# Patient Record
Sex: MALE | Race: WHITE | Employment: FULL TIME | URBAN - METROPOLITAN AREA
[De-identification: names, ages, dates, MRNs, and addresses within clinical notes are randomized per-mention and may not be internally consistent; named-entity substitution may affect disease eponyms.]

---

## 2017-01-09 ENCOUNTER — OFFICE VISIT (OUTPATIENT)
Dept: INTERNAL MEDICINE CLINIC | Age: 37
End: 2017-01-09

## 2017-01-09 VITALS
SYSTOLIC BLOOD PRESSURE: 146 MMHG | HEART RATE: 84 BPM | HEIGHT: 71 IN | WEIGHT: 287 LBS | BODY MASS INDEX: 40.18 KG/M2 | DIASTOLIC BLOOD PRESSURE: 108 MMHG

## 2017-01-09 DIAGNOSIS — I10 ESSENTIAL HYPERTENSION: Primary | ICD-10-CM

## 2017-01-09 DIAGNOSIS — E66.9 OBESITY, UNSPECIFIED OBESITY SEVERITY, UNSPECIFIED OBESITY TYPE: ICD-10-CM

## 2017-01-09 DIAGNOSIS — G47.33 OSA (OBSTRUCTIVE SLEEP APNEA): ICD-10-CM

## 2017-01-09 DIAGNOSIS — F32.A DEPRESSION, UNSPECIFIED DEPRESSION TYPE: ICD-10-CM

## 2017-01-09 PROCEDURE — 99204 OFFICE O/P NEW MOD 45 MIN: CPT | Performed by: INTERNAL MEDICINE

## 2017-01-09 RX ORDER — HYDROCHLOROTHIAZIDE 12.5 MG/1
12.5 CAPSULE, GELATIN COATED ORAL DAILY
Qty: 30 CAPSULE | Refills: 0 | Status: SHIPPED | OUTPATIENT
Start: 2017-01-09 | End: 2017-01-09 | Stop reason: SDUPTHER

## 2017-01-09 RX ORDER — HYDROCHLOROTHIAZIDE 12.5 MG/1
12.5 CAPSULE, GELATIN COATED ORAL DAILY
Qty: 30 CAPSULE | Refills: 3 | Status: SHIPPED | OUTPATIENT
Start: 2017-01-09 | End: 2017-01-09 | Stop reason: SDUPTHER

## 2017-01-09 RX ORDER — HYDROCHLOROTHIAZIDE 12.5 MG/1
12.5 CAPSULE, GELATIN COATED ORAL DAILY
Qty: 30 CAPSULE | Refills: 3 | Status: SHIPPED | OUTPATIENT
Start: 2017-01-09 | End: 2017-01-11 | Stop reason: SDUPTHER

## 2017-01-09 RX ORDER — SERTRALINE HYDROCHLORIDE 100 MG/1
100 TABLET, FILM COATED ORAL DAILY
COMMUNITY
End: 2017-05-01 | Stop reason: SDUPTHER

## 2017-01-09 ASSESSMENT — ENCOUNTER SYMPTOMS
EYE DISCHARGE: 0
EYE PAIN: 0
WHEEZING: 0
ABDOMINAL PAIN: 0
VOICE CHANGE: 0
BACK PAIN: 0
COLOR CHANGE: 0
VOMITING: 0
SHORTNESS OF BREATH: 0
COUGH: 0
NAUSEA: 0
RHINORRHEA: 0

## 2017-01-11 RX ORDER — HYDROCHLOROTHIAZIDE 12.5 MG/1
12.5 CAPSULE, GELATIN COATED ORAL DAILY
Qty: 90 CAPSULE | Refills: 3 | Status: SHIPPED | OUTPATIENT
Start: 2017-01-11 | End: 2017-05-01 | Stop reason: SDUPTHER

## 2017-01-28 ENCOUNTER — HOSPITAL ENCOUNTER (OUTPATIENT)
Dept: OTHER | Age: 37
Discharge: HOME | End: 2017-01-28
Attending: INTERNAL MEDICINE | Admitting: INTERNAL MEDICINE

## 2017-01-28 LAB
A/G RATIO: 1.7 (ref 1.1–2.2)
ALBUMIN SERPL-MCNC: 4.5 G/DL (ref 3.4–5)
ALP BLD-CCNC: 72 U/L (ref 40–129)
ALT SERPL-CCNC: 22 U/L (ref 10–40)
ANION GAP SERPL CALCULATED.3IONS-SCNC: 14 MMOL/L (ref 3–16)
AST SERPL-CCNC: 23 U/L (ref 15–37)
BILIRUB SERPL-MCNC: 0.5 MG/DL (ref 0–1)
BILIRUBIN URINE: NEGATIVE
BLOOD, URINE: NEGATIVE
BUN BLDV-MCNC: 17 MG/DL (ref 7–20)
CALCIUM SERPL-MCNC: 9.2 MG/DL (ref 8.3–10.6)
CHLORIDE BLD-SCNC: 100 MMOL/L (ref 99–110)
CHOLESTEROL, TOTAL: 197 MG/DL (ref 0–199)
CLARITY: CLEAR
CO2: 23 MMOL/L (ref 21–32)
COLOR: YELLOW
COMMENT UA: NORMAL
CREAT SERPL-MCNC: 0.8 MG/DL (ref 0.9–1.3)
EPITHELIAL CELLS, UA: 0 /HPF (ref 0–5)
GFR AFRICAN AMERICAN: >60
GFR NON-AFRICAN AMERICAN: >60
GLOBULIN: 2.7 G/DL
GLUCOSE BLD-MCNC: 81 MG/DL (ref 70–99)
GLUCOSE URINE: NEGATIVE MG/DL
HDLC SERPL-MCNC: 38 MG/DL (ref 40–60)
HYALINE CASTS: 0 /HPF (ref 0–8)
KETONES, URINE: NEGATIVE MG/DL
LDL CHOLESTEROL CALCULATED: 132 MG/DL
LEUKOCYTE ESTERASE, URINE: NEGATIVE
MICROSCOPIC EXAMINATION: YES
NITRITE, URINE: NEGATIVE
PH UA: 6
POTASSIUM SERPL-SCNC: 4.3 MMOL/L (ref 3.5–5.1)
PROTEIN UA: ABNORMAL MG/DL
RBC UA: 2 /HPF (ref 0–4)
SODIUM BLD-SCNC: 137 MMOL/L (ref 136–145)
SPECIFIC GRAVITY UA: 1.02
TOTAL PROTEIN: 7.2 G/DL (ref 6.4–8.2)
TRIGL SERPL-MCNC: 134 MG/DL (ref 0–150)
TSH REFLEX: 1.32 UIU/ML (ref 0.27–4.2)
URINE TYPE: ABNORMAL
UROBILINOGEN, URINE: 1 E.U./DL
VLDLC SERPL CALC-MCNC: 27 MG/DL
WBC UA: 1 /HPF (ref 0–5)

## 2017-02-22 ENCOUNTER — OFFICE VISIT (OUTPATIENT)
Dept: INTERNAL MEDICINE CLINIC | Age: 37
End: 2017-02-22

## 2017-02-22 VITALS
WEIGHT: 286 LBS | HEIGHT: 71 IN | HEART RATE: 80 BPM | DIASTOLIC BLOOD PRESSURE: 100 MMHG | SYSTOLIC BLOOD PRESSURE: 140 MMHG | BODY MASS INDEX: 40.04 KG/M2

## 2017-02-22 DIAGNOSIS — K21.9 GASTROESOPHAGEAL REFLUX DISEASE WITHOUT ESOPHAGITIS: ICD-10-CM

## 2017-02-22 DIAGNOSIS — E66.09 NON MORBID OBESITY DUE TO EXCESS CALORIES: ICD-10-CM

## 2017-02-22 DIAGNOSIS — E78.5 DYSLIPIDEMIA: ICD-10-CM

## 2017-02-22 DIAGNOSIS — F32.A DEPRESSION, UNSPECIFIED DEPRESSION TYPE: ICD-10-CM

## 2017-02-22 DIAGNOSIS — I10 ESSENTIAL HYPERTENSION: Primary | ICD-10-CM

## 2017-02-22 PROCEDURE — 99214 OFFICE O/P EST MOD 30 MIN: CPT | Performed by: INTERNAL MEDICINE

## 2017-02-22 RX ORDER — OMEPRAZOLE 20 MG/1
20 CAPSULE, DELAYED RELEASE ORAL DAILY
COMMUNITY
End: 2017-05-01 | Stop reason: SDUPTHER

## 2017-02-22 RX ORDER — LISINOPRIL 40 MG/1
40 TABLET ORAL DAILY
Qty: 30 TABLET | Refills: 3 | Status: SHIPPED | OUTPATIENT
Start: 2017-02-22 | End: 2017-05-01 | Stop reason: SDUPTHER

## 2017-02-22 ASSESSMENT — PATIENT HEALTH QUESTIONNAIRE - PHQ9
2. FEELING DOWN, DEPRESSED OR HOPELESS: 0
SUM OF ALL RESPONSES TO PHQ QUESTIONS 1-9: 0
1. LITTLE INTEREST OR PLEASURE IN DOING THINGS: 0
SUM OF ALL RESPONSES TO PHQ9 QUESTIONS 1 & 2: 0

## 2017-02-22 ASSESSMENT — ENCOUNTER SYMPTOMS
ABDOMINAL PAIN: 0
COUGH: 0
NAUSEA: 0
VOMITING: 0
SHORTNESS OF BREATH: 0
WHEEZING: 0

## 2017-03-13 ENCOUNTER — TELEPHONE (OUTPATIENT)
Dept: INTERNAL MEDICINE CLINIC | Age: 37
End: 2017-03-13

## 2017-04-27 ENCOUNTER — TELEPHONE (OUTPATIENT)
Dept: INTERNAL MEDICINE CLINIC | Age: 37
End: 2017-04-27

## 2017-05-01 ENCOUNTER — OFFICE VISIT (OUTPATIENT)
Dept: INTERNAL MEDICINE CLINIC | Age: 37
End: 2017-05-01

## 2017-05-01 VITALS
HEART RATE: 80 BPM | BODY MASS INDEX: 40.32 KG/M2 | WEIGHT: 288 LBS | SYSTOLIC BLOOD PRESSURE: 136 MMHG | HEIGHT: 71 IN | DIASTOLIC BLOOD PRESSURE: 86 MMHG

## 2017-05-01 DIAGNOSIS — E78.5 DYSLIPIDEMIA: ICD-10-CM

## 2017-05-01 DIAGNOSIS — K21.9 GASTROESOPHAGEAL REFLUX DISEASE WITHOUT ESOPHAGITIS: ICD-10-CM

## 2017-05-01 DIAGNOSIS — F32.A DEPRESSION, UNSPECIFIED DEPRESSION TYPE: ICD-10-CM

## 2017-05-01 DIAGNOSIS — I10 ESSENTIAL HYPERTENSION: Primary | ICD-10-CM

## 2017-05-01 PROCEDURE — 99214 OFFICE O/P EST MOD 30 MIN: CPT | Performed by: INTERNAL MEDICINE

## 2017-05-01 RX ORDER — LISINOPRIL 40 MG/1
40 TABLET ORAL DAILY
Qty: 90 TABLET | Refills: 1 | Status: SHIPPED | OUTPATIENT
Start: 2017-05-01 | End: 2018-01-24 | Stop reason: SDUPTHER

## 2017-05-01 RX ORDER — OMEPRAZOLE 20 MG/1
20 CAPSULE, DELAYED RELEASE ORAL DAILY
Qty: 90 CAPSULE | Refills: 1 | Status: SHIPPED | OUTPATIENT
Start: 2017-05-01 | End: 2018-01-23 | Stop reason: SDUPTHER

## 2017-05-01 RX ORDER — VERAPAMIL HYDROCHLORIDE 240 MG/1
240 TABLET, FILM COATED, EXTENDED RELEASE ORAL NIGHTLY
Qty: 90 TABLET | Refills: 1 | Status: SHIPPED | OUTPATIENT
Start: 2017-05-01 | End: 2018-01-23 | Stop reason: SDUPTHER

## 2017-05-01 RX ORDER — SERTRALINE HYDROCHLORIDE 100 MG/1
100 TABLET, FILM COATED ORAL DAILY
Qty: 90 TABLET | Refills: 1 | Status: SHIPPED | OUTPATIENT
Start: 2017-05-01 | End: 2018-01-23 | Stop reason: SDUPTHER

## 2017-05-01 RX ORDER — HYDROCHLOROTHIAZIDE 12.5 MG/1
12.5 CAPSULE, GELATIN COATED ORAL DAILY
Qty: 90 CAPSULE | Refills: 3 | Status: SHIPPED | OUTPATIENT
Start: 2017-05-01 | End: 2018-02-12 | Stop reason: DRUGHIGH

## 2017-05-01 ASSESSMENT — ENCOUNTER SYMPTOMS
COUGH: 0
SHORTNESS OF BREATH: 0
WHEEZING: 0

## 2017-09-18 ENCOUNTER — OFFICE VISIT (OUTPATIENT)
Dept: INTERNAL MEDICINE CLINIC | Age: 37
End: 2017-09-18

## 2017-09-18 VITALS
DIASTOLIC BLOOD PRESSURE: 88 MMHG | SYSTOLIC BLOOD PRESSURE: 138 MMHG | WEIGHT: 289 LBS | BODY MASS INDEX: 40.46 KG/M2 | HEIGHT: 71 IN | HEART RATE: 80 BPM

## 2017-09-18 DIAGNOSIS — I10 ESSENTIAL HYPERTENSION: Primary | ICD-10-CM

## 2017-09-18 DIAGNOSIS — F32.A DEPRESSION, UNSPECIFIED DEPRESSION TYPE: ICD-10-CM

## 2017-09-18 DIAGNOSIS — K21.9 GASTROESOPHAGEAL REFLUX DISEASE WITHOUT ESOPHAGITIS: ICD-10-CM

## 2017-09-18 PROCEDURE — 99214 OFFICE O/P EST MOD 30 MIN: CPT | Performed by: INTERNAL MEDICINE

## 2017-09-18 ASSESSMENT — ENCOUNTER SYMPTOMS
WHEEZING: 0
COUGH: 0

## 2018-01-23 DIAGNOSIS — K21.9 GASTROESOPHAGEAL REFLUX DISEASE WITHOUT ESOPHAGITIS: ICD-10-CM

## 2018-01-23 DIAGNOSIS — F32.A DEPRESSION, UNSPECIFIED DEPRESSION TYPE: ICD-10-CM

## 2018-01-23 DIAGNOSIS — I10 ESSENTIAL HYPERTENSION: ICD-10-CM

## 2018-01-24 DIAGNOSIS — I10 ESSENTIAL HYPERTENSION: ICD-10-CM

## 2018-01-24 RX ORDER — VERAPAMIL HYDROCHLORIDE 240 MG/1
TABLET, FILM COATED, EXTENDED RELEASE ORAL
Qty: 90 TABLET | Refills: 1 | Status: SHIPPED | OUTPATIENT
Start: 2018-01-24 | End: 2018-05-14 | Stop reason: SDUPTHER

## 2018-01-24 RX ORDER — SERTRALINE HYDROCHLORIDE 100 MG/1
TABLET, FILM COATED ORAL
Qty: 90 TABLET | Refills: 1 | Status: SHIPPED | OUTPATIENT
Start: 2018-01-24 | End: 2018-05-14 | Stop reason: SDUPTHER

## 2018-01-24 RX ORDER — LISINOPRIL 40 MG/1
TABLET ORAL
Qty: 90 TABLET | Refills: 1 | Status: SHIPPED | OUTPATIENT
Start: 2018-01-24 | End: 2018-05-14 | Stop reason: SDUPTHER

## 2018-01-24 RX ORDER — OMEPRAZOLE 20 MG/1
CAPSULE, DELAYED RELEASE ORAL
Qty: 90 CAPSULE | Refills: 1 | Status: SHIPPED | OUTPATIENT
Start: 2018-01-24 | End: 2018-10-17 | Stop reason: SDUPTHER

## 2018-01-29 ENCOUNTER — OFFICE VISIT (OUTPATIENT)
Dept: INTERNAL MEDICINE CLINIC | Age: 38
End: 2018-01-29

## 2018-01-29 ENCOUNTER — HOSPITAL ENCOUNTER (OUTPATIENT)
Dept: OTHER | Age: 38
Discharge: HOME | End: 2018-01-29
Attending: INTERNAL MEDICINE | Admitting: INTERNAL MEDICINE

## 2018-01-29 VITALS
SYSTOLIC BLOOD PRESSURE: 118 MMHG | BODY MASS INDEX: 39.9 KG/M2 | HEART RATE: 72 BPM | DIASTOLIC BLOOD PRESSURE: 86 MMHG | HEIGHT: 71 IN | WEIGHT: 285 LBS

## 2018-01-29 DIAGNOSIS — T14.8XXA PUNCTURE WOUND: ICD-10-CM

## 2018-01-29 DIAGNOSIS — F32.A DEPRESSION, UNSPECIFIED DEPRESSION TYPE: ICD-10-CM

## 2018-01-29 DIAGNOSIS — I10 ESSENTIAL HYPERTENSION: Primary | ICD-10-CM

## 2018-01-29 DIAGNOSIS — E78.5 DYSLIPIDEMIA: ICD-10-CM

## 2018-01-29 DIAGNOSIS — K21.9 GASTROESOPHAGEAL REFLUX DISEASE WITHOUT ESOPHAGITIS: ICD-10-CM

## 2018-01-29 PROCEDURE — 99214 OFFICE O/P EST MOD 30 MIN: CPT | Performed by: INTERNAL MEDICINE

## 2018-01-29 RX ORDER — CEPHALEXIN 500 MG/1
500 CAPSULE ORAL 3 TIMES DAILY
Qty: 21 CAPSULE | Refills: 0 | Status: SHIPPED | OUTPATIENT
Start: 2018-01-29 | End: 2018-02-05

## 2018-01-29 ASSESSMENT — ENCOUNTER SYMPTOMS
SHORTNESS OF BREATH: 0
CHEST TIGHTNESS: 0
EYE REDNESS: 0
WHEEZING: 0
ABDOMINAL PAIN: 0
NAUSEA: 0

## 2018-01-29 NOTE — PROGRESS NOTES
Readings from Last 3 Encounters:   01/29/18 285 lb (129.3 kg)   09/18/17 289 lb (131.1 kg)   05/01/17 288 lb (130.6 kg)       Objective:   Physical Exam   Constitutional: He is oriented to person, place, and time. He appears well-developed and well-nourished. No distress. Eyes: Conjunctivae are normal. No scleral icterus. Cardiovascular: Normal rate, regular rhythm and normal heart sounds. Pulmonary/Chest: Effort normal and breath sounds normal.   Abdominal: Soft. Bowel sounds are normal.   Musculoskeletal:   +ve puncture wound at the right 3rd finger. No drainage or redness. NO grossly palpable FB  Last Tdap 6 years ago   Neurological: He is alert and oriented to person, place, and time. Nursing note and vitals reviewed.     CBC:   Lab Results   Component Value Date    WBC 8.9 11/01/2012    HGB 14.7 11/01/2012    HCT 44.0 11/01/2012     11/01/2012     CMP:   Lab Results   Component Value Date     01/28/2017    K 4.3 01/28/2017     01/28/2017    CO2 23 01/28/2017    ANIONGAP 14 01/28/2017    GLUCOSE 81 01/28/2017    BUN 17 01/28/2017    CREATININE 0.8 01/28/2017    GFRAA >60 01/28/2017    GFRAA >60 11/01/2012    CALCIUM 9.2 01/28/2017    PROT 7.2 01/28/2017    LABALBU 4.5 01/28/2017    AGRATIO 1.7 01/28/2017    BILITOT 0.5 01/28/2017    ALKPHOS 72 01/28/2017    ALT 22 01/28/2017    AST 23 01/28/2017    GLOB 2.7 01/28/2017     URINALYSIS:   Lab Results   Component Value Date    GLUCOSEU Negative 01/28/2017    KETUA Negative 01/28/2017    SPECGRAV 1.025 01/28/2017    BLOODU Negative 01/28/2017    PHUR 6.0 01/28/2017    PROTEINU TRACE 01/28/2017    NITRU Negative 01/28/2017    LEUKOCYTESUR Negative 01/28/2017    LABMICR YES 01/28/2017    URINETYPE Not Specified 01/28/2017   MICRO/ALB:   Lab Results   Component Value Date    LABMICR YES 01/28/2017     LIPID:   Lab Results   Component Value Date    CHOL 197 01/28/2017    TRIG 134 01/28/2017    HDL 38 01/28/2017    LDLCALC 132 01/28/2017

## 2018-02-12 ENCOUNTER — OFFICE VISIT (OUTPATIENT)
Dept: INTERNAL MEDICINE CLINIC | Age: 38
End: 2018-02-12

## 2018-02-12 VITALS
BODY MASS INDEX: 38.5 KG/M2 | DIASTOLIC BLOOD PRESSURE: 96 MMHG | HEIGHT: 71 IN | WEIGHT: 275 LBS | HEART RATE: 72 BPM | SYSTOLIC BLOOD PRESSURE: 130 MMHG

## 2018-02-12 DIAGNOSIS — S69.91XD INJURY OF FINGER OF RIGHT HAND, SUBSEQUENT ENCOUNTER: Primary | ICD-10-CM

## 2018-02-12 DIAGNOSIS — I10 ESSENTIAL HYPERTENSION: ICD-10-CM

## 2018-02-12 PROCEDURE — 99213 OFFICE O/P EST LOW 20 MIN: CPT | Performed by: INTERNAL MEDICINE

## 2018-02-12 RX ORDER — HYDROCHLOROTHIAZIDE 25 MG/1
25 TABLET ORAL DAILY
Qty: 90 TABLET | Refills: 1 | Status: SHIPPED | OUTPATIENT
Start: 2018-02-12 | End: 2018-05-14 | Stop reason: SDUPTHER

## 2018-02-12 ASSESSMENT — ENCOUNTER SYMPTOMS
WHEEZING: 0
COUGH: 0
SHORTNESS OF BREATH: 0

## 2018-02-12 NOTE — PROGRESS NOTES
Subjective:      Chief Complaint   Patient presents with    2 Week Follow-Up    Hypertension    Weight Loss     10 more #--deliberate. portion, healthier choices   Patient is here for follow-up visit to check his right third finger puncture wound. Patient ID: Priya Talamantes is a 44 y.o. male. HPI  He denies any more pain, swelling and redness to the right third finger. He completed course of antibiotic. He does not monitor his blood pressure. He has been doing diet exercise weight loss. He denies chest pain shortness of breath leg swelling palpitation. Review of Systems   Constitutional: Negative for diaphoresis and unexpected weight change. Respiratory: Negative for cough, shortness of breath and wheezing. Cardiovascular: Negative for chest pain and palpitations. Neurological: Negative for dizziness and light-headedness. Allergies   Allergen Reactions    Morphine Hives   '    Vitals:    02/12/18 1645   BP: (!) 130/96   Pulse:        Objective:   Physical Exam   Constitutional: He appears well-developed and well-nourished. Cardiovascular: Normal rate, regular rhythm and normal heart sounds. Pulmonary/Chest: Effort normal and breath sounds normal. No respiratory distress. He has no wheezes. Musculoskeletal:   Examination of the right third finger  No redness, swelling, tenderness, deformity. Nursing note and vitals reviewed. Assessment/Plan:  Libertad Escobar was seen today for 2 week follow-up, hypertension and weight loss. Diagnoses and all orders for this visit:    Injury of finger of right hand, subsequent encounter  Feeling better. Essential hypertension  Increase-     hydrochlorothiazide (HYDRODIURIL) 25 MG tablet; Take 1 tablet by mouth daily  Continue verapamil, lisinopril  F/u in 3 months  An After Visit Summary was printed and given to the patient. Documentation was done using voice recognition dragon software.   Every effort was made to ensure accuracy; however, inadvertent

## 2018-02-24 ENCOUNTER — HOSPITAL ENCOUNTER (OUTPATIENT)
Dept: OTHER | Age: 38
Discharge: HOME | End: 2018-02-24
Attending: INTERNAL MEDICINE | Admitting: INTERNAL MEDICINE

## 2018-02-24 DIAGNOSIS — E78.5 DYSLIPIDEMIA: ICD-10-CM

## 2018-02-24 DIAGNOSIS — I10 ESSENTIAL HYPERTENSION: ICD-10-CM

## 2018-02-24 LAB
ANION GAP SERPL CALCULATED.3IONS-SCNC: 14 MMOL/L (ref 3–16)
BUN BLDV-MCNC: 17 MG/DL (ref 7–20)
CALCIUM SERPL-MCNC: 8.8 MG/DL (ref 8.3–10.6)
CHLORIDE BLD-SCNC: 103 MMOL/L (ref 99–110)
CHOLESTEROL, TOTAL: 146 MG/DL (ref 0–199)
CO2: 25 MMOL/L (ref 21–32)
CREAT SERPL-MCNC: 0.7 MG/DL (ref 0.9–1.3)
GFR AFRICAN AMERICAN: >60
GFR NON-AFRICAN AMERICAN: >60
GLUCOSE BLD-MCNC: 90 MG/DL (ref 70–99)
HDLC SERPL-MCNC: 33 MG/DL (ref 40–60)
LDL CHOLESTEROL CALCULATED: 95 MG/DL
POTASSIUM SERPL-SCNC: 3.9 MMOL/L (ref 3.5–5.1)
SODIUM BLD-SCNC: 142 MMOL/L (ref 136–145)
TRIGL SERPL-MCNC: 92 MG/DL (ref 0–150)
TSH REFLEX: 1.26 UIU/ML (ref 0.27–4.2)
VLDLC SERPL CALC-MCNC: 18 MG/DL

## 2018-05-14 ENCOUNTER — OFFICE VISIT (OUTPATIENT)
Dept: INTERNAL MEDICINE CLINIC | Age: 38
End: 2018-05-14

## 2018-05-14 VITALS
WEIGHT: 270 LBS | SYSTOLIC BLOOD PRESSURE: 122 MMHG | DIASTOLIC BLOOD PRESSURE: 82 MMHG | HEIGHT: 71 IN | BODY MASS INDEX: 37.8 KG/M2 | HEART RATE: 72 BPM

## 2018-05-14 DIAGNOSIS — E78.5 DYSLIPIDEMIA: ICD-10-CM

## 2018-05-14 DIAGNOSIS — F32.A DEPRESSION, UNSPECIFIED DEPRESSION TYPE: ICD-10-CM

## 2018-05-14 DIAGNOSIS — K21.9 GASTROESOPHAGEAL REFLUX DISEASE WITHOUT ESOPHAGITIS: ICD-10-CM

## 2018-05-14 DIAGNOSIS — I10 ESSENTIAL HYPERTENSION: Primary | ICD-10-CM

## 2018-05-14 PROCEDURE — 99214 OFFICE O/P EST MOD 30 MIN: CPT | Performed by: INTERNAL MEDICINE

## 2018-05-14 RX ORDER — VERAPAMIL HYDROCHLORIDE 240 MG/1
TABLET, FILM COATED, EXTENDED RELEASE ORAL
Qty: 90 TABLET | Refills: 1 | Status: SHIPPED | OUTPATIENT
Start: 2018-05-14 | End: 2018-10-17 | Stop reason: SDUPTHER

## 2018-05-14 RX ORDER — CHLORAL HYDRATE 500 MG
1000 CAPSULE ORAL 2 TIMES DAILY
Qty: 180 CAPSULE | Refills: 1 | Status: SHIPPED | OUTPATIENT
Start: 2018-05-14 | End: 2018-10-17

## 2018-05-14 RX ORDER — HYDROCHLOROTHIAZIDE 25 MG/1
25 TABLET ORAL DAILY
Qty: 90 TABLET | Refills: 1 | Status: SHIPPED | OUTPATIENT
Start: 2018-05-14 | End: 2018-10-17 | Stop reason: SDUPTHER

## 2018-05-14 RX ORDER — SERTRALINE HYDROCHLORIDE 100 MG/1
TABLET, FILM COATED ORAL
Qty: 90 TABLET | Refills: 1 | Status: SHIPPED | OUTPATIENT
Start: 2018-05-14 | End: 2018-10-17 | Stop reason: SDUPTHER

## 2018-05-14 RX ORDER — LISINOPRIL 40 MG/1
TABLET ORAL
Qty: 90 TABLET | Refills: 1 | Status: SHIPPED | OUTPATIENT
Start: 2018-05-14 | End: 2018-10-17 | Stop reason: SDUPTHER

## 2018-05-14 ASSESSMENT — PATIENT HEALTH QUESTIONNAIRE - PHQ9
2. FEELING DOWN, DEPRESSED OR HOPELESS: 0
SUM OF ALL RESPONSES TO PHQ QUESTIONS 1-9: 0
SUM OF ALL RESPONSES TO PHQ9 QUESTIONS 1 & 2: 0
1. LITTLE INTEREST OR PLEASURE IN DOING THINGS: 0

## 2018-05-14 ASSESSMENT — ENCOUNTER SYMPTOMS
WHEEZING: 0
ABDOMINAL PAIN: 0
NAUSEA: 0
SHORTNESS OF BREATH: 0
VOMITING: 0

## 2018-09-26 ENCOUNTER — TELEPHONE (OUTPATIENT)
Dept: INTERNAL MEDICINE CLINIC | Age: 38
End: 2018-09-26

## 2018-10-17 ENCOUNTER — OFFICE VISIT (OUTPATIENT)
Dept: INTERNAL MEDICINE CLINIC | Age: 38
End: 2018-10-17
Payer: COMMERCIAL

## 2018-10-17 VITALS
HEART RATE: 78 BPM | SYSTOLIC BLOOD PRESSURE: 108 MMHG | WEIGHT: 279 LBS | HEIGHT: 71 IN | DIASTOLIC BLOOD PRESSURE: 80 MMHG | BODY MASS INDEX: 39.06 KG/M2

## 2018-10-17 DIAGNOSIS — F32.A DEPRESSION, UNSPECIFIED DEPRESSION TYPE: ICD-10-CM

## 2018-10-17 DIAGNOSIS — K21.9 GASTROESOPHAGEAL REFLUX DISEASE WITHOUT ESOPHAGITIS: ICD-10-CM

## 2018-10-17 DIAGNOSIS — I10 ESSENTIAL HYPERTENSION: Primary | ICD-10-CM

## 2018-10-17 DIAGNOSIS — I10 ESSENTIAL HYPERTENSION: ICD-10-CM

## 2018-10-17 DIAGNOSIS — G47.00 INSOMNIA, UNSPECIFIED TYPE: ICD-10-CM

## 2018-10-17 LAB
ANION GAP SERPL CALCULATED.3IONS-SCNC: 15 MMOL/L (ref 3–16)
BUN BLDV-MCNC: 18 MG/DL (ref 7–20)
CALCIUM SERPL-MCNC: 9.7 MG/DL (ref 8.3–10.6)
CHLORIDE BLD-SCNC: 101 MMOL/L (ref 99–110)
CO2: 22 MMOL/L (ref 21–32)
CREAT SERPL-MCNC: 0.9 MG/DL (ref 0.9–1.3)
GFR AFRICAN AMERICAN: >60
GFR NON-AFRICAN AMERICAN: >60
GLUCOSE BLD-MCNC: 83 MG/DL (ref 70–99)
POTASSIUM SERPL-SCNC: 4.2 MMOL/L (ref 3.5–5.1)
SODIUM BLD-SCNC: 138 MMOL/L (ref 136–145)

## 2018-10-17 PROCEDURE — 99214 OFFICE O/P EST MOD 30 MIN: CPT | Performed by: INTERNAL MEDICINE

## 2018-10-17 PROCEDURE — G0444 DEPRESSION SCREEN ANNUAL: HCPCS | Performed by: INTERNAL MEDICINE

## 2018-10-17 RX ORDER — VERAPAMIL HYDROCHLORIDE 240 MG/1
TABLET, FILM COATED, EXTENDED RELEASE ORAL
Qty: 90 TABLET | Refills: 1 | Status: SHIPPED | OUTPATIENT
Start: 2018-10-17 | End: 2019-01-21 | Stop reason: SDUPTHER

## 2018-10-17 RX ORDER — OMEPRAZOLE 20 MG/1
CAPSULE, DELAYED RELEASE ORAL
Qty: 90 CAPSULE | Refills: 1 | Status: SHIPPED | OUTPATIENT
Start: 2018-10-17 | End: 2019-01-21 | Stop reason: SDUPTHER

## 2018-10-17 RX ORDER — HYDROCHLOROTHIAZIDE 25 MG/1
25 TABLET ORAL DAILY
Qty: 90 TABLET | Refills: 1 | Status: SHIPPED | OUTPATIENT
Start: 2018-10-17 | End: 2019-01-21 | Stop reason: SDUPTHER

## 2018-10-17 RX ORDER — CALCIUM CARBONATE 200(500)MG
6 TABLET,CHEWABLE ORAL DAILY PRN
COMMUNITY

## 2018-10-17 RX ORDER — LISINOPRIL 40 MG/1
TABLET ORAL
Qty: 90 TABLET | Refills: 1 | Status: SHIPPED | OUTPATIENT
Start: 2018-10-17 | End: 2019-01-21 | Stop reason: SDUPTHER

## 2018-10-17 ASSESSMENT — PATIENT HEALTH QUESTIONNAIRE - PHQ9
SUM OF ALL RESPONSES TO PHQ QUESTIONS 1-9: 0
4. FEELING TIRED OR HAVING LITTLE ENERGY: 0
7. TROUBLE CONCENTRATING ON THINGS, SUCH AS READING THE NEWSPAPER OR WATCHING TELEVISION: 0
8. MOVING OR SPEAKING SO SLOWLY THAT OTHER PEOPLE COULD HAVE NOTICED. OR THE OPPOSITE, BEING SO FIGETY OR RESTLESS THAT YOU HAVE BEEN MOVING AROUND A LOT MORE THAN USUAL: 0
1. LITTLE INTEREST OR PLEASURE IN DOING THINGS: 0
SUM OF ALL RESPONSES TO PHQ9 QUESTIONS 1 & 2: 0
10. IF YOU CHECKED OFF ANY PROBLEMS, HOW DIFFICULT HAVE THESE PROBLEMS MADE IT FOR YOU TO DO YOUR WORK, TAKE CARE OF THINGS AT HOME, OR GET ALONG WITH OTHER PEOPLE: 0
3. TROUBLE FALLING OR STAYING ASLEEP: 0
5. POOR APPETITE OR OVEREATING: 0
9. THOUGHTS THAT YOU WOULD BE BETTER OFF DEAD, OR OF HURTING YOURSELF: 0
SUM OF ALL RESPONSES TO PHQ QUESTIONS 1-9: 0
2. FEELING DOWN, DEPRESSED OR HOPELESS: 0
6. FEELING BAD ABOUT YOURSELF - OR THAT YOU ARE A FAILURE OR HAVE LET YOURSELF OR YOUR FAMILY DOWN: 0

## 2018-10-17 ASSESSMENT — ENCOUNTER SYMPTOMS
NAUSEA: 0
WHEEZING: 0
VOMITING: 0
SHORTNESS OF BREATH: 0

## 2018-11-07 ENCOUNTER — TELEPHONE (OUTPATIENT)
Dept: INTERNAL MEDICINE CLINIC | Age: 38
End: 2018-11-07

## 2019-01-21 ENCOUNTER — OFFICE VISIT (OUTPATIENT)
Dept: INTERNAL MEDICINE CLINIC | Age: 39
End: 2019-01-21
Payer: COMMERCIAL

## 2019-01-21 VITALS
DIASTOLIC BLOOD PRESSURE: 80 MMHG | HEIGHT: 71 IN | WEIGHT: 280 LBS | HEART RATE: 76 BPM | BODY MASS INDEX: 39.2 KG/M2 | SYSTOLIC BLOOD PRESSURE: 124 MMHG

## 2019-01-21 DIAGNOSIS — G47.00 INSOMNIA, UNSPECIFIED TYPE: ICD-10-CM

## 2019-01-21 DIAGNOSIS — I10 ESSENTIAL HYPERTENSION: ICD-10-CM

## 2019-01-21 DIAGNOSIS — F32.A DEPRESSION, UNSPECIFIED DEPRESSION TYPE: Primary | ICD-10-CM

## 2019-01-21 DIAGNOSIS — K21.9 GASTROESOPHAGEAL REFLUX DISEASE WITHOUT ESOPHAGITIS: ICD-10-CM

## 2019-01-21 PROCEDURE — 99214 OFFICE O/P EST MOD 30 MIN: CPT | Performed by: INTERNAL MEDICINE

## 2019-01-21 RX ORDER — SERTRALINE HYDROCHLORIDE 25 MG/1
25 TABLET, FILM COATED ORAL DAILY
Qty: 90 TABLET | Refills: 0 | Status: SHIPPED | OUTPATIENT
Start: 2019-01-21 | End: 2019-03-11 | Stop reason: SDUPTHER

## 2019-01-21 RX ORDER — OMEPRAZOLE 20 MG/1
CAPSULE, DELAYED RELEASE ORAL
Qty: 90 CAPSULE | Refills: 1 | Status: SHIPPED | OUTPATIENT
Start: 2019-01-21 | End: 2019-08-24 | Stop reason: SDUPTHER

## 2019-01-21 RX ORDER — VERAPAMIL HYDROCHLORIDE 240 MG/1
TABLET, FILM COATED, EXTENDED RELEASE ORAL
Qty: 90 TABLET | Refills: 1 | Status: SHIPPED | OUTPATIENT
Start: 2019-01-21 | End: 2019-08-24 | Stop reason: SDUPTHER

## 2019-01-21 RX ORDER — LISINOPRIL 40 MG/1
TABLET ORAL
Qty: 90 TABLET | Refills: 1 | Status: SHIPPED | OUTPATIENT
Start: 2019-01-21 | End: 2019-08-24 | Stop reason: SDUPTHER

## 2019-01-21 RX ORDER — HYDROCHLOROTHIAZIDE 25 MG/1
25 TABLET ORAL DAILY
Qty: 90 TABLET | Refills: 1 | Status: SHIPPED | OUTPATIENT
Start: 2019-01-21 | End: 2019-08-24 | Stop reason: SDUPTHER

## 2019-01-21 ASSESSMENT — ENCOUNTER SYMPTOMS
CHEST TIGHTNESS: 0
SHORTNESS OF BREATH: 0
NAUSEA: 0
VOMITING: 0
ABDOMINAL PAIN: 0

## 2019-04-29 PROBLEM — M75.51 BURSITIS OF RIGHT SHOULDER: Status: ACTIVE | Noted: 2019-04-29

## 2019-05-30 ENCOUNTER — TELEPHONE (OUTPATIENT)
Dept: INTERNAL MEDICINE CLINIC | Age: 39
End: 2019-05-30

## 2019-07-16 ENCOUNTER — OFFICE VISIT (OUTPATIENT)
Dept: INTERNAL MEDICINE CLINIC | Age: 39
End: 2019-07-16
Payer: COMMERCIAL

## 2019-07-16 ENCOUNTER — HOSPITAL ENCOUNTER (OUTPATIENT)
Dept: CT IMAGING | Age: 39
Discharge: HOME | End: 2019-07-16
Payer: COMMERCIAL

## 2019-07-16 VITALS
HEART RATE: 84 BPM | HEIGHT: 71 IN | BODY MASS INDEX: 40.6 KG/M2 | SYSTOLIC BLOOD PRESSURE: 124 MMHG | TEMPERATURE: 98.5 F | WEIGHT: 290 LBS | OXYGEN SATURATION: 98 % | DIASTOLIC BLOOD PRESSURE: 82 MMHG

## 2019-07-16 DIAGNOSIS — N50.819 TESTICULAR PAIN: ICD-10-CM

## 2019-07-16 DIAGNOSIS — R10.9 LEFT FLANK PAIN: ICD-10-CM

## 2019-07-16 DIAGNOSIS — R10.9 LEFT FLANK PAIN: Primary | ICD-10-CM

## 2019-07-16 LAB
BILIRUBIN, POC: NORMAL
BLOOD URINE, POC: NORMAL
CLARITY, POC: CLEAR
COLOR, POC: YELLOW
GLUCOSE URINE, POC: NORMAL
KETONES, POC: NORMAL
LEUKOCYTE EST, POC: NORMAL
NITRITE, POC: NORMAL
PH, POC: 6
PROTEIN, POC: NORMAL
SPECIFIC GRAVITY, POC: 1.02
UROBILINOGEN, POC: 2

## 2019-07-16 PROCEDURE — 99213 OFFICE O/P EST LOW 20 MIN: CPT | Performed by: NURSE PRACTITIONER

## 2019-07-16 PROCEDURE — 74176 CT ABD & PELVIS W/O CONTRAST: CPT

## 2019-07-16 PROCEDURE — 81002 URINALYSIS NONAUTO W/O SCOPE: CPT | Performed by: NURSE PRACTITIONER

## 2019-07-16 ASSESSMENT — ENCOUNTER SYMPTOMS
NAUSEA: 0
CONSTIPATION: 0
ABDOMINAL PAIN: 0
BLOOD IN STOOL: 0
DIARRHEA: 0
SHORTNESS OF BREATH: 0
VOMITING: 0
COUGH: 0

## 2019-07-16 NOTE — PATIENT INSTRUCTIONS
medicines. Read and follow all instructions on the label. · If the doctor gave you a prescription medicine for pain, take it as prescribed. When should you call for help? Call your doctor now or seek immediate medical care if:    · You have severe or increasing pain.     · You notice a change in how your testicles look or are positioned in your scrotum.     · You notice new or worse swelling in your scrotum.     · You have symptoms of a urinary problem, such as a urinary tract infection. These may include:  ? Pain or burning when you urinate. ? A frequent need to urinate without being able to pass much urine. ? Pain in the flank, which is just below the rib cage and above the waist on either side of the back. ? Blood in your urine. ? A fever.    Watch closely for changes in your health, and be sure to contact your doctor if:    · You do not get better as expected. Where can you learn more? Go to [email or web address] org and sign in to your [employer] account. Enter T821 in the [employer] box to learn more about \"Testicular Pain: Care Instructions. \"     If you do not have an account, please click on the \"Sign Up Now\" link. Current as of: December 19, 2018  Content Version: 12.0  © 8877-4404 Healthwise, Incorporated. Care instructions adapted under license by [facility] ([facility]). If you have questions about a medical condition or this instruction, always ask your healthcare professional. [place] [address] any warranty or liability for your use of this information.

## 2019-07-16 NOTE — PROGRESS NOTES
Acute Office Visit  7/16/2019    SUBJECTIVE:    Patient ID: Gabrielavicki Webb is a 43 y.o. male. Chief Complaint   Patient presents with    Back Pain     and testicle, left back started yesterday      HPI: The patient presents to the office for an acute visit. Pt reports left flank pain that started yesterday. Last night, pain started radiating to his testicles bilaterally. Pt reports hesitancy today with urination. Last BM was last night and normal.   Pt reports a history of epididymitis. No abdominal pain. No new sexual partners. No dysuria, hematuria, nocturia, urinary frequency, urgency, urinary cloudiness or odor. No fever/chills. No recent UTI. No penile swelling, pain or discharge. He states he is staying hydrated. Allergies   Allergen Reactions    Morphine Hives     Current Outpatient Medications   Medication Sig Dispense Refill    sertraline (ZOLOFT) 25 MG tablet TAKE 1 TABLET BY MOUTH  DAILY 90 tablet 1    hydrochlorothiazide (HYDRODIURIL) 25 MG tablet Take 1 tablet by mouth daily 90 tablet 1    lisinopril (PRINIVIL;ZESTRIL) 40 MG tablet TAKE 1 TABLET DAILY 90 tablet 1    verapamil (CALAN SR) 240 MG extended release tablet TAKE 1 TABLET daily 90 tablet 1    omeprazole (PRILOSEC) 20 MG delayed release capsule TAKE 1 CAPSULE DAILY 90 capsule 1    calcium carbonate (TUMS) 500 MG chewable tablet Take 6 tablets by mouth daily as needed for Heartburn       No current facility-administered medications for this visit. Review of Systems   Constitutional: Negative for appetite change, chills, fatigue and fever. Respiratory: Negative for cough and shortness of breath. Cardiovascular: Negative for chest pain. Gastrointestinal: Negative for abdominal pain, blood in stool, constipation, diarrhea, nausea and vomiting. Endocrine: Negative for polydipsia, polyphagia and polyuria. Genitourinary: Positive for flank pain (left sided), scrotal swelling and testicular pain.  Negative for decreased urine volume, difficulty urinating, discharge, dysuria, enuresis, frequency, genital sores, hematuria, penile pain, penile swelling and urgency. Musculoskeletal: Negative for gait problem. Skin: Negative for pallor. OBJECTIVE:  /82   Pulse 84   Temp 98.5 °F (36.9 °C)   Ht 5' 10.5\" (1.791 m)   Wt 290 lb (131.5 kg)   SpO2 98%   BMI 41.02 kg/m²    Physical Exam   Constitutional: He is oriented to person, place, and time. He appears well-developed and well-nourished. No distress. HENT:   Head: Normocephalic. Cardiovascular: Normal rate, regular rhythm and normal heart sounds. No murmur heard. Pulmonary/Chest: Effort normal and breath sounds normal. No respiratory distress. He has no wheezes. Abdominal: Soft. Bowel sounds are normal. He exhibits no distension and no mass. There is no tenderness. There is no rebound and no guarding. Genitourinary: Penis normal. No penile tenderness. Genitourinary Comments: CVA tenderness to the left side  Cremasteric reflex present bilaterally   Musculoskeletal: Normal range of motion. He exhibits no tenderness. Neurological: He is alert and oriented to person, place, and time. Skin: Skin is warm and dry. No rash noted. He is not diaphoretic. No erythema. No pallor. Psychiatric: He has a normal mood and affect. Vitals reviewed. A chaperone, Drake Mahoney MA present during penile/scrotal exam.    ASSESSMENT/PLAN:  Jovana Orantes was seen today for back pain. Diagnoses and all orders for this visit:    Left flank pain/Testicular pain  -     Not sudden onset pain. Left flank pain started yesterday and testicular pain started throughout the day. - CVA tenderness to the left side  - Cremasteric reflex present bilaterally. Low suspicion for torsion. No pain with palpation  - Trace lysed hematuria noted on UA with left flank pain. - Stat CT abdomen pelvis today.   - Will send urine for culture and screen for STDs  - POCT Urinalysis no Micro- trace blood  -     C.trachomatis N.gonorrhoeae DNA, Urine; Future  -     URINE CULTURE  -     CT ABDOMEN PELVIS WO CONTRAST Additional Contrast? Radiologist Recommendation; Future- STAT  - Recommended an increase in fluid water intake and tylenol PRN  - Testicular pain patient education handout provided and reviewed with the patient. If not improving, US reviewed with the pt. Symptomatic management reviewed. - Red flag warning signs reviewed with the pt. Pt will call if symptoms worsen or fail to improve. Return for as previously scheduled or sooner if needed. Pt informed to call if symptoms worsen or fail to improve. All questions answered. Patient states no further questions or concerns at this time.     Electronically signed by LORENZA Mcintosh CNP 07/16/19

## 2019-07-17 ENCOUNTER — TELEPHONE (OUTPATIENT)
Dept: INTERNAL MEDICINE CLINIC | Age: 39
End: 2019-07-17

## 2019-07-17 ENCOUNTER — HOSPITAL ENCOUNTER (OUTPATIENT)
Dept: ULTRASOUND IMAGING | Age: 39
Discharge: HOME | End: 2019-07-17
Payer: COMMERCIAL

## 2019-07-17 DIAGNOSIS — N50.819 TESTICULAR PAIN: ICD-10-CM

## 2019-07-17 DIAGNOSIS — R10.9 LEFT FLANK PAIN: ICD-10-CM

## 2019-07-17 DIAGNOSIS — R10.9 LEFT FLANK PAIN: Primary | ICD-10-CM

## 2019-07-17 LAB
C. TRACHOMATIS DNA ,URINE: NEGATIVE
N. GONORRHOEAE DNA, URINE: NEGATIVE

## 2019-07-17 PROCEDURE — 76870 US EXAM SCROTUM: CPT

## 2019-07-17 RX ORDER — LEVOFLOXACIN 500 MG/1
500 TABLET, FILM COATED ORAL DAILY
Qty: 10 TABLET | Refills: 0 | Status: SHIPPED | OUTPATIENT
Start: 2019-07-17 | End: 2019-07-27

## 2019-07-17 RX ORDER — NAPROXEN 500 MG/1
500 TABLET ORAL 2 TIMES DAILY WITH MEALS
Qty: 60 TABLET | Refills: 0 | Status: SHIPPED | OUTPATIENT
Start: 2019-07-17 | End: 2020-02-24 | Stop reason: ALTCHOICE

## 2019-07-17 NOTE — TELEPHONE ENCOUNTER
Please call and inform the patient that his testicular ultrasound showed epididymitis. Gonorrhea and chlamydia was negative as well as UA. Urine culture still pending. Will prescribe Levaquin 500 mg daily for 10 days- sent to pharmacy. Encourage lifestyle modifications such as wearing supportive underwear and ice to the area.     Call if symptoms worsen or fail to improve    Electronically signed by: LORENZA Ruiz CNP 07/17/19

## 2019-07-18 LAB — URINE CULTURE, ROUTINE: NORMAL

## 2019-08-26 ENCOUNTER — OFFICE VISIT (OUTPATIENT)
Dept: INTERNAL MEDICINE CLINIC | Age: 39
End: 2019-08-26
Payer: COMMERCIAL

## 2019-08-26 VITALS
SYSTOLIC BLOOD PRESSURE: 118 MMHG | WEIGHT: 291 LBS | BODY MASS INDEX: 40.74 KG/M2 | HEIGHT: 71 IN | HEART RATE: 90 BPM | DIASTOLIC BLOOD PRESSURE: 84 MMHG

## 2019-08-26 DIAGNOSIS — Z83.3 FAMILY HISTORY OF DIABETES MELLITUS (DM): ICD-10-CM

## 2019-08-26 DIAGNOSIS — I10 ESSENTIAL HYPERTENSION: ICD-10-CM

## 2019-08-26 DIAGNOSIS — Z80.42 FAMILY HISTORY OF PROSTATE CANCER: ICD-10-CM

## 2019-08-26 DIAGNOSIS — Z00.00 ROUTINE PHYSICAL EXAMINATION: Primary | ICD-10-CM

## 2019-08-26 DIAGNOSIS — K21.9 GASTROESOPHAGEAL REFLUX DISEASE WITHOUT ESOPHAGITIS: ICD-10-CM

## 2019-08-26 PROCEDURE — 99396 PREV VISIT EST AGE 40-64: CPT | Performed by: INTERNAL MEDICINE

## 2019-08-26 ASSESSMENT — ENCOUNTER SYMPTOMS
SHORTNESS OF BREATH: 0
VOICE CHANGE: 0
BACK PAIN: 0
RHINORRHEA: 0
EYE DISCHARGE: 0
EYE PAIN: 0
NAUSEA: 0
ABDOMINAL PAIN: 0
COLOR CHANGE: 0
VOMITING: 0
WHEEZING: 0
TROUBLE SWALLOWING: 0
COUGH: 0

## 2019-08-26 NOTE — PROGRESS NOTES
Days per week: None     Minutes per session: None    Stress: None   Relationships    Social connections:     Talks on phone: None     Gets together: None     Attends [employer] service: None     Active member of club or organization: None     Attends meetings of clubs or organizations: None     Relationship status: None    Intimate partner violence:     Fear of current or ex partner: None     Emotionally abused: None     Physically abused: None     Forced sexual activity: None   Other Topics Concern    None   Social History Narrative    None     Family History   Problem Relation Age of Onset    Brain Cancer Mother 58    High Blood Pressure Father     Diabetes Father     Prostate Cancer Father     Heart Disease Father         poss MI       Review of Systems   Constitutional: Negative for appetite change, chills, fever and unexpected weight change. HENT: Negative for congestion, ear discharge, ear pain, rhinorrhea, trouble swallowing and voice change. Eyes: Negative for pain and discharge. Respiratory: Negative for cough, shortness of breath and wheezing. Cardiovascular: Negative for chest pain, palpitations and leg swelling. Gastrointestinal: Negative for abdominal pain, nausea and vomiting. Endocrine: Negative for cold intolerance and heat intolerance. Genitourinary: Negative for dysuria, flank pain and hematuria. Musculoskeletal: Negative for back pain, joint swelling, neck pain and neck stiffness. Skin: Negative for color change and rash. Neurological: Negative for syncope, light-headedness and headaches. Hematological: Negative for adenopathy. Does not bruise/bleed easily. Psychiatric/Behavioral: Negative for decreased concentration. The patient is not nervous/anxious.         OBJECTIVE:  Pulse Readings from Last 4 Encounters:   08/26/19 90   07/16/19 84   01/21/19 76   10/17/18 78     Wt Readings from Last 4 Encounters:   08/26/19 291 lb (132 kg)   07/16/19 290 lb (131.5 kg)

## 2019-09-07 ENCOUNTER — HOSPITAL ENCOUNTER (OUTPATIENT)
Age: 39
Discharge: HOME | End: 2019-09-07
Payer: COMMERCIAL

## 2019-09-07 DIAGNOSIS — Z00.00 ROUTINE PHYSICAL EXAMINATION: ICD-10-CM

## 2019-09-07 DIAGNOSIS — Z80.42 FAMILY HISTORY OF PROSTATE CANCER: ICD-10-CM

## 2019-09-07 DIAGNOSIS — Z83.3 FAMILY HISTORY OF DIABETES MELLITUS (DM): ICD-10-CM

## 2019-09-07 LAB
A/G RATIO: 1.7 (ref 1.1–2.2)
ALBUMIN SERPL-MCNC: 4.6 G/DL (ref 3.4–5)
ALP BLD-CCNC: 74 U/L (ref 40–129)
ALT SERPL-CCNC: 20 U/L (ref 10–40)
ANION GAP SERPL CALCULATED.3IONS-SCNC: 12 MMOL/L (ref 3–16)
AST SERPL-CCNC: 25 U/L (ref 15–37)
BILIRUB SERPL-MCNC: 0.3 MG/DL (ref 0–1)
BILIRUBIN URINE: NEGATIVE
BLOOD, URINE: ABNORMAL
BUN BLDV-MCNC: 17 MG/DL (ref 7–20)
CALCIUM SERPL-MCNC: 9.3 MG/DL (ref 8.3–10.6)
CHLORIDE BLD-SCNC: 100 MMOL/L (ref 99–110)
CHOLESTEROL, TOTAL: 171 MG/DL (ref 0–199)
CLARITY: CLEAR
CO2: 23 MMOL/L (ref 21–32)
COLOR: YELLOW
CREAT SERPL-MCNC: 0.9 MG/DL (ref 0.9–1.3)
EPITHELIAL CELLS, UA: 0 /HPF (ref 0–5)
GFR AFRICAN AMERICAN: >60
GFR NON-AFRICAN AMERICAN: >60
GLOBULIN: 2.7 G/DL
GLUCOSE BLD-MCNC: 85 MG/DL (ref 70–99)
GLUCOSE URINE: NEGATIVE MG/DL
HCT VFR BLD CALC: 45.6 % (ref 40.5–52.5)
HDLC SERPL-MCNC: 40 MG/DL (ref 40–60)
HEMOGLOBIN: 15 G/DL (ref 13.5–17.5)
HYALINE CASTS: 0 /LPF (ref 0–8)
KETONES, URINE: NEGATIVE MG/DL
LDL CHOLESTEROL CALCULATED: 116 MG/DL
LEUKOCYTE ESTERASE, URINE: NEGATIVE
MCH RBC QN AUTO: 31 PG (ref 26–34)
MCHC RBC AUTO-ENTMCNC: 33 G/DL (ref 31–36)
MCV RBC AUTO: 93.9 FL (ref 80–100)
MICROSCOPIC EXAMINATION: YES
NITRITE, URINE: NEGATIVE
PDW BLD-RTO: 13.6 % (ref 12.4–15.4)
PH UA: 6 (ref 5–8)
PLATELET # BLD: 295 K/UL (ref 135–450)
PMV BLD AUTO: 8.1 FL (ref 5–10.5)
POTASSIUM SERPL-SCNC: 4.5 MMOL/L (ref 3.5–5.1)
PROSTATE SPECIFIC ANTIGEN: 0.33 NG/ML (ref 0–4)
PROTEIN UA: NEGATIVE MG/DL
RBC # BLD: 4.85 M/UL (ref 4.2–5.9)
RBC UA: 4 /HPF (ref 0–4)
SODIUM BLD-SCNC: 135 MMOL/L (ref 136–145)
SPECIFIC GRAVITY UA: 1.02 (ref 1–1.03)
TOTAL PROTEIN: 7.3 G/DL (ref 6.4–8.2)
TRIGL SERPL-MCNC: 75 MG/DL (ref 0–150)
TSH REFLEX: 1.44 UIU/ML (ref 0.27–4.2)
URINE TYPE: ABNORMAL
UROBILINOGEN, URINE: 0.2 E.U./DL
VLDLC SERPL CALC-MCNC: 15 MG/DL
WBC # BLD: 5.1 K/UL (ref 4–11)
WBC UA: 1 /HPF (ref 0–5)

## 2019-09-07 PROCEDURE — 80053 COMPREHEN METABOLIC PANEL: CPT

## 2019-09-07 PROCEDURE — 83036 HEMOGLOBIN GLYCOSYLATED A1C: CPT

## 2019-09-07 PROCEDURE — 81001 URINALYSIS AUTO W/SCOPE: CPT

## 2019-09-07 PROCEDURE — 80061 LIPID PANEL: CPT

## 2019-09-07 PROCEDURE — 85027 COMPLETE CBC AUTOMATED: CPT

## 2019-09-07 PROCEDURE — 36415 COLL VENOUS BLD VENIPUNCTURE: CPT

## 2019-09-07 PROCEDURE — 84443 ASSAY THYROID STIM HORMONE: CPT

## 2019-09-07 PROCEDURE — 84153 ASSAY OF PSA TOTAL: CPT

## 2019-09-08 LAB
ESTIMATED AVERAGE GLUCOSE: 119.8 MG/DL
HBA1C MFR BLD: 5.8 %

## 2020-02-05 RX ORDER — LISINOPRIL 40 MG/1
TABLET ORAL
Qty: 90 TABLET | Refills: 1 | Status: SHIPPED | OUTPATIENT
Start: 2020-02-05 | End: 2020-07-27

## 2020-02-05 RX ORDER — OMEPRAZOLE 20 MG/1
CAPSULE, DELAYED RELEASE ORAL
Qty: 90 CAPSULE | Refills: 1 | Status: SHIPPED | OUTPATIENT
Start: 2020-02-05 | End: 2020-07-27

## 2020-02-05 RX ORDER — VERAPAMIL HYDROCHLORIDE 240 MG/1
TABLET, FILM COATED, EXTENDED RELEASE ORAL
Qty: 90 TABLET | Refills: 1 | Status: SHIPPED | OUTPATIENT
Start: 2020-02-05 | End: 2020-07-27

## 2020-02-21 RX ORDER — HYDROCHLOROTHIAZIDE 25 MG/1
25 TABLET ORAL DAILY
Qty: 90 TABLET | Refills: 1 | Status: SHIPPED | OUTPATIENT
Start: 2020-02-21 | End: 2020-07-21 | Stop reason: SDUPTHER

## 2020-02-24 ENCOUNTER — OFFICE VISIT (OUTPATIENT)
Dept: INTERNAL MEDICINE CLINIC | Age: 40
End: 2020-02-24
Payer: COMMERCIAL

## 2020-02-24 VITALS
WEIGHT: 293 LBS | HEIGHT: 71 IN | BODY MASS INDEX: 41.02 KG/M2 | HEART RATE: 90 BPM | DIASTOLIC BLOOD PRESSURE: 88 MMHG | SYSTOLIC BLOOD PRESSURE: 136 MMHG

## 2020-02-24 DIAGNOSIS — R73.03 PREDIABETES: ICD-10-CM

## 2020-02-24 PROCEDURE — 99214 OFFICE O/P EST MOD 30 MIN: CPT | Performed by: INTERNAL MEDICINE

## 2020-02-24 RX ORDER — SILDENAFIL 50 MG/1
50 TABLET, FILM COATED ORAL PRN
Qty: 30 TABLET | Refills: 2 | Status: SHIPPED | OUTPATIENT
Start: 2020-02-24 | End: 2020-08-26 | Stop reason: SDUPTHER

## 2020-02-24 SDOH — ECONOMIC STABILITY: FOOD INSECURITY: WITHIN THE PAST 12 MONTHS, YOU WORRIED THAT YOUR FOOD WOULD RUN OUT BEFORE YOU GOT MONEY TO BUY MORE.: NEVER TRUE

## 2020-02-24 SDOH — ECONOMIC STABILITY: FOOD INSECURITY: WITHIN THE PAST 12 MONTHS, THE FOOD YOU BOUGHT JUST DIDN'T LAST AND YOU DIDN'T HAVE MONEY TO GET MORE.: NEVER TRUE

## 2020-02-24 SDOH — ECONOMIC STABILITY: TRANSPORTATION INSECURITY
IN THE PAST 12 MONTHS, HAS THE LACK OF TRANSPORTATION KEPT YOU FROM MEDICAL APPOINTMENTS OR FROM GETTING MEDICATIONS?: NO

## 2020-02-24 SDOH — ECONOMIC STABILITY: TRANSPORTATION INSECURITY
IN THE PAST 12 MONTHS, HAS LACK OF TRANSPORTATION KEPT YOU FROM MEETINGS, WORK, OR FROM GETTING THINGS NEEDED FOR DAILY LIVING?: NO

## 2020-02-24 SDOH — ECONOMIC STABILITY: INCOME INSECURITY: HOW HARD IS IT FOR YOU TO PAY FOR THE VERY BASICS LIKE FOOD, HOUSING, MEDICAL CARE, AND HEATING?: NOT HARD AT ALL

## 2020-02-24 ASSESSMENT — ENCOUNTER SYMPTOMS
TROUBLE SWALLOWING: 0
VOICE CHANGE: 0
SHORTNESS OF BREATH: 0
PHOTOPHOBIA: 0
ABDOMINAL PAIN: 0
VOMITING: 0
NAUSEA: 0
WHEEZING: 0

## 2020-02-24 NOTE — PROGRESS NOTES
Margi Alonso  1980  male  35 y.o. SUBJECTIVE:       Chief Complaint   Patient presents with    6 Month Follow-Up     [employer]    Hypertension       HPI:  Hypertension:    Margi Alonso returns for follow up of hypertension. Tolerating medications well and taking them as directed. . No symptoms (denies chest pain,dyspnea,edema or TIA's or blurred vision) concerning for end organ damage are present. GERD:    Denies abdominal pain, abdominal pain radiating to back, abdominal pain radiating to shoulder, anorexia, hoarseness, odynophagia, regurgitation, water brash and weight loss  Takes Prilosec daily. Patient has been complaining of erectile dysfunction over the last couple of months. He have a strong urge. Denies fatigue tiredness headache or any other systemic symptoms. Patient denies any exertional chest pain, dyspnea, palpitations, syncope, orthopnea, edema or paroxysmal nocturnal dyspnea.       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   Social Needs    Financial resource strain: Not hard at all   [place] insecurity:     Worry: Never true     Inability: Never true    Transportation needs:     Medical: No     Non-medical: No   Tobacco Use    Smoking status: Never Smoker    Smokeless tobacco: Former User     Types: Chew   Substance and Sexual Activity    Alcohol use: Yes     Comment: rare    Drug use: No    Sexual activity: Yes     Partners: Female     Birth control/protection: Surgical   Lifestyle    Physical activity:     Days per week: None Minutes per session: None    Stress: None   Relationships    Social connections:     Talks on phone: None     Gets together: None     Attends [employer] service: None     Active member of club or organization: None     Attends meetings of clubs or organizations: None     Relationship status: None    Intimate partner violence:     Fear of current or ex partner: None     Emotionally abused: None     Physically abused: None     Forced sexual activity: None   Other Topics Concern    None   Social History Narrative    None     Family History   Problem Relation Age of Onset    Brain Cancer Mother 58    High Blood Pressure Father     Diabetes Father     Prostate Cancer Father     Heart Disease Father         poss MI       Review of Systems   Constitutional: Negative for diaphoresis and unexpected weight change. HENT: Negative for trouble swallowing and voice change. Eyes: Negative for photophobia and visual disturbance. Respiratory: Negative for shortness of breath and wheezing. Cardiovascular: Negative for chest pain and palpitations. Gastrointestinal: Negative for abdominal pain, nausea and vomiting. Endocrine: Negative for polyphagia and polyuria. Genitourinary: Negative for flank pain and frequency. Neurological: Negative for dizziness and light-headedness. OBJECTIVE:  Pulse Readings from Last 4 Encounters:   02/24/20 90   08/26/19 90   07/16/19 84   01/21/19 76     Wt Readings from Last 4 Encounters:   02/24/20 293 lb (132.9 kg)   08/26/19 291 lb (132 kg)   07/16/19 290 lb (131.5 kg)   01/21/19 280 lb (127 kg)     BP Readings from Last 4 Encounters:   02/24/20 136/88   08/26/19 118/84   07/16/19 124/82   01/21/19 124/80     Physical Exam  Vitals signs and nursing note reviewed. Constitutional:       Appearance: He is normal weight. He is not diaphoretic. Eyes:      General: No scleral icterus.      Conjunctiva/sclera: Conjunctivae normal.   Cardiovascular:      Rate and Rhythm:

## 2020-02-25 LAB
ESTIMATED AVERAGE GLUCOSE: 111.2 MG/DL
HBA1C MFR BLD: 5.5 %

## 2020-06-10 ENCOUNTER — TELEPHONE (OUTPATIENT)
Dept: INTERNAL MEDICINE CLINIC | Age: 40
End: 2020-06-10

## 2020-07-21 RX ORDER — HYDROCHLOROTHIAZIDE 25 MG/1
25 TABLET ORAL DAILY
Qty: 90 TABLET | Refills: 1 | Status: SHIPPED | OUTPATIENT
Start: 2020-07-21 | End: 2020-12-07

## 2020-07-27 RX ORDER — VERAPAMIL HYDROCHLORIDE 240 MG/1
TABLET, FILM COATED, EXTENDED RELEASE ORAL
Qty: 90 TABLET | Refills: 3 | Status: SHIPPED | OUTPATIENT
Start: 2020-07-27 | End: 2021-07-15

## 2020-07-27 RX ORDER — LISINOPRIL 40 MG/1
TABLET ORAL
Qty: 90 TABLET | Refills: 3 | Status: SHIPPED | OUTPATIENT
Start: 2020-07-27 | End: 2021-07-15

## 2020-07-27 RX ORDER — OMEPRAZOLE 20 MG/1
CAPSULE, DELAYED RELEASE ORAL
Qty: 90 CAPSULE | Refills: 3 | Status: SHIPPED | OUTPATIENT
Start: 2020-07-27 | End: 2021-07-15

## 2020-08-26 ENCOUNTER — OFFICE VISIT (OUTPATIENT)
Dept: INTERNAL MEDICINE CLINIC | Age: 40
End: 2020-08-26
Payer: COMMERCIAL

## 2020-08-26 VITALS
TEMPERATURE: 99.5 F | WEIGHT: 296 LBS | HEART RATE: 90 BPM | BODY MASS INDEX: 41.44 KG/M2 | DIASTOLIC BLOOD PRESSURE: 86 MMHG | SYSTOLIC BLOOD PRESSURE: 120 MMHG | HEIGHT: 71 IN

## 2020-08-26 DIAGNOSIS — I10 ESSENTIAL HYPERTENSION: ICD-10-CM

## 2020-08-26 PROBLEM — G47.33 OSA (OBSTRUCTIVE SLEEP APNEA): Status: ACTIVE | Noted: 2020-08-26

## 2020-08-26 PROBLEM — N52.9 ERECTILE DYSFUNCTION: Status: ACTIVE | Noted: 2020-08-26

## 2020-08-26 LAB
ANION GAP SERPL CALCULATED.3IONS-SCNC: 10 MMOL/L (ref 3–16)
BUN BLDV-MCNC: 18 MG/DL (ref 7–20)
CALCIUM SERPL-MCNC: 9.7 MG/DL (ref 8.3–10.6)
CHLORIDE BLD-SCNC: 101 MMOL/L (ref 99–110)
CO2: 25 MMOL/L (ref 21–32)
CREAT SERPL-MCNC: 1 MG/DL (ref 0.9–1.3)
GFR AFRICAN AMERICAN: >60
GFR NON-AFRICAN AMERICAN: >60
GLUCOSE BLD-MCNC: 90 MG/DL (ref 70–99)
POTASSIUM SERPL-SCNC: 4.4 MMOL/L (ref 3.5–5.1)
SODIUM BLD-SCNC: 136 MMOL/L (ref 136–145)

## 2020-08-26 PROCEDURE — 99214 OFFICE O/P EST MOD 30 MIN: CPT | Performed by: INTERNAL MEDICINE

## 2020-08-26 RX ORDER — SILDENAFIL 50 MG/1
50 TABLET, FILM COATED ORAL PRN
Qty: 9 TABLET | Refills: 5 | Status: SHIPPED | OUTPATIENT
Start: 2020-08-26 | End: 2021-02-03 | Stop reason: SDUPTHER

## 2020-08-26 ASSESSMENT — PATIENT HEALTH QUESTIONNAIRE - PHQ9
2. FEELING DOWN, DEPRESSED OR HOPELESS: 0
SUM OF ALL RESPONSES TO PHQ QUESTIONS 1-9: 0
1. LITTLE INTEREST OR PLEASURE IN DOING THINGS: 0
SUM OF ALL RESPONSES TO PHQ QUESTIONS 1-9: 0
SUM OF ALL RESPONSES TO PHQ9 QUESTIONS 1 & 2: 0

## 2020-08-26 ASSESSMENT — ENCOUNTER SYMPTOMS
WHEEZING: 0
TROUBLE SWALLOWING: 0
ABDOMINAL PAIN: 0
VOMITING: 0
VOICE CHANGE: 0
NAUSEA: 0
SHORTNESS OF BREATH: 0

## 2020-08-26 NOTE — PROGRESS NOTES
Felicita Calderón  1980  male  38 y.o. SUBJECTIVE:       Chief Complaint   Patient presents with    6 Month Follow-Up       HPI:  Hypertension:    Felicita Calderón returns for follow up of hypertension. Tolerating medications well and taking them as directed. No symptoms (denies chest pain,dyspnea,edema or TIA's or blurred vision) concerning for end organ damage are present. Patient has been complaining of morning fatigue, tiredness, loud snoring. His wife noticed stop breathing at night. He was suggested to have sleep study several years ago however declined that time. Current medication sildenafil has been helpful for his erectile dysfunction and decreased libido. Patient denies any visual symptoms, chest pain palpitation dizziness while using this medication. His reflux symptom has been well controlled with current omeprazole. He denies weight loss, epigastric pain, black his stool, dysphagia.   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   Social Needs    Financial resource strain: Not hard at all   [place]-Damaris insecurity     Worry: Never true     Inability: Never true    Transportation needs     Medical: No     Non-medical: No   Tobacco Use    Smoking status: Never Smoker    Smokeless tobacco: Former User     Types: Chew   Substance and Sexual Activity    Alcohol use: Yes     Comment: rare    Drug use: No    Sexual activity: Yes     Partners: Female     Birth control/protection: Surgical   Lifestyle    Physical activity     Days per week: None Minutes per session: None    Stress: None   Relationships    Social connections     Talks on phone: None     Gets together: None     Attends Restoration service: None     Active member of club or organization: None     Attends meetings of clubs or organizations: None     Relationship status: None    Intimate partner violence     Fear of current or ex partner: None     Emotionally abused: None     Physically abused: None     Forced sexual activity: None   Other Topics Concern    None   Social History Narrative    None     Family History   Problem Relation Age of Onset    Brain Cancer Mother 58    High Blood Pressure Father     Diabetes Father     Prostate Cancer Father     Heart Disease Father         poss MI       Review of Systems   Constitutional: Negative for diaphoresis and unexpected weight change. HENT: Negative for trouble swallowing and voice change. Respiratory: Negative for shortness of breath and wheezing. Cardiovascular: Negative for chest pain and palpitations. Gastrointestinal: Negative for abdominal pain, nausea and vomiting. Neurological: Negative for dizziness and light-headedness. OBJECTIVE:  Pulse Readings from Last 4 Encounters:   08/26/20 90   02/24/20 90   08/26/19 90   07/16/19 84     Wt Readings from Last 4 Encounters:   08/26/20 296 lb (134.3 kg)   02/24/20 293 lb (132.9 kg)   08/26/19 291 lb (132 kg)   07/16/19 290 lb (131.5 kg)     BP Readings from Last 4 Encounters:   08/26/20 120/86   02/24/20 136/88   08/26/19 118/84   07/16/19 124/82     Physical Exam  Vitals signs and nursing note reviewed. Constitutional:       Appearance: He is obese. Eyes:      General: No scleral icterus. Conjunctiva/sclera: Conjunctivae normal.   Cardiovascular:      Rate and Rhythm: Normal rate and regular rhythm. Pulses: Normal pulses. Heart sounds: Normal heart sounds. Pulmonary:      Effort: Pulmonary effort is normal.      Breath sounds: Normal breath sounds. Abdominal:      General: Bowel sounds are normal.   Musculoskeletal:      Right lower leg: No edema. Left lower leg: No edema. Skin:     General: Skin is warm and dry. Capillary Refill: Capillary refill takes less than 2 seconds. Neurological:      General: No focal deficit present. Mental Status: He is oriented to person, place, and time.    Psychiatric:         Mood and Affect: Mood normal.         CBC:   Lab Results   Component Value Date    WBC 5.1 09/07/2019    HGB 15.0 09/07/2019    HCT 45.6 09/07/2019     09/07/2019     CMP:  Lab Results   Component Value Date     09/07/2019    K 4.5 09/07/2019     09/07/2019    CO2 23 09/07/2019    ANIONGAP 12 09/07/2019    GLUCOSE 85 09/07/2019    BUN 17 09/07/2019    CREATININE 0.9 09/07/2019    GFRAA >60 09/07/2019    GFRAA >60 11/01/2012    CALCIUM 9.3 09/07/2019    PROT 7.3 09/07/2019    LABALBU 4.6 09/07/2019    AGRATIO 1.7 09/07/2019    BILITOT 0.3 09/07/2019    ALKPHOS 74 09/07/2019    ALT 20 09/07/2019    AST 25 09/07/2019    GLOB 2.7 09/07/2019     URINALYSIS:  Lab Results   Component Value Date    GLUCOSEU Negative 09/07/2019    KETUA Negative 09/07/2019    SPECGRAV 1.020 09/07/2019    BLOODU TRACE 09/07/2019    PHUR 6.0 09/07/2019    PROTEINU Negative 09/07/2019    NITRU Negative 09/07/2019    LEUKOCYTESUR Negative 09/07/2019    LABMICR YES 09/07/2019    URINETYPE Not Specified 09/07/2019     HBA1C:   Lab Results   Component Value Date    LABA1C 5.5 02/24/2020    .2 02/24/2020     MICRO/ALB:   Lab Results   Component Value Date    LABMICR YES 09/07/2019     LIPID:  Lab Results   Component Value Date    CHOL 171 09/07/2019    TRIG 75 09/07/2019    HDL 40 09/07/2019    LDLCALC 116 09/07/2019    LABVLDL 15 09/07/2019     TSH:   Lab Results   Component Value Date    TSHREFLEX 1.44 09/07/2019     PSA:   Lab Results   Component Value Date    PSA 0.33 09/07/2019        ASSESSMENT/PLAN:    Tereza Farias was seen today for 6 month follow-up. Diagnoses and all orders for this visit:    Essential hypertension  -     BASIC METABOLIC PANEL; Future  Continue lisinopril and hydrochlorothiazide and Calan SR  Erectile dysfunction, unspecified erectile dysfunction type  -     sildenafil (VIAGRA) 50 MG tablet; Take 1 tablet by mouth as needed for Erectile Dysfunction    YOHANNES (obstructive sleep apnea)  -     Ambulatory referral to Sleep Medicine  Patient is advised to increase physical activities, weight loss. Gastroesophageal reflux disease without esophagitis  He will continue Prilosec. No medication side effect. Use as needed only            Orders Placed This Encounter   Procedures    BASIC METABOLIC PANEL     Standing Status:   Future     Number of Occurrences:   1     Standing Expiration Date:   8/26/2021    Ambulatory referral to Sleep Medicine     Referral Priority:   Routine     Referral Type:   Consult for Advice and Opinion     Referral Reason:   Specialty Services Required     Referred to Provider:   Kaykay Whelan MD     Number of Visits Requested:   1     Current Outpatient Medications   Medication Sig Dispense Refill    sildenafil (VIAGRA) 50 MG tablet Take 1 tablet by mouth as needed for Erectile Dysfunction 9 tablet 5    verapamil (CALAN SR) 240 MG extended release tablet TAKE 1 TABLET BY MOUTH  DAILY 90 tablet 3    lisinopril (PRINIVIL;ZESTRIL) 40 MG tablet TAKE 1 TABLET BY MOUTH  DAILY 90 tablet 3    omeprazole (PRILOSEC) 20 MG delayed release capsule TAKE 1 CAPSULE BY MOUTH  DAILY 90 capsule 3    hydroCHLOROthiazide (HYDRODIURIL) 25 MG tablet Take 1 tablet by mouth daily 90 tablet 1    calcium carbonate (TUMS) 500 MG chewable tablet Take 6 tablets by mouth daily as needed for Heartburn       No current facility-administered medications for this visit. Return in about 6 months (around 2/26/2021) for physical.  An After Visit Summary was printed and given to the patient.   Documentation was done using voice recognition dragon software. Every effort was made to ensure accuracy; however, inadvertent  Unintentional computerized transcription errors may be present.

## 2020-10-08 ENCOUNTER — OFFICE VISIT (OUTPATIENT)
Dept: INTERNAL MEDICINE CLINIC | Age: 40
End: 2020-10-08
Payer: COMMERCIAL

## 2020-10-08 VITALS
DIASTOLIC BLOOD PRESSURE: 80 MMHG | TEMPERATURE: 98.3 F | HEART RATE: 96 BPM | SYSTOLIC BLOOD PRESSURE: 126 MMHG | BODY MASS INDEX: 41.72 KG/M2 | WEIGHT: 298 LBS | HEIGHT: 71 IN

## 2020-10-08 DIAGNOSIS — R31.9 HEMATURIA, UNSPECIFIED TYPE: ICD-10-CM

## 2020-10-08 DIAGNOSIS — N39.43 DRIBBLING: ICD-10-CM

## 2020-10-08 DIAGNOSIS — R39.15 URGENCY OF URINATION: ICD-10-CM

## 2020-10-08 DIAGNOSIS — R35.0 FREQUENCY OF URINATION: ICD-10-CM

## 2020-10-08 DIAGNOSIS — Z80.42 FAMILY HISTORY OF PROSTATE CANCER: ICD-10-CM

## 2020-10-08 DIAGNOSIS — Z12.5 SCREENING FOR PROSTATE CANCER: ICD-10-CM

## 2020-10-08 LAB
ANION GAP SERPL CALCULATED.3IONS-SCNC: 12 MMOL/L (ref 3–16)
BILIRUBIN, POC: ABNORMAL
BLOOD URINE, POC: ABNORMAL
BUN BLDV-MCNC: 14 MG/DL (ref 7–20)
CALCIUM SERPL-MCNC: 9.8 MG/DL (ref 8.3–10.6)
CHLORIDE BLD-SCNC: 100 MMOL/L (ref 99–110)
CLARITY, POC: CLEAR
CO2: 27 MMOL/L (ref 21–32)
COLOR, POC: ABNORMAL
CREAT SERPL-MCNC: 0.8 MG/DL (ref 0.9–1.3)
GFR AFRICAN AMERICAN: >60
GFR NON-AFRICAN AMERICAN: >60
GLUCOSE BLD-MCNC: 68 MG/DL (ref 70–99)
GLUCOSE URINE, POC: ABNORMAL
HCT VFR BLD CALC: 43.8 % (ref 40.5–52.5)
HEMOGLOBIN: 14.8 G/DL (ref 13.5–17.5)
KETONES, POC: ABNORMAL
LEUKOCYTE EST, POC: ABNORMAL
MCH RBC QN AUTO: 31.9 PG (ref 26–34)
MCHC RBC AUTO-ENTMCNC: 33.9 G/DL (ref 31–36)
MCV RBC AUTO: 94.2 FL (ref 80–100)
NITRITE, POC: ABNORMAL
PDW BLD-RTO: 13.3 % (ref 12.4–15.4)
PH, POC: 6
PLATELET # BLD: 309 K/UL (ref 135–450)
PMV BLD AUTO: 8 FL (ref 5–10.5)
POTASSIUM SERPL-SCNC: 3.9 MMOL/L (ref 3.5–5.1)
PROSTATE SPECIFIC ANTIGEN: 0.34 NG/ML (ref 0–4)
PROTEIN, POC: ABNORMAL
RBC # BLD: 4.65 M/UL (ref 4.2–5.9)
SODIUM BLD-SCNC: 139 MMOL/L (ref 136–145)
SPECIFIC GRAVITY, POC: 1.01
UROBILINOGEN, POC: 0.2
WBC # BLD: 10.6 K/UL (ref 4–11)

## 2020-10-08 PROCEDURE — 81002 URINALYSIS NONAUTO W/O SCOPE: CPT | Performed by: INTERNAL MEDICINE

## 2020-10-08 PROCEDURE — 99213 OFFICE O/P EST LOW 20 MIN: CPT | Performed by: INTERNAL MEDICINE

## 2020-10-08 RX ORDER — CIPROFLOXACIN 500 MG/1
500 TABLET, FILM COATED ORAL 2 TIMES DAILY
Qty: 14 TABLET | Refills: 0 | Status: SHIPPED | OUTPATIENT
Start: 2020-10-08 | End: 2020-10-15

## 2020-10-08 ASSESSMENT — ENCOUNTER SYMPTOMS
SHORTNESS OF BREATH: 0
TROUBLE SWALLOWING: 0
VOICE CHANGE: 0
WHEEZING: 0

## 2020-10-08 NOTE — PROGRESS NOTES
Jesika Thorpe  1980  male  38 y.o. SUBJECTIVE:       Chief Complaint   Patient presents with    Urinary Frequency     feels like he is emptying his bladder but then needs to go again 10 minutes later. up at night 3-4 x. father with hx of prostate cancer.  Other     10 minutes later pt states he feels the need to go again--doesn 't void in small amounts. no weight loss, no increase in thirst.Dad dx with diabetes at age 79. HPI:  Chief Complaint   Patient presents with    Urinary Frequency     feels like he is emptying his bladder but then needs to go again 10 minutes later. up at night 3-4 x. father with hx of prostate cancer.  Other     10 minutes later pt states he feels the need to go again--doesn 't void in small amounts. no weight loss, no increase in thirst.Dad dx with diabetes at age 79. Urinary Frequency    This is a new problem. The current episode started more than 1 month ago. The problem occurs every urination. The problem has been gradually worsening. The pain is at a severity of 2/10. There has been no fever. He is sexually active. There is no history of pyelonephritis. Associated symptoms include frequency, hesitancy and urgency. Pertinent negatives include no chills, discharge or flank pain. Associated symptoms comments:  Dribbling. He has tried nothing for the symptoms. worried about prostate cancer   Other   Pertinent negatives include no chest pain, chills or diaphoresis.          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 Constitutional:       Appearance: He is obese. Eyes:      Extraocular Movements: Extraocular movements intact. Conjunctiva/sclera: Conjunctivae normal.   Cardiovascular:      Rate and Rhythm: Normal rate and regular rhythm. Pulses: Normal pulses. Heart sounds: Normal heart sounds. Abdominal:      Tenderness: There is no abdominal tenderness. There is no right CVA tenderness, left CVA tenderness, guarding or rebound. Genitourinary:     Penis: Normal.       Scrotum/Testes: Normal.      Prostate: Normal. Not enlarged, not tender and no nodules present. Rectum: Normal.   Neurological:      General: No focal deficit present. Mental Status: He is alert and oriented to person, place, and time.          CBC:   Lab Results   Component Value Date    WBC 5.1 09/07/2019    HGB 15.0 09/07/2019    HCT 45.6 09/07/2019     09/07/2019     CMP:  Lab Results   Component Value Date     08/26/2020    K 4.4 08/26/2020     08/26/2020    CO2 25 08/26/2020    ANIONGAP 10 08/26/2020    GLUCOSE 90 08/26/2020    BUN 18 08/26/2020    CREATININE 1.0 08/26/2020    GFRAA >60 08/26/2020    GFRAA >60 11/01/2012    CALCIUM 9.7 08/26/2020    PROT 7.3 09/07/2019    LABALBU 4.6 09/07/2019    AGRATIO 1.7 09/07/2019    BILITOT 0.3 09/07/2019    ALKPHOS 74 09/07/2019    ALT 20 09/07/2019    AST 25 09/07/2019    GLOB 2.7 09/07/2019     URINALYSIS:  Lab Results   Component Value Date    GLUCOSEU neg 10/08/2020    GLUCOSEU Negative 09/07/2019    KETUA neg 10/08/2020    KETUA Negative 09/07/2019    SPECGRAV 1.010 10/08/2020    SPECGRAV 1.020 09/07/2019    BLOODU trace 10/08/2020    BLOODU TRACE 09/07/2019    PHUR 6.0 10/08/2020    PHUR 6.0 09/07/2019    PROTEINU neg 10/08/2020    PROTEINU Negative 09/07/2019    NITRU Negative 09/07/2019    LEUKOCYTESUR neg 10/08/2020    LEUKOCYTESUR Negative 09/07/2019    LABMICR YES 09/07/2019    URINETYPE Not Specified 09/07/2019     HBA1C:   Lab Results   Component Value Date    LABA1C 5.5 02/24/2020    .2 02/24/2020     MICRO/ALB:   Lab Results   Component Value Date    LABMICR YES 09/07/2019     LIPID:  Lab Results   Component Value Date    CHOL 171 09/07/2019    TRIG 75 09/07/2019    HDL 40 09/07/2019    LDLCALC 116 09/07/2019    LABVLDL 15 09/07/2019     TSH:   Lab Results   Component Value Date    TSHREFLEX 1.44 09/07/2019     PSA:   Lab Results   Component Value Date    PSA 0.33 09/07/2019      POCT UA- small blood. Negative nitrite and leukocyte  ASSESSMENT/PLAN:    1. Frequency of urination    2. Urgency of urination    3. Dribbling    4. Family history of prostate cancer    5. Screening for prostate cancer    6. Hematuria, unspecified type      Once UTI ruled out. May try Flomax. Patient will call in 1 to 2 weeks about the response to current treatment. Orders Placed This Encounter   Procedures    Culture, Urine     Order Specific Question:   Specify (ex-cath, midstream, cysto, etc)?      Answer:   mid stream clean catch    US RENAL COMPLETE     Standing Status:   Future     Standing Expiration Date:   10/8/2021    CBC     Standing Status:   Future     Number of Occurrences:   1     Standing Expiration Date:   10/8/2021    BASIC METABOLIC PANEL     Standing Status:   Future     Number of Occurrences:   1     Standing Expiration Date:   10/8/2021    Psa screening     Standing Status:   Future     Number of Occurrences:   1     Standing Expiration Date:   10/8/2021    POCT Urinalysis no Micro     Current Outpatient Medications   Medication Sig Dispense Refill    ciprofloxacin (CIPRO) 500 MG tablet Take 1 tablet by mouth 2 times daily for 7 days 14 tablet 0    sildenafil (VIAGRA) 50 MG tablet Take 1 tablet by mouth as needed for Erectile Dysfunction 9 tablet 5    verapamil (CALAN SR) 240 MG extended release tablet TAKE 1 TABLET BY MOUTH  DAILY 90 tablet 3    lisinopril (PRINIVIL;ZESTRIL) 40 MG tablet TAKE 1 TABLET BY MOUTH  DAILY 90 tablet 3    omeprazole (PRILOSEC) 20 MG delayed release capsule TAKE 1 CAPSULE BY MOUTH  DAILY 90 capsule 3    hydroCHLOROthiazide (HYDRODIURIL) 25 MG tablet Take 1 tablet by mouth daily 90 tablet 1    calcium carbonate (TUMS) 500 MG chewable tablet Take 6 tablets by mouth daily as needed for Heartburn       No current facility-administered medications for this visit. May need sooner visit. An After Visit Summary was printed and given to the patient. Documentation was done using voice recognition dragon software. Every effort was made to ensure accuracy; however, inadvertent  Unintentional computerized transcription errors may be present.

## 2020-10-09 LAB — URINE CULTURE, ROUTINE: NORMAL

## 2020-10-13 ENCOUNTER — TELEPHONE (OUTPATIENT)
Dept: INTERNAL MEDICINE CLINIC | Age: 40
End: 2020-10-13

## 2020-10-13 NOTE — TELEPHONE ENCOUNTER
Patient wanted to speak with you regarding the flu shot he received this morning and questions about the lab results you left on his voice mail.

## 2020-10-13 NOTE — TELEPHONE ENCOUNTER
He received your message. He made an appt with Urology. He goes 10/28 and will see Medhat Henderson. He received his flu vaccine this AM at work. Please update his chart. Updated in epic. Should he finish the ATB prescribed?

## 2020-11-03 ENCOUNTER — VIRTUAL VISIT (OUTPATIENT)
Dept: PULMONOLOGY | Age: 40
End: 2020-11-03
Payer: COMMERCIAL

## 2020-11-03 PROBLEM — K21.9 GASTROESOPHAGEAL REFLUX DISEASE WITHOUT ESOPHAGITIS: Chronic | Status: ACTIVE | Noted: 2017-02-22

## 2020-11-03 PROBLEM — I10 ESSENTIAL HYPERTENSION: Chronic | Status: ACTIVE | Noted: 2017-02-22

## 2020-11-03 PROBLEM — E66.01 MORBID OBESITY, UNSPECIFIED OBESITY TYPE (HCC): Status: ACTIVE | Noted: 2020-11-03

## 2020-11-03 PROBLEM — E66.01 MORBID OBESITY, UNSPECIFIED OBESITY TYPE (HCC): Chronic | Status: ACTIVE | Noted: 2020-11-03

## 2020-11-03 PROCEDURE — 99204 OFFICE O/P NEW MOD 45 MIN: CPT | Performed by: INTERNAL MEDICINE

## 2020-11-03 RX ORDER — FESOTERODINE FUMARATE 4 MG/1
8 TABLET, FILM COATED, EXTENDED RELEASE ORAL DAILY
COMMUNITY
End: 2021-09-01

## 2020-11-03 ASSESSMENT — SLEEP AND FATIGUE QUESTIONNAIRES
HOW LIKELY ARE YOU TO NOD OFF OR FALL ASLEEP WHILE SITTING AND TALKING TO SOMEONE: 0
HOW LIKELY ARE YOU TO NOD OFF OR FALL ASLEEP WHILE SITTING AND READING: 2
HOW LIKELY ARE YOU TO NOD OFF OR FALL ASLEEP WHILE LYING DOWN TO REST IN THE AFTERNOON WHEN CIRCUMSTANCES PERMIT: 0
HOW LIKELY ARE YOU TO NOD OFF OR FALL ASLEEP WHILE SITTING QUIETLY AFTER LUNCH WITHOUT ALCOHOL: 1
ESS TOTAL SCORE: 6
HOW LIKELY ARE YOU TO NOD OFF OR FALL ASLEEP WHILE WATCHING TV: 2
HOW LIKELY ARE YOU TO NOD OFF OR FALL ASLEEP WHEN YOU ARE A PASSENGER IN A CAR FOR AN HOUR WITHOUT A BREAK: 1
HOW LIKELY ARE YOU TO NOD OFF OR FALL ASLEEP WHILE SITTING INACTIVE IN A PUBLIC PLACE: 0
HOW LIKELY ARE YOU TO NOD OFF OR FALL ASLEEP IN A CAR, WHILE STOPPED FOR A FEW MINUTES IN TRAFFIC: 0

## 2020-11-03 NOTE — PROGRESS NOTES
Fredrick Bhaagt MD, [facility], [facility]  Tiffanie Kehrt [address]  3[address],  [address] [facility], [address] [phone]   [facility] [address] Bartolo Desir. [address] [place], [place] [address]Yodit Benavides 37 [phone]     Video Visit- Consult    Pursuant to the emergency declaration under the [address] [place] [address], [facility] authority and the [employer] Act, this Virtual  Visit was conducted, with patient's consent, to reduce the patient's risk of exposure to COVID-19. Services were provided through a video synchronous discussion virtually to substitute for in-person clinic visit. Patient was located in their home. Assessment:      Visit Diagnoses and Associated Orders     Hypersomnia   (New Problem)  -  Primary    needs work-up    Home Sleep Study (HST) [[id number] Custom]   - Future Order         Snoring   (New Problem)      needs work-up    Home Sleep Study (HST) [[id number] Custom]   - Future Order         Essential hypertension   (Stable)           Gastroesophageal reflux disease without esophagitis   (Stable)           Morbid obesity, unspecified obesity type (HCC)   (Stable)           ORDERS WITHOUT AN ASSOCIATED DIAGNOSIS    fesoterodine (TOVIAZ) 4 MG TB24 ER tablet [[id number]]             Plan:      Differential diagnosis includes but not limited to: YOHANNES, PLMD's, narcolepsy, parasomnias. Reviewed YOHANNES (highest likelihood Dx): pathophysiology, diagnosis, complications and treatment. Instructed him not to drive if drowsy. Continue medications per her PCP and other physicians. Limit caffeine use after 3pm. Standard of care is to do in-lab PSG but insurance is mandating an inferior HST. 1 wk follow up after study to review his results. The chronic medical conditions listed are directly related to the primary diagnosis listed above. The management of the primary diagnosis affects the secondary diagnosis and vice versa.     Continue meds for: HTN and GERD. Pt would medically benefit from wt loss for YOHANNES (diet, exercise, surgical). Orders Placed This Encounter   Procedures    Home Sleep Study (HST)          Subjective:     Patient ID: Sabina Garcia is a 38 y.o. male. Chief Complaint   Patient presents with    New Patient     DR Abel Contreras Fatigue    Snoring       HPI:      Sabina Garcia is a 38 y.o. male referred by Owen Schuler MD for a sleep evaluation. He complains of: snoring, witnessed apneas, excessive daytime sleepiness , non-restorative sleep, nocturia and tossing and turning at night. He denies: cataplexy and hypnagogic hallucinations. Hypertension, gastroesophageal reflux disease, and obesity: stable on meds and followed by pt's PCP and other physicians. Previous evaluation and treatment has included- none    DOT/CDL - No  FAA/'s license -No    Previous Report(s) Reviewed: historical medical records, office notes, andreferral letter(s). Pertinent data has been documented.     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   Social Needs    Financial resource strain: Not hard at all   [place]-Damaris insecurity     Worry: Never true     Inability: Never true    Transportation needs     Medical: No     Non-medical: No   Tobacco Use    Smoking status: Never Smoker    Smokeless tobacco: Former User     Types: Chew   Substance and Sexual Activity    Alcohol use: Yes     Comment: rare    Drug use: No    Sexual activity: Yes     Partners: Female     Birth control/protection: Surgical   Lifestyle    Physical activity     Days per week: Not on file     Minutes per session: Not on file    Stress: Not on file   Relationships    Social connections     Talks on phone: Not on file     Gets together: Not on file     Attends Oriental orthodox service: Not on file     Active member of club or organization: Not on file     Attends meetings of clubs or organizations: Not on file     Relationship status: Not on file    Intimate partner violence     Fear of current or ex partner: Not on file     Emotionally abused: Not on file     Physically abused: Not on file     Forced sexual activity: Not on file   Other Topics Concern    Not on file   Social History Narrative    Not on file        Current Outpatient Medications   Medication Sig Dispense Refill    fesoterodine (TOVIAZ) 4 MG TB24 ER tablet Take 4 mg by mouth daily      sildenafil (VIAGRA) 50 MG tablet Take 1 tablet by mouth as needed for Erectile Dysfunction 9 tablet 5    verapamil (CALAN SR) 240 MG extended release tablet TAKE 1 TABLET BY MOUTH  DAILY 90 tablet 3    lisinopril (PRINIVIL;ZESTRIL) 40 MG tablet TAKE 1 TABLET BY MOUTH  DAILY 90 tablet 3    omeprazole (PRILOSEC) 20 MG delayed release capsule TAKE 1 CAPSULE BY MOUTH  DAILY 90 capsule 3    hydroCHLOROthiazide (HYDRODIURIL) 25 MG tablet Take 1 tablet by mouth daily 90 tablet 1    calcium carbonate (TUMS) 500 MG chewable tablet Take 6 tablets by mouth daily as needed for Heartburn       No current facility-administered medications for this visit.         Allergies as of 11/03/2020 - Review Complete 11/03/2020   Allergen Reaction Noted    Morphine Hives 02/21/2011       Patient Active Problem List   Diagnosis    Depression    Dyslipidemia    Essential hypertension    Gastroesophageal reflux disease without esophagitis    Bursitis of right shoulder    Erectile dysfunction    YOHANNES (obstructive sleep apnea)    Morbid obesity, unspecified obesity type [facility])       Past Medical History:   Diagnosis Date    Hypertension        Past Surgical History:   Procedure Laterality Date    APPENDECTOMY      CHOLECYSTECTOMY      MOUTH SURGERY      VASECTOMY      WISDOM TOOTH EXTRACTION         Family History   Problem Relation Age of Onset    Brain Cancer Mother 58    High Blood Pressure Father     Diabetes Father     Prostate Cancer Father         48 years    Heart Disease Father         poss MI       Objective:     Vitals:  Patient reported Height and Weight Calculated BMI   Patient-Reported Vitals 11/3/2020   Patient-Reported Weight 298LB   Patient-Reported Height 5FT10. 5IN       42.1     Due to COVID-19 this was a virtual visit and physical exam was deferred.     Electronically signed by Adelso Howard MD on11/3/2020 at 4:05 PM

## 2020-11-03 NOTE — LETTER
[facility] Sleep Medicine  [address] [facility] [address] [address]  Phone: [phone]  Fax: [phone]      November 3, 2020       Patient: Viv Sun   MR Number: [id number]   YOB: 1980   Date of Visit: 11/3/2020     Thank you for allowing me to participate in the care of Nancy Edouard. Here is my assessment and plan. Also attached is a copy of his consult note:    ASSESSMENT:  Visit Diagnoses and Associated Orders     Hypersomnia   (New Problem)  -  Primary    needs work-up    Home Sleep Study (HST) [[id number] Custom]   - Future Order         Snoring   (New Problem)      needs work-up    Home Sleep Study (HST) [[id number] Custom]   - Future Order         Essential hypertension   (Stable)           Gastroesophageal reflux disease without esophagitis   (Stable)           Morbid obesity, unspecified obesity type (HCC)   (Stable)           ORDERS WITHOUT AN ASSOCIATED DIAGNOSIS    fesoterodine (TOVIAZ) 4 MG TB24 ER tablet [[id number]]            Plan:  Differential diagnosis includes but not limited to: YOHANENS, PLMD's, narcolepsy, parasomnias. Reviewed YOHANNES (highest likelihood Dx): pathophysiology, diagnosis, complications and treatment. Instructed him not to drive if drowsy. Continue medications per her PCP and other physicians. Limit caffeine use after 3pm. Standard of care is to do in-lab PSG but insurance is mandating an inferior HST. 1 wk follow up after study to review his results. The chronic medical conditions listed are directly related to the primary diagnosis listed above. The management of the primary diagnosis affects the secondary diagnosis and vice versa. Continue meds for: HTN and GERD. Pt would medically benefit from wt loss for YOHANNES (diet, exercise, surgical). Orders Placed This Encounter   Procedures    Home Sleep Study (HST)         If you have questions or concerns, please do not hesitate to call me. I look forward to following [place] along with you. Sincerely,      Glenn Whyte MD    CC providers:  Jessie Fountain MD  [address] [address]  VIA In Basket

## 2020-11-19 ENCOUNTER — HOSPITAL ENCOUNTER (OUTPATIENT)
Dept: SLEEP CENTER | Age: 40
Discharge: HOME | End: 2020-11-19
Payer: COMMERCIAL

## 2020-11-19 PROCEDURE — 95806 SLEEP STUDY UNATT&RESP EFFT: CPT

## 2020-11-25 ENCOUNTER — TELEPHONE (OUTPATIENT)
Dept: PULMONOLOGY | Age: 40
End: 2020-11-25

## 2020-11-25 PROCEDURE — 95806 SLEEP STUDY UNATT&RESP EFFT: CPT | Performed by: INTERNAL MEDICINE

## 2020-11-25 NOTE — PROGRESS NOTES
Marianne Olivo         : 1980 A-1     Diagnosis: [x] YOHANNES (G47.33) [] CSA (G47.31) [] Apnea (G47.30)   Length of Need: [x] 12 Months [] 99 Months [] Other:    Machine (THU!): [x] Respironics Dream Station      Auto [] ResMed AirSense     Auto [] Other:     [x]  CPAP () [] Bilevel ()   Mode: [x] Auto [] Spontaneous    Mode: [] Auto [] Spontaneous          Pmin 7 cmh2O  Pmax 20 cmH2O                 Comfort Settings:   - Ramp Pressure: 4 cmH2O                                        - Ramp time: 15 min                                     -  Flex/EPR - 3 full time                                    - For ResMed Bilevel (TiMax-4 sec   TiMin- 0.2 sec)     Humidifier: [x] Heated ()        [x] Water chamber replacement ()/ 1 per 6 months        Mask:   [x] Nasal () /1 per 3 months [] Full Face () /1 per 3 months   [x] Patient choice -Size and fit mask [] Patient Choice - Size and fit mask   [] Dispense:  [] Dispense:    [x] Headgear () / 1 per 3 months [] Headgear () / 1 per 3 months   [x] Replacement Nasal Cushion ()/2 per month [] Interface Replacement ()/1 per month   [] Replacement Nasal Pillows ()/2 per month         Tubing: [x] Heated ()/1 per 3 months    [] Standard ()/1 per 3 months [] Other:           Filters: [x] Non-disposable ()/1 per 6 months     [x] Ultra-Fine, Disposable ()/2 per month        Miscellaneous: [] Chin Strap ()/ 1 per 6 months [] O2 bleed-in:       LPM   [] Oximetry on CPAP/Bilevel []  Other:    [x] Modem: ()         Start Order Date: 20    MEDICAL JUSTIFICATION:  I, the undersigned, certify that the above prescribed supplies are medically necessary for this patients wellbeing. In my opinion, the supplies are both reasonable and necessary in reference to accepted standards of medicalpractice in treatment of this patients condition.     Valencia Woods MD      NPI: [id number]       Order Signed Date: 11/25/20    Electronically signed by Marychuy Willis MD on 11/25/2020 at 2:16 PM

## 2021-01-25 ENCOUNTER — NURSE TRIAGE (OUTPATIENT)
Dept: OTHER | Facility: CLINIC | Age: 41
End: 2021-01-25

## 2021-01-26 ENCOUNTER — OFFICE VISIT (OUTPATIENT)
Dept: INTERNAL MEDICINE CLINIC | Age: 41
End: 2021-01-26
Payer: COMMERCIAL

## 2021-01-26 ENCOUNTER — HOSPITAL ENCOUNTER (OUTPATIENT)
Dept: VASCULAR LAB | Age: 41
Discharge: HOME | End: 2021-01-26
Payer: COMMERCIAL

## 2021-01-26 ENCOUNTER — VIRTUAL VISIT (OUTPATIENT)
Dept: PULMONOLOGY | Age: 41
End: 2021-01-26
Payer: COMMERCIAL

## 2021-01-26 VITALS
TEMPERATURE: 97.8 F | WEIGHT: 279 LBS | DIASTOLIC BLOOD PRESSURE: 88 MMHG | SYSTOLIC BLOOD PRESSURE: 136 MMHG | BODY MASS INDEX: 39.06 KG/M2 | HEIGHT: 71 IN

## 2021-01-26 DIAGNOSIS — I10 ESSENTIAL HYPERTENSION: Chronic | ICD-10-CM

## 2021-01-26 DIAGNOSIS — G47.33 OSA (OBSTRUCTIVE SLEEP APNEA): ICD-10-CM

## 2021-01-26 DIAGNOSIS — M79.604 PAIN OF RIGHT LOWER EXTREMITY: Primary | ICD-10-CM

## 2021-01-26 DIAGNOSIS — E66.01 MORBID OBESITY, UNSPECIFIED OBESITY TYPE (HCC): Chronic | ICD-10-CM

## 2021-01-26 DIAGNOSIS — M79.604 PAIN OF RIGHT LOWER EXTREMITY: ICD-10-CM

## 2021-01-26 DIAGNOSIS — M79.89 RIGHT LEG SWELLING: ICD-10-CM

## 2021-01-26 DIAGNOSIS — K21.9 GASTROESOPHAGEAL REFLUX DISEASE WITHOUT ESOPHAGITIS: Chronic | ICD-10-CM

## 2021-01-26 PROBLEM — M71.21 SYNOVIAL CYST OF RIGHT POPLITEAL SPACE: Status: ACTIVE | Noted: 2021-01-26

## 2021-01-26 LAB
D DIMER: <200 NG/ML DDU (ref 0–229)
HCT VFR BLD CALC: 45.4 % (ref 40.5–52.5)
HEMOGLOBIN: 15.4 G/DL (ref 13.5–17.5)
MCH RBC QN AUTO: 32 PG (ref 26–34)
MCHC RBC AUTO-ENTMCNC: 33.9 G/DL (ref 31–36)
MCV RBC AUTO: 94.2 FL (ref 80–100)
PDW BLD-RTO: 13.1 % (ref 12.4–15.4)
PLATELET # BLD: 310 K/UL (ref 135–450)
PMV BLD AUTO: 7.7 FL (ref 5–10.5)
RBC # BLD: 4.82 M/UL (ref 4.2–5.9)
WBC # BLD: 5.7 K/UL (ref 4–11)

## 2021-01-26 PROCEDURE — 93971 EXTREMITY STUDY: CPT

## 2021-01-26 PROCEDURE — 99214 OFFICE O/P EST MOD 30 MIN: CPT | Performed by: INTERNAL MEDICINE

## 2021-01-26 ASSESSMENT — SLEEP AND FATIGUE QUESTIONNAIRES
HOW LIKELY ARE YOU TO NOD OFF OR FALL ASLEEP WHILE SITTING INACTIVE IN A PUBLIC PLACE: 2
ESS TOTAL SCORE: 7
HOW LIKELY ARE YOU TO NOD OFF OR FALL ASLEEP WHILE LYING DOWN TO REST IN THE AFTERNOON WHEN CIRCUMSTANCES PERMIT: 3
HOW LIKELY ARE YOU TO NOD OFF OR FALL ASLEEP WHILE WATCHING TV: 2

## 2021-01-26 ASSESSMENT — ENCOUNTER SYMPTOMS
WHEEZING: 0
NAUSEA: 0
CHEST TIGHTNESS: 0
SHORTNESS OF BREATH: 0
VOMITING: 0
COUGH: 0

## 2021-01-26 NOTE — PROGRESS NOTES
Bryson Champ         : 1980    Diagnosis: [x] Hypoxia (R09.02) [] CSA (G47.31) [] Apnea (G47.30)   Length of Need: [] 13 Months [] 99 Months [] Other:    Machine (THU!): [] Respironics Dream Station      Auto [] ResMed AirSense     Auto [] Other:     []  CPAP () [] Bilevel ()   Mode: [] Auto [] Spontaneous    Mode: [] Auto [] Spontaneous              Comfort Settings:        Humidifier: [] Heated ()        [] Water chamber replacement ()/ 1 per 6 months        Mask:   [] Nasal () /1 per 3 months [] Full Face () /1 per 3 months   [] Patient choice -Size and fit mask [] Patient Choice - Size and fit mask   [] Dispense:  [] Dispense:    [] Headgear () / 1 per 3 months [] Headgear () / 1 per 3 months   [] Replacement Nasal Cushion ()/2 per month [] Interface Replacement ()/1 per month   [] Replacement Nasal Pillows ()/2 per month         Tubing: [] Heated ()/1 per 3 months    [] Standard ()/1 per 3 months [] Other:           Filters: [] Non-disposable ()/1 per 6 months     [] Ultra-Fine, Disposable ()/2 per month        Miscellaneous: [] Chin Strap ()/ 1 per 6 months [] O2 bleed-in:       LPM   [x] Overnight Oximetry on CPAP/Bilevel []  Other:    [] Modem: ()         Start Order Date: 21    MEDICAL JUSTIFICATION:  I, the undersigned, certify that the above prescribed supplies are medically necessary for this patients wellbeing. In my opinion, the supplies are both reasonable and necessary in reference to accepted standards of medicalpractice in treatment of this patients condition.     Reji Pickett MD      NPI: [id number]       Order Signed Date: 21    Electronically signed by Reji Pickett MD on 2021 at 4:45 PM    [facility]  1980  [address]  [phone] (home)   [phone] (mobile)      Insurance Info (confirm with patient if correct):  Payor/Plan Subscr  Sex Relation Sub.  Ins. ID Effective Group Num

## 2021-01-26 NOTE — LETTER
[facility] Sleep Medicine  [address] [facility] [place] [address] [address]  Phone: [phone]  Fax: [phone]    January 26, 2021       Patient: Harinder Whitfield   MR Number: [id number]   YOB: 1980   Date of Visit: 1/26/2021       Julio Parisi was seen for a follow up visit today. Here is my assessment and plan as well as an attached copy of his visit today:    YOHANNES (obstructive sleep apnea)  New Problem - On Tx. Reviewed sleep study and download compliance data with patient. Supplies and parts as needed for his machine. These are medically necessary. Limit caffeine use after 3pm.  Needs overnight oximetry on APAP (insurance mandated) even though standard of care is to do in-lab titration. Essential hypertension  Chronic- Stable. Cont meds per PCP and other physicians. Gastroesophageal reflux disease without esophagitis  Chronic- Stable. Cont meds per PCP and other physicians. Morbid obesity, unspecified obesity type ([address]  Chronic-Stable. Encouraged him to work on weight loss through diet and exercise. If you have questions or concerns, please do not hesitate to call me. I look forward to following Donel Jenni along with you.     Sincerely,    Rhea Brown MD    CC providers:  Ashley Mercado MD  [address] [place] [id number]  [address] [place]

## 2021-01-26 NOTE — PROGRESS NOTES
Jensen Terry  1980  male  38 y.o. SUBJECTIVE:       Chief Complaint   Patient presents with    Joint Swelling     RT-tender calf, pain behind knee--pain started after working out.  Weight Loss     310# home scale 11-10--loss of 31#, using protein shake/bar for 2 meals, small portions for dinner. HPI:  Patient has been complaining of pain and swelling affecting the right posterior knee and upper calf for the last 3 days. He recall walking about 3.5 miles on treadmill since then he started to have the symptoms. Patient is trying to lose weight. No previous history of DVT. No history of underlying cancer. Denies any cardiorespiratory symptoms. No family history of DVT or pulmonary embolism. He have not taken any medication to relieve the symptoms. He reported history of Baker's cyst behind the right knee.     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   Social Needs    Financial resource strain: Not hard at all   Mirtha-Damaris insecurity     Worry: Never true     Inability: Never true    Transportation needs     Medical: No     Non-medical: No   Tobacco Use    Smoking status: Never Smoker    Smokeless tobacco: Former User     Types: Chew   Substance and Sexual Activity    Alcohol use: Yes     Comment: rare    Drug use: No    Sexual activity: Yes     Partners: Female     Birth control/protection: Surgical   Lifestyle    Physical activity     Days per week: Not on file     Minutes per session: Not on file  Stress: Not on file   Relationships    Social connections     Talks on phone: Not on file     Gets together: Not on file     Attends Tenriism service: Not on file     Active member of club or organization: Not on file     Attends meetings of clubs or organizations: Not on file     Relationship status: Not on file    Intimate partner violence     Fear of current or ex partner: Not on file     Emotionally abused: Not on file     Physically abused: Not on file     Forced sexual activity: Not on file   Other Topics Concern    Not on file   Social History Narrative    Not on file     Family History   Problem Relation Age of Onset    Brain Cancer Mother 58    High Blood Pressure Father     Diabetes Father     Prostate Cancer Father         48 years    Heart Disease Father         poss MI       Review of Systems   Constitutional: Negative for diaphoresis and unexpected weight change. Respiratory: Negative for cough, chest tightness, shortness of breath and wheezing. Cardiovascular: Positive for leg swelling. Negative for chest pain and palpitations. Gastrointestinal: Negative for nausea and vomiting. Endocrine: Negative for polyphagia and polyuria. Genitourinary: Negative for difficulty urinating and frequency. Musculoskeletal: Negative for arthralgias. Neurological: Negative for dizziness and light-headedness. OBJECTIVE:  Pulse Readings from Last 4 Encounters:   10/08/20 96   08/26/20 90   02/24/20 90   08/26/19 90     Wt Readings from Last 4 Encounters:   01/26/21 279 lb (126.6 kg)   10/08/20 298 lb (135.2 kg)   08/26/20 296 lb (134.3 kg)   02/24/20 293 lb (132.9 kg)     BP Readings from Last 4 Encounters:   01/26/21 136/88   10/08/20 126/80   08/26/20 120/86   02/24/20 136/88     Physical Exam  Vitals signs and nursing note reviewed. Constitutional:       Appearance: He is obese. Eyes:      General: No scleral icterus.      Conjunctiva/sclera: Conjunctivae normal.   Cardiovascular: Rate and Rhythm: Normal rate and regular rhythm. Pulses: Normal pulses. Heart sounds: Normal heart sounds. Pulmonary:      Effort: Pulmonary effort is normal.      Breath sounds: Normal breath sounds. Musculoskeletal:      Comments: Local examination of the right leg    Tenderness over the upper part of the calf. Mild local diffuse puffiness of the lower leg diffusely. There is couple of prominent varicose veins at the lower leg. No definite pitting edema  Right calf diameter about half inch more than the left calf. No tenderness over the course of deep veins other than over the upper tibial vein area. Full weightbearing   Neurological:      General: No focal deficit present. Mental Status: He is alert and oriented to person, place, and time.          CBC:   Lab Results   Component Value Date    WBC 10.6 10/08/2020    HGB 14.8 10/08/2020    HCT 43.8 10/08/2020     10/08/2020     CMP:  Lab Results   Component Value Date     10/08/2020    K 3.9 10/08/2020     10/08/2020    CO2 27 10/08/2020    ANIONGAP 12 10/08/2020    GLUCOSE 68 10/08/2020    BUN 14 10/08/2020    CREATININE 0.8 10/08/2020    GFRAA >60 10/08/2020    GFRAA >60 11/01/2012    CALCIUM 9.8 10/08/2020    PROT 7.3 09/07/2019    LABALBU 4.6 09/07/2019    AGRATIO 1.7 09/07/2019    BILITOT 0.3 09/07/2019    ALKPHOS 74 09/07/2019    ALT 20 09/07/2019    AST 25 09/07/2019    GLOB 2.7 09/07/2019     URINALYSIS:  Lab Results   Component Value Date    GLUCOSEU neg 10/08/2020    GLUCOSEU Negative 09/07/2019    KETUA neg 10/08/2020    KETUA Negative 09/07/2019    SPECGRAV 1.010 10/08/2020    SPECGRAV 1.020 09/07/2019    BLOODU trace 10/08/2020    BLOODU TRACE 09/07/2019    PHUR 6.0 10/08/2020    PHUR 6.0 09/07/2019    PROTEINU neg 10/08/2020    PROTEINU Negative 09/07/2019    NITRU Negative 09/07/2019    LEUKOCYTESUR neg 10/08/2020    LEUKOCYTESUR Negative 09/07/2019    LABMICR YES 09/07/2019 URINETYPE Not Specified 09/07/2019     HBA1C:   Lab Results   Component Value Date    LABA1C 5.5 02/24/2020    .2 02/24/2020     MICRO/ALB:   Lab Results   Component Value Date    LABMICR YES 09/07/2019     LIPID:  Lab Results   Component Value Date    CHOL 171 09/07/2019    TRIG 75 09/07/2019    HDL 40 09/07/2019    LDLCALC 116 09/07/2019    LABVLDL 15 09/07/2019     TSH:   Lab Results   Component Value Date    TSHREFLEX 1.44 09/07/2019     PSA:   Lab Results   Component Value Date    PSA 0.34 10/08/2020        ASSESSMENT/PLAN:  Assessment/Plan:  Brunilda Acosta was seen today for joint swelling and weight loss. Diagnoses and all orders for this visit:    Pain of right lower extremity  -     Cancel: VL Extremity Venous Right; Future  -     CBC; Future  -     D-DIMER, QUANTITATIVE; Future  -     VL Extremity Venous Right; Future    Right leg swelling  -     Cancel: VL Extremity Venous Right; Future  -     CBC; Future  -     D-DIMER, QUANTITATIVE; Future  -     VL Extremity Venous Right; Future    He will be off work today and tomorrow. Pending blood work and Doppler study  Over-the-counter Tylenol ibuprofen for now        Orders Placed This Encounter   Procedures    VL Extremity Venous Right     Standing Status:   Future     Number of Occurrences:   1     Standing Expiration Date:   1/26/2022     Order Specific Question:   Upper or Lower Extremity?      Answer:   Lower Extremity     Order Specific Question:   Reason for exam:     Answer:   exclude dvt    CBC     Standing Status:   Future     Number of Occurrences:   1     Standing Expiration Date:   1/26/2022    D-DIMER, QUANTITATIVE     Standing Status:   Future     Number of Occurrences:   1     Standing Expiration Date:   1/26/2022     Current Outpatient Medications   Medication Sig Dispense Refill    hydroCHLOROthiazide (HYDRODIURIL) 25 MG tablet TAKE 1 TABLET BY MOUTH  DAILY 90 tablet 3

## 2021-01-26 NOTE — LETTER
[facility]  [address] [address]  Phone: [phone]  Fax: [phone]    Errol Bains MD        January 26, 2021     Patient: Kalyn Holloway   YOB: 1980   Date of Visit: 1/26/2021       To Whom it May Concern:    Oniel Carrera was seen in my clinic on 1/26/2021. He  Should be off work today and tomorrow pending further work up. If you have any questions or concerns, please don't hesitate to call.     Sincerely,         Errol Bains MD

## 2021-01-26 NOTE — PROGRESS NOTES
Alxe Mccormick MD, [facility], [facility]  Tiffanie Kehrt CNP  Francie Hamptones CNP [address]  [address] Desir [address] [facility], [address] [facility] [phone]   [facility] [address]  Bartolo Desir. [address] [place] [address]. [address] Benav[address] [phone]     Video Visit- Follow up    Pursuant to the emergency declaration under the [address] [place] [address] authority and the [employer] Act, this Virtual  Visit was conducted, with patient's consent, to reduce the patient's risk of exposure to COVID-19 and provide continuity of care for an established patient. Services were provided through a video synchronous discussion virtually to substitute for in-person clinic visit. Patient was located in their home. Assessment/Plan:     1. YOHANNES (obstructive sleep apnea)  Assessment & Plan:  New Problem - On Tx. Reviewed sleep study and download compliance data with patient. Supplies and parts as needed for his machine. These are medically necessary. Limit caffeine use after 3pm.  Needs overnight oximetry on APAP (insurance mandated) even though standard of care is to do in-lab titration. 2. Essential hypertension  Assessment & Plan:  Chronic- Stable. Cont meds per PCP and other physicians. 3. Gastroesophageal reflux disease without esophagitis  Assessment & Plan:  Chronic- Stable. Cont meds per PCP and other physicians. 4. Morbid obesity, unspecified obesity type ([address]  Assessment & Plan:  Chronic-Stable. Encouraged him to work on weight loss through diet and exercise. Diagnoses of YOHANNES (obstructive sleep apnea), Essential hypertension, Gastroesophageal reflux disease without esophagitis, and Morbid obesity, unspecified obesity type ([address] were pertinent to this visit. The chronic medical conditions listed are directly related to the primary diagnosis listed above. The management of the primary diagnosis affects the secondary diagnosis and vice versa. Subjective:     Patient ID: Maddi Rea is a 38 y.o. male. Chief Complaint   Patient presents with    Sleep Apnea     Subjective   HPI:    Machine Modem/Download Info:  Compliance (hours/night): 6.75 hrs/night  Download AHI (/hour): 5.9 /HR  Average CPAP Pressure : 8.9 cmH2O   APAP - Settings  Pressure Min: 7 cmH2O  Pressure Max: 20 cmH2O     Comfort Settings  Humidity Level (0-8): 3  Flex/EPR (0-3): 3       YOHANNES:   Insurance mandated HST on 11/20/20 showed RDI-27.4/hr and low sat 75% with time below 88% of 146.8 min. Feels much better on his machine. Nocturia is much better on  APAP. No HA, dryness, or congestion. Pressure feels good, not having SOB nor aerophagia. [facility]    [place] - Total score: 7    Current Outpatient Medications   Medication Sig Dispense Refill    hydroCHLOROthiazide (HYDRODIURIL) 25 MG tablet TAKE 1 TABLET BY MOUTH  DAILY 90 tablet 3    fesoterodine (TOVIAZ) 4 MG TB24 ER tablet Take 8 mg by mouth daily       sildenafil (VIAGRA) 50 MG tablet Take 1 tablet by mouth as needed for Erectile Dysfunction 9 tablet 5    verapamil (CALAN SR) 240 MG extended release tablet TAKE 1 TABLET BY MOUTH  DAILY 90 tablet 3    lisinopril (PRINIVIL;ZESTRIL) 40 MG tablet TAKE 1 TABLET BY MOUTH  DAILY 90 tablet 3    omeprazole (PRILOSEC) 20 MG delayed release capsule TAKE 1 CAPSULE BY MOUTH  DAILY 90 capsule 3    calcium carbonate (TUMS) 500 MG chewable tablet Take 6 tablets by mouth daily as needed for Heartburn       No current facility-administered medications for this visit.         Allergies as of 01/26/2021 - Review Complete 01/26/2021   Allergen Reaction Noted    Morphine Hives 02/21/2011         Electronically signed by Saranya Bajwa MD on 1/26/2021 at 4:47 PM

## 2021-01-26 NOTE — ASSESSMENT & PLAN NOTE
New Problem - On Tx. Reviewed sleep study and download compliance data with patient. Supplies and parts as needed for his machine. These are medically necessary. Limit caffeine use after 3pm.  Needs overnight oximetry on APAP (insurance mandated) even though standard of care is to do in-lab titration.

## 2021-01-27 ENCOUNTER — TELEPHONE (OUTPATIENT)
Dept: INTERNAL MEDICINE CLINIC | Age: 41
End: 2021-01-27

## 2021-02-03 ENCOUNTER — OFFICE VISIT (OUTPATIENT)
Dept: INTERNAL MEDICINE CLINIC | Age: 41
End: 2021-02-03
Payer: COMMERCIAL

## 2021-02-03 VITALS
BODY MASS INDEX: 39.2 KG/M2 | HEART RATE: 84 BPM | WEIGHT: 280 LBS | TEMPERATURE: 97.5 F | SYSTOLIC BLOOD PRESSURE: 116 MMHG | DIASTOLIC BLOOD PRESSURE: 86 MMHG | HEIGHT: 71 IN

## 2021-02-03 DIAGNOSIS — N52.9 ERECTILE DYSFUNCTION, UNSPECIFIED ERECTILE DYSFUNCTION TYPE: ICD-10-CM

## 2021-02-03 DIAGNOSIS — F32.A DEPRESSION, UNSPECIFIED DEPRESSION TYPE: ICD-10-CM

## 2021-02-03 DIAGNOSIS — F52.4 PREMATURE EJACULATION: Primary | ICD-10-CM

## 2021-02-03 DIAGNOSIS — M71.21 BAKER CYST, RIGHT: ICD-10-CM

## 2021-02-03 DIAGNOSIS — S86.811D STRAIN OF CALF MUSCLE, RIGHT, SUBSEQUENT ENCOUNTER: ICD-10-CM

## 2021-02-03 PROCEDURE — 99213 OFFICE O/P EST LOW 20 MIN: CPT | Performed by: INTERNAL MEDICINE

## 2021-02-03 RX ORDER — SERTRALINE HYDROCHLORIDE 100 MG/1
TABLET, FILM COATED ORAL
Qty: 90 TABLET | Refills: 1 | Status: SHIPPED | OUTPATIENT
Start: 2021-02-03 | End: 2021-04-22

## 2021-02-03 RX ORDER — SILDENAFIL 50 MG/1
50 TABLET, FILM COATED ORAL PRN
Qty: 27 TABLET | Refills: 1 | Status: SHIPPED | OUTPATIENT
Start: 2021-02-03 | End: 2021-04-22

## 2021-02-03 ASSESSMENT — ENCOUNTER SYMPTOMS
NAUSEA: 0
VOICE CHANGE: 0
WHEEZING: 0
VOMITING: 0
TROUBLE SWALLOWING: 0
SHORTNESS OF BREATH: 0

## 2021-02-03 NOTE — PROGRESS NOTES
Beckyhiwot Drew  1980  male  38 y.o. SUBJECTIVE:       Chief Complaint   Patient presents with    Leg Pain     1 week f/u improving. right--resting leg--not going to gym       HPI:  Follow-up visit. Patient right calf pain resolved completely. He was found to have right popliteal fossa Baker's cyst.  As per patient he reported history of Baker's cyst since the age of 20. Patient is complaining of premature ejaculation. He has been taking Viagra. He used to take Zoloft in the past for depression. He recall he did not have ejaculation issue ssue while taking that medication.     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   Social Needs    Financial resource strain: Not hard at all   [place]-Damaris insecurity     Worry: Never true     Inability: Never true    Transportation needs     Medical: No     Non-medical: No   Tobacco Use    Smoking status: Never Smoker    Smokeless tobacco: Former User     Types: Chew   Substance and Sexual Activity    Alcohol use: Yes     Comment: rare    Drug use: No    Sexual activity: Yes     Partners: Female     Birth control/protection: Surgical   Lifestyle    Physical activity     Days per week: None     Minutes per session: None    Stress: None   Relationships    Social connections     Talks on phone: None     Gets together: None     Attends Scientologist service: None     Active member of club or organization: None     Attends meetings of clubs or organizations: None Relationship status: None    Intimate partner violence     Fear of current or ex partner: None     Emotionally abused: None     Physically abused: None     Forced sexual activity: None   Other Topics Concern    None   Social History Narrative    None     Family History   Problem Relation Age of Onset    Brain Cancer Mother 58    High Blood Pressure Father     Diabetes Father     Prostate Cancer Father         48 years    Heart Disease Father         poss MI       Review of Systems   Constitutional: Negative for appetite change and unexpected weight change. HENT: Negative for trouble swallowing and voice change. Respiratory: Negative for shortness of breath and wheezing. Cardiovascular: Negative for chest pain and palpitations. Gastrointestinal: Negative for nausea and vomiting. Neurological: Negative for dizziness and light-headedness. OBJECTIVE:  Pulse Readings from Last 4 Encounters:   02/03/21 84   10/08/20 96   08/26/20 90   02/24/20 90     Wt Readings from Last 4 Encounters:   02/03/21 280 lb (127 kg)   01/26/21 279 lb (126.6 kg)   10/08/20 298 lb (135.2 kg)   08/26/20 296 lb (134.3 kg)     BP Readings from Last 4 Encounters:   02/03/21 116/86   01/26/21 136/88   10/08/20 126/80   08/26/20 120/86     Physical Exam  Vitals signs and nursing note reviewed. Constitutional:       Appearance: He is obese. Cardiovascular:      Rate and Rhythm: Normal rate and regular rhythm. Pulses: Normal pulses. Heart sounds: Normal heart sounds. Pulmonary:      Effort: Pulmonary effort is normal.      Breath sounds: Normal breath sounds. Abdominal:      General: Bowel sounds are normal.   Musculoskeletal:      Comments: Examination of the right leg  Pre-existing varicose vein. No calf tenderness. No leg swelling   Neurological:      General: No focal deficit present. Mental Status: He is oriented to person, place, and time.          CBC:   Lab Results   Component Value Date WBC 5.7 01/26/2021    HGB 15.4 01/26/2021    HCT 45.4 01/26/2021     01/26/2021     CMP:  Lab Results   Component Value Date     10/08/2020    K 3.9 10/08/2020     10/08/2020    CO2 27 10/08/2020    ANIONGAP 12 10/08/2020    GLUCOSE 68 10/08/2020    BUN 14 10/08/2020    CREATININE 0.8 10/08/2020    GFRAA >60 10/08/2020    GFRAA >60 11/01/2012    CALCIUM 9.8 10/08/2020    PROT 7.3 09/07/2019    LABALBU 4.6 09/07/2019    AGRATIO 1.7 09/07/2019    BILITOT 0.3 09/07/2019    ALKPHOS 74 09/07/2019    ALT 20 09/07/2019    AST 25 09/07/2019    GLOB 2.7 09/07/2019     URINALYSIS:  Lab Results   Component Value Date    GLUCOSEU neg 10/08/2020    GLUCOSEU Negative 09/07/2019    KETUA neg 10/08/2020    KETUA Negative 09/07/2019    SPECGRAV 1.010 10/08/2020    SPECGRAV 1.020 09/07/2019    BLOODU trace 10/08/2020    BLOODU TRACE 09/07/2019    PHUR 6.0 10/08/2020    PHUR 6.0 09/07/2019    PROTEINU neg 10/08/2020    PROTEINU Negative 09/07/2019    NITRU Negative 09/07/2019    LEUKOCYTESUR neg 10/08/2020    LEUKOCYTESUR Negative 09/07/2019    LABMICR YES 09/07/2019    URINETYPE Not Specified 09/07/2019     HBA1C:   Lab Results   Component Value Date    LABA1C 5.5 02/24/2020    .2 02/24/2020     MICRO/ALB:   Lab Results   Component Value Date    LABMICR YES 09/07/2019     LIPID:  Lab Results   Component Value Date    CHOL 171 09/07/2019    TRIG 75 09/07/2019    HDL 40 09/07/2019    LDLCALC 116 09/07/2019    LABVLDL 15 09/07/2019     TSH:   Lab Results   Component Value Date    TSHREFLEX 1.44 09/07/2019     PSA:   Lab Results   Component Value Date    PSA 0.34 10/08/2020        ASSESSMENT/PLAN:    1. Premature ejaculation    2. Erectile dysfunction, unspecified erectile dysfunction type    3. Depression, unspecified depression type    4. Baker cyst, right    5. Strain of calf muscle, right, subsequent encounter    He will continue compression stockings. He may resume his exercise gradually. He will restart sertraline. No orders of the defined types were placed in this encounter. Current Outpatient Medications   Medication Sig Dispense Refill    sildenafil (VIAGRA) 50 MG tablet Take 1 tablet by mouth as needed for Erectile Dysfunction 27 tablet 1    sertraline (ZOLOFT) 100 MG tablet 1/2 tab po daily for 1 week then 1 tab po daily 90 tablet 1    hydroCHLOROthiazide (HYDRODIURIL) 25 MG tablet TAKE 1 TABLET BY MOUTH  DAILY 90 tablet 3    fesoterodine (TOVIAZ) 4 MG TB24 ER tablet Take 8 mg by mouth daily       verapamil (CALAN SR) 240 MG extended release tablet TAKE 1 TABLET BY MOUTH  DAILY 90 tablet 3    lisinopril (PRINIVIL;ZESTRIL) 40 MG tablet TAKE 1 TABLET BY MOUTH  DAILY 90 tablet 3    omeprazole (PRILOSEC) 20 MG delayed release capsule TAKE 1 CAPSULE BY MOUTH  DAILY 90 capsule 3    calcium carbonate (TUMS) 500 MG chewable tablet Take 6 tablets by mouth daily as needed for Heartburn       No current facility-administered medications for this visit. Keep regular appointment as a schedule in next month. An After Visit Summary was printed and given to the patient. Documentation was done using voice recognition dragon software. Every effort was made to ensure accuracy; however, inadvertent  Unintentional computerized transcription errors may be present.

## 2021-02-11 ENCOUNTER — TELEPHONE (OUTPATIENT)
Dept: PULMONOLOGY | Age: 41
End: 2021-02-11

## 2021-02-11 ENCOUNTER — CLINICAL DOCUMENTATION (OUTPATIENT)
Dept: PULMONOLOGY | Age: 41
End: 2021-02-11

## 2021-02-11 DIAGNOSIS — G47.33 OBSTRUCTIVE SLEEP APNEA SYNDROME: Primary | ICD-10-CM

## 2021-02-12 ENCOUNTER — PATIENT MESSAGE (OUTPATIENT)
Dept: PULMONOLOGY | Age: 41
End: 2021-02-12

## 2021-02-12 ENCOUNTER — TELEPHONE (OUTPATIENT)
Dept: SLEEP CENTER | Age: 41
End: 2021-02-12

## 2021-02-12 NOTE — TELEPHONE ENCOUNTER
Called to schedule cpap study per Allison Greenfield.      Left  for the pt to return my call.     [facility]

## 2021-03-01 ENCOUNTER — OFFICE VISIT (OUTPATIENT)
Dept: INTERNAL MEDICINE CLINIC | Age: 41
End: 2021-03-01
Payer: COMMERCIAL

## 2021-03-01 VITALS
BODY MASS INDEX: 37.38 KG/M2 | WEIGHT: 267 LBS | SYSTOLIC BLOOD PRESSURE: 120 MMHG | HEIGHT: 71 IN | TEMPERATURE: 97.2 F | HEART RATE: 78 BPM | DIASTOLIC BLOOD PRESSURE: 82 MMHG

## 2021-03-01 DIAGNOSIS — Z00.00 ANNUAL PHYSICAL EXAM: Primary | ICD-10-CM

## 2021-03-01 DIAGNOSIS — Z00.00 ANNUAL PHYSICAL EXAM: ICD-10-CM

## 2021-03-01 DIAGNOSIS — I10 ESSENTIAL HYPERTENSION: Chronic | ICD-10-CM

## 2021-03-01 DIAGNOSIS — E78.5 DYSLIPIDEMIA: ICD-10-CM

## 2021-03-01 DIAGNOSIS — F32.A DEPRESSION, UNSPECIFIED DEPRESSION TYPE: ICD-10-CM

## 2021-03-01 LAB
A/G RATIO: 1.7 (ref 1.1–2.2)
ALBUMIN SERPL-MCNC: 4.7 G/DL (ref 3.4–5)
ALP BLD-CCNC: 75 U/L (ref 40–129)
ALT SERPL-CCNC: 24 U/L (ref 10–40)
ANION GAP SERPL CALCULATED.3IONS-SCNC: 13 MMOL/L (ref 3–16)
AST SERPL-CCNC: 29 U/L (ref 15–37)
BILIRUB SERPL-MCNC: 0.3 MG/DL (ref 0–1)
BILIRUBIN URINE: NEGATIVE
BLOOD, URINE: ABNORMAL
BUN BLDV-MCNC: 10 MG/DL (ref 7–20)
CALCIUM SERPL-MCNC: 9.6 MG/DL (ref 8.3–10.6)
CHLORIDE BLD-SCNC: 100 MMOL/L (ref 99–110)
CHOLESTEROL, TOTAL: 180 MG/DL (ref 0–199)
CLARITY: CLEAR
CO2: 24 MMOL/L (ref 21–32)
COLOR: YELLOW
CREAT SERPL-MCNC: 0.8 MG/DL (ref 0.9–1.3)
EPITHELIAL CELLS, UA: 0 /HPF (ref 0–5)
GFR AFRICAN AMERICAN: >60
GFR NON-AFRICAN AMERICAN: >60
GLOBULIN: 2.7 G/DL
GLUCOSE BLD-MCNC: 72 MG/DL (ref 70–99)
GLUCOSE URINE: NEGATIVE MG/DL
HCT VFR BLD CALC: 44 % (ref 40.5–52.5)
HDLC SERPL-MCNC: 39 MG/DL (ref 40–60)
HEMOGLOBIN: 15.3 G/DL (ref 13.5–17.5)
HYALINE CASTS: 0 /LPF (ref 0–8)
KETONES, URINE: NEGATIVE MG/DL
LDL CHOLESTEROL CALCULATED: 119 MG/DL
LEUKOCYTE ESTERASE, URINE: NEGATIVE
MCH RBC QN AUTO: 32.6 PG (ref 26–34)
MCHC RBC AUTO-ENTMCNC: 34.8 G/DL (ref 31–36)
MCV RBC AUTO: 93.8 FL (ref 80–100)
MICROSCOPIC EXAMINATION: YES
NITRITE, URINE: NEGATIVE
PDW BLD-RTO: 12.5 % (ref 12.4–15.4)
PH UA: 6.5 (ref 5–8)
PLATELET # BLD: 304 K/UL (ref 135–450)
PMV BLD AUTO: 7.9 FL (ref 5–10.5)
POTASSIUM SERPL-SCNC: 3.6 MMOL/L (ref 3.5–5.1)
PROTEIN UA: NEGATIVE MG/DL
RBC # BLD: 4.69 M/UL (ref 4.2–5.9)
RBC UA: 2 /HPF (ref 0–4)
SODIUM BLD-SCNC: 137 MMOL/L (ref 136–145)
SPECIFIC GRAVITY UA: 1.01 (ref 1–1.03)
TOTAL PROTEIN: 7.4 G/DL (ref 6.4–8.2)
TRIGL SERPL-MCNC: 110 MG/DL (ref 0–150)
TSH REFLEX: 1.49 UIU/ML (ref 0.27–4.2)
URINE TYPE: ABNORMAL
UROBILINOGEN, URINE: 0.2 E.U./DL
VLDLC SERPL CALC-MCNC: 22 MG/DL
WBC # BLD: 6.4 K/UL (ref 4–11)
WBC UA: 0 /HPF (ref 0–5)

## 2021-03-01 PROCEDURE — 99396 PREV VISIT EST AGE 40-64: CPT | Performed by: INTERNAL MEDICINE

## 2021-03-01 ASSESSMENT — ENCOUNTER SYMPTOMS
BACK PAIN: 0
WHEEZING: 0
VOMITING: 0
EYE DISCHARGE: 0
NAUSEA: 0
SHORTNESS OF BREATH: 0
TROUBLE SWALLOWING: 0
VOICE CHANGE: 0
EYE PAIN: 0
COLOR CHANGE: 0
ABDOMINAL PAIN: 0
COUGH: 0

## 2021-03-01 NOTE — PATIENT INSTRUCTIONS
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
## 2021-03-05 ENCOUNTER — HOSPITAL ENCOUNTER (OUTPATIENT)
Dept: SLEEP CENTER | Age: 41
Discharge: HOME | End: 2021-03-05
Payer: COMMERCIAL

## 2021-03-05 DIAGNOSIS — G47.33 OBSTRUCTIVE SLEEP APNEA SYNDROME: ICD-10-CM

## 2021-03-05 PROCEDURE — 95811 POLYSOM 6/>YRS CPAP 4/> PARM: CPT | Performed by: INTERNAL MEDICINE

## 2021-03-05 PROCEDURE — 95811 POLYSOM 6/>YRS CPAP 4/> PARM: CPT

## 2021-03-11 ENCOUNTER — TELEPHONE (OUTPATIENT)
Dept: PULMONOLOGY | Age: 41
End: 2021-03-11

## 2021-03-11 NOTE — PROGRESS NOTES
Franco Hyde         : 1980 [facility]    Diagnosis: [x] YOHANNES (G47.33) [] CSA (G47.31) [] Apnea (G47.30)   Length of Need: [x] 12 Months [] 99 Months [] Other:    Machine (THU!): [x] Respironics Dream Station      Auto [] ResMed AirSense     Auto [] Other:     []  CPAP () [x] Bilevel ()   Mode: [] Auto [] Spontaneous    Mode: [x] Auto [] Spontaneous               IPAP max 25 cmH2O  EPAP  Min 10 cmH2O  PS min 3 cmH2O  PS max 7 cmH2O             Comfort Settings:   - Ramp Pressure: 5 cmH2O                                        - Ramp time: 15 min                                     -  Flex/EPR - 3 full time                                    - For ResMed Bilevel (TiMax-4 sec   TiMin- 0.2 sec)     Humidifier: [x] Heated ()        [x] Water chamber replacement ()/ 1 per 6 months        Mask:   [] Nasal () /1 per 3 months [x] Full Face () /1 per 3 months   [] Patient choice -Size and fit mask [x] Patient Choice - Size and fit mask   [] Dispense:  [] Dispense:    [] Headgear () / 1 per 3 months [x] Headgear () / 1 per 3 months   [] Replacement Nasal Cushion ()/2 per month [x] Interface Replacement ()/1 per month   [] Replacement Nasal Pillows ()/2 per month         Tubing: [x] Heated ()/1 per 3 months    [] Standard ()/1 per 3 months [] Other:           Filters: [x] Non-disposable ()/1 per 6 months     [x] Ultra-Fine, Disposable ()/2 per month        Miscellaneous: [] Chin Strap ()/ 1 per 6 months [] O2 bleed-in:       LPM   [] Oximetry on CPAP/Bilevel []  Other:    [x] Modem: ()         Start Order Date: 21    MEDICAL JUSTIFICATION:  I, the undersigned, certify that the above prescribed supplies are medically necessary for this patients wellbeing. In my opinion, the supplies are both reasonable and necessary in reference to accepted standards of medicalpractice in treatment of this patients condition.     Pop Wang, MD      NPI: [id number]       Order Signed Date: 21    Electronically signed by Luis Gaxiola MD on 3/11/2021 at 9:23 AM    Faye Remy  1980  [address] [address] [phone] (home)   [phone] (mobile)      Insurance Info (confirm with patient if correct):  Payor/Plan Subscr  Sex Relation Sub.  Ins. ID Effective Group Num

## 2021-03-11 NOTE — TELEPHONE ENCOUNTER
Spoke with pt to review titration study. Order to be sent to [facility].  F/U scheduled

## 2021-05-11 ENCOUNTER — VIRTUAL VISIT (OUTPATIENT)
Dept: PULMONOLOGY | Age: 41
End: 2021-05-11
Payer: COMMERCIAL

## 2021-05-11 DIAGNOSIS — G47.33 OSA (OBSTRUCTIVE SLEEP APNEA): Primary | ICD-10-CM

## 2021-05-11 DIAGNOSIS — I10 ESSENTIAL HYPERTENSION: Chronic | ICD-10-CM

## 2021-05-11 DIAGNOSIS — E66.01 MORBID OBESITY, UNSPECIFIED OBESITY TYPE (HCC): Chronic | ICD-10-CM

## 2021-05-11 DIAGNOSIS — K21.9 GASTROESOPHAGEAL REFLUX DISEASE WITHOUT ESOPHAGITIS: Chronic | ICD-10-CM

## 2021-05-11 PROCEDURE — 99214 OFFICE O/P EST MOD 30 MIN: CPT | Performed by: NURSE PRACTITIONER

## 2021-05-11 ASSESSMENT — SLEEP AND FATIGUE QUESTIONNAIRES
HOW LIKELY ARE YOU TO NOD OFF OR FALL ASLEEP WHILE SITTING AND READING: 3
HOW LIKELY ARE YOU TO NOD OFF OR FALL ASLEEP IN A CAR, WHILE STOPPED FOR A FEW MINUTES IN TRAFFIC: 0
HOW LIKELY ARE YOU TO NOD OFF OR FALL ASLEEP WHEN YOU ARE A PASSENGER IN A CAR FOR AN HOUR WITHOUT A BREAK: 0
HOW LIKELY ARE YOU TO NOD OFF OR FALL ASLEEP WHILE SITTING AND TALKING TO SOMEONE: 0
ESS TOTAL SCORE: 5
HOW LIKELY ARE YOU TO NOD OFF OR FALL ASLEEP WHILE LYING DOWN TO REST IN THE AFTERNOON WHEN CIRCUMSTANCES PERMIT: 2

## 2021-05-11 NOTE — PROGRESS NOTES
Steve Nevarez         : 1980    Diagnosis: [x] YOHANNES (G47.33) [] CSA (G47.31) [x] Apnea (G47.30)   Length of Need: [x] 6 Months [] 99 Months [x] Other: Hypoxia Unspecified (R09.02)       Miscellaneous: [] Chin Strap ()/ 1 per 6 months [] O2 bleed-in:       LPM   [x] Oximetry on CPAP/Bilevel []  Other:          Start Order Date: 21    MEDICAL JUSTIFICATION:  I, the undersigned, certify that the above prescribed supplies are medically necessary for this patients wellbeing. In my opinion, the supplies are both reasonable and necessary in reference to accepted standards of medicalpractice in treatment of this patients condition. LORENZA Mancilla CNP      NPI: [id number]       Order Signed Date: 21    Electronically signed by LORENZA Mancilla CNP on 2021 at 4:29 PM    Steve Nevarez  1980  [address] [facility] [address]  [phone] (home)   [phone] (mobile)      Insurance Info (confirm with patient if correct):  Payor/Plan Subscr  Sex Relation Sub.  Ins. ID Effective Group Num
21    Electronically signed by LORENZA Pennington CNP on 2021 at 4:29 PM    Efrain Rule  1980  [address] [address]  [phone] (home)   [phone] (mobile)      Insurance Info (confirm with patient if correct):  Payor/Plan Subscr  Sex Relation Sub.  Ins. ID Effective Group Num

## 2021-05-11 NOTE — PROGRESS NOTES
enough air    [] To much pressure)     Noticed changes in pressure  [x] NA  [] Yes    [] No     Mask is fitting well  [x] Yes  [] No   Noting Mask Air Leak  [x] Yes  [] No   Having painful Aerophagia  [] Yes  [x] No   Nocturia   1  per night. Having  HA upon waking  [] Yes  [x] No   Dry mouth upon waking   Dry Nose  Dry Eyes  [x] Yes  [] No   Congestion upon waking   [] Yes  [x] No    Nose Bleeds  [] Yes  [x] No   Using Sleep Aides  [x] NA  [] OTC  [] Per our office   [] Per another provider   Understands how to change humidification and/or tubing temperature for comfort while at home  [x] Yes  [] No     Difficulties falling asleep  [] Yes  [x] No   Difficulties staying asleep  [] Yes  [x] No   Approximate time to bed  9pm   Approximate wake time  5:40m   Taking Naps  no   If taking naps usual length      [x] NA   If taking naps using the machine [x] NA  [] Yes    [] No    [] With and With out    Drowsy when driving  [] Yes  [x] No     Does patient carry a DOT/CDL  [] Yes  [x] No     Does patient carry FAA/Pilots License   [] Yes  [x] No      Any concerns noted with the machine at this time  [] Yes  [x] No       Assessment/Plan:   1. YOHANNES (obstructive sleep apnea)  Assessment & Plan:  After downloading data and reviewing  Reviewed compliance download with pt. Supplies and parts as needed for his machine. These are medically necessary. Continue medications per his PCP and other physicians. Limit caffeine use after 3pm.    The chronic medical conditions listed are directly related to the primary diagnosis listed above. The management of the primary diagnosis affects the secondary diagnosis and vice versa    Patient is complaint encouraged to maintain compliance to aide on controlling other stated healthcare concerns. 2. Morbid obesity, unspecified obesity type [facility])  Assessment & Plan:  Patient encouraged to work on maintaining a healthy weight per height.   Achievable with diet

## 2021-07-15 DIAGNOSIS — I10 ESSENTIAL HYPERTENSION: ICD-10-CM

## 2021-07-15 DIAGNOSIS — K21.9 GASTROESOPHAGEAL REFLUX DISEASE WITHOUT ESOPHAGITIS: ICD-10-CM

## 2021-07-15 RX ORDER — LISINOPRIL 40 MG/1
TABLET ORAL
Qty: 90 TABLET | Refills: 3 | Status: SHIPPED | OUTPATIENT
Start: 2021-07-15 | End: 2022-08-07 | Stop reason: SDUPTHER

## 2021-07-15 RX ORDER — OMEPRAZOLE 20 MG/1
CAPSULE, DELAYED RELEASE ORAL
Qty: 90 CAPSULE | Refills: 3 | Status: SHIPPED | OUTPATIENT
Start: 2021-07-15 | End: 2022-07-18 | Stop reason: SDUPTHER

## 2021-07-15 RX ORDER — VERAPAMIL HYDROCHLORIDE 240 MG/1
TABLET, FILM COATED, EXTENDED RELEASE ORAL
Qty: 90 TABLET | Refills: 3 | Status: SHIPPED | OUTPATIENT
Start: 2021-07-15 | End: 2022-08-07 | Stop reason: SDUPTHER

## 2021-09-01 ENCOUNTER — OFFICE VISIT (OUTPATIENT)
Dept: INTERNAL MEDICINE CLINIC | Age: 41
End: 2021-09-01
Payer: COMMERCIAL

## 2021-09-01 VITALS
HEART RATE: 76 BPM | DIASTOLIC BLOOD PRESSURE: 82 MMHG | BODY MASS INDEX: 36.01 KG/M2 | HEIGHT: 71 IN | SYSTOLIC BLOOD PRESSURE: 118 MMHG | WEIGHT: 257.2 LBS

## 2021-09-01 DIAGNOSIS — K21.9 GASTROESOPHAGEAL REFLUX DISEASE WITHOUT ESOPHAGITIS: ICD-10-CM

## 2021-09-01 DIAGNOSIS — F32.A DEPRESSION, UNSPECIFIED DEPRESSION TYPE: ICD-10-CM

## 2021-09-01 DIAGNOSIS — I10 ESSENTIAL HYPERTENSION: Primary | ICD-10-CM

## 2021-09-01 PROCEDURE — 99214 OFFICE O/P EST MOD 30 MIN: CPT | Performed by: INTERNAL MEDICINE

## 2021-09-01 RX ORDER — OXYBUTYNIN CHLORIDE 10 MG/1
10 TABLET, EXTENDED RELEASE ORAL 2 TIMES DAILY
COMMUNITY
End: 2022-09-07 | Stop reason: ALTCHOICE

## 2021-09-01 SDOH — ECONOMIC STABILITY: FOOD INSECURITY: WITHIN THE PAST 12 MONTHS, YOU WORRIED THAT YOUR FOOD WOULD RUN OUT BEFORE YOU GOT MONEY TO BUY MORE.: NEVER TRUE

## 2021-09-01 SDOH — ECONOMIC STABILITY: FOOD INSECURITY: WITHIN THE PAST 12 MONTHS, THE FOOD YOU BOUGHT JUST DIDN'T LAST AND YOU DIDN'T HAVE MONEY TO GET MORE.: NEVER TRUE

## 2021-09-01 ASSESSMENT — ENCOUNTER SYMPTOMS
ABDOMINAL PAIN: 0
SHORTNESS OF BREATH: 0
VOICE CHANGE: 0
NAUSEA: 0
WHEEZING: 0
TROUBLE SWALLOWING: 0

## 2021-09-01 ASSESSMENT — SOCIAL DETERMINANTS OF HEALTH (SDOH): HOW HARD IS IT FOR YOU TO PAY FOR THE VERY BASICS LIKE FOOD, HOUSING, MEDICAL CARE, AND HEATING?: NOT HARD AT ALL

## 2021-09-01 NOTE — PROGRESS NOTES
Omar Ruggiero  1980  male  42 y.o. SUBJECTIVE:       Chief Complaint   Patient presents with    6 Month Follow-Up       HPI:  Follow-up visit for chronic problems. Patient denies any exertional chest pain, dyspnea, palpitations, syncope, orthopnea, edema or paroxysmal nocturnal dyspnea. History of depression. Patient denies any manic symptoms. Denies any suicidal homicidal ideation. History of gastroesophageal reflux disorder. He denies any dyspeptic symptoms. He take Prilosec every day    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   Tobacco Use    Smoking status: Never Smoker    Smokeless tobacco: Former User     Types: Chew   Vaping Use    Vaping Use: Never used   Substance and Sexual Activity    Alcohol use: Yes     Comment: rare    Drug use: No    Sexual activity: Yes     Partners: Female     Birth control/protection: Surgical   Other Topics Concern    None   Social History Narrative    None     Social Determinants of Health     Financial Resource Strain: Low Risk     Difficulty of Paying Living Expenses: Not hard at all   Food Insecurity: No Food Insecurity    Worried About [address] [employer] in the Last Year: Never true    [facility] in the Last Year: Never true   Transportation Needs:     Lack of Transportation (Medical):      Lack of Transportation (Non-Medical):    Physical Activity:     Days of Exercise per Week:     Minutes of Exercise per Session:    Stress:  Feeling of Stress :    Social Connections:     Frequency of Communication with Friends and Family:     Frequency of Social Gatherings with Friends and Family:     Attends Yazidi Services:     Active Member of Clubs or Organizations:     Attends Club or Organization Meetings:     Marital Status:    Intimate Partner Violence:     Fear of Current or Ex-Partner:     Emotionally Abused:     Physically Abused:     Sexually Abused:      Family History   Problem Relation Age of Onset    Brain Cancer Mother 58    High Blood Pressure Father     Diabetes Father     Prostate Cancer Father         48 years    Heart Disease Father         poss MI       Review of Systems   Constitutional: Negative for diaphoresis and unexpected weight change. HENT: Negative for trouble swallowing and voice change. Respiratory: Negative for shortness of breath and wheezing. Cardiovascular: Negative for chest pain and palpitations. Gastrointestinal: Negative for abdominal pain and nausea. Neurological: Negative for dizziness and light-headedness. OBJECTIVE:  Pulse Readings from Last 4 Encounters:   09/01/21 76   03/01/21 78   02/03/21 84   10/08/20 96     Wt Readings from Last 4 Encounters:   09/01/21 257 lb 3.2 oz (116.7 kg)   03/01/21 267 lb (121.1 kg)   02/03/21 280 lb (127 kg)   01/26/21 279 lb (126.6 kg)     BP Readings from Last 4 Encounters:   09/01/21 118/82   03/01/21 120/82   02/03/21 116/86   01/26/21 136/88     Physical Exam  Vitals and nursing note reviewed. Constitutional:       Appearance: He is not ill-appearing. Eyes:      General: No scleral icterus. Conjunctiva/sclera: Conjunctivae normal.   Cardiovascular:      Rate and Rhythm: Normal rate and regular rhythm. Pulses: Normal pulses. Heart sounds: Normal heart sounds. Pulmonary:      Effort: Pulmonary effort is normal.      Breath sounds: Normal breath sounds.    Abdominal:      General: Bowel sounds are normal. Musculoskeletal:      Right lower leg: No edema. Left lower leg: No edema. Neurological:      General: No focal deficit present. Mental Status: He is alert and oriented to person, place, and time. CBC:   Lab Results   Component Value Date    WBC 6.4 03/01/2021    HGB 15.3 03/01/2021    HCT 44.0 03/01/2021     03/01/2021     CMP:  Lab Results   Component Value Date     03/01/2021    K 3.6 03/01/2021     03/01/2021    CO2 24 03/01/2021    ANIONGAP 13 03/01/2021    GLUCOSE 72 03/01/2021    BUN 10 03/01/2021    CREATININE 0.8 03/01/2021    GFRAA >60 03/01/2021    GFRAA >60 11/01/2012    CALCIUM 9.6 03/01/2021    PROT 7.4 03/01/2021    LABALBU 4.7 03/01/2021    AGRATIO 1.7 03/01/2021    BILITOT 0.3 03/01/2021    ALKPHOS 75 03/01/2021    ALT 24 03/01/2021    AST 29 03/01/2021    GLOB 2.7 03/01/2021     URINALYSIS:  Lab Results   Component Value Date    GLUCOSEU Negative 03/01/2021    KETUA Negative 03/01/2021    SPECGRAV 1.007 03/01/2021    BLOODU TRACE 03/01/2021    PHUR 6.5 03/01/2021    PROTEINU Negative 03/01/2021    NITRU Negative 03/01/2021    LEUKOCYTESUR Negative 03/01/2021    LABMICR YES 03/01/2021    URINETYPE NotGiven 03/01/2021     HBA1C:   Lab Results   Component Value Date    LABA1C 5.5 02/24/2020    .2 02/24/2020     MICRO/ALB:   Lab Results   Component Value Date    LABMICR YES 03/01/2021     LIPID:  Lab Results   Component Value Date    CHOL 180 03/01/2021    TRIG 110 03/01/2021    HDL 39 03/01/2021    LDLCALC 119 03/01/2021    LABVLDL 22 03/01/2021     TSH:   Lab Results   Component Value Date    TSHREFLEX 1.49 03/01/2021     PSA:   Lab Results   Component Value Date    PSA 0.34 10/08/2020        ASSESSMENT/PLAN:  Assessment/Plan:  Kathia Yadav was seen today for 6 month follow-up. Diagnoses and all orders for this visit:    Essential hypertension  The current medical regimen is effective;  continue present plan and medications.   The patient is asked to make an attempt to improve diet and exercise patterns to aid in medical management of this problem. Depression, unspecified depression type  In full remission with current sertraline. Gastroesophageal reflux disease without esophagitis  Patient is advised she may take Prilosec only as needed. Patient is counseled to get COVID-19 vaccination. No orders of the defined types were placed in this encounter. Current Outpatient Medications   Medication Sig Dispense Refill    oxybutynin (DITROPAN XL) 10 MG extended release tablet Take 10 mg by mouth 2 times daily      lisinopril (PRINIVIL;ZESTRIL) 40 MG tablet TAKE 1 TABLET BY MOUTH  DAILY 90 tablet 3    verapamil (CALAN SR) 240 MG extended release tablet TAKE 1 TABLET BY MOUTH  DAILY 90 tablet 3    omeprazole (PRILOSEC) 20 MG delayed release capsule TAKE 1 CAPSULE BY MOUTH  DAILY 90 capsule 3    sertraline (ZOLOFT) 100 MG tablet 1 TABLET BY MOUTH DAILY 90 tablet 3    sildenafil (VIAGRA) 50 MG tablet TAKE 1 TABLET BY MOUTH AS  NEEDED FOR ERECTILE  DYSFUNCTION 27 tablet 3    hydroCHLOROthiazide (HYDRODIURIL) 25 MG tablet TAKE 1 TABLET BY MOUTH  DAILY 90 tablet 3    calcium carbonate (TUMS) 500 MG chewable tablet Take 6 tablets by mouth daily as needed for Heartburn       No current facility-administered medications for this visit. Return in about 6 months (around 3/1/2022) for physical, will need blood work. An After Visit Summary was printed and given to the patient. Documentation was done using voice recognition dragon software. Every effort was made to ensure accuracy; however, inadvertent  Unintentional computerized transcription errors may be present.

## 2021-09-24 DIAGNOSIS — I10 ESSENTIAL HYPERTENSION: ICD-10-CM

## 2021-09-24 RX ORDER — HYDROCHLOROTHIAZIDE 25 MG/1
25 TABLET ORAL DAILY
Qty: 90 TABLET | Refills: 3 | Status: SHIPPED | OUTPATIENT
Start: 2021-09-24 | End: 2022-08-07 | Stop reason: SDUPTHER

## 2022-03-02 ENCOUNTER — OFFICE VISIT (OUTPATIENT)
Dept: INTERNAL MEDICINE CLINIC | Age: 42
End: 2022-03-02
Payer: COMMERCIAL

## 2022-03-02 VITALS
OXYGEN SATURATION: 97 % | WEIGHT: 281 LBS | DIASTOLIC BLOOD PRESSURE: 60 MMHG | HEIGHT: 71 IN | BODY MASS INDEX: 39.34 KG/M2 | SYSTOLIC BLOOD PRESSURE: 130 MMHG | HEART RATE: 74 BPM

## 2022-03-02 DIAGNOSIS — Z00.00 ENCOUNTER FOR WELL ADULT EXAM WITHOUT ABNORMAL FINDINGS: Primary | ICD-10-CM

## 2022-03-02 DIAGNOSIS — E78.5 DYSLIPIDEMIA: ICD-10-CM

## 2022-03-02 DIAGNOSIS — I10 ESSENTIAL HYPERTENSION: ICD-10-CM

## 2022-03-02 DIAGNOSIS — F32.A DEPRESSION, UNSPECIFIED DEPRESSION TYPE: ICD-10-CM

## 2022-03-02 DIAGNOSIS — E66.01 CLASS 2 SEVERE OBESITY DUE TO EXCESS CALORIES WITH SERIOUS COMORBIDITY IN ADULT, UNSPECIFIED BMI (HCC): ICD-10-CM

## 2022-03-02 DIAGNOSIS — Z13.1 SCREENING FOR DIABETES MELLITUS: ICD-10-CM

## 2022-03-02 DIAGNOSIS — E01.0 THYROMEGALY: ICD-10-CM

## 2022-03-02 DIAGNOSIS — Z83.3 FAMILY HISTORY OF DIABETES MELLITUS: ICD-10-CM

## 2022-03-02 DIAGNOSIS — Z12.5 SCREENING FOR PROSTATE CANCER: ICD-10-CM

## 2022-03-02 DIAGNOSIS — Z80.42 FAMILY HISTORY OF PROSTATE CANCER: ICD-10-CM

## 2022-03-02 PROBLEM — E66.812 CLASS 2 SEVERE OBESITY DUE TO EXCESS CALORIES WITH SERIOUS COMORBIDITY IN ADULT: Status: ACTIVE | Noted: 2020-11-03

## 2022-03-02 PROCEDURE — 99396 PREV VISIT EST AGE 40-64: CPT | Performed by: INTERNAL MEDICINE

## 2022-03-02 ASSESSMENT — PATIENT HEALTH QUESTIONNAIRE - PHQ9
7. TROUBLE CONCENTRATING ON THINGS, SUCH AS READING THE NEWSPAPER OR WATCHING TELEVISION: 0
SUM OF ALL RESPONSES TO PHQ QUESTIONS 1-9: 0
6. FEELING BAD ABOUT YOURSELF - OR THAT YOU ARE A FAILURE OR HAVE LET YOURSELF OR YOUR FAMILY DOWN: 0
9. THOUGHTS THAT YOU WOULD BE BETTER OFF DEAD, OR OF HURTING YOURSELF: 0
8. MOVING OR SPEAKING SO SLOWLY THAT OTHER PEOPLE COULD HAVE NOTICED. OR THE OPPOSITE, BEING SO FIGETY OR RESTLESS THAT YOU HAVE BEEN MOVING AROUND A LOT MORE THAN USUAL: 0
SUM OF ALL RESPONSES TO PHQ QUESTIONS 1-9: 0
1. LITTLE INTEREST OR PLEASURE IN DOING THINGS: 0
SUM OF ALL RESPONSES TO PHQ QUESTIONS 1-9: 0
3. TROUBLE FALLING OR STAYING ASLEEP: 0
SUM OF ALL RESPONSES TO PHQ QUESTIONS 1-9: 0
5. POOR APPETITE OR OVEREATING: 0
10. IF YOU CHECKED OFF ANY PROBLEMS, HOW DIFFICULT HAVE THESE PROBLEMS MADE IT FOR YOU TO DO YOUR WORK, TAKE CARE OF THINGS AT HOME, OR GET ALONG WITH OTHER PEOPLE: 0
SUM OF ALL RESPONSES TO PHQ9 QUESTIONS 1 & 2: 0
2. FEELING DOWN, DEPRESSED OR HOPELESS: 0
4. FEELING TIRED OR HAVING LITTLE ENERGY: 0

## 2022-03-02 ASSESSMENT — ENCOUNTER SYMPTOMS
NAUSEA: 0
ABDOMINAL PAIN: 0
VOMITING: 0
SHORTNESS OF BREATH: 0
WHEEZING: 0

## 2022-03-02 NOTE — PROGRESS NOTES
Well Adult Note  Name: Masha Manual Date: 3/2/2022   MRN: [id number] Sex: Male   Age: 42 y.o. Ethnicity: Non- / Non    : 1980 Race: White (non-)      Jigna Santana is here for well adult exam.  History:  Patient also have yearly physical.  He is also here for follow-up visit of chronic problems. History of hypertension. Denies chest pain palpitation dizziness. He feels his depression has been in full remission. Despite doing moderate physical activities and following dietary restriction he continues to gain weight. Review of Systems   Constitutional: Negative for diaphoresis. Respiratory: Negative for shortness of breath and wheezing. Cardiovascular: Negative for chest pain and palpitations. Gastrointestinal: Negative for abdominal pain, nausea and vomiting. Genitourinary: Negative for difficulty urinating and flank pain. Neurological: Negative for dizziness and light-headedness. Allergies   Allergen Reactions    Morphine Hives         Prior to Visit Medications    Medication Sig Taking?  Authorizing Provider   hydroCHLOROthiazide (HYDRODIURIL) 25 MG tablet TAKE 1 TABLET BY MOUTH  DAILY Yes Shante Robles MD   oxybutynin (DITROPAN XL) 10 MG extended release tablet Take 10 mg by mouth 2 times daily Yes Historical Provider, MD   lisinopril (PRINIVIL;ZESTRIL) 40 MG tablet TAKE 1 TABLET BY MOUTH  DAILY Yes Shante Robles MD   verapamil (CALAN SR) 240 MG extended release tablet TAKE 1 TABLET BY MOUTH  DAILY Yes Shante Robles MD   omeprazole (PRILOSEC) 20 MG delayed release capsule TAKE 1 CAPSULE BY MOUTH  DAILY Yes Shante Robles MD   sertraline (ZOLOFT) 100 MG tablet 1 TABLET BY MOUTH DAILY Yes Shante Robles MD   sildenafil (VIAGRA) 50 MG tablet TAKE 1 TABLET BY MOUTH AS  NEEDED FOR ERECTILE  DYSFUNCTION Yes Mychal Templeton MD   calcium carbonate (TUMS) 500 MG chewable tablet Take 6 tablets by mouth daily as needed for Heartburn Yes Historical Provider, MD         Past Medical History:   Diagnosis Date    Hypertension     YOHANNES (obstructive sleep apnea) 8/26/2020       Past Surgical History:   Procedure Laterality Date    APPENDECTOMY      CHOLECYSTECTOMY      MOUTH SURGERY      TONSILLECTOMY      VASECTOMY      WISDOM TOOTH EXTRACTION           Family History   Problem Relation Age of Onset    Brain Cancer Mother 58    High Blood Pressure Father     Diabetes Father     Prostate Cancer Father         48 years   [facility] Heart Disease Father         poss MI       Social History     Tobacco Use    Smoking status: Never Smoker    Smokeless tobacco: Former User     Types: Chew   Vaping Use    Vaping Use: Never used   Substance Use Topics    Alcohol use: Yes     Comment: rare    Drug use: No       Objective   /60 (Site: Right Upper Arm)   Pulse 74   Ht 5' 10.5\" (1.791 m)   Wt 281 lb (127.5 kg)   SpO2 97%   BMI 39.75 kg/m²   Wt Readings from Last 3 Encounters:   03/02/22 281 lb (127.5 kg)   09/01/21 257 lb 3.2 oz (116.7 kg)   03/01/21 267 lb (121.1 kg)     There were no vitals filed for this visit. Physical Exam  Vitals reviewed. Constitutional:       Appearance: He is obese. He is not ill-appearing. Eyes:      Conjunctiva/sclera: Conjunctivae normal.   Neck:      Comments: Palpable both thyroid lobe  Cardiovascular:      Rate and Rhythm: Normal rate and regular rhythm. Pulses: Normal pulses. Heart sounds: Normal heart sounds. Pulmonary:      Effort: Pulmonary effort is normal.      Breath sounds: Normal breath sounds. Abdominal:      General: Bowel sounds are normal.   Musculoskeletal:      Right lower leg: No edema. Left lower leg: No edema. Neurological:      Mental Status: He is alert. Mental status is at baseline. Psychiatric:         Mood and Affect: Mood normal.         Behavior: Behavior normal.           Assessment   Plan   1.  Encounter for well adult exam without abnormal findings  - CBC; Future  -     Comprehensive Metabolic Panel; Future  -     TSH with Reflex; Future  -     Lipid Panel; Future  -     Urinalysis; Future  -     PSA Screening; Future  2. Depression, unspecified depression type  3. Dyslipidemia  -     Lipid Panel; Future  4. Family history of prostate cancer  5. Screening for diabetes mellitus  -     Hemoglobin A1C; Future  6. Family history of diabetes mellitus  -     Hemoglobin A1C; Future  7. Screening for prostate cancer  -     PSA Screening; Future  8. Essential hypertension  Excellent control. We will continue current lisinopril verapamil and hydrochlorothiazide  The patient is asked to make an attempt to improve diet and exercise patterns to aid in medical management of this problem. -     CBC; Future  -     Comprehensive Metabolic Panel; Future  -     TSH with Reflex; Future  -     Lipid Panel; Future  -     Urinalysis; Future  9. Thyromegaly  -     US HEAD NECK SOFT TISSUE THYROID; Future  10. Class 2 severe obesity due to excess calories with serious comorbidity in adult, unspecified BMI ([facility] [address]  -     US HEAD NECK SOFT TISSUE THYROID; Future     Advised patient he should call me back despite doing aggressive therapeutic lifestyle changes if he is unable to lose weight. Will consider referral to weight loss clinic.     Personalized Preventive Plan   Current Health Maintenance Status  Immunization History   Administered Date(s) Administered    Influenza Virus Vaccine 10/01/2014, 11/07/2018, 10/13/2020    Influenza, Quadv, 6-35 months, IM, PF (Fluzone, Afluria) 10/10/2017    Influenza, Quadv, IM, (6 mo and older Fluzone, Flulaval, Fluarix and 3 yrs and older Afluria) 10/10/2016    Td vaccine (adult) 03/01/2006    Tdap (Boostrix, Adacel) 12/14/2011, 12/30/2011        Health Maintenance   Topic Date Due    Hepatitis C screen  Never done    COVID-19 Vaccine (1) Never done    Depression Monitoring  Never done    HIV screen  Never done    Flu vaccine (1) 09/01/2021  DTaP/Tdap/Td vaccine (3 - Td or Tdap) 12/30/2021    Potassium monitoring  03/01/2022    Creatinine monitoring  03/01/2022    Lipid screen  03/01/2026    Hepatitis A vaccine  Aged Out    Hepatitis B vaccine  Aged Out    Hib vaccine  Aged Out    Meningococcal (ACWY) vaccine  Aged Out    Pneumococcal 0-64 years Vaccine  Aged Out     Recommendations for [employer] Due: see orders and patient instructions/AVS.    Return in about 6 months (around 9/2/2022). An After Visit Summary was printed and given to the patient. Documentation was done using voice recognition dragon software. Every effort was made to ensure accuracy; however, inadvertent  Unintentional computerized transcription errors may be present.

## 2022-03-02 NOTE — PATIENT INSTRUCTIONS
Well Visit, Ages 25 to 48: Care Instructions  Overview     Well visits can help you stay healthy. Your doctor has checked your overall health and may have suggested ways to take good care of yourself. Your doctor also may have recommended tests. At home, you can help prevent illness with healthy eating, regular exercise, and other steps. Follow-up care is a key part of your treatment and safety. Be sure to make and go to all appointments, and call your doctor if you are having problems. It's also a good idea to know your test results and keep a list of the medicines you take. How can you care for yourself at home? · Get screening tests that you and your doctor decide on. Screening helps find diseases before any symptoms appear. · Eat healthy foods. Choose fruits, vegetables, whole grains, protein, and low-fat dairy foods. Limit fat, especially saturated fat. Reduce salt in your diet. · Limit alcohol. If you are a man, have no more than 2 drinks a day or 14 drinks a week. If you are a woman, have no more than 1 drink a day or 7 drinks a week. · Get at least 30 minutes of physical activity on most days of the week. Walking is a good choice. You also may want to do other activities, such as running, swimming, cycling, or playing tennis or team sports. Discuss any changes in your exercise program with your doctor. · Reach and stay at a healthy weight. This will lower your risk for many problems, such as obesity, diabetes, heart disease, and high blood pressure. · Do not smoke or allow others to smoke around you. If you need help quitting, talk to your doctor about stop-smoking programs and medicines. These can increase your chances of quitting for good. · Care for your mental health. It is easy to get weighed down by worry and stress. Learn strategies to manage stress, like deep breathing and mindfulness, and stay connected with your family and community.  If you find you often feel sad or hopeless, talk with your doctor. Treatment can help. · Talk to your doctor about whether you have any risk factors for sexually transmitted infections (STIs). You can help prevent STIs if you wait to have sex with a new partner (or partners) until you've each been tested for STIs. It also helps if you use condoms (male or female condoms) and if you limit your sex partners to one person who only has sex with you. Vaccines are available for some STIs, such as HPV. · Use birth control if it's important to you to prevent pregnancy. Talk with your doctor about the choices available and what might be best for you. · If you think you may have a problem with alcohol or drug use, talk to your doctor. This includes prescription medicines (such as amphetamines and opioids) and illegal drugs (such as cocaine and methamphetamine). Your doctor can help you figure out what type of treatment is best for you. · Protect your skin from too much sun. When you're outdoors from 10 a.m. to 4 p.m., stay in the shade or cover up with clothing and a hat with a wide brim. Wear sunglasses that block UV rays. Even when it's cloudy, put broad-spectrum sunscreen (SPF 30 or higher) on any exposed skin. · See a dentist one or two times a year for checkups and to have your teeth cleaned. · Wear a seat belt in the car. When should you call for help? Watch closely for changes in your health, and be sure to contact your doctor if you have any problems or symptoms that concern you. Where can you learn more? Go to [email or web address].[employer]back [email or web address] org and sign in to your [employer] account. Enter P072 in the [facility] box to learn more about \"Well Visit, Ages 25 to 48: Care Instructions. \"     If you do not have an account, please click on the \"Sign Up Now\" link. Current as of: October 6, 2021               Content Version: 13.1  © 0775-1604 Healthwise, Incorporated. Care instructions adapted under license by [facility] ([facility]).  If you have questions about a medical condition or this instruction, always ask your healthcare professional. [address] any warranty or liability for your use of this information. DASH Diet: After Your Visit  Your Care Instructions  The DASH diet is an eating plan that can help lower your blood pressure. DASH stands for Dietary Approaches to Stop Hypertension. Hypertension is high blood pressure. The DASH diet focuses on eating foods that are high in calcium, potassium, and magnesium. These nutrients can lower blood pressure. The foods that are highest in these nutrients are fruits, vegetables, low-fat dairy products, nuts, seeds, and legumes. But taking calcium, potassium, and magnesium supplements instead of eating foods that are high in those nutrients does not have the same effect. The DASH diet also includes whole grains, fish, and poultry. The DASH diet is one of several lifestyle changes your doctor may recommend to lower your high blood pressure. Your doctor may also want you to decrease the amount of sodium in your diet. Lowering sodium while following the DASH diet can lower blood pressure even further than just the DASH diet alone. Follow-up care is a key part of your treatment and safety. Be sure to make and go to all appointments, and call your doctor if you are having problems. Its also a good idea to know your test results and keep a list of the medicines you take. How can you care for yourself at home? Following the DASH diet  · Eat 4 to 5 servings of fruit each day. A serving is 1 medium-sized piece of fruit, ½ cup chopped or canned fruit, 1/4 cup dried fruit, or 4 ounces (½ cup) of fruit juice. Choose fruit more often than fruit juice. · Eat 4 to 5 servings of vegetables each day. A serving is 1 cup of lettuce or raw leafy vegetables, ½ cup of chopped or cooked vegetables, or 4 ounces (½ cup) of vegetable juice. Choose vegetables more often than vegetable juice.   · Get 2 to 3 servings of low-fat and fat-free dairy each day. A serving is 8 ounces of milk, 1 cup of yogurt, or 1 ½ ounces of cheese. · Eat 7 to 8 servings of grains each day. A serving is 1 slice of bread, 1 ounce of dry cereal, or ½ cup of cooked rice, pasta, or cooked cereal. Try to choose whole-grain products as much as possible. · Limit lean meat, poultry, and fish to 6 ounces each day. Six ounces is about the size of two decks of cards. · Eat 4 to 5 servings of nuts, seeds, and legumes (cooked dried beans, lentils, and split peas) each week. A serving is 1/3 cup of nuts, 2 tablespoons of seeds, or ½ cup cooked dried beans or peas. · Limit sweets and added sugars to 5 servings or less a week. A serving is 1 tablespoon jelly or jam, ½ cup sorbet, or 1 cup of lemonade. Tips for success  · Start small. Do not try to make dramatic changes to your diet all at once. You might feel that you are missing out on your favorite foods and then be more likely to not follow the plan. Make small changes, and stick with them. Once those changes become habit, add a few more changes. · Try some of the following:  ¨ Make it a goal to eat a fruit or vegetable at every meal and at snacks. This will make it easy to get the recommended amount of fruits and vegetables each day. ¨ Try yogurt topped with fruit and nuts for a snack or healthy dessert. ¨ Add lettuce, tomato, cucumber, and onion to sandwiches. ¨ Combine a ready-made pizza crust with low-fat mozzarella cheese and lots of vegetable toppings. Try using tomatoes, squash, spinach, broccoli, carrots, cauliflower, and onions. ¨ Have a variety of cut-up vegetables with a low-fat dip as an appetizer instead of chips and dip. ¨ Sprinkle sunflower seeds or chopped almonds over salads. Or try adding chopped walnuts or almonds to cooked vegetables. Try some vegetarian meals using beans and peas. Add garbanzo or kidney beans to salads.  Make burritos and tacos with mashed pendleton beans or black beans    © 8904-4904 Healthwise, Incorporated. Care instructions adapted under license by [facility]. This care instruction is for use with your licensed healthcare professional. If you have questions about a medical condition or this instruction, always ask your healthcare professional. [address] any warranty or liability for your use of this information. Content Version: 9.4.62099;  Last Revised: March 15, 2012      [facility] Scheduling  [phone], option 2, then option 1

## 2022-03-22 ENCOUNTER — OFFICE VISIT (OUTPATIENT)
Dept: INTERNAL MEDICINE CLINIC | Age: 42
End: 2022-03-22
Payer: COMMERCIAL

## 2022-03-22 VITALS
SYSTOLIC BLOOD PRESSURE: 114 MMHG | DIASTOLIC BLOOD PRESSURE: 70 MMHG | HEART RATE: 72 BPM | WEIGHT: 277.2 LBS | BODY MASS INDEX: 39.21 KG/M2 | OXYGEN SATURATION: 98 % | TEMPERATURE: 97.9 F

## 2022-03-22 DIAGNOSIS — Z09 HOSPITAL DISCHARGE FOLLOW-UP: Primary | ICD-10-CM

## 2022-03-22 DIAGNOSIS — L03.113 CELLULITIS OF RIGHT ELBOW: ICD-10-CM

## 2022-03-22 PROCEDURE — 1111F DSCHRG MED/CURRENT MED MERGE: CPT | Performed by: NURSE PRACTITIONER

## 2022-03-22 PROCEDURE — 99214 OFFICE O/P EST MOD 30 MIN: CPT | Performed by: NURSE PRACTITIONER

## 2022-03-22 RX ORDER — AMOXICILLIN AND CLAVULANATE POTASSIUM 875; 125 MG/1; MG/1
TABLET, FILM COATED ORAL
COMMUNITY
Start: 2022-03-19 | End: 2022-09-07

## 2022-03-22 ASSESSMENT — ENCOUNTER SYMPTOMS
ABDOMINAL PAIN: 0
SHORTNESS OF BREATH: 0
WHEEZING: 0
COUGH: 0
CONSTIPATION: 0
DIARRHEA: 0
NAUSEA: 0
VOMITING: 0

## 2022-03-22 NOTE — PROGRESS NOTES
Post-Discharge Transitional Care Management Services  Efrain Paez   YOB: 1980    Date of Visit:  3/22/2022  Chief Complaint   Patient presents with    Follow-Up from Hospital     Allergies   Allergen Reactions    Morphine Hives     Outpatient Medications Marked as Taking for the 3/22/22 encounter (Office Visit) with LORENZA Monte CNP   Medication Sig Dispense Refill    amoxicillin-clavulanate (AUGMENTIN) 875-125 MG per tablet TAKE 1 TABLET BY MOUTH EVERY 12 HOURS      hydroCHLOROthiazide (HYDRODIURIL) 25 MG tablet TAKE 1 TABLET BY MOUTH  DAILY 90 tablet 3    oxybutynin (DITROPAN XL) 10 MG extended release tablet Take 10 mg by mouth 2 times daily      lisinopril (PRINIVIL;ZESTRIL) 40 MG tablet TAKE 1 TABLET BY MOUTH  DAILY 90 tablet 3    verapamil (CALAN SR) 240 MG extended release tablet TAKE 1 TABLET BY MOUTH  DAILY 90 tablet 3    omeprazole (PRILOSEC) 20 MG delayed release capsule TAKE 1 CAPSULE BY MOUTH  DAILY 90 capsule 3    sertraline (ZOLOFT) 100 MG tablet 1 TABLET BY MOUTH DAILY 90 tablet 3    sildenafil (VIAGRA) 50 MG tablet TAKE 1 TABLET BY MOUTH AS  NEEDED FOR ERECTILE  DYSFUNCTION 27 tablet 3    calcium carbonate (TUMS) 500 MG chewable tablet Take 6 tablets by mouth daily as needed for Heartburn       Vitals:    03/22/22 0735   BP: 114/70   Pulse: 72   Temp: 97.9 °F (36.6 °C)   TempSrc: Temporal   SpO2: 98%   Weight: 277 lb 3.2 oz (125.7 kg)     Body mass index is 39.21 kg/m². Wt Readings from Last 3 Encounters:   03/22/22 277 lb 3.2 oz (125.7 kg)   03/02/22 281 lb (127.5 kg)   09/01/21 257 lb 3.2 oz (116.7 kg)     BP Readings from Last 3 Encounters:   03/22/22 114/70   03/02/22 130/60   09/01/21 118/82      Patient was admitted to [facility] from 3/15/22 for ER visit. States he was put on IV ATB and discharged from the ER.  Then symptoms worsened at home and he was admitted to [facility] from 3/17/22 to 3/19/22 for cellitus of the right elbow. Patient was given IV antibiotics inpatient and then discharged with Augmentin. Inpatient course: Discharge summary reviewed- see chart. Current status:   Today, the patient reports that his symptoms are improving. Still has some swelling over the right elbow. However, improved significantly from inpatient. Pt reports that he is on the ATB at this time and plans to continue this medication- has a script for 10 days and is on day 3. Denies side effects on the medication. No open wounds. No drainage/discharge. Denies chest pain, palpitations, shortness of breath, trouble breathing, lightheadedness, dizziness or blurred vision. Review of Systems:  Review of Systems   Constitutional: Negative for chills, fatigue and fever. Respiratory: Negative for cough, shortness of breath and wheezing. Cardiovascular: Negative for chest pain, palpitations and leg swelling. Gastrointestinal: Negative for abdominal pain, constipation, diarrhea, nausea and vomiting. Musculoskeletal:        Right elbow swelling   Skin: Negative for pallor and rash. Neurological: Negative for dizziness, weakness, light-headedness, numbness and headaches. Physical Exam:  Physical Exam  Vitals reviewed. Constitutional:       General: He is not in acute distress. Appearance: He is well-developed. He is not diaphoretic. HENT:      Head: Normocephalic and atraumatic. Cardiovascular:      Rate and Rhythm: Normal rate and regular rhythm. Pulmonary:      Effort: Pulmonary effort is normal. No respiratory distress. Breath sounds: Normal breath sounds. No wheezing or rales. Chest:      Chest wall: No tenderness. Musculoskeletal:      Comments: Scant swelling to the right elbow   Skin:     General: Skin is warm and dry. Neurological:      Mental Status: He is alert and oriented to person, place, and time.       Coordination: Coordination normal.   Psychiatric:         Mood and Affect: Mood normal. Assessment/Plan:  Peri Tejada was seen today for follow-up from hospital.  Diagnoses and all orders for this visit:    Hospital discharge follow-up/cellulitis of right elbow. -     Pt reports this is improving overall. There is still scant swelling to the right elbow. No fevers/chills. Still has 7 days of ATB left. - Recommend patient continue all antibiotics as prescribed. Symptomatic management reviewed. - Recommend patient monitor symptoms and call if they worsen/fail to improve. Strict protocol reviewed. - RI DISCHARGE MEDS RECONCILED W/ CURRENT OUTPATIENT MED LIST  - Red flag warning signs reviewed with the pt and he will go to the ER if these occur. Diagnostic test results reviewed: labs; Xray    Patient risk of morbidity and mortality: moderate    Medical Decision Making: moderate complexity    Return for as previously scheduled or sooner if needed. All questions answered. Patient states no further questions or concerns at this time.   Electronically signed by: LORENZA Reyes CNP 03/22/22

## 2022-07-18 DIAGNOSIS — K21.9 GASTROESOPHAGEAL REFLUX DISEASE WITHOUT ESOPHAGITIS: ICD-10-CM

## 2022-07-18 RX ORDER — OMEPRAZOLE 20 MG/1
CAPSULE, DELAYED RELEASE ORAL
Qty: 90 CAPSULE | Refills: 3 | Status: SHIPPED | OUTPATIENT
Start: 2022-07-18 | End: 2022-09-25 | Stop reason: SDUPTHER

## 2022-08-06 ENCOUNTER — HOSPITAL ENCOUNTER (OUTPATIENT)
Age: 42
Discharge: HOME | End: 2022-08-06
Payer: COMMERCIAL

## 2022-08-06 DIAGNOSIS — Z12.5 SCREENING FOR PROSTATE CANCER: ICD-10-CM

## 2022-08-06 DIAGNOSIS — Z83.3 FAMILY HISTORY OF DIABETES MELLITUS: ICD-10-CM

## 2022-08-06 DIAGNOSIS — Z13.1 SCREENING FOR DIABETES MELLITUS: ICD-10-CM

## 2022-08-06 DIAGNOSIS — Z00.00 ENCOUNTER FOR WELL ADULT EXAM WITHOUT ABNORMAL FINDINGS: ICD-10-CM

## 2022-08-06 DIAGNOSIS — I10 ESSENTIAL HYPERTENSION: ICD-10-CM

## 2022-08-06 DIAGNOSIS — E78.5 DYSLIPIDEMIA: ICD-10-CM

## 2022-08-06 LAB
A/G RATIO: 1.6 (ref 1.1–2.2)
ALBUMIN SERPL-MCNC: 4.2 G/DL (ref 3.4–5)
ALP BLD-CCNC: 77 U/L (ref 40–129)
ALT SERPL-CCNC: 26 U/L (ref 10–40)
ANION GAP SERPL CALCULATED.3IONS-SCNC: 11 MMOL/L (ref 3–16)
AST SERPL-CCNC: 26 U/L (ref 15–37)
BACTERIA: NORMAL /HPF
BILIRUB SERPL-MCNC: 0.4 MG/DL (ref 0–1)
BILIRUBIN URINE: NEGATIVE
BLOOD, URINE: NEGATIVE
BUN BLDV-MCNC: 19 MG/DL (ref 7–20)
CALCIUM SERPL-MCNC: 8.7 MG/DL (ref 8.3–10.6)
CHLORIDE BLD-SCNC: 104 MMOL/L (ref 99–110)
CHOLESTEROL, TOTAL: 158 MG/DL (ref 0–199)
CLARITY: CLEAR
CO2: 24 MMOL/L (ref 21–32)
COLOR: YELLOW
CREAT SERPL-MCNC: 0.8 MG/DL (ref 0.9–1.3)
EPITHELIAL CELLS, UA: 0 /HPF (ref 0–5)
GFR AFRICAN AMERICAN: >60
GFR NON-AFRICAN AMERICAN: >60
GLUCOSE BLD-MCNC: 84 MG/DL (ref 70–99)
GLUCOSE URINE: NEGATIVE MG/DL
HCT VFR BLD CALC: 43 % (ref 40.5–52.5)
HDLC SERPL-MCNC: 42 MG/DL (ref 40–60)
HEMOGLOBIN: 14.9 G/DL (ref 13.5–17.5)
HYALINE CASTS: 0 /LPF (ref 0–8)
KETONES, URINE: NEGATIVE MG/DL
LDL CHOLESTEROL CALCULATED: 99 MG/DL
LEUKOCYTE ESTERASE, URINE: NEGATIVE
MCH RBC QN AUTO: 33.1 PG (ref 26–34)
MCHC RBC AUTO-ENTMCNC: 34.6 G/DL (ref 31–36)
MCV RBC AUTO: 95.6 FL (ref 80–100)
MICROSCOPIC EXAMINATION: YES
NITRITE, URINE: NEGATIVE
PDW BLD-RTO: 12.6 % (ref 12.4–15.4)
PH UA: 7 (ref 5–8)
PLATELET # BLD: 256 K/UL (ref 135–450)
PMV BLD AUTO: 8 FL (ref 5–10.5)
POTASSIUM SERPL-SCNC: 4.2 MMOL/L (ref 3.5–5.1)
PROSTATE SPECIFIC ANTIGEN: 0.39 NG/ML (ref 0–4)
PROTEIN UA: ABNORMAL MG/DL
RBC # BLD: 4.5 M/UL (ref 4.2–5.9)
RBC UA: 2 /HPF (ref 0–4)
SODIUM BLD-SCNC: 139 MMOL/L (ref 136–145)
SPECIFIC GRAVITY UA: 1.02 (ref 1–1.03)
TOTAL PROTEIN: 6.9 G/DL (ref 6.4–8.2)
TRIGL SERPL-MCNC: 84 MG/DL (ref 0–150)
TSH REFLEX: 0.85 UIU/ML (ref 0.27–4.2)
URINE TYPE: ABNORMAL
UROBILINOGEN, URINE: 1 E.U./DL
VLDLC SERPL CALC-MCNC: 17 MG/DL
WBC # BLD: 5.2 K/UL (ref 4–11)
WBC UA: 0 /HPF (ref 0–5)

## 2022-08-06 PROCEDURE — 83036 HEMOGLOBIN GLYCOSYLATED A1C: CPT

## 2022-08-06 PROCEDURE — 80061 LIPID PANEL: CPT

## 2022-08-06 PROCEDURE — 81001 URINALYSIS AUTO W/SCOPE: CPT

## 2022-08-06 PROCEDURE — 84443 ASSAY THYROID STIM HORMONE: CPT

## 2022-08-06 PROCEDURE — 80053 COMPREHEN METABOLIC PANEL: CPT

## 2022-08-06 PROCEDURE — 84153 ASSAY OF PSA TOTAL: CPT

## 2022-08-06 PROCEDURE — 85027 COMPLETE CBC AUTOMATED: CPT

## 2022-08-07 DIAGNOSIS — F52.4 PREMATURE EJACULATION: ICD-10-CM

## 2022-08-07 DIAGNOSIS — F32.A DEPRESSION, UNSPECIFIED DEPRESSION TYPE: ICD-10-CM

## 2022-08-07 DIAGNOSIS — I10 ESSENTIAL HYPERTENSION: ICD-10-CM

## 2022-08-07 DIAGNOSIS — N52.9 ERECTILE DYSFUNCTION, UNSPECIFIED ERECTILE DYSFUNCTION TYPE: ICD-10-CM

## 2022-08-07 LAB
ESTIMATED AVERAGE GLUCOSE: 114 MG/DL
HBA1C MFR BLD: 5.6 %

## 2022-08-08 RX ORDER — SILDENAFIL 50 MG/1
50 TABLET, FILM COATED ORAL PRN
Qty: 27 TABLET | Refills: 3 | Status: SHIPPED | OUTPATIENT
Start: 2022-08-08 | End: 2022-09-25 | Stop reason: SDUPTHER

## 2022-08-08 RX ORDER — LISINOPRIL 40 MG/1
40 TABLET ORAL DAILY
Qty: 90 TABLET | Refills: 3 | Status: SHIPPED | OUTPATIENT
Start: 2022-08-08 | End: 2022-09-25 | Stop reason: SDUPTHER

## 2022-08-08 RX ORDER — VERAPAMIL HYDROCHLORIDE 240 MG/1
240 TABLET, FILM COATED, EXTENDED RELEASE ORAL NIGHTLY
Qty: 90 TABLET | Refills: 3 | Status: SHIPPED | OUTPATIENT
Start: 2022-08-08 | End: 2022-09-25 | Stop reason: SDUPTHER

## 2022-08-08 RX ORDER — HYDROCHLOROTHIAZIDE 25 MG/1
25 TABLET ORAL DAILY
Qty: 90 TABLET | Refills: 3 | Status: SHIPPED | OUTPATIENT
Start: 2022-08-08 | End: 2022-09-25 | Stop reason: SDUPTHER

## 2022-08-08 RX ORDER — SERTRALINE HYDROCHLORIDE 100 MG/1
TABLET, FILM COATED ORAL
Qty: 90 TABLET | Refills: 3 | Status: SHIPPED | OUTPATIENT
Start: 2022-08-08 | End: 2022-09-01 | Stop reason: SDUPTHER

## 2022-08-09 ENCOUNTER — HOSPITAL ENCOUNTER (OUTPATIENT)
Dept: ULTRASOUND IMAGING | Age: 42
Discharge: HOME | End: 2022-08-09
Payer: COMMERCIAL

## 2022-08-09 DIAGNOSIS — E66.01 CLASS 2 SEVERE OBESITY DUE TO EXCESS CALORIES WITH SERIOUS COMORBIDITY IN ADULT, UNSPECIFIED BMI (HCC): ICD-10-CM

## 2022-08-09 DIAGNOSIS — E01.0 THYROMEGALY: ICD-10-CM

## 2022-08-09 PROCEDURE — 76536 US EXAM OF HEAD AND NECK: CPT

## 2022-08-24 DIAGNOSIS — F32.A DEPRESSION, UNSPECIFIED DEPRESSION TYPE: ICD-10-CM

## 2022-08-24 DIAGNOSIS — F52.4 PREMATURE EJACULATION: ICD-10-CM

## 2022-08-24 RX ORDER — SERTRALINE HYDROCHLORIDE 100 MG/1
TABLET, FILM COATED ORAL
Qty: 90 TABLET | Refills: 3 | OUTPATIENT
Start: 2022-08-24

## 2022-09-01 ENCOUNTER — PATIENT MESSAGE (OUTPATIENT)
Dept: INTERNAL MEDICINE CLINIC | Age: 42
End: 2022-09-01

## 2022-09-01 DIAGNOSIS — F52.4 PREMATURE EJACULATION: ICD-10-CM

## 2022-09-01 DIAGNOSIS — F32.A DEPRESSION, UNSPECIFIED DEPRESSION TYPE: ICD-10-CM

## 2022-09-01 RX ORDER — SERTRALINE HYDROCHLORIDE 100 MG/1
TABLET, FILM COATED ORAL
Qty: 90 TABLET | Refills: 3 | Status: SHIPPED | OUTPATIENT
Start: 2022-09-01 | End: 2022-09-01 | Stop reason: SDUPTHER

## 2022-09-01 RX ORDER — SERTRALINE HYDROCHLORIDE 100 MG/1
TABLET, FILM COATED ORAL
Qty: 30 TABLET | Refills: 0 | Status: SHIPPED | OUTPATIENT
Start: 2022-09-01 | End: 2022-09-07 | Stop reason: DRUGHIGH

## 2022-09-01 NOTE — TELEPHONE ENCOUNTER
Last script sent on 8/8 to [facility]. All scripts to be sent to [facility] going forward-new rx sent.  30 day supply sent to local pharmacy per patient request.

## 2022-09-07 ENCOUNTER — OFFICE VISIT (OUTPATIENT)
Dept: INTERNAL MEDICINE CLINIC | Age: 42
End: 2022-09-07
Payer: COMMERCIAL

## 2022-09-07 VITALS
HEIGHT: 71 IN | HEART RATE: 89 BPM | BODY MASS INDEX: 38.92 KG/M2 | OXYGEN SATURATION: 97 % | DIASTOLIC BLOOD PRESSURE: 82 MMHG | WEIGHT: 278 LBS | SYSTOLIC BLOOD PRESSURE: 122 MMHG

## 2022-09-07 DIAGNOSIS — F32.A DEPRESSION, UNSPECIFIED DEPRESSION TYPE: ICD-10-CM

## 2022-09-07 DIAGNOSIS — K21.9 GASTROESOPHAGEAL REFLUX DISEASE WITHOUT ESOPHAGITIS: ICD-10-CM

## 2022-09-07 DIAGNOSIS — E78.5 DYSLIPIDEMIA: ICD-10-CM

## 2022-09-07 DIAGNOSIS — Z23 NEED FOR PROPHYLACTIC VACCINATION AGAINST DIPHTHERIA-TETANUS-PERTUSSIS (DTP): ICD-10-CM

## 2022-09-07 DIAGNOSIS — F52.4 PREMATURE EJACULATION: ICD-10-CM

## 2022-09-07 DIAGNOSIS — I10 ESSENTIAL HYPERTENSION: Primary | ICD-10-CM

## 2022-09-07 PROCEDURE — 90471 IMMUNIZATION ADMIN: CPT | Performed by: INTERNAL MEDICINE

## 2022-09-07 PROCEDURE — 90715 TDAP VACCINE 7 YRS/> IM: CPT | Performed by: INTERNAL MEDICINE

## 2022-09-07 PROCEDURE — 99214 OFFICE O/P EST MOD 30 MIN: CPT | Performed by: INTERNAL MEDICINE

## 2022-09-07 RX ORDER — VIBEGRON 75 MG/1
75 TABLET, FILM COATED ORAL DAILY
COMMUNITY

## 2022-09-07 RX ORDER — SERTRALINE HYDROCHLORIDE 100 MG/1
TABLET, FILM COATED ORAL
Qty: 90 TABLET | Refills: 3 | Status: SHIPPED
Start: 2022-09-07 | End: 2022-09-25 | Stop reason: SDUPTHER

## 2022-09-07 SDOH — ECONOMIC STABILITY: FOOD INSECURITY: WITHIN THE PAST 12 MONTHS, YOU WORRIED THAT YOUR FOOD WOULD RUN OUT BEFORE YOU GOT MONEY TO BUY MORE.: NEVER TRUE

## 2022-09-07 SDOH — ECONOMIC STABILITY: FOOD INSECURITY: WITHIN THE PAST 12 MONTHS, THE FOOD YOU BOUGHT JUST DIDN'T LAST AND YOU DIDN'T HAVE MONEY TO GET MORE.: NEVER TRUE

## 2022-09-07 ASSESSMENT — ENCOUNTER SYMPTOMS
WHEEZING: 0
ABDOMINAL PAIN: 0
VOMITING: 0
SHORTNESS OF BREATH: 0
NAUSEA: 0

## 2022-09-07 ASSESSMENT — SOCIAL DETERMINANTS OF HEALTH (SDOH): HOW HARD IS IT FOR YOU TO PAY FOR THE VERY BASICS LIKE FOOD, HOUSING, MEDICAL CARE, AND HEATING?: NOT HARD AT ALL

## 2022-09-07 NOTE — PROGRESS NOTES
Arron Pulido  1980  male  36 y.o. SUBJECTIVE:       Chief Complaint   Patient presents with    6 Month Follow-Up    Hypertension       HPI:  Follow-up visit for chronic problems. Patient denies any new or concerning symptoms. His premature ejaculation, urinary symptoms and erectile dysfunction has been stable now. Past Medical History:   Diagnosis Date    Hypertension     YOHANNES (obstructive sleep apnea) 8/26/2020     Past Surgical History:   Procedure Laterality Date    APPENDECTOMY      CHOLECYSTECTOMY      MOUTH SURGERY      TONSILLECTOMY      VASECTOMY      WISDOM TOOTH EXTRACTION       Social History     Socioeconomic History    Marital status:      Spouse name: None    Number of children: 2    Years of education: None    Highest education level: None   Occupational History    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   Tobacco Use    Smoking status: Never    Smokeless tobacco: Former     Types: Chew     Quit date: 1/1/2014   Vaping Use    Vaping Use: Never used   Substance and Sexual Activity    Alcohol use: Yes     Comment: rare    Drug use: No    Sexual activity: Yes     Partners: Female     Birth control/protection: Surgical     Social Determinants of Health     Financial Resource Strain: Low Risk     Difficulty of Paying Living Expenses: Not hard at all   Food Insecurity: No Food Insecurity    Worried About Running Out of Food in the Last Year: Never true    Ran Out of Food in the Last Year: Never true     Family History   Problem Relation Age of Onset    Brain Cancer Mother 58    High Blood Pressure Father     Diabetes Father     Prostate Cancer Father         48 years    Heart Disease Father         poss MI       Review of Systems   Constitutional:  Negative for diaphoresis and unexpected weight change. Respiratory:  Negative for shortness of breath and wheezing. Cardiovascular:  Negative for chest pain and palpitations.    Gastrointestinal:  Negative for abdominal pain, nausea and vomiting. Genitourinary:  Negative for difficulty urinating, flank pain and frequency. Neurological:  Negative for dizziness and light-headedness. OBJECTIVE:  Pulse Readings from Last 4 Encounters:   09/07/22 89   03/22/22 72   03/02/22 74   09/01/21 76     Wt Readings from Last 4 Encounters:   09/07/22 278 lb (126.1 kg)   03/22/22 277 lb 3.2 oz (125.7 kg)   03/02/22 281 lb (127.5 kg)   09/01/21 257 lb 3.2 oz (116.7 kg)     BP Readings from Last 4 Encounters:   09/07/22 122/82   03/22/22 114/70   03/02/22 130/60   09/01/21 118/82     Physical Exam  Vitals and nursing note reviewed. Constitutional:       Appearance: Normal appearance. He is not ill-appearing. Eyes:      Conjunctiva/sclera: Conjunctivae normal.   Cardiovascular:      Rate and Rhythm: Normal rate and regular rhythm. Pulses: Normal pulses. Heart sounds: Normal heart sounds. Pulmonary:      Effort: Pulmonary effort is normal.      Breath sounds: Normal breath sounds. Abdominal:      General: Bowel sounds are normal.   Musculoskeletal:      Right lower leg: No edema. Left lower leg: No edema. Neurological:      Mental Status: He is alert.    Psychiatric:         Mood and Affect: Mood normal.         Behavior: Behavior normal.       CBC:   Lab Results   Component Value Date/Time    WBC 5.2 08/06/2022 08:15 AM    HGB 14.9 08/06/2022 08:15 AM    HCT 43.0 08/06/2022 08:15 AM     08/06/2022 08:15 AM     CMP:  Lab Results   Component Value Date/Time     08/06/2022 08:15 AM    K 4.2 08/06/2022 08:15 AM     08/06/2022 08:15 AM    CO2 24 08/06/2022 08:15 AM    ANIONGAP 11 08/06/2022 08:15 AM    GLUCOSE 84 08/06/2022 08:15 AM    BUN 19 08/06/2022 08:15 AM    CREATININE 0.8 08/06/2022 08:15 AM    GFRAA >60 08/06/2022 08:15 AM    GFRAA >60 11/01/2012 08:49 AM    CALCIUM 8.7 08/06/2022 08:15 AM    PROT 6.9 08/06/2022 08:15 AM    LABALBU 4.2 08/06/2022 08:15 AM    AGRATIO 1.6 08/06/2022 08:15 AM    BILITOT 0.4 08/06/2022 08:15 AM    ALKPHOS 77 08/06/2022 08:15 AM    ALT 26 08/06/2022 08:15 AM    AST 26 08/06/2022 08:15 AM    GLOB 2.7 03/01/2021 05:15 PM     URINALYSIS:  Lab Results   Component Value Date/Time    GLUCOSEU Negative 08/06/2022 08:24 AM    KETUA Negative 08/06/2022 08:24 AM    SPECGRAV 1.020 08/06/2022 08:24 AM    BLOODU Negative 08/06/2022 08:24 AM    PHUR 7.0 08/06/2022 08:24 AM    PROTEINU TRACE 08/06/2022 08:24 AM    NITRU Negative 08/06/2022 08:24 AM    LEUKOCYTESUR Negative 08/06/2022 08:24 AM    LABMICR YES 08/06/2022 08:24 AM    URINETYPE Voided 08/06/2022 08:24 AM     HBA1C:   Lab Results   Component Value Date/Time    LABA1C 5.6 08/06/2022 08:15 AM    .0 08/06/2022 08:15 AM     MICRO/ALB:   Lab Results   Component Value Date/Time    LABMICR YES 08/06/2022 08:24 AM     LIPID:  Lab Results   Component Value Date/Time    CHOL 158 08/06/2022 08:15 AM    TRIG 84 08/06/2022 08:15 AM    HDL 42 08/06/2022 08:15 AM    LDLCALC 99 08/06/2022 08:15 AM    LABVLDL 17 08/06/2022 08:15 AM     TSH:   Lab Results   Component Value Date/Time    TSHREFLEX 0.85 08/06/2022 08:15 AM     PSA:   Lab Results   Component Value Date/Time    PSA 0.39 08/06/2022 08:15 AM        ASSESSMENT/PLAN:    Farheen Seay was seen today for 6 month follow-up and hypertension. Diagnoses and all orders for this visit:    Essential hypertension  Patient denies any exertional chest pain, dyspnea, palpitations, syncope, orthopnea, edema or paroxysmal nocturnal dyspnea. Excellent blood pressure control. We will continue current verapamil, hydrochlorothiazide and lisinopril  Premature ejaculation  stable  -     sertraline (ZOLOFT) 100 MG tablet; 1 TABLET BY MOUTH DAILY    Depression, unspecified depression type  Symptom has been in full remission. No manic symptoms. -     sertraline (ZOLOFT) 100 MG tablet; 1 TABLET BY MOUTH DAILY    Dyslipidemia  Continue therapeutic lifestyle changes.   Need for prophylactic vaccination against diphtheria-tetanus-pertussis (DTP)  -     Tdap (age 6y and older) IM (Boostrix)    Gastroesophageal reflux disease without esophagitis  He is currently taking Prilosec almost every day. He is advised to reduce Prilosec as tolerated. Orders Placed This Encounter   Procedures    Tdap (age 6y and older) IM (Boostrix)       Current Outpatient Medications   Medication Sig Dispense Refill    Vibegron (GEMTESA) 75 MG TABS Take 75 mg by mouth daily OVERACTIVE BLADDER      sertraline (ZOLOFT) 100 MG tablet 1 TABLET BY MOUTH DAILY 90 tablet 3    sildenafil (VIAGRA) 50 MG tablet Take 1 tablet by mouth as needed for Erectile Dysfunction 27 tablet 3    lisinopril (PRINIVIL;ZESTRIL) 40 MG tablet Take 1 tablet by mouth in the morning. 90 tablet 3    verapamil (CALAN SR) 240 MG extended release tablet Take 1 tablet by mouth nightly 90 tablet 3    hydroCHLOROthiazide (HYDRODIURIL) 25 MG tablet Take 1 tablet by mouth in the morning. 90 tablet 3    omeprazole (PRILOSEC) 20 MG delayed release capsule 1 daily 90 capsule 3    calcium carbonate (TUMS) 500 MG chewable tablet Take 6 tablets by mouth daily as needed for Heartburn       No current facility-administered medications for this visit. Return in about 6 months (around 3/7/2023). An After Visit Summary was printed and given to the patient. Documentation was done using voice recognition dragon software. Every effort was made to ensure accuracy; however, inadvertent  Unintentional computerized transcription errors may be present.

## 2022-09-25 DIAGNOSIS — I10 ESSENTIAL HYPERTENSION: ICD-10-CM

## 2022-09-25 DIAGNOSIS — F32.A DEPRESSION, UNSPECIFIED DEPRESSION TYPE: ICD-10-CM

## 2022-09-25 DIAGNOSIS — F52.4 PREMATURE EJACULATION: ICD-10-CM

## 2022-09-25 DIAGNOSIS — K21.9 GASTROESOPHAGEAL REFLUX DISEASE WITHOUT ESOPHAGITIS: ICD-10-CM

## 2022-09-25 DIAGNOSIS — N52.9 ERECTILE DYSFUNCTION, UNSPECIFIED ERECTILE DYSFUNCTION TYPE: ICD-10-CM

## 2022-09-26 RX ORDER — SILDENAFIL 50 MG/1
50 TABLET, FILM COATED ORAL PRN
Qty: 27 TABLET | Refills: 3 | Status: SHIPPED | OUTPATIENT
Start: 2022-09-26

## 2022-09-26 RX ORDER — VERAPAMIL HYDROCHLORIDE 240 MG/1
240 TABLET, FILM COATED, EXTENDED RELEASE ORAL NIGHTLY
Qty: 90 TABLET | Refills: 3 | Status: SHIPPED | OUTPATIENT
Start: 2022-09-26

## 2022-09-26 RX ORDER — HYDROCHLOROTHIAZIDE 25 MG/1
25 TABLET ORAL DAILY
Qty: 90 TABLET | Refills: 3 | Status: SHIPPED | OUTPATIENT
Start: 2022-09-26

## 2022-09-26 RX ORDER — OMEPRAZOLE 20 MG/1
CAPSULE, DELAYED RELEASE ORAL
Qty: 90 CAPSULE | Refills: 3 | Status: SHIPPED | OUTPATIENT
Start: 2022-09-26

## 2022-09-26 RX ORDER — SERTRALINE HYDROCHLORIDE 100 MG/1
TABLET, FILM COATED ORAL
Qty: 90 TABLET | Refills: 3 | Status: SHIPPED | OUTPATIENT
Start: 2022-09-26

## 2022-09-26 RX ORDER — LISINOPRIL 40 MG/1
40 TABLET ORAL DAILY
Qty: 90 TABLET | Refills: 3 | Status: SHIPPED | OUTPATIENT
Start: 2022-09-26

## 2022-10-06 ENCOUNTER — OFFICE VISIT (OUTPATIENT)
Dept: INTERNAL MEDICINE CLINIC | Age: 42
End: 2022-10-06
Payer: COMMERCIAL

## 2022-10-06 VITALS
HEART RATE: 91 BPM | HEIGHT: 71 IN | OXYGEN SATURATION: 98 % | SYSTOLIC BLOOD PRESSURE: 134 MMHG | BODY MASS INDEX: 39.48 KG/M2 | DIASTOLIC BLOOD PRESSURE: 86 MMHG | WEIGHT: 282 LBS

## 2022-10-06 DIAGNOSIS — T07.XXXA MULTIPLE BRUISES: Primary | ICD-10-CM

## 2022-10-06 LAB
APTT: 34.5 SEC (ref 23–34.3)
INR BLD: 1.08 (ref 0.87–1.14)
PROTHROMBIN TIME: 13.9 SEC (ref 11.7–14.5)

## 2022-10-06 PROCEDURE — 99214 OFFICE O/P EST MOD 30 MIN: CPT | Performed by: NURSE PRACTITIONER

## 2022-10-06 ASSESSMENT — ENCOUNTER SYMPTOMS
RESPIRATORY NEGATIVE: 1
GASTROINTESTINAL NEGATIVE: 1
ROS SKIN COMMENTS: MULTIPLE BRUISES

## 2022-10-06 NOTE — PROGRESS NOTES
SUBJECTIVE:    Patient ID: Cherelle Narayan is a 36 y.o. male. CC: bruising    HPI: The patient presents to the office for an acute visit. He has noticed multiple bruises on his body for the past 5 days. He has bruising on right thigh, multiple right lower leg, right wrist, left arm, right upper chest wall. He fells well with no complaints otherwise. No known injuries or trauma. No other bleeding - no bleeding gums, nosebleeds, urine, stool. Current Outpatient Medications   Medication Sig Dispense Refill    omeprazole (PRILOSEC) 20 MG delayed release capsule 1 daily 90 capsule 3    sildenafil (VIAGRA) 50 MG tablet Take 1 tablet by mouth as needed for Erectile Dysfunction 27 tablet 3    lisinopril (PRINIVIL;ZESTRIL) 40 MG tablet Take 1 tablet by mouth daily 90 tablet 3    verapamil (CALAN SR) 240 MG extended release tablet Take 1 tablet by mouth nightly 90 tablet 3    hydroCHLOROthiazide (HYDRODIURIL) 25 MG tablet Take 1 tablet by mouth daily 90 tablet 3    sertraline (ZOLOFT) 100 MG tablet 1 TABLET BY MOUTH DAILY 90 tablet 3    Vibegron (GEMTESA) 75 MG TABS Take 75 mg by mouth daily OVERACTIVE BLADDER      calcium carbonate (TUMS) 500 MG chewable tablet Take 6 tablets by mouth daily as needed for Heartburn       No current facility-administered medications for this visit. Review of Systems   Constitutional:  Negative for chills and fever. Respiratory: Negative. Cardiovascular: Negative. Gastrointestinal: Negative. Musculoskeletal: Negative. Skin:         Multiple bruises   Neurological: Negative. Hematological:  Negative for adenopathy. OBJECTIVE:  Physical Exam  Constitutional:       Appearance: Normal appearance. HENT:      Head: Normocephalic and atraumatic. Skin:     General: Skin is warm and dry. Findings: Ecchymosis present.       Comments: Large bruise on right medial thigh, right lower leg, right arm, left arm, right upper chest wall, and multiple small bruises on posterior torso. Neurological:      General: No focal deficit present. Mental Status: He is alert and oriented to person, place, and time. Psychiatric:         Mood and Affect: Mood normal.         Behavior: Behavior normal.      /86   Pulse 91   Ht 5' 10.5\" (1.791 m)   Wt 282 lb (127.9 kg)   SpO2 98%   BMI 39.89 kg/m²      PHQ Scores 3/2/2022 8/26/2020 10/17/2018 5/14/2018 2/22/2017   PHQ2 Score 0 0 0 0 0   PHQ9 Score 0 0 0 0 0     Interpretation of Total Score Depression Severity: 1-4 = Minimal depression, 5-9 = Mild depression, 10-14 = Moderate depression, 15-19 = Moderately severe depression, 20-27 =Severe depression      ASSESSMENT/PLAN:  Lizet Gonzalez was seen today for bleeding/bruising. Diagnoses and all orders for this visit:    Multiple bruises  -     CBC with Auto Differential  -     Protime-INR  -     APTT    - Feels well. No evidence of systemic illness  - Multiple bruises located not just on extremities but on anterior and posterior torso  - No other s/s bleeding  - He has over 12 bruises of various sizes on torso, arms, legs. - No clear causation for bruising. He is very active but denies injuries/traumas. - Start workup with labs for easy bruising      LORENZA Reed - CNP      Addendum:  Severe thrombocytopenia, mild anemia, mild PTT elevation. Plt 17  H&H 11.3 / 32.8  PTT 34.5    Spoke to Dr. Aide Guy, hematology, this AM.  Will add labs to blood drawn - smear, iron studies, B12/folate, fibrinogen, LDH, haptoglobin   [facility] will contact and see patient today  Spoke to patient and his wife, they are aware and agreeable to plan.       30 minutes spent as follows:  Preparing to see the patient (e.g., review of tests)  Obtaining and/or reviewing separately obtained history  Performing a medically appropriate examination and/or evaluation  Counseling and educating the patient/family/caregiver  Ordering medications, tests or procedures  Referring and communicating with other health professionals  Documenting clinical information in the electronic health record  Independently interpreting results and communicating results to the patient/family/caregiver  Care coordination not separately reported      Da Montoya, APRN - CNP

## 2022-10-07 ENCOUNTER — TELEPHONE (OUTPATIENT)
Dept: INTERNAL MEDICINE CLINIC | Age: 42
End: 2022-10-07

## 2022-10-07 ENCOUNTER — HOSPITAL ENCOUNTER (INPATIENT)
Age: 42
LOS: 19 days | Discharge: HOME | DRG: 546 | End: 2022-10-26
Attending: FAMILY MEDICINE | Admitting: FAMILY MEDICINE
Payer: COMMERCIAL

## 2022-10-07 DIAGNOSIS — D69.6 SEVERE THROMBOCYTOPENIA (HCC): ICD-10-CM

## 2022-10-07 DIAGNOSIS — D69.6 THROMBOCYTOPENIA (HCC): Primary | ICD-10-CM

## 2022-10-07 DIAGNOSIS — D69.6 SEVERE THROMBOCYTOPENIA (HCC): Primary | ICD-10-CM

## 2022-10-07 LAB
A/G RATIO: 1.6 (ref 1.1–2.2)
ABO/RH: NORMAL
ALBUMIN SERPL-MCNC: 4.7 G/DL (ref 3.4–5)
ALP BLD-CCNC: 89 U/L (ref 40–129)
ALT SERPL-CCNC: 26 U/L (ref 10–40)
AMPHETAMINE SCREEN, URINE: NORMAL
ANION GAP SERPL CALCULATED.3IONS-SCNC: 8 MMOL/L (ref 3–16)
ANTIBODY SCREEN: NORMAL
APTT: 29.1 SEC (ref 23–34.3)
AST SERPL-CCNC: 43 U/L (ref 15–37)
BACTERIA: ABNORMAL /HPF
BARBITURATE SCREEN URINE: NORMAL
BASOPHILS ABSOLUTE: 0 K/UL (ref 0–0.2)
BASOPHILS ABSOLUTE: 0 K/UL (ref 0–0.2)
BASOPHILS RELATIVE PERCENT: 0.6 %
BASOPHILS RELATIVE PERCENT: 0.7 %
BENZODIAZEPINE SCREEN, URINE: NORMAL
BILIRUB SERPL-MCNC: 2 MG/DL (ref 0–1)
BILIRUBIN URINE: NEGATIVE
BLOOD BANK DISPENSE STATUS: NORMAL
BLOOD BANK PRODUCT CODE: NORMAL
BLOOD SMEAR REVIEW: NORMAL
BLOOD SMEAR REVIEW: NORMAL
BLOOD, URINE: ABNORMAL
BPU ID: NORMAL
BUN BLDV-MCNC: 15 MG/DL (ref 7–20)
CALCIUM SERPL-MCNC: 9.7 MG/DL (ref 8.3–10.6)
CANNABINOID SCREEN URINE: NORMAL
CHLORIDE BLD-SCNC: 103 MMOL/L (ref 99–110)
CLARITY: CLEAR
CO2: 25 MMOL/L (ref 21–32)
COCAINE METABOLITE SCREEN URINE: NORMAL
COLOR: YELLOW
CREAT SERPL-MCNC: 0.7 MG/DL (ref 0.9–1.3)
DAT POLYSPECIFIC: NORMAL
DESCRIPTION BLOOD BANK: NORMAL
EOSINOPHILS ABSOLUTE: 0.2 K/UL (ref 0–0.6)
EOSINOPHILS ABSOLUTE: 0.2 K/UL (ref 0–0.6)
EOSINOPHILS RELATIVE PERCENT: 3.2 %
EOSINOPHILS RELATIVE PERCENT: 3.9 %
EPITHELIAL CELLS, UA: 0 /HPF (ref 0–5)
ETHANOL: NORMAL MG/DL (ref 0–0.08)
FENTANYL SCREEN, URINE: NORMAL
FERRITIN: 1025 NG/ML (ref 30–400)
FIBRINOGEN: 385 MG/DL (ref 207–509)
FIBRINOGEN: 429 MG/DL (ref 207–509)
FOLATE: 18.18 NG/ML (ref 4.78–24.2)
GFR AFRICAN AMERICAN: >60
GFR NON-AFRICAN AMERICAN: >60
GLUCOSE BLD-MCNC: 83 MG/DL (ref 70–99)
GLUCOSE URINE: NEGATIVE MG/DL
HAPTOGLOBIN: <10 MG/DL (ref 30–200)
HCT VFR BLD CALC: 32.5 % (ref 40.5–52.5)
HCT VFR BLD CALC: 32.8 % (ref 40.5–52.5)
HEMATOLOGY PATH CONSULT: NO
HEMATOLOGY PATH CONSULT: NORMAL
HEMATOLOGY PATH CONSULT: YES
HEMOGLOBIN: 11.2 G/DL (ref 13.5–17.5)
HEMOGLOBIN: 11.3 G/DL (ref 13.5–17.5)
HYALINE CASTS: 0 /LPF (ref 0–8)
HYPOCHROMIA: ABNORMAL
INR BLD: 1.09 (ref 0.87–1.14)
IRON SATURATION: 86 % (ref 20–50)
IRON: 344 UG/DL (ref 59–158)
KETONES, URINE: NEGATIVE MG/DL
LACTATE DEHYDROGENASE: 929 U/L (ref 100–190)
LEUKOCYTE ESTERASE, URINE: NEGATIVE
LYMPHOCYTES ABSOLUTE: 1.5 K/UL (ref 1–5.1)
LYMPHOCYTES ABSOLUTE: 1.6 K/UL (ref 1–5.1)
LYMPHOCYTES RELATIVE PERCENT: 21.5 %
LYMPHOCYTES RELATIVE PERCENT: 24.9 %
Lab: NORMAL
MACROCYTES: ABNORMAL
MCH RBC QN AUTO: 32.3 PG (ref 26–34)
MCH RBC QN AUTO: 32.9 PG (ref 26–34)
MCHC RBC AUTO-ENTMCNC: 34.4 G/DL (ref 31–36)
MCHC RBC AUTO-ENTMCNC: 34.5 G/DL (ref 31–36)
MCV RBC AUTO: 93.9 FL (ref 80–100)
MCV RBC AUTO: 95.1 FL (ref 80–100)
METHADONE SCREEN, URINE: NORMAL
MICROSCOPIC EXAMINATION: YES
MONOCYTES ABSOLUTE: 0.7 K/UL (ref 0–1.3)
MONOCYTES ABSOLUTE: 1.1 K/UL (ref 0–1.3)
MONOCYTES RELATIVE PERCENT: 11.8 %
MONOCYTES RELATIVE PERCENT: 15.1 %
NEUTROPHILS ABSOLUTE: 3.6 K/UL (ref 1.7–7.7)
NEUTROPHILS ABSOLUTE: 4.3 K/UL (ref 1.7–7.7)
NEUTROPHILS RELATIVE PERCENT: 58.7 %
NEUTROPHILS RELATIVE PERCENT: 59.6 %
NITRITE, URINE: NEGATIVE
OPIATE SCREEN URINE: NORMAL
OXYCODONE URINE: NORMAL
PDW BLD-RTO: 14 % (ref 12.4–15.4)
PDW BLD-RTO: 14.1 % (ref 12.4–15.4)
PH UA: 7
PH UA: 7 (ref 5–8)
PHENCYCLIDINE SCREEN URINE: NORMAL
PLATELET # BLD: 17 K/UL (ref 135–450)
PLATELET # BLD: 19 K/UL (ref 135–450)
PLATELET SLIDE REVIEW: ABNORMAL
PMV BLD AUTO: 7.8 FL (ref 5–10.5)
PMV BLD AUTO: 8 FL (ref 5–10.5)
POLYCHROMASIA: ABNORMAL
POTASSIUM REFLEX MAGNESIUM: 4.1 MMOL/L (ref 3.5–5.1)
PROTEIN UA: NEGATIVE MG/DL
PROTHROMBIN TIME: 14 SEC (ref 11.7–14.5)
RBC # BLD: 3.45 M/UL (ref 4.2–5.9)
RBC # BLD: 3.46 M/UL (ref 4.2–5.9)
RBC UA: 12 /HPF (ref 0–4)
SCHISTOCYTES: ABNORMAL
SLIDE REVIEW: ABNORMAL
SODIUM BLD-SCNC: 136 MMOL/L (ref 136–145)
SPECIFIC GRAVITY UA: 1.01 (ref 1–1.03)
TOTAL IRON BINDING CAPACITY: 399 UG/DL (ref 260–445)
TOTAL PROTEIN: 7.7 G/DL (ref 6.4–8.2)
TSH REFLEX: 1.97 UIU/ML (ref 0.27–4.2)
URINE REFLEX TO CULTURE: ABNORMAL
URINE TYPE: ABNORMAL
UROBILINOGEN, URINE: 2 E.U./DL
VITAMIN B-12: 394 PG/ML (ref 211–911)
WBC # BLD: 6.2 K/UL (ref 4–11)
WBC # BLD: 7.3 K/UL (ref 4–11)
WBC UA: 0 /HPF (ref 0–5)

## 2022-10-07 PROCEDURE — 86038 ANTINUCLEAR ANTIBODIES: CPT

## 2022-10-07 PROCEDURE — 86701 HIV-1ANTIBODY: CPT

## 2022-10-07 PROCEDURE — 80053 COMPREHEN METABOLIC PANEL: CPT

## 2022-10-07 PROCEDURE — 80074 ACUTE HEPATITIS PANEL: CPT

## 2022-10-07 PROCEDURE — 85025 COMPLETE CBC W/AUTO DIFF WBC: CPT

## 2022-10-07 PROCEDURE — 82728 ASSAY OF FERRITIN: CPT

## 2022-10-07 PROCEDURE — 86702 HIV-2 ANTIBODY: CPT

## 2022-10-07 PROCEDURE — 84443 ASSAY THYROID STIM HORMONE: CPT

## 2022-10-07 PROCEDURE — 85730 THROMBOPLASTIN TIME PARTIAL: CPT

## 2022-10-07 PROCEDURE — 36430 TRANSFUSION BLD/BLD COMPNT: CPT

## 2022-10-07 PROCEDURE — 80307 DRUG TEST PRSMV CHEM ANLYZR: CPT

## 2022-10-07 PROCEDURE — 83550 IRON BINDING TEST: CPT

## 2022-10-07 PROCEDURE — P9017 PLASMA 1 DONOR FRZ W/IN 8 HR: HCPCS

## 2022-10-07 PROCEDURE — 1200000000 HC SEMI PRIVATE

## 2022-10-07 PROCEDURE — 6360000002 HC RX W HCPCS: Performed by: INTERNAL MEDICINE

## 2022-10-07 PROCEDURE — 82746 ASSAY OF FOLIC ACID SERUM: CPT

## 2022-10-07 PROCEDURE — 82607 VITAMIN B-12: CPT

## 2022-10-07 PROCEDURE — 86900 BLOOD TYPING SEROLOGIC ABO: CPT

## 2022-10-07 PROCEDURE — 30233L1 TRANSFUSION OF NONAUTOLOGOUS FRESH PLASMA INTO PERIPHERAL VEIN, PERCUTANEOUS APPROACH: ICD-10-PCS | Performed by: INTERNAL MEDICINE

## 2022-10-07 PROCEDURE — 86850 RBC ANTIBODY SCREEN: CPT

## 2022-10-07 PROCEDURE — 85384 FIBRINOGEN ACTIVITY: CPT

## 2022-10-07 PROCEDURE — 82077 ASSAY SPEC XCP UR&BREATH IA: CPT

## 2022-10-07 PROCEDURE — 6360000002 HC RX W HCPCS: Performed by: FAMILY MEDICINE

## 2022-10-07 PROCEDURE — 85610 PROTHROMBIN TIME: CPT

## 2022-10-07 PROCEDURE — 86901 BLOOD TYPING SEROLOGIC RH(D): CPT

## 2022-10-07 PROCEDURE — P9059 PLASMA, FRZ BETWEEN 8-24HOUR: HCPCS

## 2022-10-07 PROCEDURE — 2580000003 HC RX 258: Performed by: FAMILY MEDICINE

## 2022-10-07 PROCEDURE — 87390 HIV-1 AG IA: CPT

## 2022-10-07 PROCEDURE — 81001 URINALYSIS AUTO W/SCOPE: CPT

## 2022-10-07 PROCEDURE — 94761 N-INVAS EAR/PLS OXIMETRY MLT: CPT

## 2022-10-07 PROCEDURE — 85397 CLOTTING FUNCT ACTIVITY: CPT

## 2022-10-07 PROCEDURE — 36415 COLL VENOUS BLD VENIPUNCTURE: CPT

## 2022-10-07 PROCEDURE — 83010 ASSAY OF HAPTOGLOBIN QUANT: CPT

## 2022-10-07 PROCEDURE — 83540 ASSAY OF IRON: CPT

## 2022-10-07 PROCEDURE — 83615 LACTATE (LD) (LDH) ENZYME: CPT

## 2022-10-07 PROCEDURE — 86880 COOMBS TEST DIRECT: CPT

## 2022-10-07 PROCEDURE — 6370000000 HC RX 637 (ALT 250 FOR IP): Performed by: INTERNAL MEDICINE

## 2022-10-07 RX ORDER — SODIUM CHLORIDE 9 MG/ML
INJECTION, SOLUTION INTRAVENOUS PRN
Status: COMPLETED | OUTPATIENT
Start: 2022-10-07 | End: 2022-10-21

## 2022-10-07 RX ORDER — SODIUM CHLORIDE 0.9 % (FLUSH) 0.9 %
5-40 SYRINGE (ML) INJECTION EVERY 12 HOURS SCHEDULED
Status: DISCONTINUED | OUTPATIENT
Start: 2022-10-07 | End: 2022-10-26 | Stop reason: HOSPADM

## 2022-10-07 RX ORDER — TROSPIUM CHLORIDE 20 MG/1
20 TABLET, FILM COATED ORAL
Status: DISCONTINUED | OUTPATIENT
Start: 2022-10-08 | End: 2022-10-10

## 2022-10-07 RX ORDER — ONDANSETRON 4 MG/1
4 TABLET, ORALLY DISINTEGRATING ORAL EVERY 8 HOURS PRN
Status: DISCONTINUED | OUTPATIENT
Start: 2022-10-07 | End: 2022-10-26 | Stop reason: HOSPADM

## 2022-10-07 RX ORDER — SODIUM CHLORIDE 0.9 % (FLUSH) 0.9 %
5-40 SYRINGE (ML) INJECTION PRN
Status: DISCONTINUED | OUTPATIENT
Start: 2022-10-07 | End: 2022-10-26 | Stop reason: HOSPADM

## 2022-10-07 RX ORDER — SODIUM CHLORIDE 9 MG/ML
INJECTION, SOLUTION INTRAVENOUS PRN
Status: DISCONTINUED | OUTPATIENT
Start: 2022-10-07 | End: 2022-10-26 | Stop reason: HOSPADM

## 2022-10-07 RX ORDER — LANOLIN ALCOHOL/MO/W.PET/CERES
500 CREAM (GRAM) TOPICAL DAILY
Status: DISCONTINUED | OUTPATIENT
Start: 2022-10-08 | End: 2022-10-26 | Stop reason: HOSPADM

## 2022-10-07 RX ORDER — PANTOPRAZOLE SODIUM 40 MG/1
40 TABLET, DELAYED RELEASE ORAL
Refills: 3 | Status: DISCONTINUED | OUTPATIENT
Start: 2022-10-08 | End: 2022-10-07

## 2022-10-07 RX ORDER — ACETAMINOPHEN 325 MG/1
650 TABLET ORAL ONCE
Status: COMPLETED | OUTPATIENT
Start: 2022-10-07 | End: 2022-10-07

## 2022-10-07 RX ORDER — DIPHENHYDRAMINE HYDROCHLORIDE 50 MG/ML
25 INJECTION INTRAMUSCULAR; INTRAVENOUS ONCE
Status: COMPLETED | OUTPATIENT
Start: 2022-10-07 | End: 2022-10-07

## 2022-10-07 RX ORDER — VERAPAMIL HYDROCHLORIDE 240 MG/1
240 TABLET, FILM COATED, EXTENDED RELEASE ORAL NIGHTLY
Status: DISCONTINUED | OUTPATIENT
Start: 2022-10-07 | End: 2022-10-07

## 2022-10-07 RX ORDER — PANTOPRAZOLE SODIUM 40 MG/1
40 TABLET, DELAYED RELEASE ORAL
Status: DISCONTINUED | OUTPATIENT
Start: 2022-10-07 | End: 2022-10-26 | Stop reason: HOSPADM

## 2022-10-07 RX ORDER — ACETAMINOPHEN 325 MG/1
650 TABLET ORAL EVERY 6 HOURS PRN
Status: DISCONTINUED | OUTPATIENT
Start: 2022-10-07 | End: 2022-10-26 | Stop reason: HOSPADM

## 2022-10-07 RX ORDER — DEXAMETHASONE 4 MG/1
40 TABLET ORAL DAILY
Status: DISCONTINUED | OUTPATIENT
Start: 2022-10-07 | End: 2022-10-11

## 2022-10-07 RX ORDER — LISINOPRIL 20 MG/1
40 TABLET ORAL DAILY
Status: DISCONTINUED | OUTPATIENT
Start: 2022-10-07 | End: 2022-10-07

## 2022-10-07 RX ORDER — VERAPAMIL HYDROCHLORIDE 240 MG/1
240 TABLET, FILM COATED, EXTENDED RELEASE ORAL DAILY
Status: DISCONTINUED | OUTPATIENT
Start: 2022-10-08 | End: 2022-10-26 | Stop reason: HOSPADM

## 2022-10-07 RX ORDER — ONDANSETRON 2 MG/ML
4 INJECTION INTRAMUSCULAR; INTRAVENOUS EVERY 6 HOURS PRN
Status: DISCONTINUED | OUTPATIENT
Start: 2022-10-07 | End: 2022-10-26 | Stop reason: HOSPADM

## 2022-10-07 RX ORDER — TROSPIUM CHLORIDE 20 MG/1
20 TABLET, FILM COATED ORAL
Status: DISCONTINUED | OUTPATIENT
Start: 2022-10-07 | End: 2022-10-07

## 2022-10-07 RX ORDER — ACETAMINOPHEN 650 MG/1
650 SUPPOSITORY RECTAL EVERY 6 HOURS PRN
Status: DISCONTINUED | OUTPATIENT
Start: 2022-10-07 | End: 2022-10-26 | Stop reason: HOSPADM

## 2022-10-07 RX ORDER — POLYETHYLENE GLYCOL 3350 17 G/17G
17 POWDER, FOR SOLUTION ORAL DAILY PRN
Status: DISCONTINUED | OUTPATIENT
Start: 2022-10-07 | End: 2022-10-26 | Stop reason: HOSPADM

## 2022-10-07 RX ADMIN — Medication 10 ML: at 21:56

## 2022-10-07 RX ADMIN — PANTOPRAZOLE SODIUM 40 MG: 40 TABLET, DELAYED RELEASE ORAL at 15:12

## 2022-10-07 RX ADMIN — IMMUNE GLOBULIN (HUMAN) 50 G: 10 INJECTION INTRAVENOUS; SUBCUTANEOUS at 16:38

## 2022-10-07 RX ADMIN — DIPHENHYDRAMINE HYDROCHLORIDE 25 MG: 50 INJECTION, SOLUTION INTRAMUSCULAR; INTRAVENOUS at 15:14

## 2022-10-07 RX ADMIN — DEXAMETHASONE 40 MG: 4 TABLET ORAL at 15:11

## 2022-10-07 RX ADMIN — Medication 10 ML: at 14:30

## 2022-10-07 RX ADMIN — ACETAMINOPHEN 650 MG: 325 TABLET ORAL at 15:13

## 2022-10-07 NOTE — TELEPHONE ENCOUNTER
[facility] called rheumatology department stating that they can only do a Path review smear on the patient.  Tried to call Internal [facility]s and no one was available

## 2022-10-07 NOTE — TELEPHONE ENCOUNTER
----- Message from [facility] sent at 10/7/2022  7:53 AM EDT -----  Subject: Message to Provider    QUESTIONS  Information for Provider? Patient is requesting a call back because he has   a question about his phone conversation w/LORENZA Weldon today. He   states he was told he was going to be scheduled with hematology today and   wants to know if he should take his regularly prescribed medications   today.  ---------------------------------------------------------------------------  --------------  Yuliana CHRISTY  [phone]; OK to leave message on voicemail  ---------------------------------------------------------------------------  --------------  SCRIPT ANSWERS  Relationship to Patient?  Self

## 2022-10-07 NOTE — TELEPHONE ENCOUNTER
[facility] lab was  called to add on additional labs ordered by covering provider this morning. Lab will check to see which labs they can add and will let this office know which ones they cannot add, if any. Patient to have an appt with Hematology this afternoon - any labs not able to be added at this time will be drawn during his office visit. Patient aware of additional labs - spoke to covering provider this morning.

## 2022-10-07 NOTE — PROGRESS NOTES
Patient has arrived to 5T. Vitals obtained. Patient is awake, alert and oriented. Respirations are easy and unlabored. Patient denies pain and does not appear to be in distress. Patient oriented to room and call light. Plan of care discussed with patient, patient agreeable. Call light within reach. No further needs expressed.

## 2022-10-07 NOTE — PROGRESS NOTES
4 Eyes Skin Assessment     NAME:  Yanely Olivas OF BIRTH:  1980  MEDICAL RECORD NUMBER:  [id number]    The patient is being assess for  Admission    I agree that 2 RN's have performed a thorough Head to Toe Skin Assessment on the patient. ALL assessment sites listed below have been assessed. Areas assessed by both nurses:    Head, Face, Ears, Shoulders, Back, Chest, Arms, Elbows, Hands, Sacrum. Buttock, Coccyx, Ischium, and Legs. Feet and Heels        Does the Patient have a Wound?  No noted wound(s)       Lucas Prevention initiated:  No   Wound Care Orders initiated:  No    Pressure Injury (Stage 3,4, Unstageable, DTI, NWPT, and Complex wounds) if present place referral/consult order under [de-identified] No    New and Established Ostomies if present place consult order under : No      Nurse 1 eSignature: Electronically signed by Geraldo Henderson RN on 10/7/22 at 6:46 PM EDT    **SHARE this note so that the co-signing nurse is able to place an eSignature**    Nurse 2 eSignature: Electronically signed by Segundo Ortega RN on 10/7/22 at 7:15 PM EDT

## 2022-10-07 NOTE — CONSULTS
[facility]   Consult Note      Reason for Consult: Thrombocytopenia    Requesting Physician:  Dr. Karina Hayden:  Low platelets bruising    History Obtained From: patient    HISTORY OF PRESENT ILLNESS:      41-year-old male with history of hypertension,, anxiety/depression. He started noticing bruises since last 5 days. He was evaluated by PCP/DYLON  CBC was done on 10/6/2022 which showed platelet count of 17 and hemoglobin was 11.2. In August 2022, CBC was normal.    Cindy Dsouza had called me this morning with CBC results. Patient is being directly admitted for further work-up, treatment and monitoring    There is no obvious external bleeding  No black stools. He did not take any new medications or antibiotics  No recent infections or COVID vaccinations. He reports good energy level. No fever chills night sweats  Chest pain palpitation shortness of breath. His appetite is good. No weight loss. No urinary complaints  No swelling in the legs    Past Medical History:     has a past medical history of Hypertension and YOHANNES (obstructive sleep apnea).    Past Surgical History:    Past Surgical History:   Procedure Laterality Date    APPENDECTOMY      CHOLECYSTECTOMY      MOUTH SURGERY      TONSILLECTOMY      VASECTOMY      WISDOM TOOTH EXTRACTION        Current Medications:    Current Facility-Administered Medications   Medication Dose Route Frequency Provider Last Rate Last Admin    sertraline (ZOLOFT) tablet 100 mg  100 mg Oral Daily Maria Del Rosario Beltran MD        verapamil (CALAN SR) extended release tablet 240 mg  240 mg Oral Nightly Maria Del Rosario Beltran MD        trospium (SANCTURA) tablet 20 mg  20 mg Oral BID AC Maria Del Rosario Beltran MD        sodium chloride flush 0.9 % injection 5-40 mL  5-40 mL IntraVENous 2 times per day Maria Del Rosario Beltran MD        sodium chloride flush 0.9 % injection 5-40 mL  5-40 mL IntraVENous PRN Maria Del Rosario Beltran MD        0.9 % sodium chloride infusion   IntraVENous PRN Dari Sim MD        ondansetron (ZOFRAN-ODT) disintegrating tablet 4 mg  4 mg Oral Q8H PRN Dari Sim MD        Or    ondansetron (ZOFRAN) injection 4 mg  4 mg IntraVENous Q6H PRN Dari Sim MD        polyethylene glycol (GLYCOLAX) packet 17 g  17 g Oral Daily PRN Dari Sim MD        acetaminophen (TYLENOL) tablet 650 mg  650 mg Oral Q6H PRN Dari Sim MD        Or    acetaminophen (TYLENOL) suppository 650 mg  650 mg Rectal Q6H PRN Dari Sim MD        dexamethasone (DECADRON) tablet 40 mg  40 mg Oral Daily Carol Rivas MD        acetaminophen (TYLENOL) tablet 650 mg  650 mg Oral Once Carol Rivas MD        diphenhydrAMINE (BENADRYL) injection 25 mg  25 mg IntraVENous Once Carol Rivas MD        pantoprazole (PROTONIX) tablet 40 mg  40 mg Oral QAM AC Carol Rivas MD        Immune Globulin (Human) IV solution 50 g  50 g IntraVENous Once Carol Rivas MD         Allergies: Allergies   Allergen Reactions    Morphine Hives      Social History:    reports that he has never smoked. He quit smokeless tobacco use about 8 years ago. His smokeless tobacco use included chew. He reports current alcohol use. He reports that he does not use drugs. Family History:     family history includes Brain Cancer (age of onset: 58) in his mother; Diabetes in his father; Heart Disease in his father; High Blood Pressure in his father; Prostate Cancer in his father. PHYSICAL EXAM:    Vitals:  Vitals:    10/07/22 1128   BP: 129/64   Pulse: 83   Resp: 16   Temp: 98.8 °F (37.1 °C)   SpO2: 96%      Conscious alert and oriented. Neck is supple. No pallor or icterus. Lungs are clear to auscultation no rales or rhonchi heard. Respiratory efforts are normal.  CVS S1-S2 normal.  No murmurs or gallops heard  Abdomen is soft bowel sounds are heard no organomegaly noted no tenderness noted  Extremities no edema.   Neuro no focal deficits noted. Skin examination ecchymosis were noted on the right thigh as well as on the upper back. DATA:  General Labs:    CBC:   Recent Labs     10/06/22  1349 10/07/22  1156   WBC 6.2 7.3   HGB 11.3* 11.2*   HCT 32.8* 32.5*   MCV 95.1 93.9   PLT 17* 19*     BMP:   Recent Labs     10/07/22  1156      K 4.1      CO2 25   BUN 15   CREATININE 0.7*     LIVER PROFILE:   Recent Labs     10/07/22  1156   AST 43*   ALT 26   BILITOT 2.0*   ALKPHOS 89     PT/INR:    Lab Results   Component Value Date/Time    PROTIME 14.0 10/07/2022 11:55 AM    PROTIME 13.9 10/06/2022 01:49 PM    INR 1.09 10/07/2022 11:55 AM    INR 1.08 10/06/2022 01:49 PM     PTT:    Lab Results   Component Value Date/Time    APTT 29.1 10/07/2022 11:55 AM    APTT 34.5 10/06/2022 01:49 PM     Magnesium:  No results found for: MG    Imaging:  No results found. Assessment & Plan:    65-year-old gentleman with history of hypertension, anxiety/depression has    1. Thrombocytopenia:    -Peripheral smear had shown occasional large platelets. No blasts noted. Occasional schistocytes noted. 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 Although microangiopathic process needs to be ruled out  -Platelet count is 17 and he has ecchymosis. -We will start patient on IVIG 400 mg/kg daily for at least 3 days.  -Dexamethasone 40 40 mg p.o. daily will be started  -Protonix will be continued.  -We will monitor CBC daily. - We will obtain fibrinogen, LDH, haptoglobin to rule out DIC and hemolysis      2. Anemia:    -Mild  -New finding  -We will check iron studies, B12, folate, LDH, haptoglobin    -Might need further work-up depending on the results of these tests    Had lengthy discussion with the patient and patient's wife about the diagnosis and treatment plan. They verbalized understanding.   All the questions were answered to their satisfaction    Salina Soriano MD  10/7/2022,

## 2022-10-08 ENCOUNTER — APPOINTMENT (OUTPATIENT)
Dept: GENERAL RADIOLOGY | Age: 42
DRG: 546 | End: 2022-10-08
Attending: FAMILY MEDICINE
Payer: COMMERCIAL

## 2022-10-08 LAB
A/G RATIO: 1.2 (ref 1.1–2.2)
ALBUMIN SERPL-MCNC: 4.5 G/DL (ref 3.4–5)
ALP BLD-CCNC: 80 U/L (ref 40–129)
ALT SERPL-CCNC: 23 U/L (ref 10–40)
ANION GAP SERPL CALCULATED.3IONS-SCNC: 14 MMOL/L (ref 3–16)
ANTI-NUCLEAR ANTIBODY (ANA): NEGATIVE
AST SERPL-CCNC: 38 U/L (ref 15–37)
BASOPHILS ABSOLUTE: 0 K/UL (ref 0–0.2)
BASOPHILS ABSOLUTE: 0 K/UL (ref 0–0.2)
BASOPHILS RELATIVE PERCENT: 0 %
BASOPHILS RELATIVE PERCENT: 0.3 %
BILIRUB SERPL-MCNC: 1.7 MG/DL (ref 0–1)
BILIRUBIN DIRECT: 0.3 MG/DL (ref 0–0.3)
BILIRUBIN, INDIRECT: 1.4 MG/DL (ref 0–1)
BUN BLDV-MCNC: 16 MG/DL (ref 7–20)
CALCIUM SERPL-MCNC: 9.6 MG/DL (ref 8.3–10.6)
CHLORIDE BLD-SCNC: 104 MMOL/L (ref 99–110)
CO2: 19 MMOL/L (ref 21–32)
CREAT SERPL-MCNC: 0.7 MG/DL (ref 0.9–1.3)
EOSINOPHILS ABSOLUTE: 0 K/UL (ref 0–0.6)
EOSINOPHILS ABSOLUTE: 0 K/UL (ref 0–0.6)
EOSINOPHILS RELATIVE PERCENT: 0.1 %
EOSINOPHILS RELATIVE PERCENT: 0.1 %
GFR AFRICAN AMERICAN: >60
GFR NON-AFRICAN AMERICAN: >60
GLUCOSE BLD-MCNC: 136 MG/DL (ref 70–99)
HCT VFR BLD CALC: 28.1 % (ref 40.5–52.5)
HCT VFR BLD CALC: 28.3 % (ref 40.5–52.5)
HCT VFR BLD CALC: 28.9 % (ref 40.5–52.5)
HEMATOLOGY PATH CONSULT: NO
HEMOGLOBIN: 10 G/DL (ref 13.5–17.5)
HEMOGLOBIN: 9.6 G/DL (ref 13.5–17.5)
HEMOGLOBIN: 9.8 G/DL (ref 13.5–17.5)
HIV AG/AB: NORMAL
HIV ANTIGEN: NORMAL
HIV-1 ANTIBODY: NORMAL
HIV-2 AB: NORMAL
LYMPHOCYTES ABSOLUTE: 0.7 K/UL (ref 1–5.1)
LYMPHOCYTES ABSOLUTE: 0.9 K/UL (ref 1–5.1)
LYMPHOCYTES RELATIVE PERCENT: 6.2 %
LYMPHOCYTES RELATIVE PERCENT: 9.2 %
MACROCYTES: ABNORMAL
MCH RBC QN AUTO: 32.5 PG (ref 26–34)
MCH RBC QN AUTO: 33.1 PG (ref 26–34)
MCHC RBC AUTO-ENTMCNC: 34.5 G/DL (ref 31–36)
MCHC RBC AUTO-ENTMCNC: 35 G/DL (ref 31–36)
MCV RBC AUTO: 94.3 FL (ref 80–100)
MCV RBC AUTO: 94.6 FL (ref 80–100)
MONOCYTES ABSOLUTE: 0.2 K/UL (ref 0–1.3)
MONOCYTES ABSOLUTE: 1.2 K/UL (ref 0–1.3)
MONOCYTES RELATIVE PERCENT: 3 %
MONOCYTES RELATIVE PERCENT: 8.1 %
NEUTROPHILS ABSOLUTE: 12.4 K/UL (ref 1.7–7.7)
NEUTROPHILS ABSOLUTE: 6.8 K/UL (ref 1.7–7.7)
NEUTROPHILS RELATIVE PERCENT: 85.6 %
NEUTROPHILS RELATIVE PERCENT: 87.4 %
PDW BLD-RTO: 13.7 % (ref 12.4–15.4)
PDW BLD-RTO: 14.1 % (ref 12.4–15.4)
PLATELET # BLD: 21 K/UL (ref 135–450)
PLATELET # BLD: 22 K/UL (ref 135–450)
PLATELET SLIDE REVIEW: ABNORMAL
PMV BLD AUTO: 7.4 FL (ref 5–10.5)
PMV BLD AUTO: 8 FL (ref 5–10.5)
POLYCHROMASIA: ABNORMAL
POTASSIUM SERPL-SCNC: 4 MMOL/L (ref 3.5–5.1)
RBC # BLD: 2.97 M/UL (ref 4.2–5.9)
RBC # BLD: 3.07 M/UL (ref 4.2–5.9)
SCHISTOCYTES: ABNORMAL
SLIDE REVIEW: ABNORMAL
SODIUM BLD-SCNC: 137 MMOL/L (ref 136–145)
TOTAL PROTEIN: 8.3 G/DL (ref 6.4–8.2)
WBC # BLD: 14.5 K/UL (ref 4–11)
WBC # BLD: 7.8 K/UL (ref 4–11)

## 2022-10-08 PROCEDURE — 36556 INSERT NON-TUNNEL CV CATH: CPT | Performed by: INTERNAL MEDICINE

## 2022-10-08 PROCEDURE — 6360000002 HC RX W HCPCS: Performed by: INTERNAL MEDICINE

## 2022-10-08 PROCEDURE — 2580000003 HC RX 258: Performed by: INTERNAL MEDICINE

## 2022-10-08 PROCEDURE — 6370000000 HC RX 637 (ALT 250 FOR IP): Performed by: PHYSICIAN ASSISTANT

## 2022-10-08 PROCEDURE — 71045 X-RAY EXAM CHEST 1 VIEW: CPT

## 2022-10-08 PROCEDURE — 85014 HEMATOCRIT: CPT

## 2022-10-08 PROCEDURE — 36415 COLL VENOUS BLD VENIPUNCTURE: CPT

## 2022-10-08 PROCEDURE — 99223 1ST HOSP IP/OBS HIGH 75: CPT | Performed by: INTERNAL MEDICINE

## 2022-10-08 PROCEDURE — 2000000000 HC ICU R&B

## 2022-10-08 PROCEDURE — 02HV33Z INSERTION OF INFUSION DEVICE INTO SUPERIOR VENA CAVA, PERCUTANEOUS APPROACH: ICD-10-PCS | Performed by: INTERNAL MEDICINE

## 2022-10-08 PROCEDURE — 82248 BILIRUBIN DIRECT: CPT

## 2022-10-08 PROCEDURE — 36514 APHERESIS PLASMA: CPT

## 2022-10-08 PROCEDURE — 6370000000 HC RX 637 (ALT 250 FOR IP): Performed by: INTERNAL MEDICINE

## 2022-10-08 PROCEDURE — 6370000000 HC RX 637 (ALT 250 FOR IP): Performed by: FAMILY MEDICINE

## 2022-10-08 PROCEDURE — 36430 TRANSFUSION BLD/BLD COMPNT: CPT

## 2022-10-08 PROCEDURE — 80053 COMPREHEN METABOLIC PANEL: CPT

## 2022-10-08 PROCEDURE — 6A551Z3 PHERESIS OF PLASMA, MULTIPLE: ICD-10-PCS | Performed by: INTERNAL MEDICINE

## 2022-10-08 PROCEDURE — 2580000003 HC RX 258: Performed by: FAMILY MEDICINE

## 2022-10-08 PROCEDURE — 2500000003 HC RX 250 WO HCPCS: Performed by: INTERNAL MEDICINE

## 2022-10-08 PROCEDURE — 85018 HEMOGLOBIN: CPT

## 2022-10-08 PROCEDURE — 85025 COMPLETE CBC W/AUTO DIFF WBC: CPT

## 2022-10-08 RX ORDER — LANOLIN ALCOHOL/MO/W.PET/CERES
6 CREAM (GRAM) TOPICAL NIGHTLY PRN
Status: DISCONTINUED | OUTPATIENT
Start: 2022-10-08 | End: 2022-10-26 | Stop reason: HOSPADM

## 2022-10-08 RX ORDER — ANTICOAGULANT CITRATE DEXTROSE SOLUTION FORMULA A 12.25; 11; 3.65 G/500ML; G/500ML; G/500ML
SOLUTION INTRAVENOUS CONTINUOUS
Status: DISPENSED | OUTPATIENT
Start: 2022-10-08 | End: 2022-10-08

## 2022-10-08 RX ORDER — HEPARIN SODIUM 1000 [USP'U]/ML
1000 INJECTION, SOLUTION INTRAVENOUS; SUBCUTANEOUS PRN
Status: DISCONTINUED | OUTPATIENT
Start: 2022-10-08 | End: 2022-10-26 | Stop reason: HOSPADM

## 2022-10-08 RX ORDER — SODIUM CHLORIDE 0.9 % (FLUSH) 0.9 %
10 SYRINGE (ML) INJECTION PRN
Status: DISCONTINUED | OUTPATIENT
Start: 2022-10-08 | End: 2022-10-26 | Stop reason: HOSPADM

## 2022-10-08 RX ORDER — DIPHENHYDRAMINE HCL 25 MG
25 TABLET ORAL ONCE
Status: COMPLETED | OUTPATIENT
Start: 2022-10-08 | End: 2022-10-08

## 2022-10-08 RX ORDER — LANOLIN ALCOHOL/MO/W.PET/CERES
3 CREAM (GRAM) TOPICAL NIGHTLY PRN
Status: DISCONTINUED | OUTPATIENT
Start: 2022-10-08 | End: 2022-10-08

## 2022-10-08 RX ORDER — ANTICOAGULANT CITRATE DEXTROSE SOLUTION FORMULA A 12.25; 11; 3.65 G/500ML; G/500ML; G/500ML
SOLUTION INTRAVENOUS DAILY PRN
Status: DISCONTINUED | OUTPATIENT
Start: 2022-10-08 | End: 2022-10-08

## 2022-10-08 RX ORDER — 0.9 % SODIUM CHLORIDE 0.9 %
2000 INTRAVENOUS SOLUTION INTRAVENOUS
Status: ACTIVE | OUTPATIENT
Start: 2022-10-08 | End: 2022-10-09

## 2022-10-08 RX ORDER — ACETAMINOPHEN 325 MG/1
650 TABLET ORAL ONCE
Status: COMPLETED | OUTPATIENT
Start: 2022-10-08 | End: 2022-10-08

## 2022-10-08 RX ORDER — SODIUM CHLORIDE 9 MG/ML
INJECTION, SOLUTION INTRAVENOUS PRN
Status: DISCONTINUED | OUTPATIENT
Start: 2022-10-08 | End: 2022-10-08

## 2022-10-08 RX ADMIN — TROSPIUM CHLORIDE 20 MG: 20 TABLET, FILM COATED ORAL at 06:42

## 2022-10-08 RX ADMIN — SERTRALINE 100 MG: 50 TABLET, FILM COATED ORAL at 08:27

## 2022-10-08 RX ADMIN — ANTICOAGULANT CITRATE DEXTROSE SOLUTION FORMULA A: 12.25; 11; 3.65 SOLUTION INTRAVENOUS at 16:00

## 2022-10-08 RX ADMIN — CALCIUM GLUCONATE 4000 MG: 98 INJECTION, SOLUTION INTRAVENOUS at 16:12

## 2022-10-08 RX ADMIN — Medication 10 ML: at 21:00

## 2022-10-08 RX ADMIN — HEPARIN SODIUM 1000 UNITS: 1000 INJECTION INTRAVENOUS; SUBCUTANEOUS at 11:11

## 2022-10-08 RX ADMIN — DEXAMETHASONE 40 MG: 4 TABLET ORAL at 08:27

## 2022-10-08 RX ADMIN — ACETAMINOPHEN 650 MG: 325 TABLET ORAL at 12:59

## 2022-10-08 RX ADMIN — PANTOPRAZOLE SODIUM 40 MG: 40 TABLET, DELAYED RELEASE ORAL at 06:42

## 2022-10-08 RX ADMIN — MELATONIN TAB 3 MG 6 MG: 3 TAB at 21:28

## 2022-10-08 RX ADMIN — DIPHENHYDRAMINE HYDROCHLORIDE 25 MG: 25 TABLET ORAL at 12:59

## 2022-10-08 RX ADMIN — VERAPAMIL HYDROCHLORIDE 240 MG: 240 TABLET, FILM COATED, EXTENDED RELEASE ORAL at 08:27

## 2022-10-08 RX ADMIN — Medication 10 ML: at 12:58

## 2022-10-08 RX ADMIN — CYANOCOBALAMIN TAB 1000 MCG 500 MCG: 1000 TAB at 08:27

## 2022-10-08 RX ADMIN — MELATONIN TAB 3 MG 3 MG: 3 TAB at 00:46

## 2022-10-08 NOTE — PROGRESS NOTES
Pt's wife was at bedside. Vital signs stable. FFP completed at 2245. Pt's wife concerned with pt's color she said compared to this afternoon he his skin color just seems off. Notified Harpreet Huggins PA.  Awaiting a response

## 2022-10-08 NOTE — PROCEDURES
PROCEDURE NOTE     Procedure: Bedside dialysis catheter insertion. Indication: Renal replacement therapy requiring dialysis access. Consent: Informed Consent. Site: right internal jugularvein  Ultrasound guidance used: Yes  Complications: None were apparent. Blood Loss: Less than 5 ml. CXR: Shows appropriate placement of the catheter without any pneumothorax. Description of the procedure: After usual sterile precautions, the site was prepped. Ultrasound guidance was used to visualize the venous anatomy. Seldinger technique was used to insert a needle into the vein and then place a guidewire. A triple lumen dialysis catheter  16 cm, 12  Fr. was threaded over the guidewire and placed into the vein. Good flow of blood was appreciated through all the ports. Catheter was sutured to the skin and a dry gauze dressing was placed on it. Patient's hemodynamics and oxygenation was continuously monitored during the procedure and they remained stable throughout. A postprocedure chest x-ray was ordered which showed adequate placement of the vascular catheter without any complications.       Licha Nielson MD  Pulmonary Critical Care and Sleep Medicine  10/8/2022, 1:26 PM

## 2022-10-08 NOTE — PROGRESS NOTES
Nephrology brief note:     Consult received by Dr. Elvin Marie - Hem/Onc. Chart reviewed briefly. full consult note to follow. F/UP ADMTS13 activity, Hapto. Plans for TTP in 24 hrs after IVIG, Dexa. Thank you for allowing us to participate in this patients care. Please do not hesitate to contact me, if any questions/concerns. We will follow along with you. Genoveva Palmer MD  [facility] [address] [place] [address] . [phone] or [address].

## 2022-10-08 NOTE — PROGRESS NOTES
[facility]   Progress Note    10/8/2022    SUBJECTIVE:  Feels about the same. Denies any bleeding    OBJECTIVE:    Physical Assessment:  Vitals:  /82   Pulse 79   Temp 97.6 °F (36.4 °C) (Oral)   Resp 16   Ht 5' 10.5\" (1.791 m)   Wt 282 lb (127.9 kg)   SpO2 98%   BMI 39.89 kg/m²    24HR INTAKE/OUTPUT:    Intake/Output Summary (Last 24 hours) at 10/8/2022 1432  Last data filed at 10/8/2022 1334  Gross per 24 hour   Intake 1050 ml   Output --   Net 1050 ml       CONSTITUTIONAL:  Awake, alert & oriented x3  RESPIRATORY:  No increased work of breathing, breath sounds decreased.   CARDIOVASCULAR:  Cardiac S1/S2, RRR  GASTROINTESTINAL:  abdomen soft +BS x4, no hepatosplenomegaly  EXTREMITIES: Negative for Lower extremity edema    Labs Results:    CBC:   Recent Labs     10/07/22  1156 10/08/22  0009 10/08/22  0655   WBC 7.3 7.8 14.5*   HGB 11.2* 9.8* 10.0*   HCT 32.5* 28.1* 28.9*   MCV 93.9 94.6 94.3   PLT 19* 21* 22*     BMP:   Recent Labs     10/07/22  1156 10/08/22  0655    137   K 4.1 4.0    104   CO2 25 19*   BUN 15 16   CREATININE 0.7* 0.7*     LIVER PROFILE:   Recent Labs     10/07/22  1156 10/08/22  0655   AST 43* 38*   ALT 26 23   BILIDIR  --  0.3   BILITOT 2.0* 1.7*   ALKPHOS 89 80     PT/INR:    Lab Results   Component Value Date/Time    PROTIME 14.0 10/07/2022 11:55 AM    PROTIME 13.9 10/06/2022 01:49 PM    INR 1.09 10/07/2022 11:55 AM    INR 1.08 10/06/2022 01:49 PM     PTT:    Lab Results   Component Value Date/Time    APTT 29.1 10/07/2022 11:55 AM    APTT 34.5 10/06/2022 01:49 PM     UA:  Recent Labs     10/07/22  1155 10/07/22  1156   COLORU Yellow  --    PHUR 7.0 7.0   WBCUA 0  --    RBCUA 12*  --    BACTERIA None Seen  --    CLARITYU Clear  --    SPECGRAV 1.010  --    LEUKOCYTESUR Negative  --    UROBILINOGEN 2.0*  --    BILIRUBINUR Negative  --    BLOODU MODERATE*  --    GLUCOSEU Negative  --      Magnesium: No results found for: MG  DEMI:    Lab Results Component Value Date/Time    DEMI Negative 10/07/2022 11:56 AM       RADIOLOGY:    XR CHEST PORTABLE    Result Date: 10/8/2022  EXAMINATION: ONE XRAY VIEW OF THE CHEST 10/8/2022 12:00 pm COMPARISON: None HISTORY: ORDERING SYSTEM PROVIDED HISTORY: placement of vas cath TECHNOLOGIST PROVIDED HISTORY: Reason for exam:->placement of vas cath Reason for Exam: Placement of vas cath FINDINGS: Right internal jugular central venous catheter with the tip in the mid superior vena cava. Overlying heart monitor leads. Eventration or elevation of the right hemidiaphragm. Clear lungs. No definite findings of pneumothorax or pleural effusion. Prominence of the pulmonary trunk that can be seen with pulmonary hypertension. Mildly prominent hilar contours. Cardiac contour within normal limits. Small to moderate hiatal hernia. No acute fracture. 1. A right internal jugular central line terminates in the mid superior vena cava. No associated pneumothorax. 2. No acute cardiopulmonary process. 3. Small to moderate hiatal hernia.        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

## 2022-10-08 NOTE — PROGRESS NOTES
#Suspected TTP  -Anemia and thrombocytopenia  -Schistocytes on smear  -Undetectable haptoglobin and elevated LDH  -Nicole negative  -Above clinical picture highly suspicious for TTP  -ZAMAN TS 13 pending  -Already on Dexamethasone 40mg daily and IVIG  -Consult to nephrology placed and nephrology informed of consult  -Recommend 1 unit of fresh frozen plasma transfusion overnight as a bridge to plasma exchange tomorrow.   -Recommend Plasma exchange per nephrology  -Patient's wife has been updated of the diagnosis of TTP and plan      Mert Arguelles MD  [facility]

## 2022-10-08 NOTE — PROGRESS NOTES
Hospital Medicine Progress Note     Date:  10/8/2022    PCP: Saud Hathaway MD (Tel: [phone])    Date of Admission: 10/7/2022        Subjective  No acute complaints. Objective  Physical exam:  Vitals: /80   Pulse 92   Temp 97.6 °F (36.4 °C) (Oral)   Resp 16   Ht 5' 10.5\" (1.791 m)   Wt 282 lb (127.9 kg)   SpO2 98%   BMI 39.89 kg/m²   Gen: Not in distress. Alert. Head: Normocephalic. Atraumatic. Eyes: EOMI. Good acuity. ENT: Oral mucosa moist  Neck: No JVD. No obvious thyromegaly. CVS: Nml S1S2, no MRG, RRR  Pulmomary: Clear bilaterally. No crackles. No wheezes. Gastrointestinal: Soft, NT/ND. Positive bowel sounds. Musculoskeletal: No edema. Warm  Neuro: No focal deficit. Moves extremity spontaneously. Psychiatry: Appropriate affect. Not agitated. Skin: Warm, dry with normal turgor. No rash      24HR INTAKE/OUTPUT:    Intake/Output Summary (Last 24 hours) at 10/8/2022 1135  Last data filed at 10/7/2022 2247  Gross per 24 hour   Intake 510 ml   Output --   Net 510 ml     I/O last 3 completed shifts: In: 510 [I.V.:10; Blood:500]  Out: -   No intake/output data recorded.       Meds:    acetaminophen  650 mg Oral Once    diphenhydramine  25 mg Oral Once    sertraline  100 mg Oral Daily    sodium chloride flush  5-40 mL IntraVENous 2 times per day    dexamethasone  40 mg Oral Daily    pantoprazole  40 mg Oral QAM AC    trospium  20 mg Oral BID AC    vitamin B-12  500 mcg Oral Daily    verapamil  240 mg Oral Daily       Infusions:    citrate dextrose      And    calcium chloride CRRT infusion      sodium chloride      sodium chloride           PRN Meds: melatonin, sodium chloride, sodium chloride flush, calcium gluconate, heparin (porcine), sodium chloride flush, sodium chloride, ondansetron **OR** ondansetron, polyethylene glycol, acetaminophen **OR** acetaminophen, sodium chloride    Labs/imaging:  CBC:   Recent Labs     10/07/22  1156 10/08/22  0009 10/08/22  0655   WBC 7.3 7.8 14.5*   HGB 11.2* 9.8* 10.0*   PLT 19* 21* 22*         BMP:    Recent Labs     10/07/22  1156 10/08/22  0655    137   K 4.1 4.0    104   CO2 25 19*   BUN 15 16   CREATININE 0.7* 0.7*   GLUCOSE 83 136*         Hepatic:   Recent Labs     10/07/22  1156 10/08/22  0655   AST 43* 38*   ALT 26 23   BILITOT 2.0* 1.7*   ALKPHOS 89 80       Troponin: No results for input(s): TROPONINI in the last 72 hours. BNP: No results for input(s): BNP in the last 72 hours. INR:   Recent Labs     10/06/22  1349 10/07/22  1155   INR 1.08 1.09           Reviewed imaging and reports noted      Assessment:  Principal Problem: Thrombocytopenia ([address]  Active Problems: Thrombocytopenic ([facility] [address]  Resolved Problems:    * No resolved hospital problems. *        Plan: Thrombotic thrombocytopenic purpura  -Appreciate oncology, pulmonology and nephrology recommendations  -Plan to start plasmapheresis today  -No platelet transfusion secondary to possibility of forming blood clots      Essential hypertension  -BP stable, CPM, CTM      Anemia  -Secondary to above, continue to monitor and transfuse for hemoglobin less than 8      Anxiety and depression  -Continue Zoloft      Diet: ADULT DIET;  Regular    Activity: up as tolerated  Prophylaxis: SCD    Code status: Full Code     ----------        Charles Munoz MD  -------------------------------  [place] [facility]

## 2022-10-08 NOTE — H&P
Hospital Medicine History and Physical    10/7/2022    Date of Admission: 10/7/2022    Date of Service: Pt seen/examined on 10/7/2022 and admitted to inpatient. Chief complaint:  No chief complaint on file. Thrombocytopenia    History of Presenting Illness: This is a pleasant 36 y.o. male whom is a direct admission from home, spoke to Dr. Demond Valderrama who stated pt was supposed to come to clinic for evaluation of a 1 week hx of bruising, was found to have platelet count of 17 on outpt CBC. He states he noticed the easy bruising about a week ago, otherwise has been feeling fine, denies SOB, CP, f, c, n, v, d, abd pain, dsyuria. He states he was last sick in [place] but otherwise has not been sick recently. Past Medical History:      Diagnosis Date    Hypertension     YOHANNES (obstructive sleep apnea) 8/26/2020       Past Surgical History:      Procedure Laterality Date    APPENDECTOMY      CHOLECYSTECTOMY      MOUTH SURGERY      TONSILLECTOMY      VASECTOMY      WISDOM TOOTH EXTRACTION         Medications (prior to admission):  Prior to Admission medications    Medication Sig Start Date End Date Taking?  Authorizing Provider   omeprazole (PRILOSEC) 20 MG delayed release capsule 1 daily 9/26/22   Eboni Helton MD   sildenafil (VIAGRA) 50 MG tablet Take 1 tablet by mouth as needed for Erectile Dysfunction 9/26/22   Eboni Helton MD   lisinopril (PRINIVIL;ZESTRIL) 40 MG tablet Take 1 tablet by mouth daily 9/26/22   Eboni Helton MD   verapamil (CALAN SR) 240 MG extended release tablet Take 1 tablet by mouth nightly 9/26/22   Eboni Helton MD   hydroCHLOROthiazide (HYDRODIURIL) 25 MG tablet Take 1 tablet by mouth daily 9/26/22   Eboni Helton MD   sertraline (ZOLOFT) 100 MG tablet 1 TABLET BY MOUTH DAILY 9/26/22   ABEBA Templeton MD   Vibegron (GEMTESA) 75 MG TABS Take 75 mg by mouth daily OVERACTIVE BLADDER    Historical Provider, MD   calcium carbonate (TUMS) 500 MG chewable tablet Take 6 tablets by mouth daily as needed for Heartburn    Historical Provider, MD       Allergy(ies): Morphine    Social History:  TOBACCO:  reports that he has never smoked. He quit smokeless tobacco use about 8 years ago. His smokeless tobacco use included chew. ETOH:  reports current alcohol use. Family History:      Problem Relation Age of Onset    Brain Cancer Mother 58    High Blood Pressure Father     Diabetes Father     Prostate Cancer Father         48 years    Heart Disease Father         poss MI       Review of Systems:  All other systems are reviewed, positive and negative are noted in HPI. Vitals and physical examination:  BP (!) 142/77   Pulse 86   Temp 97.6 °F (36.4 °C) (Oral)   Resp 18   Ht 5' 10.5\" (1.791 m)   Wt 282 lb (127.9 kg)   SpO2 98%   BMI 39.89 kg/m²   Gen/overall appearance: Not in acute distress. Alert. Head: Normocephalic, atraumatic  Eyes: EOMI, good acuity  ENT:- Oral mucosa moist  Neck: No JVD, thyromegaly  CVS: Nml S1S2, no MRG, RRR  Pulm: Clear bilaterally. No crackles/wheezes  Gastrointestinal: Soft, NT/ND, +BS  Musculoskeletal: No edema. Warm  Neuro: No focal deficit. Moves extremity spontaneously. Psychiatry: Appropriate affect. Not agitated. Skin: Warm, dry with normal turgor. No rash    Labs/imaging/EKG:  CBC:   Recent Labs     10/06/22  1349 10/07/22  1156   WBC 6.2 7.3   HGB 11.3* 11.2*   PLT 17* 19*     BMP:    Recent Labs     10/07/22  1156      K 4.1      CO2 25   BUN 15   CREATININE 0.7*   GLUCOSE 83     Hepatic:   Recent Labs     10/07/22  1156   AST 43*   ALT 26   BILITOT 2.0*   ALKPHOS 89       No results found. Discussed with Patient, Family, and Dr. Elder [place]. Assessment:  Principal Problem: Thrombocytopenia ([address]  Active Problems: Thrombocytopenic ([address]  Resolved Problems:    * No resolved hospital problems. *      Plan:   Thrombocytopenia  - ITP vs TTP  - appreciate Onc reccs  - started on decadron, IVIG        Essential HTN  - BP stable, CPm, CTM      Anemia  - replace B12  - cont PPI      Anxiety and Depression  - cont zoloft            Activities: Up with assist  Prophylaxis:SCD  Code status: Full    ==========================================================          Thank you Aayush David MD for the opportunity to be involved in this patient's care.  If you have any questions or concerns please feel free to contact me at [phone].  -----------------------------  Salvador Mathews MD  [facility]

## 2022-10-08 NOTE — SIGNIFICANT EVENT
Paged about Heme/onc wanted to talk to hospitalist.  Talked to oncology hematology, , much appreciated. Patient likely has TTP, discussed care and recommended 1 unit of FFP. Ordered 1 unit of plasma.

## 2022-10-08 NOTE — PROGRESS NOTES
Patient transferred to ICU for plasmapheresis report called to [facility]. Patient expressed no concerns at time of transfer.

## 2022-10-08 NOTE — CONSULTS
MD Sera Haley MD Kerwin Mae, MD                  Office: [phone]                      Fax: [phone]             [facility]                   NEPHROLOGY INITIAL CONSULT NOTE:     PATIENT NAME: Arturo Bojorquez  : 1980  MRN: [id number]  REASON FOR CONSULT: For evaluation and management of TTP. (My recommendations will be communicated by way of shared medical record.)      RECOMMENDATIONS:   - 5 Therapeutic Plasma Exchanges, starting today   - 1 plasma volume exchange. - Replacement fluids w/ FFP, not 5% albumin  - monitor S. fibrinogen level, LDH, CBC, PT/INR, iCalcium, Mag, Phos  - ACD-A for a/c protocol.  - Calcium replacement protocol.  - requested dialysis nurse - discussed w/ them,   - ok to go to HD unit, out of ICU - discussed w/ ICU charge , nurse     - fup ADAMTS 13 activity  -Dexamethasone 40 mg daily,   -IV Ig - as per hematologist     -Transfer to ICU for vascath and TPE initiation   -Vascath placement today   -requested Dr Salvador Harper - appreciated his assistance   -avoid platelet transfusion  - hold Lisinopril, HCTZ, can use hydralazine PRN for SBP >160s  - at higher risk for decompensation, needing closer monitoring. D/C plan from renal stand point:  - fup 5 TPE - then later this week    -pt has verbally consented for TPE , after discussing indication, risk, benefits, procedures, current alternative. D/W pt, team, hematologist, intensivist, hospitalist, nurse, ICU, TPE nurse         IMPRESSION:       Admitted on:  10/7/2022 10:36 AM   For:  Thrombocytopenia ([facility] [address] [D69.6]  Thrombocytopenic ([facility] [address] [D69.6]  No diagnosis found. Suspected TTP  As (+) : MAHA on PBS w/ schistocytes, elevated LDH, low hapto, w/ anemia + thrombocytopenia  No renal failure        Other major problems: Management per primary and other consulting teams.     HTN - was on  Lisinopril, HCTZ   YOHANNES  Obesity high 30s   Hospital Problems             Last Modified POA    * (Principal) Thrombocytopenia ([facility] [address] 10/7/2022 Yes    Thrombocytopenic ([facility] [address] 10/7/2022 Yes         : other supportive care :   - Check daily renal function panel with electrolytes-phosphorus  - Strict monitoring of I/Os, daily weight  - non-Renal feeds/diet  - Current medications reviewed. Please refer to the orders. High Complexity. Multiple complex problems. Discussed with patient and treatment team-    Time spent > 30 (~35) minutes. Thank you for allowing me to participate in this patient's care. Please do not hesitate to contact me anytime. We will follow along with you. Lamar Mathews MD,  [facility] [address] [place] [address] [place]: [phone] or Via [employer]  Fax: [phone]        =======================================================================================   =======================================================================================      CHIEF COMPLAINT:  low platelets    History Obtained From:  patient + treatment team + Electronic Medical Records    HPI: Mr. Ruth Singh is a 36 y.o. male with significant past medical history as below:   Past Medical History:   Diagnosis Date    Hypertension     YOHANNES (obstructive sleep apnea) 8/26/2020      Presents with No chief complaint on file. Admitted with Thrombocytopenia ([facility] [address] [D69.6]  Thrombocytopenic ([facility] [address] [D69.6]  , so we are called for that. For suspected TTP and need for TPE  No current active complaints. Patient denied chest pain / dizziness/lightheadedness/syncope/ SOB / leg edema. Had rash +  Regarding: Thrombocytopenia  Duration: acute   Location: kidneys  Severity: Severe   Timing: continous  Context (ex: related to condition):  , refer to my assessment / impression. Modifying factors (ex: medications, interventions):  , refer to my plan / recommendation. Associated signs & symptoms (ex: edema, SOB): As mentioned above in CC and HPI      Past medical, Surgical, Social, Family medical history reviewed by me. PAST MEDICAL HISTORY:   Past Medical History:   Diagnosis Date    Hypertension     YOHANNES (obstructive sleep apnea) 8/26/2020     PAST SURGICAL HISTORY:   Past Surgical History:   Procedure Laterality Date    APPENDECTOMY      CHOLECYSTECTOMY      MOUTH SURGERY      TONSILLECTOMY      VASECTOMY      WISDOM TOOTH EXTRACTION       FAMILY HISTORY:   Family History   Problem Relation Age of Onset    Brain Cancer Mother 58    High Blood Pressure Father     Diabetes Father     Prostate Cancer Father         48 years    Heart Disease Father         poss MI     SOCIAL HISTORY:   Social History     Socioeconomic History    Marital status:     Number of children: 2   Occupational History    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   Tobacco Use    Smoking status: Never    Smokeless tobacco: Former     Types: Chew     Quit date: 1/1/2014   Vaping Use    Vaping Use: Never used   Substance and Sexual Activity    Alcohol use: Yes     Comment: rare    Drug use: No    Sexual activity: Yes     Partners: Female     Birth control/protection: Surgical     Social Determinants of Health     Financial Resource Strain: Low Risk     Difficulty of Paying Living Expenses: Not hard at all   Food Insecurity: No Food Insecurity    Worried About Running Out of Food in the Last Year: Never true    [address] [facility] [address] in the Last Year: Never true          MEDICATIONS: reviewed by me. Medications Prior to Admission:  No current facility-administered medications on file prior to encounter.      Current Outpatient Medications on File Prior to Encounter   Medication Sig Dispense Refill    omeprazole (PRILOSEC) 20 MG delayed release capsule 1 daily 90 capsule 3    sildenafil (VIAGRA) 50 MG tablet Take 1 tablet by mouth as needed for Erectile Dysfunction 27 tablet 3    lisinopril (PRINIVIL;ZESTRIL) 40 MG tablet Take 1 tablet by mouth daily 90 tablet 3    verapamil (CALAN SR) 240 MG extended release tablet Take 1 tablet by mouth nightly 90 tablet 3    hydroCHLOROthiazide (HYDRODIURIL) 25 MG tablet Take 1 tablet by mouth daily 90 tablet 3    sertraline (ZOLOFT) 100 MG tablet 1 TABLET BY MOUTH DAILY 90 tablet 3    Vibegron (GEMTESA) 75 MG TABS Take 75 mg by mouth daily OVERACTIVE BLADDER      calcium carbonate (TUMS) 500 MG chewable tablet Take 6 tablets by mouth daily as needed for Heartburn           Current Facility-Administered Medications:     melatonin tablet 3 mg, 3 mg, Oral, Nightly PRN, Omari Light PA-C, 3 mg at 10/08/22 0046    sertraline (ZOLOFT) tablet 100 mg, 100 mg, Oral, Daily, Dari Sim MD, 100 mg at 10/08/22 0827    sodium chloride flush 0.9 % injection 5-40 mL, 5-40 mL, IntraVENous, 2 times per day, Dari Sim MD, 10 mL at 10/07/22 2156    sodium chloride flush 0.9 % injection 5-40 mL, 5-40 mL, IntraVENous, PRN, Dari Sim MD    0.9 % sodium chloride infusion, , IntraVENous, PRN, Dari Sim MD    ondansetron (ZOFRAN-ODT) disintegrating tablet 4 mg, 4 mg, Oral, Q8H PRN **OR** ondansetron (ZOFRAN) injection 4 mg, 4 mg, IntraVENous, Q6H PRN, Dari Sim MD    polyethylene glycol (GLYCOLAX) packet 17 g, 17 g, Oral, Daily PRN, Dari Sim MD    acetaminophen (TYLENOL) tablet 650 mg, 650 mg, Oral, Q6H PRN **OR** acetaminophen (TYLENOL) suppository 650 mg, 650 mg, Rectal, Q6H PRN, Dari Sim MD    dexamethasone (DECADRON) tablet 40 mg, 40 mg, Oral, Daily, Carol Rivas MD, 40 mg at 10/08/22 0827    pantoprazole (PROTONIX) tablet 40 mg, 40 mg, Oral, QAM AC, Carol Rivas MD, 40 mg at 10/08/22 8856    trospium (SANCTURA) tablet 20 mg, 20 mg, Oral, BID AC, Dari Sim MD, 20 mg at 10/08/22 0642    0.9 % sodium chloride infusion, , IntraVENous, PRN, Chalino Griggs DO    vitamin B-12 (CYANOCOBALAMIN) tablet 500 mcg, 500 mcg, Oral, Daily, Dari Sim MD, 500 mcg at 10/08/22 0827    verapamil (CALAN SR) extended release tablet 240 mg, 240 mg, Oral, Daily, Reno De Jesus MD, 240 mg at 10/08/22 0827      Allergies reviewed by me: Morphine    REVIEW OF SYSTEMS:  As mentioned in chief complaint and HPI , Subjective   All other 10-point review of systems: negative.         =======================================================================================     PHYSICAL EXAM:  Recent vital signs and recent I/Os reviewed by me. Wt Readings from Last 3 Encounters:   10/07/22 282 lb (127.9 kg)   10/06/22 282 lb (127.9 kg)   09/07/22 278 lb (126.1 kg)     BP Readings from Last 3 Encounters:   10/08/22 137/80   10/06/22 134/86   09/07/22 122/82     Patient Vitals for the past 24 hrs:   BP Temp Temp src Pulse Resp SpO2 Height Weight   10/08/22 0825 137/80 97.6 °F (36.4 °C) Oral 92 16 98 % -- --   10/08/22 0405 117/68 97.6 °F (36.4 °C) Oral 81 16 98 % -- --   10/07/22 2345 124/62 98.6 °F (37 °C) Oral 84 18 96 % -- --   10/07/22 2250 124/62 98.6 °F (37 °C) -- 73 16 98 % -- --   10/07/22 2247 128/75 97.9 °F (36.6 °C) Oral 80 18 97 % -- --   10/07/22 2206 124/80 97.9 °F (36.6 °C) Oral 73 18 97 % -- --   10/07/22 2151 137/84 98 °F (36.7 °C) Oral 93 20 98 % -- --   10/07/22 2142 (!) 151/82 98 °F (36.7 °C) Oral 95 18 97 % -- --   10/07/22 2017 (!) 142/77 97.6 °F (36.4 °C) Oral 86 18 98 % -- --   10/07/22 1719 (!) 141/90 97.7 °F (36.5 °C) Oral 78 16 97 % -- --   10/07/22 1657 132/85 97.7 °F (36.5 °C) Oral 77 16 98 % -- --   10/07/22 1518 (!) 154/95 97.9 °F (36.6 °C) Oral 83 16 99 % -- --   10/07/22 1128 129/64 98.8 °F (37.1 °C) Oral 83 16 96 % 5' 10.5\" (1.791 m) 282 lb (127.9 kg)       Intake/Output Summary (Last 24 hours) at 10/8/2022 1781  Last data filed at 10/7/2022 2247  Gross per 24 hour   Intake 510 ml   Output --   Net 510 ml           Physical Exam  Vitals reviewed. Constitutional:       General: He is not in acute distress. Appearance: Normal appearance. HENT:      Head: Normocephalic and atraumatic.       Right Ear: External ear normal.      Left Ear: External ear normal.      Nose: Nose normal.      Mouth/Throat:      Mouth: Mucous membranes are moist. Mucous membranes are not dry. Eyes:      General: No scleral icterus. Conjunctiva/sclera: Conjunctivae normal.   Neck:      Vascular: No JVD. Cardiovascular:      Rate and Rhythm: Normal rate and regular rhythm. Heart sounds: S1 normal and S2 normal.   Pulmonary:      Effort: Pulmonary effort is normal. No respiratory distress. Breath sounds: Normal breath sounds. Abdominal:      General: Bowel sounds are normal. There is no distension. Musculoskeletal:         General: No swelling or deformity. Cervical back: Normal range of motion and neck supple. Skin:     General: Skin is dry. Coloration: Skin is not jaundiced. Findings: Rash present. No lesion. Neurological:      Mental Status: He is alert and oriented to person, place, and time. Mental status is at baseline. Psychiatric:         Mood and Affect: Mood normal.         Behavior: Behavior normal.        =======================================================================================     DATA:  Diagnostic tests reviewed by me for today's visit:   (AS NEEDED FOR MY EVALUATION AND MANAGEMENT).        Recent Labs     10/06/22  1349 10/07/22  1156 10/08/22  0009 10/08/22  0655   WBC 6.2 7.3 7.8 14.5*   HCT 32.8* 32.5* 28.1* 28.9*   PLT 17* 19* 21* 22*     Iron Saturation:  No components found for: PERCENTFE  FERRITIN:    Lab Results   Component Value Date/Time    FERRITIN 1,025.0 10/07/2022 11:56 AM     IRON:    Lab Results   Component Value Date/Time    IRON 344 10/07/2022 11:56 AM     TIBC:    Lab Results   Component Value Date/Time    TIBC 399 10/07/2022 11:56 AM       Recent Labs     10/07/22  1156 10/08/22  0655    137   K 4.1 4.0    104   CO2 25 19*   BUN 15 16   CREATININE 0.7* 0.7*     Recent Labs     10/07/22  1156 10/08/22  0655   CALCIUM 9.7 9.6     No results for input(s): PH, PCO2, PO2 in the last 72 hours.     Invalid input(s): Gil Moreno    ABG:  No results found for: PH, PCO2, PO2, HCO3, BE, THGB, TCO2, O2SAT  VBG:  No results found for: PHVEN, QLA9VMT, BEVEN, C2SOFAIH    LDH:    Lab Results   Component Value Date/Time     10/07/2022 11:56 AM     Uric Acid:  No results found for: LABURIC, URICACID    PT/INR:    Lab Results   Component Value Date/Time    PROTIME 14.0 10/07/2022 11:55 AM    INR 1.09 10/07/2022 11:55 AM     Warfarin PT/INR:  No components found for: PTPATWAR, PTINRWAR  PTT:    Lab Results   Component Value Date/Time    APTT 29.1 10/07/2022 11:55 AM   [APTT}  Last 3 Troponin:  No results found for: TROPONINI    U/A:    Lab Results   Component Value Date/Time    NITRITE neg 10/08/2020 03:20 PM    COLORU Yellow 10/07/2022 11:55 AM    PROTEINU Negative 10/07/2022 11:55 AM    PHUR 7.0 10/07/2022 11:56 AM    WBCUA 0 10/07/2022 11:55 AM    RBCUA 12 10/07/2022 11:55 AM    BACTERIA None Seen 10/07/2022 11:55 AM    CLARITYU Clear 10/07/2022 11:55 AM    SPECGRAV 1.010 10/07/2022 11:55 AM    LEUKOCYTESUR Negative 10/07/2022 11:55 AM    UROBILINOGEN 2.0 10/07/2022 11:55 AM    BILIRUBINUR Negative 10/07/2022 11:55 AM    BILIRUBINUR neg 10/08/2020 03:20 PM    BLOODU MODERATE 10/07/2022 11:55 AM    GLUCOSEU Negative 10/07/2022 11:55 AM     Microalbumen/Creatinine ratio:  No components found for: RUCREAT  24 Hour Urine for Protein:  No components found for: RAWUPRO, UHRS3, RZRS80AG, UTV3  24 Hour Urine for Creatinine Clearance:  No components found for: CREAT4, UHRS10, UTV10  Urine Toxicology:  No components found for: IAMMENTA, IBARBIT, IBENZO, ICOCAINE, IMARTHC, IOPIATES, IPHENCYC    HgBA1c:    Lab Results   Component Value Date/Time    LABA1C 5.6 08/06/2022 08:15 AM     RPR:  No results found for: RPR  HIV:  No results found for: HIV  DEMI:  No results found for: ANATITER, DEMI  RF:  No results found for: RF  DSDNA:  No components found for: DNA  AMYLASE:  No results found for: AMYLASE  LIPASE:  No results found for: LIPASE  Fibrinogen Level:  No components found for: FIB       BELOW MENTIONED RADIOLOGY STUDY RESULTS BY ME (AS NEEDED FOR MY EVALUATION AND MANAGEMENT). No results found.

## 2022-10-08 NOTE — FLOWSHEET NOTE
TPE ( Therapeutic Plasma Exchange)  1  Volume of TPV exchange, with 100 % replacement. Use FFP:3720 ml    Number of Treatment : 01 of 5 ? total ( Started on 10/08, QD)    Pt tolerated well with TPE,   Fluid balance: + 148 ml ( including ACD-A, Ca gluconate, saline rinse blood back to pt)    Initial setting:  AC 3.1;  inlet 45.8;  plasma removal 30.3 , Replacement 25.6, ratio 15 ,      Final values:   ml;  inlet 6821 ml ;   plasma removal 4361 ml , Replacement  3716 ml;  duration 151 minutes  Started time: 1612  End time: 1902    Medication:    ACD-A ( per protocol via machine) to pt: 58 ml total during whole TPE  Ca: gluconate : 4 gm IVPB with whole TPE, started @ 1612    Next TPE scheduled on 10/09    Placed TPE flow sheets and blood transfusion downtime papers  in the chart for EMR scan    10/08/22 1605 10/08/22 1907   Vital Signs   BP (!) 163/55 135/79   Temp 98.5 °F (36.9 °C) 98.5 °F (36.9 °C)   Heart Rate 92 90   Resp 18 16   SpO2 94 %  --    Run Parameters   Blood flow rate (ml/min) 45.8 ml/min  --    Blood Wm Temp 98.6 °F (37 °C)  --    AC flow/volume 3.1/6  --    Inlet flow/volume 45.8/86  --    Plasma flow/volume 30.3/32  --    Replace flow/volume 25.6/6  --    ACD-A ratio/rpm 15  --    Final Values   Apheresis Intake(ml)  --  4512 ml   Apheresis Output(ml)  --  4364 ml   Net Balance  --  148   AC-AC Bag  --  58   Eff. Ratio  --  1.1

## 2022-10-08 NOTE — CONSULTS
PULMONARY AND CRITICAL CARE MEDICINE CONSULTATION NOTE    CONSULTING PHYSICIAN:  Jean Sandoval MD    ADMISSION DATE: 10/7/2022  ADMISSION DIAGNOSIS: Thrombocytopenia ([facility] [address] [D69.6]  Thrombocytopenic ([facility] [address] [D69.6]    REASON FOR CONSULT: No chief complaint on file. DATE OF CONSULT: 10/7/2022    HISTORY OF PRESENT ILLNESS: 36y.o. year old male with a past medical history significant for hypertension, obstructive sleep apnea, obesity was admitted directly from home due to history of easy bruising. Patient has been seen by medical oncology as an outpatient and was suspected to have TTP. He was initially given IVIG. Further lab work showed schistocytosis with persistent thrombocytopenia. Plan is to initiate the patient on plasma exchange today. At the time of interview patient roaming around in the room taking pictures with his son who has homecoming for [facility]. Patient denies any shortness of breath, chest pain, chest tightness, nausea, vomiting, abdominal pain or diarrhea. No recent sickness. No recent travel or sick contacts. REVIEW OF SYSTEMS:     CONSTITUTIONAL SYMPTOMS: The patient denies fever, fatigue, night sweats, weight loss or weight gain. HEENT: No vision changes. No tinnitus, Denies sinus pain. No hoarseness, or dysphagia. NECK: Patient denies swelling in the neck. CARDIOVASCULAR: Denies chest pain, palpitation, syncope. RESPIRATORY: Denies shortness of breath or cough. GASTROINTESTINAL: Denies nausea, abdominal pain or change in bowel function. GENITOURINARY: Denies obstructive symptoms. No history of incontinence. BREASTS: No masses or lumps in the breasts. SKIN: No rashes or itching. MUSCULOSKELETAL: Denies weakness or bone pain. NEUROLOGICAL: No headaches or seizures. PSYCHIATRIC: Denies mood swings or depression.    ENDOCRINE: Denies heat or cold intolerance or excessive thirst.  HEMATOLOGIC/LYMPHATIC: Denies easy bruising or lymph node mg Oral QAM AC    trospium  20 mg Oral BID AC    vitamin B-12  500 mcg Oral Daily    verapamil  240 mg Oral Daily      citrate dextrose      And    calcium chloride CRRT infusion      sodium chloride      sodium chloride       melatonin, sodium chloride, sodium chloride flush, calcium gluconate, heparin (porcine), sodium chloride flush, sodium chloride, ondansetron **OR** ondansetron, polyethylene glycol, acetaminophen **OR** acetaminophen, sodium chloride      ALLERGIES:   Allergies as of 10/07/2022 - Fully Reviewed 10/07/2022   Allergen Reaction Noted    Morphine Hives 02/21/2011      OBJECTIVE:   height is 5' 10.5\" (1.791 m) and weight is 282 lb (127.9 kg). His oral temperature is 97.6 °F (36.4 °C). His blood pressure is 125/82 and his pulse is 79. His respiration is 16 and oxygen saturation is 98%. I/O this shift:  In: 300 [P.O.:300]  Out: -      PHYSICAL EXAM:    CONSTITUTIONAL: He is a 36y.o.-year-old who appears his stated age. He is alert and oriented x 3 and in no acute distress. HEENT: PERRLA, EOMI. No scleral icterus. No thrush, atraumatic, normocephalic. NECK: Supple, without cervical or supraclavicular lymphadenopathy:  CARDIOVASCULAR: S1 S2 RRR. Without murmer. No JVD or hepatojugular reflux seen. RESPIRATORY & CHEST: Lungs are clear to auscultation and percussion. No wheezing, no crackles. Good air movement  GASTROINTESTINAL & ABDOMEN: Soft, nontender, positive bowel sounds in all quadrants, non-distended, without hepatosplenomegaly. GENITOURINARY: No gross anatomical abnormalities seen. MUSCULOSKELETAL: No tenderness to palpation of the axial skeleton. There is no clubbing. No cyanosis. No edema of the lower extremities. SKIN OF BODY: No rash or jaundice. PSYCHIATRIC EVALUATION: Normal affect. Patient answers questions appropriately. HEMATOLOGIC/LYMPHATIC/ IMMUNOLOGIC: No palpable lymphadenopathy. NEUROLOGIC: Alert and oriented x 3. Groslly non-focal. Motor strength is 5+/5 in all muscle groups. The patient has a normal sensorium globally. LABS:  Recent Labs     10/06/22  1349 10/07/22  1155 10/07/22  1156 10/08/22  0009 10/08/22  0655   WBC 6.2  --  7.3 7.8 14.5*   HGB 11.3*  --  11.2* 9.8* 10.0*   HCT 32.8*  --  32.5* 28.1* 28.9*   PLT 17*  --  19* 21* 22*   ALT  --   --  26  --  23   AST  --   --  43*  --  38*   NA  --   --  136  --  137   K  --   --  4.1  --  4.0   CL  --   --  103  --  104   CREATININE  --   --  0.7*  --  0.7*   BUN  --   --  15  --  16   CO2  --   --  25  --  19*   INR 1.08 1.09  --   --   --        Recent Labs     10/07/22  1156 10/08/22  0655   GLUCOSE 83 136*   CALCIUM 9.7 9.6    137   K 4.1 4.0   CO2 25 19*    104   BUN 15 16   CREATININE 0.7* 0.7*       No results for input(s): PHART, BXZ3ISK, PO2ART, BMD9BRZ, Q8LAYEZS, BEART, Z5RGVGJF in the last 72 hours. Lab Results   Component Value Date    INR 1.09 10/07/2022    INR 1.08 10/06/2022    PROTIME 14.0 10/07/2022    PROTIME 13.9 10/06/2022     No results found for: AMYLASE   Lab Results   Component Value Date    LABA1C 5.6 08/06/2022     Lab Results   Component Value Date    .0 08/06/2022     No results found for: TSH, I4LLWHV, G5LIFLN, THYROIDAB, FT3, T4FREE  No results found for: CKTOTAL, CKMB, CKMBINDEX, TROPONINI   No results found for: CRP   No results found for: BNP   Lab Results   Component Value Date    DDIMER <200 01/26/2021      Lab Results   Component Value Date    FERRITIN 1,025.0 (H) 10/07/2022      No results found for: LACTA        IMAGING:    No new imaging for me to review. IMPRESSION:     Thrombotic thrombocytopenic purpura. Obesity  YOHANNES     RECOMMENDATION:     Patient has presented to the hospital as a direct admission from home for easy bruising. Found to be thrombocytopenic. Further testing consistent with a diagnosis of TTP. This included schistocytosis on smear, undetectable haptoglobin and elevated LDH, Nicole test negative.   Nephrology and medical oncology on board. Patient already receiving IVIG and dexamethasone. Plan to perform plasma exchange. Critical care assistance needed for placement of vascular catheter to initiate plasma exchange. I  Patient may need 2-3 cycles of plasma exchange in order to improve his clinical picture. Remains on room air without any respiratory issues. Discussed the case with nephrologist in detail. Thank you for your consultation. Luna Andrews MD  Pulmonary Critical Care and Sleep Medicine  10/7/2022, 1:20 PM    This note was completed using dragon medical speech recognition software. Grammatical errors, random word insertions, pronoun errors and incomplete sentences are occasional consequences of this technology due to software limitations. If there are questions or concerns about the content of this note of information contained within the body of this dictation they should be addressed with the provider for clarification.

## 2022-10-08 NOTE — PROGRESS NOTES
Spoke to Dr. Vanesa Cary. Informed pt he will be receiving unit of FFP tonight. Pt agreeable to sign consent.

## 2022-10-09 LAB
A/G RATIO: 1.4 (ref 1.1–2.2)
ALBUMIN SERPL-MCNC: 4.2 G/DL (ref 3.4–5)
ALP BLD-CCNC: 71 U/L (ref 40–129)
ALT SERPL-CCNC: 18 U/L (ref 10–40)
ANION GAP SERPL CALCULATED.3IONS-SCNC: 11 MMOL/L (ref 3–16)
ANISOCYTOSIS: ABNORMAL
APTT: 23.4 SEC (ref 23–34.3)
AST SERPL-CCNC: 24 U/L (ref 15–37)
BASOPHILS ABSOLUTE: 0 K/UL (ref 0–0.2)
BASOPHILS RELATIVE PERCENT: 0 %
BILIRUB SERPL-MCNC: 0.7 MG/DL (ref 0–1)
BILIRUBIN DIRECT: <0.2 MG/DL (ref 0–0.3)
BILIRUBIN, INDIRECT: NORMAL MG/DL (ref 0–1)
BUN BLDV-MCNC: 21 MG/DL (ref 7–20)
CALCIUM IONIZED: 1.16 MMOL/L (ref 1.12–1.32)
CALCIUM SERPL-MCNC: 9.3 MG/DL (ref 8.3–10.6)
CHLORIDE BLD-SCNC: 105 MMOL/L (ref 99–110)
CO2: 26 MMOL/L (ref 21–32)
CREAT SERPL-MCNC: 0.7 MG/DL (ref 0.9–1.3)
EOSINOPHILS ABSOLUTE: 0 K/UL (ref 0–0.6)
EOSINOPHILS RELATIVE PERCENT: 0 %
FIBRINOGEN: 312 MG/DL (ref 207–509)
GFR AFRICAN AMERICAN: >60
GFR NON-AFRICAN AMERICAN: >60
GLUCOSE BLD-MCNC: 114 MG/DL (ref 70–99)
HAV IGM SER IA-ACNC: NORMAL
HCT VFR BLD CALC: 30.5 % (ref 40.5–52.5)
HEMOGLOBIN: 10.4 G/DL (ref 13.5–17.5)
HEPATITIS B CORE IGM ANTIBODY: NORMAL
HEPATITIS B SURFACE ANTIGEN INTERPRETATION: NORMAL
HEPATITIS C ANTIBODY INTERPRETATION: NORMAL
INR BLD: 1.15 (ref 0.87–1.14)
LACTATE DEHYDROGENASE: 606 U/L (ref 100–190)
LYMPHOCYTES ABSOLUTE: 1.7 K/UL (ref 1–5.1)
LYMPHOCYTES RELATIVE PERCENT: 8 %
MACROCYTES: ABNORMAL
MAGNESIUM: 1.9 MG/DL (ref 1.8–2.4)
MCH RBC QN AUTO: 32.6 PG (ref 26–34)
MCHC RBC AUTO-ENTMCNC: 34.1 G/DL (ref 31–36)
MCV RBC AUTO: 95.7 FL (ref 80–100)
MONOCYTES ABSOLUTE: 2 K/UL (ref 0–1.3)
MONOCYTES RELATIVE PERCENT: 9 %
NEUTROPHILS ABSOLUTE: 18 K/UL (ref 1.7–7.7)
NEUTROPHILS RELATIVE PERCENT: 83 %
PDW BLD-RTO: 15 % (ref 12.4–15.4)
PH VENOUS: 7.39 (ref 7.35–7.45)
PHOSPHORUS: 4.7 MG/DL (ref 2.5–4.9)
PLATELET # BLD: 32 K/UL (ref 135–450)
PLATELET SLIDE REVIEW: ABNORMAL
PMV BLD AUTO: 9.5 FL (ref 5–10.5)
POLYCHROMASIA: ABNORMAL
POTASSIUM SERPL-SCNC: 4.1 MMOL/L (ref 3.5–5.1)
PROTHROMBIN TIME: 14.6 SEC (ref 11.7–14.5)
RBC # BLD: 3.19 M/UL (ref 4.2–5.9)
REPORT: NORMAL
SLIDE REVIEW: ABNORMAL
SODIUM BLD-SCNC: 142 MMOL/L (ref 136–145)
TOTAL PROTEIN: 7.1 G/DL (ref 6.4–8.2)
WBC # BLD: 21.7 K/UL (ref 4–11)

## 2022-10-09 PROCEDURE — 99232 SBSQ HOSP IP/OBS MODERATE 35: CPT | Performed by: INTERNAL MEDICINE

## 2022-10-09 PROCEDURE — 82330 ASSAY OF CALCIUM: CPT

## 2022-10-09 PROCEDURE — 83735 ASSAY OF MAGNESIUM: CPT

## 2022-10-09 PROCEDURE — 85610 PROTHROMBIN TIME: CPT

## 2022-10-09 PROCEDURE — 83615 LACTATE (LD) (LDH) ENZYME: CPT

## 2022-10-09 PROCEDURE — 6360000002 HC RX W HCPCS: Performed by: INTERNAL MEDICINE

## 2022-10-09 PROCEDURE — 2000000000 HC ICU R&B

## 2022-10-09 PROCEDURE — 6370000000 HC RX 637 (ALT 250 FOR IP): Performed by: INTERNAL MEDICINE

## 2022-10-09 PROCEDURE — 84100 ASSAY OF PHOSPHORUS: CPT

## 2022-10-09 PROCEDURE — 85025 COMPLETE CBC W/AUTO DIFF WBC: CPT

## 2022-10-09 PROCEDURE — 80053 COMPREHEN METABOLIC PANEL: CPT

## 2022-10-09 PROCEDURE — 85730 THROMBOPLASTIN TIME PARTIAL: CPT

## 2022-10-09 PROCEDURE — 2580000003 HC RX 258: Performed by: FAMILY MEDICINE

## 2022-10-09 PROCEDURE — 82248 BILIRUBIN DIRECT: CPT

## 2022-10-09 PROCEDURE — 6370000000 HC RX 637 (ALT 250 FOR IP): Performed by: FAMILY MEDICINE

## 2022-10-09 PROCEDURE — 85384 FIBRINOGEN ACTIVITY: CPT

## 2022-10-09 RX ORDER — DIPHENHYDRAMINE HCL 25 MG
12.5 TABLET ORAL NIGHTLY PRN
Status: DISCONTINUED | OUTPATIENT
Start: 2022-10-10 | End: 2022-10-09

## 2022-10-09 RX ORDER — DIPHENHYDRAMINE HCL 25 MG
12.5 TABLET ORAL NIGHTLY PRN
Status: DISCONTINUED | OUTPATIENT
Start: 2022-10-09 | End: 2022-10-26 | Stop reason: HOSPADM

## 2022-10-09 RX ORDER — ANTICOAGULANT CITRATE DEXTROSE SOLUTION FORMULA A 12.25; 11; 3.65 G/500ML; G/500ML; G/500ML
SOLUTION INTRAVENOUS CONTINUOUS PRN
Status: DISCONTINUED | OUTPATIENT
Start: 2022-10-09 | End: 2022-10-15 | Stop reason: SDUPTHER

## 2022-10-09 RX ADMIN — Medication 10 ML: at 20:12

## 2022-10-09 RX ADMIN — TROSPIUM CHLORIDE 20 MG: 20 TABLET, FILM COATED ORAL at 05:41

## 2022-10-09 RX ADMIN — Medication 10 ML: at 09:00

## 2022-10-09 RX ADMIN — PANTOPRAZOLE SODIUM 40 MG: 40 TABLET, DELAYED RELEASE ORAL at 05:41

## 2022-10-09 RX ADMIN — SERTRALINE 100 MG: 50 TABLET, FILM COATED ORAL at 10:21

## 2022-10-09 RX ADMIN — ACETAMINOPHEN 650 MG: 325 TABLET ORAL at 14:32

## 2022-10-09 RX ADMIN — DIPHENHYDRAMINE HYDROCHLORIDE 12.5 MG: 25 TABLET ORAL at 22:35

## 2022-10-09 RX ADMIN — TROSPIUM CHLORIDE 20 MG: 20 TABLET, FILM COATED ORAL at 16:22

## 2022-10-09 RX ADMIN — CYANOCOBALAMIN TAB 1000 MCG 500 MCG: 1000 TAB at 10:21

## 2022-10-09 RX ADMIN — DEXAMETHASONE 40 MG: 4 TABLET ORAL at 10:23

## 2022-10-09 RX ADMIN — VERAPAMIL HYDROCHLORIDE 240 MG: 240 TABLET, FILM COATED, EXTENDED RELEASE ORAL at 10:22

## 2022-10-09 ASSESSMENT — PAIN SCALES - GENERAL: PAINLEVEL_OUTOF10: 3

## 2022-10-09 NOTE — PROGRESS NOTES
MD Cristina Cordero MD Melanie Certain, MD                  Office: [phone]                      Fax: [phone]             3 [facility]                   NEPHROLOGY INPATIENT PROGRESS NOTE:     PATIENT NAME: Mariya Hrerera  : 1980  MRN: [id number]      RECOMMENDATIONS:   - 5 Therapeutic Plasma Exchanges, started 10-8-22  -next on 10-10, as no nurse available today unfortunately as per dialysis charge nurse    - 1 plasma volume exchange. - Replacement fluids w/ FFP, not 5% albumin  - monitor S. fibrinogen level, LDH, CBC, PT/INR, iCalcium, Mag, Phos  - ACD-A for a/c protocol.  - Calcium replacement protocol.  - requested dialysis nurse - discussed w/ them,   - ok to go to HD unit, out of ICU - discussed w/ ICU charge , nurse     - fup ADAMTS 13 activity  -Dexamethasone 40 mg daily,   -IV Ig - as per hematologist     -Transfer to ICU for vascath and TPE initiation   -Vascath placement today   -requested Dr Edgar Bojorquez - appreciated his assistance   -avoid platelet transfusion  - hold Lisinopril, HCTZ, can use hydralazine PRN for SBP >160s  - at higher risk for decompensation, needing closer monitoring. D/C plan from renal stand point:  - fup 5 TPE - then later this week    -pt has verbally consented for TPE , after discussing indication, risk, benefits, procedures, current alternative. D/W pt, team, hematologist, intensivist, hospitalist, nurse, ICU, TPE nurse         IMPRESSION:       Admitted on:  10/7/2022 10:36 AM   For:  Thrombocytopenia ([address] [D69.6]  Thrombocytopenic ([address] [D69.6]  No diagnosis found. Suspected TTP  As (+) : MAHA on PBS w/ schistocytes, elevated LDH, low hapto, w/ anemia + thrombocytopenia  No renal failure        Other major problems: Management per primary and other consulting teams.     HTN - was on  Lisinopril, HCTZ   YOHANNES  Obesity high 30s   Hospital Problems             Last Modified POA    * (Principal) Thrombocytopenia ([facility] [address] 10/7/2022 Yes Thrombocytopenic ([facility] [address] 10/7/2022 Yes       : other supportive care :   - Check daily renal function panel with electrolytes-phosphorus  - Strict monitoring of I/Os, daily weight  - non-Renal feeds/diet  - Current medications reviewed. Please refer to the orders. High Complexity. Multiple complex problems. Discussed with patient and treatment team-    Time spent > 30 (~35) minutes. Thank you for allowing me to participate in this patient's care. Please do not hesitate to contact me anytime. We will follow along with you. Talita Noel MD,  [facility] [address] [place] [address] [place]: [phone] or [employer]  Fax: [phone]        =======================================================================================   =======================================================================================  Subjective / interval history:   TPE x1 started 10-8  Tolerated well  Patient was seen comfortably sitting up in the bed,   Reported no active complaints, . Past medical, Surgical, Social, Family medical history reviewed by me. MEDICATIONS: reviewed by me. Medications Prior to Admission:  No current facility-administered medications on file prior to encounter.      Current Outpatient Medications on File Prior to Encounter   Medication Sig Dispense Refill    omeprazole (PRILOSEC) 20 MG delayed release capsule 1 daily 90 capsule 3    sildenafil (VIAGRA) 50 MG tablet Take 1 tablet by mouth as needed for Erectile Dysfunction 27 tablet 3    lisinopril (PRINIVIL;ZESTRIL) 40 MG tablet Take 1 tablet by mouth daily 90 tablet 3    verapamil (CALAN SR) 240 MG extended release tablet Take 1 tablet by mouth nightly 90 tablet 3    hydroCHLOROthiazide (HYDRODIURIL) 25 MG tablet Take 1 tablet by mouth daily 90 tablet 3    sertraline (ZOLOFT) 100 MG tablet 1 TABLET BY MOUTH DAILY 90 tablet 3    Vibegron (GEMTESA) 75 MG TABS Take 75 mg by mouth daily OVERACTIVE BLADDER      calcium carbonate (TUMS) 500 MG chewable tablet Take 6 tablets by mouth daily as needed for Heartburn           Current Facility-Administered Medications:     sodium chloride flush 0.9 % injection 10 mL, 10 mL, IntraCATHeter, PRN, Talita Noel MD    calcium gluconate 4,000 mg in dextrose 5 % 100 mL IVPB, 4,000 mg, IntraVENous, PRN, Talita Noel MD, Last Rate: 25 mL/hr at 10/08/22 1612, 4,000 mg at 10/08/22 1612    heparin (porcine) injection 1,000 Units, 1,000 Units, IntraVENous, PRN, Be Rosas MD, 1,000 Units at 10/08/22 1111    melatonin tablet 6 mg, 6 mg, Oral, Nightly PRN, Yarelis Griggs DO, 6 mg at 10/08/22 2128    sertraline (ZOLOFT) tablet 100 mg, 100 mg, Oral, Daily, Yunior Waldron MD, 100 mg at 10/09/22 1021    sodium chloride flush 0.9 % injection 5-40 mL, 5-40 mL, IntraVENous, 2 times per day, Yunior Waldron MD, 10 mL at 10/08/22 2100    sodium chloride flush 0.9 % injection 5-40 mL, 5-40 mL, IntraVENous, PRN, Yunior Waldron MD    0.9 % sodium chloride infusion, , IntraVENous, PRN, Yunior Waldron MD    ondansetron (ZOFRAN-ODT) disintegrating tablet 4 mg, 4 mg, Oral, Q8H PRN **OR** ondansetron (ZOFRAN) injection 4 mg, 4 mg, IntraVENous, Q6H PRN, Yunior Waldron MD    polyethylene glycol (GLYCOLAX) packet 17 g, 17 g, Oral, Daily PRN, Yunior Waldron MD    acetaminophen (TYLENOL) tablet 650 mg, 650 mg, Oral, Q6H PRN **OR** acetaminophen (TYLENOL) suppository 650 mg, 650 mg, Rectal, Q6H PRN, Yunior Waldron MD    dexamethasone (DECADRON) tablet 40 mg, 40 mg, Oral, Daily, Leila Fleming MD, 40 mg at 10/09/22 1023    pantoprazole (PROTONIX) tablet 40 mg, 40 mg, Oral, QAM AC, Leila Fleming MD, 40 mg at 10/09/22 0541    trospium (SANCTURA) tablet 20 mg, 20 mg, Oral, BID AC, Yunior Waldron MD, 20 mg at 10/09/22 0541    0.9 % sodium chloride infusion, , IntraVENous, PRN, Yarelis Griggs DO    vitamin B-12 (CYANOCOBALAMIN) tablet 500 mcg, 500 mcg, Oral, Daily, Maria Del Rosario Beltran MD, 500 mcg at 10/09/22 1021    verapamil (CALAN SR) extended release tablet 240 mg, 240 mg, Oral, Daily, Maria Del Rosario Beltran MD, 240 mg at 10/09/22 1022        REVIEW OF SYSTEMS:  As mentioned in chief complaint and HPI , Subjective       =======================================================================================     PHYSICAL EXAM:  Recent vital signs and recent I/Os reviewed by me. Wt Readings from Last 3 Encounters:   10/09/22 288 lb 3.2 oz (130.7 kg)   10/06/22 282 lb (127.9 kg)   09/07/22 278 lb (126.1 kg)     BP Readings from Last 3 Encounters:   10/09/22 (!) 113/90   10/06/22 134/86   09/07/22 122/82     Patient Vitals for the past 24 hrs:   BP Temp Temp src Pulse Resp SpO2 Weight   10/09/22 0600 -- -- -- -- -- -- 288 lb 3.2 oz (130.7 kg)   10/09/22 0402 (!) 113/90 97.4 °F (36.3 °C) Temporal 75 16 100 % --   10/09/22 0037 124/65 97.6 °F (36.4 °C) Temporal 68 16 100 % --   10/08/22 2130 137/81 -- -- 95 -- -- --   10/08/22 2030 (!) 170/88 98 °F (36.7 °C) Temporal 76 15 98 % --   10/08/22 1907 135/79 98.5 °F (36.9 °C) -- 90 16 -- --   10/08/22 1605 (!) 163/55 98.5 °F (36.9 °C) -- 92 18 94 % --   10/08/22 1200 125/82 -- -- 79 -- 98 % --         Intake/Output Summary (Last 24 hours) at 10/9/2022 1141  Last data filed at 10/8/2022 1907  Gross per 24 hour   Intake 5052 ml   Output 4364 ml   Net 688 ml             Physical Exam  Vitals reviewed. Constitutional:       General: He is not in acute distress. Appearance: Normal appearance. HENT:      Head: Normocephalic and atraumatic. Right Ear: External ear normal.      Left Ear: External ear normal.      Nose: Nose normal.      Mouth/Throat:      Mouth: Mucous membranes are moist. Mucous membranes are not dry. Eyes:      General: No scleral icterus. Conjunctiva/sclera: Conjunctivae normal.   Neck:      Vascular: No JVD. Cardiovascular:      Rate and Rhythm: Normal rate and regular rhythm.       Heart sounds: S1 normal and S2 normal.   Pulmonary:      Effort: Pulmonary effort is normal. No respiratory distress. Breath sounds: Normal breath sounds. Abdominal:      General: Bowel sounds are normal. There is no distension. Musculoskeletal:         General: No swelling or deformity. Cervical back: Normal range of motion and neck supple. Skin:     General: Skin is dry. Coloration: Skin is not jaundiced. Findings: Rash present. No lesion. Neurological:      Mental Status: He is alert and oriented to person, place, and time. Mental status is at baseline. Psychiatric:         Mood and Affect: Mood normal.         Behavior: Behavior normal.        =======================================================================================     DATA:  Diagnostic tests reviewed by me for today's visit:   (AS NEEDED FOR MY EVALUATION AND MANAGEMENT). Recent Labs     10/06/22  1349 10/07/22  1156 10/08/22  0009 10/08/22  0655 10/08/22  1531 10/09/22  0442   WBC 6.2 7.3 7.8 14.5*  --  21.7*   HCT 32.8* 32.5* 28.1* 28.9* 28.3* 30.5*   PLT 17* 19* 21* 22*  --  32*       Iron Saturation:  No components found for: PERCENTFE  FERRITIN:    Lab Results   Component Value Date/Time    FERRITIN 1,025.0 10/07/2022 11:56 AM     IRON:    Lab Results   Component Value Date/Time    IRON 344 10/07/2022 11:56 AM     TIBC:    Lab Results   Component Value Date/Time    TIBC 399 10/07/2022 11:56 AM       Recent Labs     10/07/22  1156 10/08/22  0655 10/09/22  0442    137 142   K 4.1 4.0 4.1    104 105   CO2 25 19* 26   BUN 15 16 21*   CREATININE 0.7* 0.7* 0.7*       Recent Labs     10/07/22  1156 10/08/22  0655 10/09/22  0442   CALCIUM 9.7 9.6 9.3   MG  --   --  1.90   PHOS  --   --  4.7       No results for input(s): PH, PCO2, PO2 in the last 72 hours.     Invalid input(s): [id number], INSPIREDO2    ABG:  No results found for: PH, PCO2, PO2, HCO3, BE, THGB, TCO2, O2SAT  VBG:    Lab Results   Component Value Date/Time    PHVEN 7.395 10/09/2022 04:42 AM       LDH:    Lab Results   Component Value Date/Time     10/09/2022 04:42 AM     Uric Acid:  No results found for: LABURIC, URICACID    PT/INR:    Lab Results   Component Value Date/Time    PROTIME 14.6 10/09/2022 04:42 AM    INR 1.15 10/09/2022 04:42 AM     Warfarin PT/INR:  No components found for: PTPATWAR, PTINRWAR  PTT:    Lab Results   Component Value Date/Time    APTT 23.4 10/09/2022 04:42 AM   [APTT}  Last 3 Troponin:  No results found for: TROPONINI    U/A:    Lab Results   Component Value Date/Time    NITRITE neg 10/08/2020 03:20 PM    COLORU Yellow 10/07/2022 11:55 AM    PROTEINU Negative 10/07/2022 11:55 AM    PHUR 7.0 10/07/2022 11:56 AM    WBCUA 0 10/07/2022 11:55 AM    RBCUA 12 10/07/2022 11:55 AM    BACTERIA None Seen 10/07/2022 11:55 AM    CLARITYU Clear 10/07/2022 11:55 AM    SPECGRAV 1.010 10/07/2022 11:55 AM    LEUKOCYTESUR Negative 10/07/2022 11:55 AM    UROBILINOGEN 2.0 10/07/2022 11:55 AM    BILIRUBINUR Negative 10/07/2022 11:55 AM    BILIRUBINUR neg 10/08/2020 03:20 PM    BLOODU MODERATE 10/07/2022 11:55 AM    GLUCOSEU Negative 10/07/2022 11:55 AM     Microalbumen/Creatinine ratio:  No components found for: RUCREAT  24 Hour Urine for Protein:  No components found for: RAWUPRO, UHRS3, OHHK57ZB, UTV3  24 Hour Urine for Creatinine Clearance:  No components found for: CREAT4, UHRS10, UTV10  Urine Toxicology:  No components found for: IAMMENTA, IBARBIT, IBENZO, ICOCAINE, IMARTHC, IOPIATES, IPHENCYC    HgBA1c:    Lab Results   Component Value Date/Time    LABA1C 5.6 08/06/2022 08:15 AM     RPR:  No results found for: RPR  HIV:  No results found for: HIV  DEMI:    Lab Results   Component Value Date/Time    DEMI Negative 10/07/2022 11:56 AM     RF:  No results found for: RF  DSDNA:  No components found for: DNA  AMYLASE:  No results found for: AMYLASE  LIPASE:  No results found for: LIPASE  Fibrinogen Level:  No components found for: FIB BELOW MENTIONED RADIOLOGY STUDY RESULTS BY ME (AS NEEDED FOR MY EVALUATION AND MANAGEMENT). No results found.

## 2022-10-09 NOTE — FLOWSHEET NOTE
See flow sheets for assessment. Returned from dialysis suite- denies any complaints. VSS. Monitor NSR. Family @ bedside. Plan of care discussed.

## 2022-10-09 NOTE — PROGRESS NOTES
[facility]   Progress Note    10/9/2022    SUBJECTIVE:      Tolerated pheresis well hemoglobin and platelets improved. OBJECTIVE:    Physical Assessment:  Vitals:  BP (!) 113/90   Pulse 75   Temp 97.4 °F (36.3 °C) (Temporal)   Resp 16   Ht 5' 10.5\" (1.791 m)   Wt 288 lb 3.2 oz (130.7 kg)   SpO2 100%   BMI 40.77 kg/m²    24HR INTAKE/OUTPUT:    Intake/Output Summary (Last 24 hours) at 10/9/2022 1359  Last data filed at 10/8/2022 1907  Gross per 24 hour   Intake 4512 ml   Output 4364 ml   Net 148 ml       CONSTITUTIONAL:  Awake, alert & oriented x3  RESPIRATORY:  No increased work of breathing, breath sounds decreased.   CARDIOVASCULAR:  Cardiac S1/S2, RRR  GASTROINTESTINAL:  abdomen soft +BS x4, no hepatosplenomegaly  EXTREMITIES: Negative for Lower extremity edema    Labs Results:    CBC:   Recent Labs     10/08/22  0009 10/08/22  0655 10/08/22  1531 10/09/22  0442   WBC 7.8 14.5*  --  21.7*   HGB 9.8* 10.0* 9.6* 10.4*   HCT 28.1* 28.9* 28.3* 30.5*   MCV 94.6 94.3  --  95.7   PLT 21* 22*  --  32*     BMP:   Recent Labs     10/07/22  1156 10/08/22  0655 10/09/22  0442    137 142   K 4.1 4.0 4.1    104 105   CO2 25 19* 26   PHOS  --   --  4.7   BUN 15 16 21*   CREATININE 0.7* 0.7* 0.7*     LIVER PROFILE:   Recent Labs     10/07/22  1156 10/08/22  0655 10/09/22  0442   AST 43* 38* 24   ALT 26 23 18   BILIDIR  --  0.3 <0.2   BILITOT 2.0* 1.7* 0.7   ALKPHOS 89 80 71     PT/INR:    Lab Results   Component Value Date/Time    PROTIME 14.6 10/09/2022 04:42 AM    PROTIME 14.0 10/07/2022 11:55 AM    PROTIME 13.9 10/06/2022 01:49 PM    INR 1.15 10/09/2022 04:42 AM    INR 1.09 10/07/2022 11:55 AM    INR 1.08 10/06/2022 01:49 PM     PTT:    Lab Results   Component Value Date/Time    APTT 23.4 10/09/2022 04:42 AM    APTT 29.1 10/07/2022 11:55 AM    APTT 34.5 10/06/2022 01:49 PM     UA:  Recent Labs     10/07/22  1155 10/07/22  1156   COLORU Yellow  --    PHUR 7.0 7.0   WBCUA 0  --    RBCUA 12*  --    BACTERIA None Seen  --    CLARITYU Clear  --    SPECGRAV 1.010  --    LEUKOCYTESUR Negative  --    UROBILINOGEN 2.0*  --    BILIRUBINUR Negative  --    BLOODU MODERATE*  --    GLUCOSEU Negative  --      Magnesium:   Lab Results   Component Value Date/Time    MG 1.90 10/09/2022 04:42 AM     DEMI:    Lab Results   Component Value Date/Time    DEMI Negative 10/07/2022 11:56 AM       RADIOLOGY:    XR CHEST PORTABLE    Result Date: 10/8/2022  EXAMINATION: ONE XRAY VIEW OF THE CHEST 10/8/2022 12:00 pm COMPARISON: None HISTORY: ORDERING SYSTEM PROVIDED HISTORY: placement of vas cath TECHNOLOGIST PROVIDED HISTORY: Reason for exam:->placement of vas cath Reason for Exam: Placement of vas cath FINDINGS: Right internal jugular central venous catheter with the tip in the mid superior vena cava. Overlying heart monitor leads. Eventration or elevation of the right hemidiaphragm. Clear lungs. No definite findings of pneumothorax or pleural effusion. Prominence of the pulmonary trunk that can be seen with pulmonary hypertension. Mildly prominent hilar contours. Cardiac contour within normal limits. Small to moderate hiatal hernia. No acute fracture. 1. A right internal jugular central line terminates in the mid superior vena cava. No associated pneumothorax. 2. No acute cardiopulmonary process. 3. Small to moderate hiatal hernia.        Current Medications   sertraline  100 mg Oral Daily    sodium chloride flush  5-40 mL IntraVENous 2 times per day    dexamethasone  40 mg Oral Daily    pantoprazole  40 mg Oral QAM AC    trospium  20 mg Oral BID AC    vitamin B-12  500 mcg Oral Daily    verapamil  240 mg Oral Daily        ASSESSMENT & PLAN:      #Suspected TTP  -Anemia and thrombocytopenia  -Schistocytes on smear  -Undetectable haptoglobin and elevated LDH  -Nicole negative  -Above clinical picture highly suspicious for TTP  -ZAMAN TS 13 level very low at 16 normal greater than 65  -Already on Dexamethasone 40mg daily  -Nephrology on board with plans to do plasmapheresis, completed 1 session of plasmapheresis yesterday tolerated well with improvement in his counts  -Daily PT/INR, PTT, Fibrinogen  -Transfuse Cryoprecipitate if Fibrinogen below 100 after completion of that day's phoresis        John Bentley MD  [facility]

## 2022-10-09 NOTE — PROGRESS NOTES
Hospital Medicine Progress Note     Date:  10/9/2022    PCP: Artemio Nassar MD (Tel: [phone])    Date of Admission: 10/7/2022        Subjective  No acute complaints. Objective  Physical exam:  Vitals: BP (!) 113/90   Pulse 75   Temp 97.4 °F (36.3 °C) (Temporal)   Resp 16   Ht 5' 10.5\" (1.791 m)   Wt 288 lb 3.2 oz (130.7 kg)   SpO2 100%   BMI 40.77 kg/m²   Gen: Not in distress. Alert. Head: Normocephalic. Atraumatic. Eyes: EOMI. Good acuity. ENT: Oral mucosa moist  Neck: No JVD. No obvious thyromegaly. CVS: Nml S1S2, no MRG, RRR  Pulmomary: Clear bilaterally. No crackles. No wheezes. Gastrointestinal: Soft, NT/ND. Positive bowel sounds. Musculoskeletal: No edema. Warm  Neuro: No focal deficit. Moves extremity spontaneously. Psychiatry: Appropriate affect. Not agitated. Skin: Warm, dry with normal turgor. No rash      24HR INTAKE/OUTPUT:    Intake/Output Summary (Last 24 hours) at 10/9/2022 0856  Last data filed at 10/8/2022 1907  Gross per 24 hour   Intake 5052 ml   Output 4364 ml   Net 688 ml       I/O last 3 completed shifts: In: 8903 [P.O.:540; I.V.:10; Blood:500]  Out: 4364   No intake/output data recorded.       Meds:    sertraline  100 mg Oral Daily    sodium chloride flush  5-40 mL IntraVENous 2 times per day    dexamethasone  40 mg Oral Daily    pantoprazole  40 mg Oral QAM AC    trospium  20 mg Oral BID AC    vitamin B-12  500 mcg Oral Daily    verapamil  240 mg Oral Daily       Infusions:    sodium chloride      sodium chloride           PRN Meds: sodium chloride, sodium chloride flush, calcium gluconate, heparin (porcine), melatonin, sodium chloride flush, sodium chloride, ondansetron **OR** ondansetron, polyethylene glycol, acetaminophen **OR** acetaminophen, sodium chloride    Labs/imaging:  CBC:   Recent Labs     10/08/22  0009 10/08/22  0655 10/08/22  1531 10/09/22  0442   WBC 7.8 14.5*  --  21.7*   HGB 9.8* 10.0* 9.6* 10.4*   PLT 21* 22*  --  32*           BMP: Recent Labs     10/07/22  1156 10/08/22  0655 10/09/22  0442    137 142   K 4.1 4.0 4.1    104 105   CO2 25 19* 26   BUN 15 16 21*   CREATININE 0.7* 0.7* 0.7*   GLUCOSE 83 136* 114*           Hepatic:   Recent Labs     10/07/22  1156 10/08/22  0655 10/09/22  0442   AST 43* 38* 24   ALT 26 23 18   BILITOT 2.0* 1.7* 0.7   ALKPHOS 89 80 71         Troponin: No results for input(s): TROPONINI in the last 72 hours. BNP: No results for input(s): BNP in the last 72 hours. INR:   Recent Labs     10/06/22  1349 10/07/22  1155 10/09/22  0442   INR 1.08 1.09 1.15*             Reviewed imaging and reports noted      Assessment:  Principal Problem: Thrombocytopenia ([address]  Active Problems: Thrombocytopenic ([address]  Resolved Problems:    * No resolved hospital problems. *        Plan: Thrombotic thrombocytopenic purpura  -Appreciate oncology, pulmonology and nephrology recommendations  -starting on plasmapheresis on 10/8/22 QD for 5 doses  - cont steroids per Hematology  -No platelet transfusion secondary to possibility of forming blood clots      Essential hypertension  -BP stable, CPM, CTM      Anemia  -Secondary to above, continue to monitor and transfuse for hemoglobin less than 8      Anxiety and depression  -Continue Zoloft      Diet: ADULT DIET;  Regular    Activity: up as tolerated  Prophylaxis: SCD    Code status: Full Code     ----------        Karen Schaefer MD  -------------------------------  [facility] hospitalist

## 2022-10-09 NOTE — PLAN OF CARE
Problem: ABCDS Injury Assessment  Goal: Absence of physical injury  Outcome: Progressing     Problem: Discharge Planning  Goal: Discharge to home or other facility with appropriate resources  Outcome: Progressing

## 2022-10-09 NOTE — PROGRESS NOTES
PULMONARY AND CRITICAL CARE MEDICINE PROGRESS NOTE    Subjective: Patient sitting in chair chatting with his family member. Reports feeling better without any symptoms overnight. Did undergo 1 session of plasma exchange yesterday. Thrombocytopenia slowly improving. REVIEW OF SYSTEMS:     8 point review of system is negative except that mentioned in the subjective portion. PAST MEDICAL HISTORY:   Past Medical History:   Diagnosis Date    Hypertension     YOHANNES (obstructive sleep apnea) 8/26/2020       PAST SURGICAL HISTORY:   Past Surgical History:   Procedure Laterality Date    APPENDECTOMY      CHOLECYSTECTOMY      MOUTH SURGERY      TONSILLECTOMY      VASECTOMY      WISDOM TOOTH EXTRACTION         SOCIAL HISTORY:   Social History     Tobacco Use    Smoking status: Never    Smokeless tobacco: Former     Types: Chew     Quit date: 1/1/2014   Vaping Use    Vaping Use: Never used   Substance Use Topics    Alcohol use: Yes     Comment: rare    Drug use: No       FAMILY HISTORY:   Family History   Problem Relation Age of Onset    Brain Cancer Mother 58    High Blood Pressure Father     Diabetes Father     Prostate Cancer Father         48 years    Heart Disease Father         poss MI       MEDICATIONS:     No current facility-administered medications on file prior to encounter.      Current Outpatient Medications on File Prior to Encounter   Medication Sig Dispense Refill    omeprazole (PRILOSEC) 20 MG delayed release capsule 1 daily 90 capsule 3    sildenafil (VIAGRA) 50 MG tablet Take 1 tablet by mouth as needed for Erectile Dysfunction 27 tablet 3    lisinopril (PRINIVIL;ZESTRIL) 40 MG tablet Take 1 tablet by mouth daily 90 tablet 3    verapamil (CALAN SR) 240 MG extended release tablet Take 1 tablet by mouth nightly 90 tablet 3    hydroCHLOROthiazide (HYDRODIURIL) 25 MG tablet Take 1 tablet by mouth daily 90 tablet 3    sertraline (ZOLOFT) 100 MG tablet 1 TABLET BY MOUTH DAILY 90 tablet 3    Vibegron (GEMTESA) 75 MG TABS Take 75 mg by mouth daily OVERACTIVE BLADDER      calcium carbonate (TUMS) 500 MG chewable tablet Take 6 tablets by mouth daily as needed for Heartburn          sertraline  100 mg Oral Daily    sodium chloride flush  5-40 mL IntraVENous 2 times per day    dexamethasone  40 mg Oral Daily    pantoprazole  40 mg Oral QAM AC    trospium  20 mg Oral BID AC    vitamin B-12  500 mcg Oral Daily    verapamil  240 mg Oral Daily      sodium chloride      sodium chloride       sodium chloride flush, calcium gluconate, heparin (porcine), melatonin, sodium chloride flush, sodium chloride, ondansetron **OR** ondansetron, polyethylene glycol, acetaminophen **OR** acetaminophen, sodium chloride      ALLERGIES:   Allergies as of 10/07/2022 - Fully Reviewed 10/07/2022   Allergen Reaction Noted    Morphine Hives 02/21/2011      OBJECTIVE:   height is 5' 10.5\" (1.791 m) and weight is 288 lb 3.2 oz (130.7 kg). His temporal temperature is 97.4 °F (36.3 °C). His blood pressure is 113/90 (abnormal) and his pulse is 75. His respiration is 16 and oxygen saturation is 100%. No intake/output data recorded. PHYSICAL EXAM:    CONSTITUTIONAL: He is a 36y.o.-year-old who appears his stated age. He is alert and oriented x 3 and in no acute distress. HEENT: PERRLA, EOMI. No scleral icterus. No thrush, atraumatic, normocephalic. NECK: Supple, without cervical or supraclavicular lymphadenopathy:  CARDIOVASCULAR: S1 S2 RRR. Without murmer. No JVD or hepatojugular reflux seen. RESPIRATORY & CHEST: Lungs are clear to auscultation and percussion. No wheezing, no crackles. Good air movement  GASTROINTESTINAL & ABDOMEN: Soft, nontender, positive bowel sounds in all quadrants, non-distended, without hepatosplenomegaly. GENITOURINARY: No gross anatomical abnormalities seen. MUSCULOSKELETAL: No tenderness to palpation of the axial skeleton. There is no clubbing. No cyanosis. No edema of the lower extremities.    SKIN OF BODY: No rash or jaundice. PSYCHIATRIC EVALUATION: Normal affect. Patient answers questions appropriately. HEMATOLOGIC/LYMPHATIC/ IMMUNOLOGIC: No palpable lymphadenopathy. NEUROLOGIC: Alert and oriented x 3. Groslly non-focal. Motor strength is 5+/5 in all muscle groups. The patient has a normal sensorium globally. LABS:  Recent Labs     10/06/22  1349 10/07/22  1155 10/07/22  1156 10/08/22  0009 10/08/22  0655 10/08/22  1531 10/09/22  0442   WBC 6.2  --  7.3 7.8 14.5*  --  21.7*   HGB 11.3*  --  11.2* 9.8* 10.0* 9.6* 10.4*   HCT 32.8*  --  32.5* 28.1* 28.9* 28.3* 30.5*   PLT 17*  --  19* 21* 22*  --  32*   ALT  --   --  26  --  23  --  18   AST  --   --  43*  --  38*  --  24   NA  --   --  136  --  137  --  142   K  --   --  4.1  --  4.0  --  4.1   CL  --   --  103  --  104  --  105   CREATININE  --   --  0.7*  --  0.7*  --  0.7*   BUN  --   --  15  --  16  --  21*   CO2  --   --  25  --  19*  --  26   INR 1.08 1.09  --   --   --   --  1.15*       Recent Labs     10/07/22  1156 10/08/22  0655 10/09/22  0442   GLUCOSE 83 136* 114*   CALCIUM 9.7 9.6 9.3    137 142   K 4.1 4.0 4.1   CO2 25 19* 26    104 105   BUN 15 16 21*   CREATININE 0.7* 0.7* 0.7*       No results for input(s): PHART, BES9YEO, PO2ART, AUX5ZAO, T3RBIKSD, BEART, V3OYHHKF in the last 72 hours.     Lab Results   Component Value Date    INR 1.15 (H) 10/09/2022    INR 1.09 10/07/2022    INR 1.08 10/06/2022    PROTIME 14.6 (H) 10/09/2022    PROTIME 14.0 10/07/2022    PROTIME 13.9 10/06/2022     No results found for: AMYLASE   Lab Results   Component Value Date    LABA1C 5.6 08/06/2022     Lab Results   Component Value Date    .0 08/06/2022     No results found for: TSH, V9NALTF, F0DDJRB, THYROIDAB, FT3, T4FREE  No results found for: CKTOTAL, CKMB, CKMBINDEX, TROPONINI   No results found for: CRP   No results found for: BNP   Lab Results   Component Value Date    DDIMER <200 01/26/2021      Lab Results   Component Value Date FERRITIN 1,025.0 (H) 10/07/2022      No results found for: LACTA        IMAGING:    No new imaging for me to review. IMPRESSION:     Thrombotic thrombocytopenic purpura. Obesity  YOHANNES     RECOMMENDATION:     Patient has presented to the hospital as a direct admission from home for easy bruising. Found to be thrombocytopenic. Further testing consistent with a diagnosis of TTP. This included schistocytosis on smear, undetectable haptoglobin and elevated LDH, Nicole test negative. Nephrology and medical oncology on board. Patient already receiving IVIG and dexamethasone. Now undergoing plasma exchange. Plan to provide him with 4-5 cycles of plasma exchange. His respiratory status remained stable. Hemodynamically stable. Remains on room air without any respiratory issues. Discussed the case with nephrologist in detail. Nothing further to add from critical care standpoint. We will sign off. Luna Andrews MD  Pulmonary Critical Care and Sleep Medicine  10/7/2022, 1:23 PM    This note was completed using dragon medical speech recognition software. Grammatical errors, random word insertions, pronoun errors and incomplete sentences are occasional consequences of this technology due to software limitations. If there are questions or concerns about the content of this note of information contained within the body of this dictation they should be addressed with the provider for clarification.

## 2022-10-10 ENCOUNTER — APPOINTMENT (OUTPATIENT)
Dept: CT IMAGING | Age: 42
DRG: 546 | End: 2022-10-10
Attending: FAMILY MEDICINE
Payer: COMMERCIAL

## 2022-10-10 LAB
A/G RATIO: 1.4 (ref 1.1–2.2)
ALBUMIN SERPL-MCNC: 3.9 G/DL (ref 3.4–5)
ALP BLD-CCNC: 68 U/L (ref 40–129)
ALT SERPL-CCNC: 19 U/L (ref 10–40)
ANION GAP SERPL CALCULATED.3IONS-SCNC: 9 MMOL/L (ref 3–16)
ANISOCYTOSIS: ABNORMAL
APTT: 33.3 SEC (ref 23–34.3)
AST SERPL-CCNC: 20 U/L (ref 15–37)
BANDED NEUTROPHILS RELATIVE PERCENT: 1 % (ref 0–7)
BASOPHILS ABSOLUTE: 0 K/UL (ref 0–0.2)
BASOPHILS RELATIVE PERCENT: 0 %
BILIRUB SERPL-MCNC: 0.8 MG/DL (ref 0–1)
BILIRUBIN DIRECT: <0.2 MG/DL (ref 0–0.3)
BILIRUBIN, INDIRECT: NORMAL MG/DL (ref 0–1)
BLOOD BANK DISPENSE STATUS: NORMAL
BLOOD BANK PRODUCT CODE: NORMAL
BPU ID: NORMAL
BUN BLDV-MCNC: 19 MG/DL (ref 7–20)
CALCIUM IONIZED: 1.14 MMOL/L (ref 1.12–1.32)
CALCIUM SERPL-MCNC: 8.9 MG/DL (ref 8.3–10.6)
CHLORIDE BLD-SCNC: 105 MMOL/L (ref 99–110)
CO2: 25 MMOL/L (ref 21–32)
CREAT SERPL-MCNC: 0.7 MG/DL (ref 0.9–1.3)
DESCRIPTION BLOOD BANK: NORMAL
EOSINOPHILS ABSOLUTE: 0 K/UL (ref 0–0.6)
EOSINOPHILS RELATIVE PERCENT: 0 %
FIBRINOGEN: 267 MG/DL (ref 207–509)
GFR AFRICAN AMERICAN: >60
GFR NON-AFRICAN AMERICAN: >60
GLUCOSE BLD-MCNC: 118 MG/DL (ref 70–99)
HCT VFR BLD CALC: 27.6 % (ref 40.5–52.5)
HEMATOLOGY PATH CONSULT: NORMAL
HEMOGLOBIN: 9.3 G/DL (ref 13.5–17.5)
HOMOCYSTEINE: 10 UMOL/L (ref 0–10)
INR BLD: 1.15 (ref 0.87–1.14)
LACTATE DEHYDROGENASE: 588 U/L (ref 100–190)
LYMPHOCYTES ABSOLUTE: 3.3 K/UL (ref 1–5.1)
LYMPHOCYTES RELATIVE PERCENT: 15 %
MAGNESIUM: 2.1 MG/DL (ref 1.8–2.4)
MCH RBC QN AUTO: 32.8 PG (ref 26–34)
MCHC RBC AUTO-ENTMCNC: 33.7 G/DL (ref 31–36)
MCV RBC AUTO: 97.4 FL (ref 80–100)
MONOCYTES ABSOLUTE: 2 K/UL (ref 0–1.3)
MONOCYTES RELATIVE PERCENT: 9 %
NEUTROPHILS ABSOLUTE: 16.5 K/UL (ref 1.7–7.7)
NEUTROPHILS RELATIVE PERCENT: 75 %
PDW BLD-RTO: 16.4 % (ref 12.4–15.4)
PH VENOUS: 7.39 (ref 7.35–7.45)
PHOSPHORUS: 5 MG/DL (ref 2.5–4.9)
PLATELET # BLD: 24 K/UL (ref 135–450)
PLATELET SLIDE REVIEW: ABNORMAL
PMV BLD AUTO: 9.8 FL (ref 5–10.5)
POIKILOCYTES: ABNORMAL
POLYCHROMASIA: ABNORMAL
POTASSIUM SERPL-SCNC: 3.9 MMOL/L (ref 3.5–5.1)
PROTHROMBIN TIME: 14.6 SEC (ref 11.7–14.5)
RBC # BLD: 2.84 M/UL (ref 4.2–5.9)
RHEUMATOID FACTOR: <10 IU/ML
SLIDE REVIEW: ABNORMAL
SODIUM BLD-SCNC: 139 MMOL/L (ref 136–145)
TOTAL PROTEIN: 6.6 G/DL (ref 6.4–8.2)
TOXIC GRANULATION: PRESENT
WBC # BLD: 21.7 K/UL (ref 4–11)

## 2022-10-10 PROCEDURE — 83615 LACTATE (LD) (LDH) ENZYME: CPT

## 2022-10-10 PROCEDURE — 6360000002 HC RX W HCPCS: Performed by: INTERNAL MEDICINE

## 2022-10-10 PROCEDURE — 80053 COMPREHEN METABOLIC PANEL: CPT

## 2022-10-10 PROCEDURE — 71260 CT THORAX DX C+: CPT

## 2022-10-10 PROCEDURE — 82248 BILIRUBIN DIRECT: CPT

## 2022-10-10 PROCEDURE — 85384 FIBRINOGEN ACTIVITY: CPT

## 2022-10-10 PROCEDURE — 86038 ANTINUCLEAR ANTIBODIES: CPT

## 2022-10-10 PROCEDURE — 86225 DNA ANTIBODY NATIVE: CPT

## 2022-10-10 PROCEDURE — 86431 RHEUMATOID FACTOR QUANT: CPT

## 2022-10-10 PROCEDURE — 6370000000 HC RX 637 (ALT 250 FOR IP): Performed by: INTERNAL MEDICINE

## 2022-10-10 PROCEDURE — 2000000000 HC ICU R&B

## 2022-10-10 PROCEDURE — 85730 THROMBOPLASTIN TIME PARTIAL: CPT

## 2022-10-10 PROCEDURE — 83735 ASSAY OF MAGNESIUM: CPT

## 2022-10-10 PROCEDURE — 2500000003 HC RX 250 WO HCPCS: Performed by: INTERNAL MEDICINE

## 2022-10-10 PROCEDURE — 85025 COMPLETE CBC W/AUTO DIFF WBC: CPT

## 2022-10-10 PROCEDURE — 36514 APHERESIS PLASMA: CPT

## 2022-10-10 PROCEDURE — 2580000003 HC RX 258: Performed by: INTERNAL MEDICINE

## 2022-10-10 PROCEDURE — 82330 ASSAY OF CALCIUM: CPT

## 2022-10-10 PROCEDURE — 2580000003 HC RX 258: Performed by: FAMILY MEDICINE

## 2022-10-10 PROCEDURE — 6370000000 HC RX 637 (ALT 250 FOR IP): Performed by: FAMILY MEDICINE

## 2022-10-10 PROCEDURE — 84100 ASSAY OF PHOSPHORUS: CPT

## 2022-10-10 PROCEDURE — 83921 ORGANIC ACID SINGLE QUANT: CPT

## 2022-10-10 PROCEDURE — 83090 ASSAY OF HOMOCYSTEINE: CPT

## 2022-10-10 PROCEDURE — 6360000004 HC RX CONTRAST MEDICATION: Performed by: INTERNAL MEDICINE

## 2022-10-10 PROCEDURE — 36430 TRANSFUSION BLD/BLD COMPNT: CPT

## 2022-10-10 PROCEDURE — 94760 N-INVAS EAR/PLS OXIMETRY 1: CPT

## 2022-10-10 PROCEDURE — 74177 CT ABD & PELVIS W/CONTRAST: CPT

## 2022-10-10 PROCEDURE — 85610 PROTHROMBIN TIME: CPT

## 2022-10-10 RX ADMIN — TROSPIUM CHLORIDE 20 MG: 20 TABLET, FILM COATED ORAL at 06:07

## 2022-10-10 RX ADMIN — SERTRALINE 100 MG: 50 TABLET, FILM COATED ORAL at 10:07

## 2022-10-10 RX ADMIN — Medication 10 ML: at 10:08

## 2022-10-10 RX ADMIN — ANTICOAGULANT CITRATE DEXTROSE SOLUTION FORMULA A: 12.25; 11; 3.65 SOLUTION INTRAVENOUS at 06:40

## 2022-10-10 RX ADMIN — IOPAMIDOL 75 ML: 755 INJECTION, SOLUTION INTRAVENOUS at 17:23

## 2022-10-10 RX ADMIN — HEPARIN SODIUM 1000 UNITS: 1000 INJECTION INTRAVENOUS; SUBCUTANEOUS at 09:30

## 2022-10-10 RX ADMIN — VERAPAMIL HYDROCHLORIDE 240 MG: 240 TABLET, FILM COATED, EXTENDED RELEASE ORAL at 10:07

## 2022-10-10 RX ADMIN — DEXAMETHASONE 40 MG: 4 TABLET ORAL at 10:07

## 2022-10-10 RX ADMIN — Medication 10 ML: at 20:16

## 2022-10-10 RX ADMIN — POLYETHYLENE GLYCOL 3350 17 G: 17 POWDER, FOR SOLUTION ORAL at 21:17

## 2022-10-10 RX ADMIN — CALCIUM GLUCONATE 4000 MG: 98 INJECTION, SOLUTION INTRAVENOUS at 06:40

## 2022-10-10 RX ADMIN — CYANOCOBALAMIN TAB 1000 MCG 500 MCG: 1000 TAB at 10:07

## 2022-10-10 RX ADMIN — DIATRIZOATE MEGLUMINE AND DIATRIZOATE SODIUM 20 ML: 660; 100 LIQUID ORAL; RECTAL at 15:02

## 2022-10-10 RX ADMIN — DIPHENHYDRAMINE HYDROCHLORIDE 12.5 MG: 25 TABLET ORAL at 21:17

## 2022-10-10 RX ADMIN — PANTOPRAZOLE SODIUM 40 MG: 40 TABLET, DELAYED RELEASE ORAL at 06:07

## 2022-10-10 NOTE — PROGRESS NOTES
Hospital Medicine Progress Note     Date:  10/10/2022    PCP: Chloé Bobby MD (Tel: [phone])    Date of Admission: 10/7/2022        Subjective  No acute complaints. Objective  Physical exam:  Vitals: /80   Pulse 65   Temp 97.6 °F (36.4 °C)   Resp 16   Ht 5' 10.5\" (1.791 m)   Wt 287 lb 12.8 oz (130.5 kg)   SpO2 97%   BMI 40.71 kg/m²   Gen: Not in distress. Alert. Head: Normocephalic. Atraumatic. Eyes: EOMI. Good acuity. ENT: Oral mucosa moist  Neck: No JVD. No obvious thyromegaly. CVS: Nml S1S2, no MRG, RRR  Pulmomary: Clear bilaterally. No crackles. No wheezes. Gastrointestinal: Soft, NT/ND. Positive bowel sounds. Musculoskeletal: No edema. Warm  Neuro: No focal deficit. Moves extremity spontaneously. Psychiatry: Appropriate affect. Not agitated. Skin: Warm, dry with normal turgor. No rash      24HR INTAKE/OUTPUT:    Intake/Output Summary (Last 24 hours) at 10/10/2022 1553  Last data filed at 10/10/2022 0946  Gross per 24 hour   Intake 4487 ml   Output 4340 ml   Net 147 ml       I/O last 3 completed shifts: In: 46 [P. O.:960]  Out: 4364   I/O this shift:   In: 4487   Out: 4340       Meds:    Vibegron  75 mg Oral Daily    sertraline  100 mg Oral Daily    sodium chloride flush  5-40 mL IntraVENous 2 times per day    dexamethasone  40 mg Oral Daily    pantoprazole  40 mg Oral QAM AC    vitamin B-12  500 mcg Oral Daily    verapamil  240 mg Oral Daily       Infusions:    citrate dextrose 1,000 mL/hr at 10/10/22 0640    sodium chloride      sodium chloride           PRN Meds: diatrizoate meglumine-sodium, iopamidol, citrate dextrose, diphenhydrAMINE, sodium chloride flush, calcium gluconate, heparin (porcine), melatonin, sodium chloride flush, sodium chloride, ondansetron **OR** ondansetron, polyethylene glycol, acetaminophen **OR** acetaminophen, sodium chloride    Labs/imaging:  CBC:   Recent Labs     10/08/22  0655 10/08/22  1531 10/09/22  0442 10/10/22  0435   WBC 14.5*  -- 21.7* 21.7*   HGB 10.0* 9.6* 10.4* 9.3*   PLT 22*  --  32* 24*           BMP:    Recent Labs     10/08/22  0655 10/09/22  0442 10/10/22  0435    142 139   K 4.0 4.1 3.9    105 105   CO2 19* 26 25   BUN 16 21* 19   CREATININE 0.7* 0.7* 0.7*   GLUCOSE 136* 114* 118*           Hepatic:   Recent Labs     10/08/22  0655 10/09/22  0442 10/10/22  0435   AST 38* 24 20   ALT 23 18 19   BILITOT 1.7* 0.7 0.8   ALKPHOS 80 71 68         Troponin: No results for input(s): TROPONINI in the last 72 hours. BNP: No results for input(s): BNP in the last 72 hours. INR:   Recent Labs     10/09/22  0442 10/10/22  0435   INR 1.15* 1.15*             Reviewed imaging and reports noted      Assessment:  Principal Problem: Thrombocytopenia ([facility] [address]  Active Problems: Thrombocytopenic ([facility] [address]  Resolved Problems:    * No resolved hospital problems. *        Plan: Thrombotic thrombocytopenic purpura  -Appreciate oncology, pulmonology and nephrology recommendations  -starting on plasmapheresis on 10/8/22 QD for 5 doses  - cont steroids per Hematology  -No platelet transfusion secondary to possibility of forming blood clots      Essential hypertension  -BP stable, CPM, CTM      Anemia  -Secondary to above, continue to monitor and transfuse for hemoglobin less than 8      Anxiety and depression  -Continue Zoloft      Diet: ADULT DIET;  Regular    Activity: up as tolerated  Prophylaxis: SCD    Code status: Full Code     ----------        Raj Ceballos MD  -------------------------------  Bimal hospitalist

## 2022-10-10 NOTE — PROGRESS NOTES
Shift assessment completed ( See Flow sheets for details). Pt alert and oriented X4. Able to follow commands. Vital signs stable. Denies any pain or discomfort. Currently getting bedside plasmapheresis. All safety measures stays active. Will continue to monitor.

## 2022-10-10 NOTE — PROGRESS NOTES
Oncology and Hematology Care   Progress Note      10/10/2022 9:06 AM        Name: Chata Grier . Admitted: 10/7/2022    SUBJECTIVE:  He is feeling okay this morning, denies any bleeding. No chest pain or shortness of breath. Reviewed interval ancillary notes    Current Medications  citrate dextrose (ACD Formula A) 0.73-2.45-2.2 GM/100ML solution, Continuous PRN  diphenhydrAMINE (BENADRYL) tablet 12.5 mg, Nightly PRN  sodium chloride flush 0.9 % injection 10 mL, PRN  calcium gluconate 4,000 mg in dextrose 5 % 100 mL IVPB, PRN  heparin (porcine) injection 1,000 Units, PRN  melatonin tablet 6 mg, Nightly PRN  sertraline (ZOLOFT) tablet 100 mg, Daily  sodium chloride flush 0.9 % injection 5-40 mL, 2 times per day  sodium chloride flush 0.9 % injection 5-40 mL, PRN  0.9 % sodium chloride infusion, PRN  ondansetron (ZOFRAN-ODT) disintegrating tablet 4 mg, Q8H PRN   Or  ondansetron (ZOFRAN) injection 4 mg, Q6H PRN  polyethylene glycol (GLYCOLAX) packet 17 g, Daily PRN  acetaminophen (TYLENOL) tablet 650 mg, Q6H PRN   Or  acetaminophen (TYLENOL) suppository 650 mg, Q6H PRN  dexamethasone (DECADRON) tablet 40 mg, Daily  pantoprazole (PROTONIX) tablet 40 mg, QAM AC  trospium (SANCTURA) tablet 20 mg, BID AC  0.9 % sodium chloride infusion, PRN  vitamin B-12 (CYANOCOBALAMIN) tablet 500 mcg, Daily  verapamil (CALAN SR) extended release tablet 240 mg, Daily        Objective:  /80   Pulse 65   Temp 97.6 °F (36.4 °C)   Resp 16   Ht 5' 10.5\" (1.791 m)   Wt 287 lb 12.8 oz (130.5 kg)   SpO2 97%   BMI 40.71 kg/m²     Intake/Output Summary (Last 24 hours) at 10/10/2022 1052  Last data filed at 10/10/2022 0946  Gross per 24 hour   Intake 4967 ml   Output 4340 ml   Net 627 ml      Wt Readings from Last 3 Encounters:   10/10/22 287 lb 12.8 oz (130.5 kg)   10/06/22 282 lb (127.9 kg)   09/07/22 278 lb (126.1 kg)       General appearance:  Appears comfortable  Eyes: Sclera clear.  Pupils equal.  ENT: Moist oral mucosa. Trachea midline, no adenopathy. Cardiovascular: Regular rhythm, normal S1, S2. No murmur. No edema in lower extremities  Respiratory: Not using accessory muscles. Good inspiratory effort. Clear to auscultation bilaterally, no wheeze or crackles. GI: Abdomen soft, no tenderness, not distended  Musculoskeletal: No cyanosis in digits, neck supple  Neurology: CN 2-12 grossly intact. No speech or motor deficits  Psych: Normal affect. Alert and oriented in time, place and person  Skin: Warm, dry, normal turgor. Scattered ecchymosis. Labs and Tests:  CBC:   Recent Labs     10/08/22  0655 10/08/22  1531 10/09/22  0442 10/10/22  0435   WBC 14.5*  --  21.7* 21.7*   HGB 10.0* 9.6* 10.4* 9.3*   PLT 22*  --  32* 24*     BMP:    Recent Labs     10/08/22  0655 10/09/22  0442 10/10/22  0435    142 139   K 4.0 4.1 3.9    105 105   CO2 19* 26 25   BUN 16 21* 19   CREATININE 0.7* 0.7* 0.7*   GLUCOSE 136* 114* 118*     Hepatic:   Recent Labs     10/08/22  0655 10/09/22  0442 10/10/22  0435   AST 38* 24 20   ALT 23 18 19   BILITOT 1.7* 0.7 0.8   ALKPHOS 80 71 68       ASSESSMENT AND PLAN    Principal Problem: Thrombocytopenia ([facility] [address]  Active Problems: Thrombocytopenic ([address]  Resolved Problems:    * No resolved hospital problems. *      Suspected TTP  -Anemia and thrombocytopenia  -Schistocytes on smear  -Undetectable haptoglobin and elevated LDH  -Nicole negative  -Above clinical picture highly suspicious for TTP  -ZAMAN TS 13 level very low at 16 normal greater than 65  -Already on Dexamethasone 40mg daily  -Nephrology on board with plans to do plasmapheresis for 5 total days  -Daily PT/INR, PTT, Fibrinogen  -Transfuse Cryoprecipitate if Fibrinogen below 100 after completion of that day's pheresis  -Day 2/5 planned plasmapheresis today      LORENZA Ritchie CNP  Oncology Hematology [address] [facility].  [phone]    Events noted. Overall patient is doing well. Undergoing plasmapheresis.   Will order ADAMTS 13 inhibitor panel. CT chest abdomen and pelvis did not show any obvious evidence of malignancy. Platelets 24 today. LDH decreased to 588  Continue plasmapheresis. Completed 4 days of high-dose dexamethasone  We will start prednisone 120 mg p.o. daily on 10/11/2022. Might need rituximab if there is suboptimal response to plasmapheresis and steroids. Check homocystine level, MMA, DEMI, dsDNA, RA.   Monitor CBC, LDH    Tayo Hernandez MD

## 2022-10-10 NOTE — CARE COORDINATION
Discharge Planning:      CM/SW has reviewed this patient's medical history in detail and updated the computerized patient record. Patient independent prior to admission. There are no needs identified at this time. If something specific is identified, please notify Discharge Planner. Readmission Risk Score: 10    PCP: Vineet WILBURN [facility]    Notes:  Ind PTA    Electronically signed by Chandu Fitzgerald RN on 10/10/2022 at 8:18 AM

## 2022-10-10 NOTE — FLOWSHEET NOTE
TPE ( Therapeutic Plasma Exchange)  1  Volume of TPV exchange, with 100 % replacement. Use  FFP:  ( total : 3720 ml)   Number of Treatment : 0.2 of 05 total ( Started on 10/08, QD)    Pt tolerated well with TPE,   Fluid balance: + 147 ml ( including ACD-A, Ca gluconate, saline rinse blood back to pt)    Initial setting:  AC 2.9;  inlet 44.1;  plasma removal 31.8 , Replacement 27.5, ratio 15 ,      Final values:   ml;  inlet 6377 ml ;   plasma removal 4338 ml , Replacement  3725 ml;  duration 145 minutes  Started time: 3545  End time: 0936    Medication:    ACD-A ( per protocol via machine) to pt: 33 ml total during whole TPE  Ca: gluconate : 4 gm IVPB with whole TPE, started @ 8473      Next TPE scheduled on 10/10     Placed TPE flow sheets and blood transfusion downtime papers  in the chart for EMR scan    10/10/22 0628 10/10/22 0937 10/10/22 0946   Vital Signs   /80 132/80  --    Temp 97.8 °F (36.6 °C) 97.6 °F (36.4 °C)  --    Heart Rate 61 65  --    Resp 16 16  --    SpO2 97 %  --   --    Run Parameters   Blood flow rate (ml/min) 44 ml/min  --   --    Blood Wm Temp 98.6 °F (37 °C)  --   --    AC flow/volume 2.9/6  --   --    Inlet flow/volume 44.1/121  --   --    Plasma flow/volume =31.8/34  --   --    Replace flow/volume 27.5/6  --   --    ACD-A ratio/rpm 15  --   --    Final Values   Apheresis Intake(ml)  --   --  4487 ml   Apheresis Output(ml)  --   --  4340 ml   Net Balance  --   --  147   AC-AC Bag  --   --  33   Eff. Ratio  --   --  1.0

## 2022-10-10 NOTE — PROGRESS NOTES
MD Morgan Mclean MD Gregory Carmel, MD                  Office: [phone]                      Fax: [phone]             [facility]                   NEPHROLOGY INPATIENT PROGRESS NOTE:     PATIENT NAME: Amy Mccrary  : 1980  MRN: [id number]      RECOMMENDATIONS:   - 5 Therapeutic Plasma Exchanges, started 10-8-22  -TPE #2 10-10,      -Next on Tuesday  - 1 plasma volume exchange. - Replacement fluids w/ FFP, not 5% albumin  - monitor S. fibrinogen level, LDH, CBC, PT/INR, iCalcium, Mag, Phos -> fibrinogen level dropping but still above normal, monitor level  - ACD-A for a/c protocol.  - Calcium replacement protocol.  - requested dialysis nurse - discussed w/ them,   - ok to go to HD unit, out of ICU - discussed w/ ICU charge , nurse     -Dexamethasone 40 mg daily,   -IV Ig - as per hematologist     -Lennox Pucker placement on 10-8-2022  - by Dr Lilly Mcneil - appreciated his assistance   -avoid platelet transfusion  - hold Lisinopril, HCTZ, can use hydralazine PRN for SBP >160s  - at higher risk for decompensation, needing closer monitoring. D/C plan from renal stand point:  - fup 5 TPE - then later this week       D/W pt, team, hematologist, intensivist, hospitalist, nurse, ICU, TPE nurse   Have discussed with patient family also      IMPRESSION:       Admitted on:  10/7/2022 10:36 AM   For:  Thrombocytopenia ([address] [D69.6]  Thrombocytopenic ([address] [D69.6]  No diagnosis found. Suspected TTP  As (+) : MAHA on PBS w/ schistocytes, elevated LDH, low hapto, w/ anemia + thrombocytopenia  ADAMTS 13 activity-very low*  No renal failure        Other major problems: Management per primary and other consulting teams.     HTN - was on  Lisinopril, HCTZ   YOHANNES  Obesity high 30s   Hospital Problems             Last Modified POA    * (Principal) Thrombocytopenia ([address] 10/7/2022 Yes    Thrombocytopenic ([address] 10/7/2022 Yes     : other supportive care :   - Check daily renal function panel with electrolytes-phosphorus  - Strict monitoring of I/Os, daily weight  - non-Renal feeds/diet  - Current medications reviewed. Please refer to the orders. High Complexity. Multiple complex problems. Discussed with patient and treatment team-    Time spent > 30 (~35) minutes. Thank you for allowing me to participate in this patient's care. Please do not hesitate to contact me anytime. We will follow along with you. Adrian Andujar MD,  [facility] [address] [place]: [phone] or [employer]  Fax: [phone]        =======================================================================================   =======================================================================================  Subjective / interval history:   TPE x1 started 10-8  Tolerated well so far, no new complaints  Patient was seen comfortably sitting up in the bed,   Reported no active complaints, . Past medical, Surgical, Social, Family medical history reviewed by me. MEDICATIONS: reviewed by me. Medications Prior to Admission:  No current facility-administered medications on file prior to encounter.      Current Outpatient Medications on File Prior to Encounter   Medication Sig Dispense Refill    omeprazole (PRILOSEC) 20 MG delayed release capsule 1 daily 90 capsule 3    sildenafil (VIAGRA) 50 MG tablet Take 1 tablet by mouth as needed for Erectile Dysfunction 27 tablet 3    lisinopril (PRINIVIL;ZESTRIL) 40 MG tablet Take 1 tablet by mouth daily 90 tablet 3    verapamil (CALAN SR) 240 MG extended release tablet Take 1 tablet by mouth nightly 90 tablet 3    hydroCHLOROthiazide (HYDRODIURIL) 25 MG tablet Take 1 tablet by mouth daily 90 tablet 3    sertraline (ZOLOFT) 100 MG tablet 1 TABLET BY MOUTH DAILY 90 tablet 3    Vibegron (GEMTESA) 75 MG TABS Take 75 mg by mouth daily OVERACTIVE BLADDER      calcium carbonate (TUMS) 500 MG chewable tablet Take 6 tablets by mouth daily as needed for Heartburn           Current Facility-Administered Medications:     citrate dextrose (ACD Formula A) 0.73-2.45-2.2 GM/100ML solution, , IntraCATHeter, Continuous PRN, Sean Castro MD, Last Rate: 1,000 mL/hr at 10/10/22 0640, New Bag at 10/10/22 0640    diphenhydrAMINE (BENADRYL) tablet 12.5 mg, 12.5 mg, Oral, Nightly PRN, Yohana Griggs, DO, 12.5 mg at 10/09/22 2235    sodium chloride flush 0.9 % injection 10 mL, 10 mL, IntraCATHeter, PRN, Sean Castro MD    calcium gluconate 4,000 mg in dextrose 5 % 100 mL IVPB, 4,000 mg, IntraVENous, PRN, Sean Castro MD, Last Rate: 25 mL/hr at 10/10/22 0640, 4,000 mg at 10/10/22 0640    heparin (porcine) injection 1,000 Units, 1,000 Units, IntraVENous, PRN, Lexi Gupta MD, 1,000 Units at 10/10/22 0930    melatonin tablet 6 mg, 6 mg, Oral, Nightly PRN, Steffi Griggs, DO, 6 mg at 10/08/22 2128    sertraline (ZOLOFT) tablet 100 mg, 100 mg, Oral, Daily, Holden Russell MD, 100 mg at 10/09/22 1021    sodium chloride flush 0.9 % injection 5-40 mL, 5-40 mL, IntraVENous, 2 times per day, Holden Russell MD, 10 mL at 10/09/22 2012    sodium chloride flush 0.9 % injection 5-40 mL, 5-40 mL, IntraVENous, PRN, Holden Russell MD    0.9 % sodium chloride infusion, , IntraVENous, PRN, Holden Russell MD    ondansetron (ZOFRAN-ODT) disintegrating tablet 4 mg, 4 mg, Oral, Q8H PRN **OR** ondansetron (ZOFRAN) injection 4 mg, 4 mg, IntraVENous, Q6H PRN, Holden Russell MD    polyethylene glycol (GLYCOLAX) packet 17 g, 17 g, Oral, Daily PRN, Holden Russell MD    acetaminophen (TYLENOL) tablet 650 mg, 650 mg, Oral, Q6H PRN, 650 mg at 10/09/22 1432 **OR** acetaminophen (TYLENOL) suppository 650 mg, 650 mg, Rectal, Q6H PRN, Holden Russell MD    dexamethasone (DECADRON) tablet 40 mg, 40 mg, Oral, Daily, Alejo Morfin MD, 40 mg at 10/09/22 1023    pantoprazole (PROTONIX) tablet 40 mg, 40 mg, Oral, QAM AC, Alejo Morfin MD, 40 mg at 10/10/22 2581    trospium (SANCTURA) tablet 20 mg, 20 mg, Oral, BID AC, Romana Lefevre, MD, 20 mg at 10/10/22 0607    0.9 % sodium chloride infusion, , IntraVENous, PRN, Shashank Griggs DO    vitamin B-12 (CYANOCOBALAMIN) tablet 500 mcg, 500 mcg, Oral, Daily, Romana Lefevre, MD, 500 mcg at 10/09/22 1021    verapamil (CALAN SR) extended release tablet 240 mg, 240 mg, Oral, Daily, Romana Lefevre, MD, 240 mg at 10/09/22 1022        REVIEW OF SYSTEMS:  As mentioned in chief complaint and HPI , Subjective       =======================================================================================     PHYSICAL EXAM:  Recent vital signs and recent I/Os reviewed by me.      Wt Readings from Last 3 Encounters:   10/10/22 287 lb 12.8 oz (130.5 kg)   10/06/22 282 lb (127.9 kg)   09/07/22 278 lb (126.1 kg)     BP Readings from Last 3 Encounters:   10/10/22 126/84   10/06/22 134/86   09/07/22 122/82     Patient Vitals for the past 24 hrs:   BP Temp Temp src Pulse Resp SpO2 Weight   10/10/22 0815 126/84 97.4 °F (36.3 °C) -- 60 16 -- --   10/10/22 0800 123/75 97.6 °F (36.4 °C) -- 58 16 -- --   10/10/22 0745 121/74 97.6 °F (36.4 °C) -- 78 16 -- --   10/10/22 0730 123/81 97.6 °F (36.4 °C) -- 75 16 -- --   10/10/22 0715 120/79 97.6 °F (36.4 °C) -- 61 16 -- --   10/10/22 0700 133/78 97.6 °F (36.4 °C) -- 63 16 -- --   10/10/22 0628 134/80 97.8 °F (36.6 °C) Temporal 61 16 97 % --   10/10/22 0600 -- -- -- -- -- -- 287 lb 12.8 oz (130.5 kg)   10/10/22 0431 131/75 97.3 °F (36.3 °C) Temporal 71 16 100 % --   10/09/22 2000 128/76 97.6 °F (36.4 °C) Temporal 71 15 99 % --   10/09/22 1900 -- -- -- 68 -- -- --   10/09/22 1800 -- -- -- 92 -- -- --   10/09/22 1700 -- -- -- 81 -- -- --   10/09/22 1600 -- -- -- 84 -- -- --   10/09/22 1500 -- -- -- 73 -- -- --   10/09/22 1400 -- -- -- 84 -- -- --   10/09/22 1300 -- -- -- 72 -- -- --   10/09/22 1200 125/86 97.8 °F (36.6 °C) Temporal 72 16 99 % --   10/09/22 1100 -- -- -- 83 -- -- --   10/09/22 1025 129/79 98 °F (36.7 °C) Temporal 68 16 97 % --   10/09/22 1000 -- -- -- 87 -- -- --         Intake/Output Summary (Last 24 hours) at 10/10/2022 0922  Last data filed at 10/9/2022 1300  Gross per 24 hour   Intake 480 ml   Output --   Net 480 ml             Physical Exam  Vitals reviewed. Constitutional:       General: He is not in acute distress. Appearance: Normal appearance. HENT:      Head: Normocephalic and atraumatic. Right Ear: External ear normal.      Left Ear: External ear normal.      Nose: Nose normal.      Mouth/Throat:      Mouth: Mucous membranes are moist. Mucous membranes are not dry. Eyes:      General: No scleral icterus. Conjunctiva/sclera: Conjunctivae normal.   Neck:      Vascular: No JVD. Cardiovascular:      Rate and Rhythm: Normal rate and regular rhythm. Heart sounds: S1 normal and S2 normal.   Pulmonary:      Effort: Pulmonary effort is normal. No respiratory distress. Breath sounds: Normal breath sounds. Abdominal:      General: Bowel sounds are normal. There is no distension. Musculoskeletal:         General: No swelling or deformity. Cervical back: Normal range of motion and neck supple. Skin:     General: Skin is dry. Coloration: Skin is not jaundiced. Findings: Rash present. No lesion. Neurological:      Mental Status: He is alert and oriented to person, place, and time. Mental status is at baseline. Psychiatric:         Mood and Affect: Mood normal.         Behavior: Behavior normal.        =======================================================================================     DATA:  Diagnostic tests reviewed by me for today's visit:   (AS NEEDED FOR MY EVALUATION AND MANAGEMENT).        Recent Labs     10/07/22  1156 10/08/22  0009 10/08/22  0655 10/08/22  1531 10/09/22  0442 10/10/22  0435   WBC 7.3 7.8 14.5*  --  21.7* 21.7*   HCT 32.5* 28.1* 28.9* 28.3* 30.5* 27.6*   PLT 19* 21* 22*  --  32* 24*       Iron Saturation: No components found for: PERCENTFE  FERRITIN:    Lab Results   Component Value Date/Time    FERRITIN 1,025.0 10/07/2022 11:56 AM     IRON:    Lab Results   Component Value Date/Time    IRON 344 10/07/2022 11:56 AM     TIBC:    Lab Results   Component Value Date/Time    TIBC 399 10/07/2022 11:56 AM       Recent Labs     10/07/22  1156 10/08/22  0655 10/09/22  0442 10/10/22  0435    137 142 139   K 4.1 4.0 4.1 3.9    104 105 105   CO2 25 19* 26 25   BUN 15 16 21* 19   CREATININE 0.7* 0.7* 0.7* 0.7*       Recent Labs     10/07/22  1156 10/08/22  0655 10/09/22  0442 10/10/22  0435   CALCIUM 9.7 9.6 9.3 8.9   MG  --   --  1.90 2.10   PHOS  --   --  4.7 5.0*       No results for input(s): PH, PCO2, PO2 in the last 72 hours.     Invalid input(s): Suellen Hum    ABG:  No results found for: PH, PCO2, PO2, HCO3, BE, THGB, TCO2, O2SAT  VBG:    Lab Results   Component Value Date/Time    PHVEN 7.393 10/10/2022 04:35 AM       LDH:    Lab Results   Component Value Date/Time     10/10/2022 04:35 AM     Uric Acid:  No results found for: LABURIC, URICACID    PT/INR:    Lab Results   Component Value Date/Time    PROTIME 14.6 10/10/2022 04:35 AM    INR 1.15 10/10/2022 04:35 AM     Warfarin PT/INR:  No components found for: PTPATWAR, PTINRWAR  PTT:    Lab Results   Component Value Date/Time    APTT 33.3 10/10/2022 04:35 AM   [APTT}  Last 3 Troponin:  No results found for: TROPONINI    U/A:    Lab Results   Component Value Date/Time    NITRITE neg 10/08/2020 03:20 PM    COLORU Yellow 10/07/2022 11:55 AM    PROTEINU Negative 10/07/2022 11:55 AM    PHUR 7.0 10/07/2022 11:56 AM    WBCUA 0 10/07/2022 11:55 AM    RBCUA 12 10/07/2022 11:55 AM    BACTERIA None Seen 10/07/2022 11:55 AM    CLARITYU Clear 10/07/2022 11:55 AM    SPECGRAV 1.010 10/07/2022 11:55 AM    LEUKOCYTESUR Negative 10/07/2022 11:55 AM    UROBILINOGEN 2.0 10/07/2022 11:55 AM    BILIRUBINUR Negative 10/07/2022 11:55 AM    BILIRUBINUR neg 10/08/2020 03:20 PM    BLOODU MODERATE 10/07/2022 11:55 AM    GLUCOSEU Negative 10/07/2022 11:55 AM     Microalbumen/Creatinine ratio:  No components found for: RUCREAT  24 Hour Urine for Protein:  No components found for: RAWUPRO, UHRS3, YNWY96HR, UTV3  24 Hour Urine for Creatinine Clearance:  No components found for: CREAT4, UHRS10, UTV10  Urine Toxicology:  No components found for: Haydee Buggy, IBENZO, ICOCAINE, IMARTHC, IOPIATES, IPHENCYC    HgBA1c:    Lab Results   Component Value Date/Time    LABA1C 5.6 08/06/2022 08:15 AM     RPR:  No results found for: RPR  HIV:  No results found for: HIV  DEMI:    Lab Results   Component Value Date/Time    DEMI Negative 10/07/2022 11:56 AM     RF:  No results found for: RF  DSDNA:  No components found for: DNA  AMYLASE:  No results found for: AMYLASE  LIPASE:  No results found for: LIPASE  Fibrinogen Level:  No components found for: FIB       BELOW MENTIONED RADIOLOGY STUDY RESULTS BY ME (AS NEEDED FOR MY EVALUATION AND MANAGEMENT). No results found.

## 2022-10-11 PROBLEM — E66.01 MORBID OBESITY WITH BMI OF 40.0-44.9, ADULT (HCC): Status: ACTIVE | Noted: 2022-10-11

## 2022-10-11 PROBLEM — M31.19 TTP (THROMBOTIC THROMBOCYTOPENIC PURPURA) (HCC): Status: ACTIVE | Noted: 2022-10-11

## 2022-10-11 LAB
A/G RATIO: 1.7 (ref 1.1–2.2)
ALBUMIN SERPL-MCNC: 3.7 G/DL (ref 3.4–5)
ALP BLD-CCNC: 59 U/L (ref 40–129)
ALT SERPL-CCNC: 25 U/L (ref 10–40)
ANION GAP SERPL CALCULATED.3IONS-SCNC: 9 MMOL/L (ref 3–16)
ANISOCYTOSIS: ABNORMAL
ANTI-DSDNA IGG: <1 IU/ML (ref 0–9)
ANTI-NUCLEAR ANTIBODY (ANA): NEGATIVE
APTT: 22.1 SEC (ref 23–34.3)
AST SERPL-CCNC: 18 U/L (ref 15–37)
BASOPHILS ABSOLUTE: 0 K/UL (ref 0–0.2)
BASOPHILS RELATIVE PERCENT: 0 %
BILIRUB SERPL-MCNC: 0.5 MG/DL (ref 0–1)
BILIRUBIN DIRECT: <0.2 MG/DL (ref 0–0.3)
BILIRUBIN, INDIRECT: NORMAL MG/DL (ref 0–1)
BLOOD BANK DISPENSE STATUS: NORMAL
BLOOD BANK PRODUCT CODE: NORMAL
BPU ID: NORMAL
BUN BLDV-MCNC: 22 MG/DL (ref 7–20)
CALCIUM IONIZED: 1.11 MMOL/L (ref 1.12–1.32)
CALCIUM SERPL-MCNC: 8.5 MG/DL (ref 8.3–10.6)
CHLORIDE BLD-SCNC: 105 MMOL/L (ref 99–110)
CO2: 26 MMOL/L (ref 21–32)
CREAT SERPL-MCNC: 0.7 MG/DL (ref 0.9–1.3)
DESCRIPTION BLOOD BANK: NORMAL
EOSINOPHILS ABSOLUTE: 0 K/UL (ref 0–0.6)
EOSINOPHILS RELATIVE PERCENT: 0 %
FIBRINOGEN: 249 MG/DL (ref 207–509)
GFR AFRICAN AMERICAN: >60
GFR NON-AFRICAN AMERICAN: >60
GLUCOSE BLD-MCNC: 110 MG/DL (ref 70–99)
HCT VFR BLD CALC: 27.1 % (ref 40.5–52.5)
HEMOGLOBIN: 9 G/DL (ref 13.5–17.5)
INR BLD: 1.21 (ref 0.87–1.14)
LACTATE DEHYDROGENASE: 396 U/L (ref 100–190)
LYMPHOCYTES ABSOLUTE: 2.2 K/UL (ref 1–5.1)
LYMPHOCYTES RELATIVE PERCENT: 11 %
MAGNESIUM: 2.2 MG/DL (ref 1.8–2.4)
MCH RBC QN AUTO: 32.4 PG (ref 26–34)
MCHC RBC AUTO-ENTMCNC: 33.1 G/DL (ref 31–36)
MCV RBC AUTO: 97.8 FL (ref 80–100)
MONOCYTES ABSOLUTE: 1.8 K/UL (ref 0–1.3)
MONOCYTES RELATIVE PERCENT: 9 %
NEUTROPHILS ABSOLUTE: 16.2 K/UL (ref 1.7–7.7)
NEUTROPHILS RELATIVE PERCENT: 80 %
PDW BLD-RTO: 16.7 % (ref 12.4–15.4)
PH VENOUS: 7.4 (ref 7.35–7.45)
PHOSPHORUS: 5.2 MG/DL (ref 2.5–4.9)
PLATELET # BLD: 70 K/UL (ref 135–450)
PLATELET SLIDE REVIEW: ABNORMAL
PMV BLD AUTO: 10.2 FL (ref 5–10.5)
POLYCHROMASIA: ABNORMAL
POTASSIUM SERPL-SCNC: 3.9 MMOL/L (ref 3.5–5.1)
PROTHROMBIN TIME: 15.2 SEC (ref 11.7–14.5)
RBC # BLD: 2.78 M/UL (ref 4.2–5.9)
SLIDE REVIEW: ABNORMAL
SODIUM BLD-SCNC: 140 MMOL/L (ref 136–145)
TOTAL PROTEIN: 5.9 G/DL (ref 6.4–8.2)
TOXIC GRANULATION: PRESENT
WBC # BLD: 20.3 K/UL (ref 4–11)

## 2022-10-11 PROCEDURE — 94760 N-INVAS EAR/PLS OXIMETRY 1: CPT

## 2022-10-11 PROCEDURE — 85610 PROTHROMBIN TIME: CPT

## 2022-10-11 PROCEDURE — 6370000000 HC RX 637 (ALT 250 FOR IP): Performed by: FAMILY MEDICINE

## 2022-10-11 PROCEDURE — P9059 PLASMA, FRZ BETWEEN 8-24HOUR: HCPCS

## 2022-10-11 PROCEDURE — 83615 LACTATE (LD) (LDH) ENZYME: CPT

## 2022-10-11 PROCEDURE — 36592 COLLECT BLOOD FROM PICC: CPT

## 2022-10-11 PROCEDURE — 6370000000 HC RX 637 (ALT 250 FOR IP): Performed by: INTERNAL MEDICINE

## 2022-10-11 PROCEDURE — 84100 ASSAY OF PHOSPHORUS: CPT

## 2022-10-11 PROCEDURE — 82248 BILIRUBIN DIRECT: CPT

## 2022-10-11 PROCEDURE — 82330 ASSAY OF CALCIUM: CPT

## 2022-10-11 PROCEDURE — 2000000000 HC ICU R&B

## 2022-10-11 PROCEDURE — 2580000003 HC RX 258: Performed by: INTERNAL MEDICINE

## 2022-10-11 PROCEDURE — 6360000002 HC RX W HCPCS: Performed by: INTERNAL MEDICINE

## 2022-10-11 PROCEDURE — P9017 PLASMA 1 DONOR FRZ W/IN 8 HR: HCPCS

## 2022-10-11 PROCEDURE — 2500000003 HC RX 250 WO HCPCS: Performed by: INTERNAL MEDICINE

## 2022-10-11 PROCEDURE — 80053 COMPREHEN METABOLIC PANEL: CPT

## 2022-10-11 PROCEDURE — 36430 TRANSFUSION BLD/BLD COMPNT: CPT

## 2022-10-11 PROCEDURE — 2580000003 HC RX 258: Performed by: FAMILY MEDICINE

## 2022-10-11 PROCEDURE — 36514 APHERESIS PLASMA: CPT

## 2022-10-11 PROCEDURE — 85730 THROMBOPLASTIN TIME PARTIAL: CPT

## 2022-10-11 PROCEDURE — 85384 FIBRINOGEN ACTIVITY: CPT

## 2022-10-11 PROCEDURE — 85025 COMPLETE CBC W/AUTO DIFF WBC: CPT

## 2022-10-11 PROCEDURE — 83735 ASSAY OF MAGNESIUM: CPT

## 2022-10-11 RX ORDER — PREDNISONE 20 MG/1
120 TABLET ORAL DAILY
Status: DISCONTINUED | OUTPATIENT
Start: 2022-10-11 | End: 2022-10-23

## 2022-10-11 RX ADMIN — Medication 10 ML: at 20:45

## 2022-10-11 RX ADMIN — PREDNISONE 120 MG: 20 TABLET ORAL at 10:54

## 2022-10-11 RX ADMIN — PANTOPRAZOLE SODIUM 40 MG: 40 TABLET, DELAYED RELEASE ORAL at 11:06

## 2022-10-11 RX ADMIN — CALCIUM GLUCONATE 4000 MG: 98 INJECTION, SOLUTION INTRAVENOUS at 07:23

## 2022-10-11 RX ADMIN — DIPHENHYDRAMINE HYDROCHLORIDE 12.5 MG: 25 TABLET ORAL at 22:32

## 2022-10-11 RX ADMIN — SERTRALINE 100 MG: 50 TABLET, FILM COATED ORAL at 10:55

## 2022-10-11 RX ADMIN — ANTICOAGULANT CITRATE DEXTROSE SOLUTION FORMULA A: 12.25; 11; 3.65 SOLUTION INTRAVENOUS at 07:23

## 2022-10-11 RX ADMIN — Medication 10 ML: at 11:02

## 2022-10-11 RX ADMIN — CYANOCOBALAMIN TAB 1000 MCG 500 MCG: 1000 TAB at 10:55

## 2022-10-11 RX ADMIN — HEPARIN SODIUM 1000 UNITS: 1000 INJECTION INTRAVENOUS; SUBCUTANEOUS at 09:58

## 2022-10-11 RX ADMIN — VERAPAMIL HYDROCHLORIDE 240 MG: 240 TABLET, FILM COATED, EXTENDED RELEASE ORAL at 10:55

## 2022-10-11 NOTE — FLOWSHEET NOTE
TPE ( Therapeutic Plasma Exchange)  1  Volume of TPV exchange, with 100 % replacement. Use  FFP: 3720 ml    Number of Treatment : 03 of 05 total ( Started on 10/08, QD)    Pt tolerated well with TPE,   Fluid balance: + 165 ml ( including ACD-A, Ca gluconate, saline rinse blood back to pt)    Initial setting:  AC 3.0;  inlet 44.3;  plasma removal 31.2 , Replacement 27.8, ratio 15 ,      Final values:   ml;  inlet 6438 ml ;   plasma removal 4366 ml , Replacement  3750 ml;  duration 141 minutes  Started time: 0733  End time: 1005    Medication:  ACD-A ( per protocol via machine) to pt: 37 ml total during whole TPE  Ca: gluconate : 4 gm IVPB with whole TPE, started @ 7382    Next TPE scheduled on 10/12 am    Placed TPE flow sheets and blood transfusion downtime papers  in the chart for EMR scan    10/11/22 0704 10/11/22 1005   Vital Signs   /86 131/84   Temp 97.8 °F (36.6 °C) 97 °F (36.1 °C)   Heart Rate 58 60   Resp 16 16   SpO2 100 %  --    Run Parameters   Blood flow rate (ml/min) 44.3 ml/min  --    Blood Wm Temp 98.6 °F (37 °C)  --    AC flow/volume 3.0/8  --    Inlet flow/volume 44.3/116  --    Plasma flow/volume 31.2/48  --    Replace flow/volume 27.8/21  --    ACD-A ratio/rpm 15  --    Post-Treatment Checklist   Post-Treatment Checklist  --  Rinseback Completed;Post-tx CVC care/protocol;Equipment externally cleaned/disinfected;Biohazard wastes disposed per hospital protocol; Side rails up/call light within reach   Final Values   Apheresis Intake(ml)  --  4526 ml   Apheresis Output(ml)  --  4361 ml   Net Balance  --  165   AC-AC Bag  --  37   Eff. Ratio  --  1.0

## 2022-10-11 NOTE — PROGRESS NOTES
[facility]   Progress Note      10/11/2022 8:58 AM        Name: Mohan Carbajal . Admitted: 10/7/2022    SUBJECTIVE:  He is feeling OK, he offers no complaints, no external bleeding, plasmapheresis continues.       Reviewed interval ancillary notes    Current Medications  predniSONE (DELTASONE) tablet 120 mg, Daily  Vibegron TABS 75 mg (Patient Supplied), Daily  diatrizoate meglumine-sodium (GASTROGRAFIN) 66-10 % solution 20 mL, ONCE PRN  citrate dextrose (ACD Formula A) 0.73-2.45-2.2 GM/100ML solution, Continuous PRN  diphenhydrAMINE (BENADRYL) tablet 12.5 mg, Nightly PRN  sodium chloride flush 0.9 % injection 10 mL, PRN  calcium gluconate 4,000 mg in dextrose 5 % 100 mL IVPB, PRN  heparin (porcine) injection 1,000 Units, PRN  melatonin tablet 6 mg, Nightly PRN  sertraline (ZOLOFT) tablet 100 mg, Daily  sodium chloride flush 0.9 % injection 5-40 mL, 2 times per day  sodium chloride flush 0.9 % injection 5-40 mL, PRN  0.9 % sodium chloride infusion, PRN  ondansetron (ZOFRAN-ODT) disintegrating tablet 4 mg, Q8H PRN   Or  ondansetron (ZOFRAN) injection 4 mg, Q6H PRN  polyethylene glycol (GLYCOLAX) packet 17 g, Daily PRN  acetaminophen (TYLENOL) tablet 650 mg, Q6H PRN   Or  acetaminophen (TYLENOL) suppository 650 mg, Q6H PRN  pantoprazole (PROTONIX) tablet 40 mg, QAM AC  0.9 % sodium chloride infusion, PRN  vitamin B-12 (CYANOCOBALAMIN) tablet 500 mcg, Daily  verapamil (CALAN SR) extended release tablet 240 mg, Daily        Objective:  /86   Pulse 58   Temp 97.8 °F (36.6 °C)   Resp 16   Ht 5' 10.5\" (1.791 m)   Wt 284 lb 6.3 oz (129 kg)   SpO2 100%   BMI 40.23 kg/m²     Intake/Output Summary (Last 24 hours) at 10/11/2022 0858  Last data filed at 10/10/2022 1400  Gross per 24 hour   Intake 5967 ml   Output 4340 ml   Net 1627 ml      Wt Readings from Last 3 Encounters:   10/11/22 284 lb 6.3 oz (129 kg)   10/06/22 282 lb (127.9 kg)   09/07/22 278 lb (126.1 kg)       General appearance:  Appears comfortable  Eyes: Sclera clear. Pupils equal.  ENT: Moist oral mucosa. Trachea midline, no adenopathy. Cardiovascular: Regular rhythm, normal S1, S2. No murmur. No edema in lower extremities  Respiratory: Not using accessory muscles. Good inspiratory effort. Clear to auscultation bilaterally, no wheeze or crackles. GI: Abdomen soft, no tenderness, not distended  Musculoskeletal: No cyanosis in digits, neck supple  Neurology: CN 2-12 grossly intact. No speech or motor deficits  Psych: Normal affect. Alert and oriented in time, place and person  Skin: Warm, dry, normal turgor    Labs and Tests:  CBC:   Recent Labs     10/09/22  0442 10/10/22  0435 10/11/22  0630   WBC 21.7* 21.7* 20.3*   HGB 10.4* 9.3* 9.0*   PLT 32* 24* 70*     BMP:    Recent Labs     10/09/22  0442 10/10/22  0435 10/11/22  0630    139 140   K 4.1 3.9 3.9    105 105   CO2 26 25 26   BUN 21* 19 22*   CREATININE 0.7* 0.7* 0.7*   GLUCOSE 114* 118* 110*     Hepatic:   Recent Labs     10/09/22  0442 10/10/22  0435 10/11/22  0630   AST 24 20 18   ALT 18 19 25   BILITOT 0.7 0.8 0.5   ALKPHOS 71 68 59       ASSESSMENT AND PLAN    Principal Problem: Thrombocytopenia ([address]  Active Problems: Thrombocytopenic ([address]  Resolved Problems:    * No resolved hospital problems.  *      Suspected TTP  -Anemia and thrombocytopenia  -Schistocytes on smear  -Undetectable haptoglobin and elevated LDH  -Nicole negative  -Above clinical picture highly suspicious for TTP  -ZAMAN TS 13 level very low at 16 normal greater than 65  -Already on Dexamethasone 40mg daily  -Nephrology on board with plans to do plasmapheresis for 5 total days  -Daily PT/INR, PTT, Fibrinogen  -Transfuse Cryoprecipitate if Fibrinogen below 100 after completion of that day's pheresis  -Day 3/5 planned plasmapheresis today  - LDH decreased to 396  - plts 70k today  - homocysteine level is WNL  - RA factor - WNL  - MMA, DEMI, dsDNA pending    Ajay Palumbo, CNP  [facility]       Platelets improved to 70 today and tolerating phoresis well. Continue steroids.      Екатерина Luna MD  [facility]

## 2022-10-11 NOTE — PLAN OF CARE
Problem: ABCDS Injury Assessment  Goal: Absence of physical injury  Outcome: Progressing     Problem: Discharge Planning  Goal: Discharge to home or other facility with appropriate resources  Outcome: Progressing  Flowsheets (Taken 10/11/2022 0800)  Discharge to home or other facility with appropriate resources:   Identify barriers to discharge with patient and caregiver   Arrange for needed discharge resources and transportation as appropriate   Identify discharge learning needs (meds, wound care, etc)   Arrange for interpreters to assist at discharge as needed   Refer to discharge planning if patient needs post-hospital services based on physician order or complex needs related to functional status, cognitive ability or social support system   Pt. is ambulating in room and tolerating treatments well.

## 2022-10-12 LAB
A/G RATIO: 1.9 (ref 1.1–2.2)
ALBUMIN SERPL-MCNC: 3.6 G/DL (ref 3.4–5)
ALP BLD-CCNC: 51 U/L (ref 40–129)
ALT SERPL-CCNC: 23 U/L (ref 10–40)
ANION GAP SERPL CALCULATED.3IONS-SCNC: 6 MMOL/L (ref 3–16)
ANISOCYTOSIS: ABNORMAL
APTT: 22.5 SEC (ref 23–34.3)
AST SERPL-CCNC: 15 U/L (ref 15–37)
BANDED NEUTROPHILS RELATIVE PERCENT: 2 % (ref 0–7)
BASOPHILS ABSOLUTE: 0 K/UL (ref 0–0.2)
BASOPHILS RELATIVE PERCENT: 0 %
BILIRUB SERPL-MCNC: 0.3 MG/DL (ref 0–1)
BILIRUBIN DIRECT: <0.2 MG/DL (ref 0–0.3)
BILIRUBIN, INDIRECT: NORMAL MG/DL (ref 0–1)
BLOOD BANK DISPENSE STATUS: NORMAL
BLOOD BANK PRODUCT CODE: NORMAL
BPU ID: NORMAL
BUN BLDV-MCNC: 24 MG/DL (ref 7–20)
CALCIUM IONIZED: 1.14 MMOL/L (ref 1.12–1.32)
CALCIUM SERPL-MCNC: 8.3 MG/DL (ref 8.3–10.6)
CHLORIDE BLD-SCNC: 104 MMOL/L (ref 99–110)
CO2: 28 MMOL/L (ref 21–32)
CREAT SERPL-MCNC: 0.8 MG/DL (ref 0.9–1.3)
DESCRIPTION BLOOD BANK: NORMAL
EOSINOPHILS ABSOLUTE: 0 K/UL (ref 0–0.6)
EOSINOPHILS RELATIVE PERCENT: 0 %
FIBRINOGEN: 233 MG/DL (ref 207–509)
GFR AFRICAN AMERICAN: >60
GFR NON-AFRICAN AMERICAN: >60
GLUCOSE BLD-MCNC: 92 MG/DL (ref 70–99)
HCT VFR BLD CALC: 27.4 % (ref 40.5–52.5)
HEMOGLOBIN: 9 G/DL (ref 13.5–17.5)
HYPOCHROMIA: ABNORMAL
INR BLD: 1.12 (ref 0.87–1.14)
LACTATE DEHYDROGENASE: 267 U/L (ref 100–190)
LYMPHOCYTES ABSOLUTE: 4 K/UL (ref 1–5.1)
LYMPHOCYTES RELATIVE PERCENT: 22 %
MACROCYTES: ABNORMAL
MAGNESIUM: 2.1 MG/DL (ref 1.8–2.4)
MCH RBC QN AUTO: 32.7 PG (ref 26–34)
MCHC RBC AUTO-ENTMCNC: 32.9 G/DL (ref 31–36)
MCV RBC AUTO: 99.3 FL (ref 80–100)
METAMYELOCYTES RELATIVE PERCENT: 3 %
MONOCYTES ABSOLUTE: 1.1 K/UL (ref 0–1.3)
MONOCYTES RELATIVE PERCENT: 6 %
MYELOCYTE PERCENT: 2 %
NEUTROPHILS ABSOLUTE: 13 K/UL (ref 1.7–7.7)
NEUTROPHILS RELATIVE PERCENT: 65 %
NUCLEATED RED BLOOD CELLS: 1 /100 WBC
PDW BLD-RTO: 17.2 % (ref 12.4–15.4)
PH VENOUS: 7.4 (ref 7.35–7.45)
PHOSPHORUS: 4.4 MG/DL (ref 2.5–4.9)
PLATELET # BLD: 97 K/UL (ref 135–450)
PLATELET SLIDE REVIEW: ABNORMAL
PMV BLD AUTO: 9.7 FL (ref 5–10.5)
POLYCHROMASIA: ABNORMAL
POTASSIUM SERPL-SCNC: 4.2 MMOL/L (ref 3.5–5.1)
PROTHROMBIN TIME: 14.3 SEC (ref 11.7–14.5)
RBC # BLD: 2.76 M/UL (ref 4.2–5.9)
SLIDE REVIEW: ABNORMAL
SODIUM BLD-SCNC: 138 MMOL/L (ref 136–145)
TOTAL PROTEIN: 5.5 G/DL (ref 6.4–8.2)
WBC # BLD: 18 K/UL (ref 4–11)

## 2022-10-12 PROCEDURE — 85610 PROTHROMBIN TIME: CPT

## 2022-10-12 PROCEDURE — 6370000000 HC RX 637 (ALT 250 FOR IP): Performed by: INTERNAL MEDICINE

## 2022-10-12 PROCEDURE — 80053 COMPREHEN METABOLIC PANEL: CPT

## 2022-10-12 PROCEDURE — 36430 TRANSFUSION BLD/BLD COMPNT: CPT

## 2022-10-12 PROCEDURE — 85730 THROMBOPLASTIN TIME PARTIAL: CPT

## 2022-10-12 PROCEDURE — 6360000002 HC RX W HCPCS: Performed by: INTERNAL MEDICINE

## 2022-10-12 PROCEDURE — 85384 FIBRINOGEN ACTIVITY: CPT

## 2022-10-12 PROCEDURE — 2500000003 HC RX 250 WO HCPCS: Performed by: INTERNAL MEDICINE

## 2022-10-12 PROCEDURE — 2580000003 HC RX 258: Performed by: INTERNAL MEDICINE

## 2022-10-12 PROCEDURE — 85025 COMPLETE CBC W/AUTO DIFF WBC: CPT

## 2022-10-12 PROCEDURE — 6370000000 HC RX 637 (ALT 250 FOR IP): Performed by: FAMILY MEDICINE

## 2022-10-12 PROCEDURE — 82330 ASSAY OF CALCIUM: CPT

## 2022-10-12 PROCEDURE — 36514 APHERESIS PLASMA: CPT

## 2022-10-12 PROCEDURE — 82248 BILIRUBIN DIRECT: CPT

## 2022-10-12 PROCEDURE — 83615 LACTATE (LD) (LDH) ENZYME: CPT

## 2022-10-12 PROCEDURE — P9059 PLASMA, FRZ BETWEEN 8-24HOUR: HCPCS

## 2022-10-12 PROCEDURE — 36592 COLLECT BLOOD FROM PICC: CPT

## 2022-10-12 PROCEDURE — 2580000003 HC RX 258: Performed by: FAMILY MEDICINE

## 2022-10-12 PROCEDURE — 2000000000 HC ICU R&B

## 2022-10-12 PROCEDURE — 94760 N-INVAS EAR/PLS OXIMETRY 1: CPT

## 2022-10-12 PROCEDURE — P9017 PLASMA 1 DONOR FRZ W/IN 8 HR: HCPCS

## 2022-10-12 PROCEDURE — 83735 ASSAY OF MAGNESIUM: CPT

## 2022-10-12 PROCEDURE — 84100 ASSAY OF PHOSPHORUS: CPT

## 2022-10-12 RX ADMIN — ANTICOAGULANT CITRATE DEXTROSE SOLUTION FORMULA A: 12.25; 11; 3.65 SOLUTION INTRAVENOUS at 06:44

## 2022-10-12 RX ADMIN — VERAPAMIL HYDROCHLORIDE 240 MG: 240 TABLET, FILM COATED, EXTENDED RELEASE ORAL at 10:21

## 2022-10-12 RX ADMIN — Medication 10 ML: at 20:35

## 2022-10-12 RX ADMIN — CALCIUM GLUCONATE 4000 MG: 98 INJECTION, SOLUTION INTRAVENOUS at 06:44

## 2022-10-12 RX ADMIN — ACETAMINOPHEN 650 MG: 325 TABLET ORAL at 13:47

## 2022-10-12 RX ADMIN — CYANOCOBALAMIN TAB 1000 MCG 500 MCG: 1000 TAB at 09:57

## 2022-10-12 RX ADMIN — PREDNISONE 120 MG: 20 TABLET ORAL at 09:58

## 2022-10-12 RX ADMIN — SERTRALINE 100 MG: 50 TABLET, FILM COATED ORAL at 09:56

## 2022-10-12 RX ADMIN — DIPHENHYDRAMINE HYDROCHLORIDE 12.5 MG: 25 TABLET ORAL at 20:34

## 2022-10-12 RX ADMIN — HEPARIN SODIUM 1000 UNITS: 1000 INJECTION INTRAVENOUS; SUBCUTANEOUS at 09:45

## 2022-10-12 RX ADMIN — Medication 10 ML: at 10:19

## 2022-10-12 RX ADMIN — PANTOPRAZOLE SODIUM 40 MG: 40 TABLET, DELAYED RELEASE ORAL at 09:57

## 2022-10-12 ASSESSMENT — PAIN DESCRIPTION - DESCRIPTORS: DESCRIPTORS: ACHING

## 2022-10-12 ASSESSMENT — PAIN DESCRIPTION - LOCATION: LOCATION: HEAD

## 2022-10-12 ASSESSMENT — PAIN SCALES - GENERAL
PAINLEVEL_OUTOF10: 2
PAINLEVEL_OUTOF10: 0

## 2022-10-12 NOTE — PROGRESS NOTES
09/07/22 278 lb (126.1 kg)       General appearance:  Appears comfortable  Eyes: Sclera clear. Pupils equal.  ENT: Moist oral mucosa. Trachea midline, no adenopathy. Cardiovascular: Regular rhythm, normal S1, S2. No murmur. Respiratory: Not using accessory muscles. Good inspiratory effort. Clear to auscultation bilaterally, no wheeze or crackles. GI: Abdomen soft, no tenderness, not distended  Musculoskeletal: No cyanosis in digits, neck supple  Neurology: CN 2-12 grossly intact. No speech or motor deficits  Psych: Normal affect. Alert and oriented in time, place and person  Skin: Warm, dry, normal turgor. Scattered ecchymosis. Labs and Tests:  CBC:   Recent Labs     10/10/22  0435 10/11/22  0630 10/12/22  0500   WBC 21.7* 20.3* 18.0*   HGB 9.3* 9.0* 9.0*   PLT 24* 70* 97*     BMP:    Recent Labs     10/10/22  0435 10/11/22  0630 10/12/22  0500    140 138   K 3.9 3.9 4.2    105 104   CO2 25 26 28   BUN 19 22* 24*   CREATININE 0.7* 0.7* 0.8*   GLUCOSE 118* 110* 92     Hepatic:   Recent Labs     10/10/22  0435 10/11/22  0630 10/12/22  0500   AST 20 18 15   ALT 19 25 23   BILITOT 0.8 0.5 0.3   ALKPHOS 68 59 51       ASSESSMENT AND PLAN    Principal Problem:    TTP (thrombotic thrombocytopenic purpura) ([facility])  Active Problems: Thrombocytopenia [address]    Thrombocytopenic ([facility]    Morbid obesity with BMI of 40.0-44.9, adult [facility])    Essential hypertension  Resolved Problems:    * No resolved hospital problems.  *      TTP  -Anemia and thrombocytopenia  -Schistocytes on smear  -Undetectable haptoglobin and elevated LDH  -Nicole negative  -Above clinical picture highly suspicious for TTP  -ZAMAN TS 13 level very low at 16 normal greater than 65  -Already on Dexamethasone 40mg daily  -Nephrology on board with plans to do plasmapheresis for 5 total days  -Daily PT/INR, PTT, Fibrinogen  -Transfuse Cryoprecipitate if Fibrinogen below 100 after completion of that day's pheresis  -Day 4/5 planned plasmapheresis today  - LDH decreased to 267  - plts improved to 97k today  - homocysteine level is WNL  - RA factor - WNL  - DEMI negative   - MMA and dsDNA pending      LORENZA Salgado CNP  [facility], [facility].  [phone]    Patient was seen and examined. No thrush. Ecchymosis are fading  Is doing much better. LDH has decreased to 267. Platelets 97  Plan would be to complete 5 days of plasmapheresis. After that he will receive alternate day plasmapheresis for 2-3 treatments. Continue prednisone 120 mg p.o. daily.     Bob Galvan MD

## 2022-10-12 NOTE — ACP (ADVANCE CARE PLANNING)
Advanced Care Planning Note. Purpose of Encounter: Advanced care planning in light of TTP  Parties In Attendance: Patient, wife  Decisional Capacity: Yes  Subjective: Patient denies CP and SOB  Objective: Cr 0.7  Goals of Care Determination: Patient wants full support (CPR, vent, surgery, HD, trach, PEG)  Plan:  Onc and Renal consults. Plasmapheresis and steroids  Code Status: Full code   Time spent on Advanced care Plannin minutes  Advanced Care Planning Documents: Completed advanced directives on chart, wife is the POA.     Cindy Bansal MD  10/11/2022 8:36 PM

## 2022-10-12 NOTE — FLOWSHEET NOTE
TPE ( Therapeutic Plasma Exchange)  1  Volume of TPV exchange, with 100 % replacement. Use  FFP:  ( total : 3720 ml)   Number of Treatment : 04 of 05 total ( Started on 10/08, QD, skip Sunday)    Pt tolerated well with TPE,   Fluid balance: + 149 ml ( including ACD-A, Ca gluconate, saline rinse blood back to pt)    Initial setting:  AC 2.9;  inlet 44.1;  plasma removal 31.4 , Replacement 27.7, ratio 15 ,      Final values:   ml;  inlet 6436 ml ;   plasma removal 4346 ml , Replacement  3729 ml;  duration 131 minutes  Started time: 0702  End time: 0915    Medication:  ACD-A ( per protocol via machine) to pt: 39 ml total during whole TPE  Ca: gluconate : 4 gm IVPB with whole TPE, started @ 6690    Next TPE scheduled on 10/13 am    Placed TPE flow sheets and blood transfusion downtime papers  in the chart for EMR scan    10/12/22 0636 10/12/22 0932   Vital Signs   BP (!) 135/91 (!) 132/99   Temp 97.5 °F (36.4 °C) 97.4 °F (36.3 °C)   Heart Rate 56 56   Resp 18 16   SpO2 96 %  --    Run Parameters   Blood flow rate (ml/min) 44.1 ml/min  --    Blood Wm Temp 98.6 °F (37 °C)  --    AC flow/volume 2.9/8  --    Inlet flow/volume 44.1/129  --    Plasma flow/volume 31.4/59  --    Replace flow/volume 27.7/30  --    ACD-A ratio/rpm 15  --    Post-Treatment Checklist   Post-Treatment Checklist  --  Rinseback Completed;Post-tx CVC care/protocol;Equipment externally cleaned/disinfected;Biohazard wastes disposed per hospital protocol; Side rails up/call light within reach   Final Values   Apheresis Intake(ml)  --  4497 ml   Apheresis Output(ml)  --  4348 ml   Net Balance  --  149   AC-AC Bag  --  39   Eff. Ratio  --  1

## 2022-10-12 NOTE — PROGRESS NOTES
Hospital Medicine Progress Note     Date:  10/11/2022    PCP: Debbie Rubio MD (Tel: [phone])    Date of Admission: 10/7/2022        Subjective  Patient denies CP, SOB, HA or abdominal pain. No rectal bleeding. No gum bleeding. Objective  Physical exam:  Vitals: BP (!) 143/74   Pulse 66   Temp 97.8 °F (36.6 °C) (Temporal)   Resp 16   Ht 5' 10.5\" (1.791 m)   Wt 284 lb 6.3 oz (129 kg)   SpO2 98%   BMI 40.23 kg/m²   Gen: Not in distress. Alert. Head: Normocephalic. Atraumatic. Eyes: EOMI. Good acuity. ENT: Oral mucosa moist  Neck: No JVD. No obvious thyromegaly. CVS: Nml S1S2, no MRG, RRR  Pulmomary: Clear bilaterally. No crackles. No wheezes. Gastrointestinal: Soft, NT/ND. Positive bowel sounds. Musculoskeletal: No edema. Warm  Neuro: No focal deficit. Moves extremity spontaneously. Psychiatry: Appropriate affect. Not agitated. Skin: Warm, dry with normal turgor. No rash. Ecchymoses noted      24HR INTAKE/OUTPUT:    Intake/Output Summary (Last 24 hours) at 10/11/2022 2033  Last data filed at 10/11/2022 1319  Gross per 24 hour   Intake 4836 ml   Output 4361 ml   Net 475 ml       I/O last 3 completed shifts: In: 55736 [P.O.:1780; I.V.:10]  Out: 8701   No intake/output data recorded.       Meds:    predniSONE  120 mg Oral Daily    Vibegron  75 mg Oral Daily    sertraline  100 mg Oral Daily    sodium chloride flush  5-40 mL IntraVENous 2 times per day    pantoprazole  40 mg Oral QAM AC    vitamin B-12  500 mcg Oral Daily    verapamil  240 mg Oral Daily       Infusions:    citrate dextrose 1,000 mL/hr at 10/11/22 0723    sodium chloride      sodium chloride           PRN Meds: diatrizoate meglumine-sodium, citrate dextrose, diphenhydrAMINE, sodium chloride flush, calcium gluconate, heparin (porcine), melatonin, sodium chloride flush, sodium chloride, ondansetron **OR** ondansetron, polyethylene glycol, acetaminophen **OR** acetaminophen, sodium chloride    Labs/imaging:  CBC: Recent Labs     10/09/22  0442 10/10/22  0435 10/11/22  0630   WBC 21.7* 21.7* 20.3*   HGB 10.4* 9.3* 9.0*   PLT 32* 24* 70*           BMP:    Recent Labs     10/09/22  0442 10/10/22  0435 10/11/22  0630    139 140   K 4.1 3.9 3.9    105 105   CO2 26 25 26   BUN 21* 19 22*   CREATININE 0.7* 0.7* 0.7*   GLUCOSE 114* 118* 110*           Hepatic:   Recent Labs     10/09/22  0442 10/10/22  0435 10/11/22  0630   AST 24 20 18   ALT 18 19 25   BILITOT 0.7 0.8 0.5   ALKPHOS 71 68 59         Troponin: No results for input(s): TROPONINI in the last 72 hours. BNP: No results for input(s): BNP in the last 72 hours. INR:   Recent Labs     10/09/22  0442 10/10/22  0435 10/11/22  0630   INR 1.15* 1.15* 1.21*             Reviewed imaging and reports noted      Assessment:  Principal Problem:    TTP (thrombotic thrombocytopenic purpura) (HCC)  Active Problems: Thrombocytopenia ([facility]    Thrombocytopenic ([facility]    Morbid obesity with BMI of 40.0-44.9, adult [facility])    Essential hypertension  Resolved Problems:    * No resolved hospital problems. *        Plan: Thrombotic thrombocytopenic purpura  -Appreciate oncology, pulmonology and nephrology recommendations  - Plasmapheresis Day 3/5  - cont Prednisone 120 mg daily per Hematology  -No platelet transfusion secondary to possibility of forming blood clots      Essential hypertension  -Continue Verapamil      Anemia  -Secondary to above, continue to monitor and transfuse for hemoglobin less than 8      Anxiety and depression  -Continue Zoloft      Diet: ADULT DIET; Regular    Activity: up as tolerated  Prophylaxis: SCD    Code status: Full Code     ----------    Discussed with patient and CM. D/W wife. Home Thursday after 5th PLEX.       Moon Ayala MD  -------------------------------  Rounding hospitalist

## 2022-10-12 NOTE — CONSENT
Informed Consent for Blood Component Transfusion Note    I have discussed with the patient the rationale for blood component transfusion; its benefits in treating or preventing fatigue, organ damage, or death; and its risk which includes mild transfusion reactions, rare risk of blood borne infection, or more serious but rare reactions. I have discussed the alternatives to transfusion, including the risk and consequences of not receiving transfusion. The patient had an opportunity to ask questions and had agreed to proceed with transfusion of blood components.     Electronically signed by Falguni Joseph MD on 10/12/22 at 11:51 AM EDT

## 2022-10-12 NOTE — PLAN OF CARE
Problem: ABCDS Injury Assessment  Goal: Absence of physical injury  10/12/2022 1530 by Jinny Lewis  Outcome: Progressing  10/12/2022 1529 by Jinny Lewis  Outcome: Progressing     Problem: Discharge Planning  Goal: Discharge to home or other facility with appropriate resources  10/12/2022 1530 by Jinny Lewis  Outcome: Progressing  10/12/2022 1529 by Jinny Lewis  Outcome: Progressing  Flowsheets (Taken 10/12/2022 0800)  Discharge to home or other facility with appropriate resources:   Identify barriers to discharge with patient and caregiver   Arrange for needed discharge resources and transportation as appropriate   Arrange for interpreters to assist at discharge as needed   Identify discharge learning needs (meds, wound care, etc)   Refer to discharge planning if patient needs post-hospital services based on physician order or complex needs related to functional status, cognitive ability or social support system     Problem: Pain  Goal: Verbalizes/displays adequate comfort level or baseline comfort level  10/12/2022 1530 by Jinny Lewis  Outcome: Progressing  10/12/2022 1529 by Jinny Lewis  Outcome: Progressing

## 2022-10-12 NOTE — PROGRESS NOTES
thrombocytopenic purpura) ([facility] 10/11/2022 Yes    Thrombocytopenia ([facility] 10/11/2022 Yes    Thrombocytopenic ([facility] 10/7/2022 Yes    Morbid obesity with BMI of 40.0-44.9, adult ([facility] 10/11/2022 Yes    Essential hypertension (Chronic) 10/11/2022 Yes   : other supportive care :   - Check daily renal function panel with electrolytes-phosphorus  - Strict monitoring of I/Os, daily weight  - non-Renal feeds/diet  - Current medications reviewed. Please refer to the orders. High Complexity. Multiple complex problems. Discussed with patient and treatment team-  family , nurse   Time spent > 30 (~35) minutes. Thank you for allowing me to participate in this patient's care. Please do not hesitate to contact me anytime. We will follow along with you. Talita Noel MD,  [facility] [address] [place] [address] [place]: [phone] or Via [employer]  Fax: [phone]        =======================================================================================   =======================================================================================  Subjective / interval history:   TPE x1 started 10-8  #4 today after 5 treatments so far tolerating it well no new complaints    Patient was seen comfortably sitting up in the bed,   Walking around   Reported no active complaints, . Platelet counts responding    Past medical, Surgical, Social, Family medical history reviewed by me. MEDICATIONS: reviewed by me. Medications Prior to Admission:  No current facility-administered medications on file prior to encounter.      Current Outpatient Medications on File Prior to Encounter   Medication Sig Dispense Refill    omeprazole (PRILOSEC) 20 MG delayed release capsule 1 daily 90 capsule 3    sildenafil (VIAGRA) 50 MG tablet Take 1 tablet by mouth as needed for Erectile Dysfunction 27 tablet 3    lisinopril (PRINIVIL;ZESTRIL) 40 MG tablet Take 1 tablet by mouth daily 90 tablet 3    verapamil (CALAN SR) 240 MG extended release tablet Take 1 tablet by mouth nightly 90 tablet 3    hydroCHLOROthiazide (HYDRODIURIL) 25 MG tablet Take 1 tablet by mouth daily 90 tablet 3    sertraline (ZOLOFT) 100 MG tablet 1 TABLET BY MOUTH DAILY 90 tablet 3    Vibegron (GEMTESA) 75 MG TABS Take 75 mg by mouth daily OVERACTIVE BLADDER      calcium carbonate (TUMS) 500 MG chewable tablet Take 6 tablets by mouth daily as needed for Heartburn           Current Facility-Administered Medications:     predniSONE (DELTASONE) tablet 120 mg, 120 mg, Oral, Daily, Fabian Valenzuela MD, 120 mg at 10/12/22 5008    Vibegron TABS 75 mg (Patient Supplied), 75 mg, Oral, Daily, Jena Deleon MD, 75 mg at 10/12/22 1021    diatrizoate meglumine-sodium (GASTROGRAFIN) 66-10 % solution 20 mL, 20 mL, Oral, ONCE PRN, Fabian Valenzuela MD, 20 mL at 10/10/22 1502    citrate dextrose (ACD Formula A) 0.73-2.45-2.2 GM/100ML solution, , IntraCATHeter, Continuous PRN, Neela Montague MD, Stopped at 10/12/22 0918    diphenhydrAMINE (BENADRYL) tablet 12.5 mg, 12.5 mg, Oral, Nightly PRN, Yohana Griggs DO, 12.5 mg at 10/11/22 2232    sodium chloride flush 0.9 % injection 10 mL, 10 mL, IntraCATHeter, PRN, Neela Montague MD    calcium gluconate 4,000 mg in dextrose 5 % 100 mL IVPB, 4,000 mg, IntraVENous, PRN, Neela Montague MD, Stopped at 10/12/22 0918    heparin (porcine) injection 1,000 Units, 1,000 Units, IntraVENous, PRN, Luna Andrews MD, 1,000 Units at 10/12/22 0945    melatonin tablet 6 mg, 6 mg, Oral, Nightly PRN, Jude Griggs DO, 6 mg at 10/08/22 2128    sertraline (ZOLOFT) tablet 100 mg, 100 mg, Oral, Daily, Jena Deleon MD, 100 mg at 10/12/22 0956    sodium chloride flush 0.9 % injection 5-40 mL, 5-40 mL, IntraVENous, 2 times per day, Jena Deleon MD, 10 mL at 10/12/22 1019    sodium chloride flush 0.9 % injection 5-40 mL, 5-40 mL, IntraVENous, PRN, Jena Deleon MD    0.9 % sodium chloride infusion, , IntraVENous, PRN, Kyara Jalloh MD    ondansetron (ZOFRAN-ODT) disintegrating tablet 4 mg, 4 mg, Oral, Q8H PRN **OR** ondansetron (ZOFRAN) injection 4 mg, 4 mg, IntraVENous, Q6H PRN, Kyara Jalloh MD    polyethylene glycol (GLYCOLAX) packet 17 g, 17 g, Oral, Daily PRN, Kyara Jalloh MD, 17 g at 10/10/22 2117    acetaminophen (TYLENOL) tablet 650 mg, 650 mg, Oral, Q6H PRN, 650 mg at 10/09/22 1432 **OR** acetaminophen (TYLENOL) suppository 650 mg, 650 mg, Rectal, Q6H PRN, Kyara Jalloh MD    pantoprazole (PROTONIX) tablet 40 mg, 40 mg, Oral, QAM AC, Jagdeep Davenport MD, 40 mg at 10/12/22 0957    0.9 % sodium chloride infusion, , IntraVENous, PRN, Juan Griggs,     vitamin B-12 (CYANOCOBALAMIN) tablet 500 mcg, 500 mcg, Oral, Daily, Kyara Jalloh MD, 500 mcg at 10/12/22 0957    verapamil (CALAN SR) extended release tablet 240 mg, 240 mg, Oral, Daily, Kyara Jalloh MD, 240 mg at 10/12/22 1021        REVIEW OF SYSTEMS:  As mentioned in chief complaint and HPI , Subjective       =======================================================================================     PHYSICAL EXAM:  Recent vital signs and recent I/Os reviewed by me.      Wt Readings from Last 3 Encounters:   10/12/22 289 lb 3.9 oz (131.2 kg)   10/06/22 282 lb (127.9 kg)   09/07/22 278 lb (126.1 kg)     BP Readings from Last 3 Encounters:   10/12/22 (!) 132/99   10/06/22 134/86   09/07/22 122/82     Patient Vitals for the past 24 hrs:   BP Temp Temp src Pulse Resp SpO2 Weight   10/12/22 0932 (!) 132/99 97.4 °F (36.3 °C) Temporal 56 16 -- --   10/12/22 0915 (!) 132/99 97.6 °F (36.4 °C) Temporal 56 16 -- --   10/12/22 0900 130/85 97 °F (36.1 °C) Temporal 56 16 -- --   10/12/22 0703 118/88 97.6 °F (36.4 °C) Temporal 56 16 -- --   10/12/22 0636 (!) 135/91 97.5 °F (36.4 °C) Temporal 56 18 96 % --   10/12/22 0500 129/86 97.8 °F (36.6 °C) Temporal 57 18 99 % 289 lb 3.9 oz (131.2 kg)   10/11/22 2330 127/80 98 °F (36.7 °C) Temporal 54 16 96 % --   10/11/22 2045 (!) 144/78 97.5 °F (36.4 °C) Temporal 62 18 99 % --   10/11/22 1800 -- -- -- 66 -- -- --   10/11/22 1600 (!) 143/74 97.8 °F (36.6 °C) Temporal 75 16 98 % --   10/11/22 1200 (!) 153/88 97.8 °F (36.6 °C) Temporal 85 16 100 % --         Intake/Output Summary (Last 24 hours) at 10/12/2022 1051  Last data filed at 10/12/2022 0932  Gross per 24 hour   Intake 4807 ml   Output 4348 ml   Net 459 ml             Physical Exam  Vitals reviewed. Constitutional:       General: He is not in acute distress. Appearance: Normal appearance. HENT:      Head: Normocephalic and atraumatic. Right Ear: External ear normal.      Left Ear: External ear normal.      Nose: Nose normal.      Mouth/Throat:      Mouth: Mucous membranes are moist. Mucous membranes are not dry. Eyes:      General: No scleral icterus. Conjunctiva/sclera: Conjunctivae normal.   Neck:      Vascular: No JVD. Cardiovascular:      Rate and Rhythm: Normal rate and regular rhythm. Heart sounds: S1 normal and S2 normal.   Pulmonary:      Effort: Pulmonary effort is normal. No respiratory distress. Breath sounds: Normal breath sounds. Abdominal:      General: Bowel sounds are normal. There is no distension. Musculoskeletal:         General: No swelling or deformity. Cervical back: Normal range of motion and neck supple. Skin:     General: Skin is dry. Coloration: Skin is not jaundiced. Findings: Rash present. No lesion. Neurological:      Mental Status: He is alert and oriented to person, place, and time. Mental status is at baseline. Psychiatric:         Mood and Affect: Mood normal.         Behavior: Behavior normal.        =======================================================================================     DATA:  Diagnostic tests reviewed by me for today's visit:   (AS NEEDED FOR MY EVALUATION AND MANAGEMENT).        Recent Labs     10/10/22  0435 10/11/22  0630 10/12/22  0500   WBC 21.7* 20.3* 18.0*   HCT 27.6* 27.1* 27.4*   PLT 24* 70* 97*       Iron Saturation:  No components found for: PERCENTFE  FERRITIN:    Lab Results   Component Value Date/Time    FERRITIN 1,025.0 10/07/2022 11:56 AM     IRON:    Lab Results   Component Value Date/Time    IRON 344 10/07/2022 11:56 AM     TIBC:    Lab Results   Component Value Date/Time    TIBC 399 10/07/2022 11:56 AM       Recent Labs     10/10/22  0435 10/11/22  0630 10/12/22  0500    140 138   K 3.9 3.9 4.2    105 104   CO2 25 26 28   BUN 19 22* 24*   CREATININE 0.7* 0.7* 0.8*       Recent Labs     10/10/22  0435 10/11/22  0630 10/12/22  0500   CALCIUM 8.9 8.5 8.3   MG 2.10 2.20 2.10   PHOS 5.0* 5.2* 4.4       No results for input(s): PH, PCO2, PO2 in the last 72 hours.     Invalid input(s): [id number], INSPIREDO2    ABG:  No results found for: PH, PCO2, PO2, HCO3, BE, THGB, TCO2, O2SAT  VBG:    Lab Results   Component Value Date/Time    PHVEN 7.401 10/12/2022 05:00 AM       LDH:    Lab Results   Component Value Date/Time     10/12/2022 05:00 AM     Uric Acid:  No results found for: LABURIC, URICACID    PT/INR:    Lab Results   Component Value Date/Time    PROTIME 14.3 10/12/2022 05:00 AM    INR 1.12 10/12/2022 05:00 AM     Warfarin PT/INR:  No components found for: PTPATWAR, PTINRWAR  PTT:    Lab Results   Component Value Date/Time    APTT 22.5 10/12/2022 05:00 AM   [APTT}  Last 3 Troponin:  No results found for: TROPONINI    U/A:    Lab Results   Component Value Date/Time    NITRITE neg 10/08/2020 03:20 PM    COLORU Yellow 10/07/2022 11:55 AM    PROTEINU Negative 10/07/2022 11:55 AM    PHUR 7.0 10/07/2022 11:56 AM    WBCUA 0 10/07/2022 11:55 AM    RBCUA 12 10/07/2022 11:55 AM    BACTERIA None Seen 10/07/2022 11:55 AM    CLARITYU Clear 10/07/2022 11:55 AM    SPECGRAV 1.010 10/07/2022 11:55 AM    LEUKOCYTESUR Negative 10/07/2022 11:55 AM    UROBILINOGEN 2.0 10/07/2022 11:55 AM    BILIRUBINUR Negative 10/07/2022 11:55 AM BILIRUBINUR neg 10/08/2020 03:20 PM    BLOODU MODERATE 10/07/2022 11:55 AM    GLUCOSEU Negative 10/07/2022 11:55 AM     Microalbumen/Creatinine ratio:  No components found for: RUCREAT  24 Hour Urine for Protein:  No components found for: RAWUPRO, UHRS3, [id number], UTV3  24 Hour Urine for Creatinine Clearance:  No components found for: CREAT4, UHRS10, UTV10  Urine Toxicology:  No components found for: [employer], IBENZO, ICOCAINE, IMARTHC, IOPIATES, IPHENCYC    HgBA1c:    Lab Results   Component Value Date/Time    LABA1C 5.6 08/06/2022 08:15 AM     RPR:  No results found for: RPR  HIV:  No results found for: HIV  DEMI:    Lab Results   Component Value Date/Time    DEMI Negative 10/10/2022 02:35 PM     RF:    Lab Results   Component Value Date/Time    RF <10.0 10/10/2022 02:35 PM     DSDNA:  No components found for: DNA  AMYLASE:  No results found for: AMYLASE  LIPASE:  No results found for: LIPASE  Fibrinogen Level:  No components found for: FIB       BELOW MENTIONED RADIOLOGY STUDY RESULTS BY ME (AS NEEDED FOR MY EVALUATION AND MANAGEMENT). No results found.

## 2022-10-13 LAB
A/G RATIO: 1.5 (ref 1.1–2.2)
ACANTHOCYTES: ABNORMAL
ALBUMIN SERPL-MCNC: 3.6 G/DL (ref 3.4–5)
ALP BLD-CCNC: 64 U/L (ref 40–129)
ALT SERPL-CCNC: 21 U/L (ref 10–40)
ANION GAP SERPL CALCULATED.3IONS-SCNC: 6 MMOL/L (ref 3–16)
ANISOCYTOSIS: ABNORMAL
APTT: 23.6 SEC (ref 23–34.3)
AST SERPL-CCNC: 15 U/L (ref 15–37)
BANDED NEUTROPHILS RELATIVE PERCENT: 8 % (ref 0–7)
BASOPHILS ABSOLUTE: 0 K/UL (ref 0–0.2)
BASOPHILS RELATIVE PERCENT: 0 %
BILIRUB SERPL-MCNC: 0.4 MG/DL (ref 0–1)
BILIRUBIN DIRECT: <0.2 MG/DL (ref 0–0.3)
BILIRUBIN, INDIRECT: NORMAL MG/DL (ref 0–1)
BLOOD BANK DISPENSE STATUS: NORMAL
BLOOD BANK PRODUCT CODE: NORMAL
BPU ID: NORMAL
BUN BLDV-MCNC: 19 MG/DL (ref 7–20)
CALCIUM IONIZED: 1.13 MMOL/L (ref 1.12–1.32)
CALCIUM SERPL-MCNC: 8.7 MG/DL (ref 8.3–10.6)
CHLORIDE BLD-SCNC: 104 MMOL/L (ref 99–110)
CO2: 29 MMOL/L (ref 21–32)
CREAT SERPL-MCNC: 0.8 MG/DL (ref 0.9–1.3)
DESCRIPTION BLOOD BANK: NORMAL
EOSINOPHILS ABSOLUTE: 0 K/UL (ref 0–0.6)
EOSINOPHILS RELATIVE PERCENT: 0 %
FIBRINOGEN: 252 MG/DL (ref 207–509)
GFR AFRICAN AMERICAN: >60
GFR NON-AFRICAN AMERICAN: >60
GLUCOSE BLD-MCNC: 79 MG/DL (ref 70–99)
HCT VFR BLD CALC: 30.5 % (ref 40.5–52.5)
HEMOGLOBIN: 10.2 G/DL (ref 13.5–17.5)
HYPOCHROMIA: ABNORMAL
INR BLD: 1.17 (ref 0.87–1.14)
LACTATE DEHYDROGENASE: 257 U/L (ref 100–190)
LYMPHOCYTES ABSOLUTE: 2.5 K/UL (ref 1–5.1)
LYMPHOCYTES RELATIVE PERCENT: 18 %
MAGNESIUM: 2.3 MG/DL (ref 1.8–2.4)
MCH RBC QN AUTO: 33 PG (ref 26–34)
MCHC RBC AUTO-ENTMCNC: 33.4 G/DL (ref 31–36)
MCV RBC AUTO: 98.7 FL (ref 80–100)
MONOCYTES ABSOLUTE: 0.7 K/UL (ref 0–1.3)
MONOCYTES RELATIVE PERCENT: 5 %
NEUTROPHILS ABSOLUTE: 10.6 K/UL (ref 1.7–7.7)
NEUTROPHILS RELATIVE PERCENT: 69 %
PDW BLD-RTO: 17.8 % (ref 12.4–15.4)
PH VENOUS: 7.38 (ref 7.35–7.45)
PHOSPHORUS: 4.7 MG/DL (ref 2.5–4.9)
PLATELET # BLD: 127 K/UL (ref 135–450)
PLATELET SLIDE REVIEW: ADEQUATE
PMV BLD AUTO: 8.6 FL (ref 5–10.5)
POLYCHROMASIA: ABNORMAL
POTASSIUM SERPL-SCNC: 4.3 MMOL/L (ref 3.5–5.1)
PROTHROMBIN TIME: 14.8 SEC (ref 11.7–14.5)
RBC # BLD: 3.09 M/UL (ref 4.2–5.9)
SLIDE REVIEW: ABNORMAL
SODIUM BLD-SCNC: 139 MMOL/L (ref 136–145)
TEAR DROP CELLS: ABNORMAL
TOTAL PROTEIN: 6 G/DL (ref 6.4–8.2)
WBC # BLD: 13.8 K/UL (ref 4–11)

## 2022-10-13 PROCEDURE — 6370000000 HC RX 637 (ALT 250 FOR IP): Performed by: FAMILY MEDICINE

## 2022-10-13 PROCEDURE — 36430 TRANSFUSION BLD/BLD COMPNT: CPT

## 2022-10-13 PROCEDURE — 6360000002 HC RX W HCPCS: Performed by: INTERNAL MEDICINE

## 2022-10-13 PROCEDURE — 82248 BILIRUBIN DIRECT: CPT

## 2022-10-13 PROCEDURE — 2580000003 HC RX 258: Performed by: FAMILY MEDICINE

## 2022-10-13 PROCEDURE — 6370000000 HC RX 637 (ALT 250 FOR IP): Performed by: INTERNAL MEDICINE

## 2022-10-13 PROCEDURE — 85730 THROMBOPLASTIN TIME PARTIAL: CPT

## 2022-10-13 PROCEDURE — 36514 APHERESIS PLASMA: CPT

## 2022-10-13 PROCEDURE — 85025 COMPLETE CBC W/AUTO DIFF WBC: CPT

## 2022-10-13 PROCEDURE — 85610 PROTHROMBIN TIME: CPT

## 2022-10-13 PROCEDURE — 84100 ASSAY OF PHOSPHORUS: CPT

## 2022-10-13 PROCEDURE — 83735 ASSAY OF MAGNESIUM: CPT

## 2022-10-13 PROCEDURE — 85384 FIBRINOGEN ACTIVITY: CPT

## 2022-10-13 PROCEDURE — 83615 LACTATE (LD) (LDH) ENZYME: CPT

## 2022-10-13 PROCEDURE — 80053 COMPREHEN METABOLIC PANEL: CPT

## 2022-10-13 PROCEDURE — 36592 COLLECT BLOOD FROM PICC: CPT

## 2022-10-13 PROCEDURE — 94760 N-INVAS EAR/PLS OXIMETRY 1: CPT

## 2022-10-13 PROCEDURE — 2000000000 HC ICU R&B

## 2022-10-13 PROCEDURE — 2580000003 HC RX 258: Performed by: INTERNAL MEDICINE

## 2022-10-13 PROCEDURE — 82330 ASSAY OF CALCIUM: CPT

## 2022-10-13 PROCEDURE — 2500000003 HC RX 250 WO HCPCS: Performed by: INTERNAL MEDICINE

## 2022-10-13 RX ORDER — FOLIC ACID 1 MG/1
1 TABLET ORAL DAILY
Status: DISCONTINUED | OUTPATIENT
Start: 2022-10-14 | End: 2022-10-26 | Stop reason: HOSPADM

## 2022-10-13 RX ADMIN — ANTICOAGULANT CITRATE DEXTROSE SOLUTION FORMULA A: 12.25; 11; 3.65 SOLUTION INTRAVENOUS at 06:50

## 2022-10-13 RX ADMIN — PREDNISONE 120 MG: 20 TABLET ORAL at 11:06

## 2022-10-13 RX ADMIN — HEPARIN SODIUM 1000 UNITS: 1000 INJECTION INTRAVENOUS; SUBCUTANEOUS at 09:30

## 2022-10-13 RX ADMIN — SERTRALINE 100 MG: 50 TABLET, FILM COATED ORAL at 11:06

## 2022-10-13 RX ADMIN — Medication 10 ML: at 19:36

## 2022-10-13 RX ADMIN — CYANOCOBALAMIN TAB 1000 MCG 500 MCG: 1000 TAB at 11:07

## 2022-10-13 RX ADMIN — VERAPAMIL HYDROCHLORIDE 240 MG: 240 TABLET, FILM COATED, EXTENDED RELEASE ORAL at 11:07

## 2022-10-13 RX ADMIN — Medication 10 ML: at 11:09

## 2022-10-13 RX ADMIN — DIPHENHYDRAMINE HYDROCHLORIDE 12.5 MG: 25 TABLET ORAL at 20:56

## 2022-10-13 RX ADMIN — CALCIUM GLUCONATE 4000 MG: 98 INJECTION, SOLUTION INTRAVENOUS at 06:50

## 2022-10-13 RX ADMIN — PANTOPRAZOLE SODIUM 40 MG: 40 TABLET, DELAYED RELEASE ORAL at 11:07

## 2022-10-13 ASSESSMENT — PAIN SCALES - GENERAL
PAINLEVEL_OUTOF10: 0
PAINLEVEL_OUTOF10: 0

## 2022-10-13 NOTE — PROGRESS NOTES
MD Jeffrey Moore MD Thomasine Pel, MD                  Office: [phone]                      Fax: [phone]             [facility]                   NEPHROLOGY INPATIENT PROGRESS NOTE:     PATIENT NAME: Sinai Romero  : 1980  MRN: [id number]      RECOMMENDATIONS:   -Tentatively plan for total 5 Therapeutic Plasma Exchanges, started 10-8-22  -TPE #5 10-13 - today       -Next on # 6 Saturday,  #7 Monday - as requested by hematologist     - 1 plasma volume exchange. - Replacement fluids w/ FFP, not 5% albumin  - monitor S. fibrinogen level, LDH, CBC, PT/INR, iCalcium, Mag, Phos -> fibrinogen level dropping but still above normal, monitor level  - ACD-A for a/c protocol.  - Calcium replacement protocol. Needing repletion,  - requested dialysis nurse - discussed w/ them,      -Dexamethasone 40 mg daily, -> PO Prednisone   -IV Ig - as per hematologist     -[facility] placement on 10-8-2022  - by Dr Danni Koenig - appreciated his assistance   -avoid platelet transfusion  - hold Lisinopril, HCTZ, can use hydralazine PRN for SBP >160s  - at higher risk for decompensation, needing closer monitoring. D/C plan from renal stand point:  - fup 5 TPE - then later this week       D/W pt, team, hematologist,, hospitalist, nurse, ICU, TPE nurse again today   Have discussed with patient family also      IMPRESSION:       Admitted on:  10/7/2022 10:36 AM   For:  Thrombocytopenia ([facility] [D69.6]  Thrombocytopenic ([facility] [D69.6]    ICD-10-CM    1. 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     HTN - was on  Lisinopril, HCTZ   YOHANNES  Obesity high 30s   Hospital Problems             Last Modified POA    * (Principal) TTP (thrombotic thrombocytopenic purpura) ([facility] 10/11/2022 Yes    Thrombocytopenia ([facility] 10/11/2022 Yes    Thrombocytopenic ([facility] 10/7/2022 Yes    Morbid obesity with BMI of 40.0-44.9, adult ([facility] 10/11/2022 Yes    Essential hypertension (Chronic) 10/11/2022 Yes   : other supportive care :   - Check daily renal function panel with electrolytes-phosphorus  - Strict monitoring of I/Os, daily weight  - non-Renal feeds/diet  - Current medications reviewed. Please refer to the orders. High Complexity. Multiple complex problems. Discussed with patient and treatment team-  family , nurse   Time spent > 30 (~35) minutes. Thank you for allowing me to participate in this patient's care. Please do not hesitate to contact me anytime. We will follow along with you. Lisette Chang MD,  [facility] [address] [place] [address] [place]: [phone] or [email or web address]  Fax: [phone]        =======================================================================================   =======================================================================================  Subjective / interval history:   TPE x1 started 10-8  #5 today treatment so far tolerating it well no new complaints    Patient was seen comfortably sitting up in the bed,   Walking around   Reported no active complaints, . Platelet counts responding    Past medical, Surgical, Social, Family medical history reviewed by me. MEDICATIONS: reviewed by me. Medications Prior to Admission:  No current facility-administered medications on file prior to encounter.      Current Outpatient Medications on File Prior to Encounter   Medication Sig Dispense Refill    omeprazole (PRILOSEC) 20 MG delayed release capsule 1 daily 90 capsule 3    sildenafil (VIAGRA) 50 MG tablet Take 1 tablet by mouth as needed for Erectile Dysfunction 27 tablet 3    lisinopril (PRINIVIL;ZESTRIL) 40 MG tablet Take 1 tablet by mouth daily 90 tablet 3    verapamil (CALAN SR) 240 MG extended release tablet Take 1 tablet by mouth nightly 90 tablet 3    hydroCHLOROthiazide (HYDRODIURIL) 25 MG tablet Take 1 tablet by mouth daily 90 tablet 3    sertraline (ZOLOFT) 100 MG tablet 1 TABLET BY MOUTH DAILY 90 tablet 3    Vibegron (GEMTESA) 75 MG TABS Take 75 mg by mouth daily OVERACTIVE BLADDER      calcium carbonate (TUMS) 500 MG chewable tablet Take 6 tablets by mouth daily as needed for Heartburn           Current Facility-Administered Medications:     predniSONE (DELTASONE) tablet 120 mg, 120 mg, Oral, Daily, Efe Stewart MD, 120 mg at 10/12/22 9362    Vibegron TABS 75 mg (Patient Supplied), 75 mg, Oral, Daily, Tiana Soto MD, 75 mg at 10/12/22 1021    diatrizoate meglumine-sodium (GASTROGRAFIN) 66-10 % solution 20 mL, 20 mL, Oral, ONCE PRN, Efe Stewart MD, 20 mL at 10/10/22 1502    citrate dextrose (ACD Formula A) 0.73-2.45-2.2 GM/100ML solution, , IntraCATHeter, Continuous PRN, Randall Hoang MD, Last Rate: 1,000 mL/hr at 10/13/22 0650, New Bag at 10/13/22 0650    diphenhydrAMINE (BENADRYL) tablet 12.5 mg, 12.5 mg, Oral, Nightly PRN, Brandie Griggs, DO, 12.5 mg at 10/12/22 2034    sodium chloride flush 0.9 % injection 10 mL, 10 mL, IntraCATHeter, PRN, Randall Hoang MD    calcium gluconate 4,000 mg in dextrose 5 % 100 mL IVPB, 4,000 mg, IntraVENous, PRN, Randall Hoang MD, Last Rate: 25 mL/hr at 10/13/22 0650, 4,000 mg at 10/13/22 0650    heparin (porcine) injection 1,000 Units, 1,000 Units, IntraVENous, PRN, Licha Nielson MD, 1,000 Units at 10/13/22 0930    melatonin tablet 6 mg, 6 mg, Oral, Nightly PRN, Brandie Griggs, DO, 6 mg at 10/08/22 2128    sertraline (ZOLOFT) tablet 100 mg, 100 mg, Oral, Daily, Tiana Soto MD, 100 mg at 10/12/22 0956    sodium chloride flush 0.9 % injection 5-40 mL, 5-40 mL, IntraVENous, 2 times per day, Tiana Soto MD, 10 mL at 10/12/22 2035    sodium chloride flush 0.9 % injection 5-40 mL, 5-40 mL, IntraVENous, PRN, Declan Yeh MD    0.9 % sodium chloride infusion, , IntraVENous, PRN, Declan Yeh MD    ondansetron (ZOFRAN-ODT) disintegrating tablet 4 mg, 4 mg, Oral, Q8H PRN **OR** ondansetron (ZOFRAN) injection 4 mg, 4 mg, IntraVENous, Q6H PRN, Declan Yeh MD    polyethylene glycol (GLYCOLAX) packet 17 g, 17 g, Oral, Daily PRN, Declan Yeh MD, 17 g at 10/10/22 2117    acetaminophen (TYLENOL) tablet 650 mg, 650 mg, Oral, Q6H PRN, 650 mg at 10/12/22 1347 **OR** acetaminophen (TYLENOL) suppository 650 mg, 650 mg, Rectal, Q6H PRN, Declan Yeh MD    pantoprazole (PROTONIX) tablet 40 mg, 40 mg, Oral, QAM AC, Melinda Nieto MD, 40 mg at 10/12/22 0957    0.9 % sodium chloride infusion, , IntraVENous, PRN, Ad Griggs DO    vitamin B-12 (CYANOCOBALAMIN) tablet 500 mcg, 500 mcg, Oral, Daily, Declan Yeh MD, 500 mcg at 10/12/22 0957    verapamil (CALAN SR) extended release tablet 240 mg, 240 mg, Oral, Daily, Declan Yeh MD, 240 mg at 10/12/22 1021        REVIEW OF SYSTEMS:  As mentioned in chief complaint and HPI , Subjective       =======================================================================================     PHYSICAL EXAM:  Recent vital signs and recent I/Os reviewed by me.      Wt Readings from Last 3 Encounters:   10/13/22 285 lb 11.5 oz (129.6 kg)   10/06/22 282 lb (127.9 kg)   09/07/22 278 lb (126.1 kg)     BP Readings from Last 3 Encounters:   10/13/22 (!) 134/93   10/06/22 134/86   09/07/22 122/82     Patient Vitals for the past 24 hrs:   BP Temp Temp src Pulse Resp SpO2 Weight   10/13/22 0927 (!) 134/93 97 °F (36.1 °C) -- 61 16 100 % --   10/13/22 0922 126/82 97 °F (36.1 °C) -- 57 17 -- --   10/13/22 0915 126/82 97 °F (36.1 °C) -- 57 16 100 % --   10/13/22 0644 -- -- -- -- -- -- 285 lb 11.5 oz (129.6 kg)   10/13/22 0642 138/84 97 °F (36.1 °C) Temporal 56 18 100 % --   10/13/22 0520 129/85 97 °F (36.1 °C) Temporal 52 18 99 % --   10/12/22 1930 128/84 97.6 °F (36.4 °C) Temporal 62 16 100 % --   10/12/22 1800 -- -- -- 59 -- -- --   10/12/22 1600 126/85 97 °F (36.1 °C) Temporal 66 18 96 % --   10/12/22 1400 -- -- -- 89 -- -- --   10/12/22 1345 -- -- -- -- -- 96 % --   10/12/22 1200 126/78 97.8 °F (36.6 °C) Temporal 86 17 95 % --   10/12/22 1000 -- -- -- 68 -- -- --         Intake/Output Summary (Last 24 hours) at 10/13/2022 0944  Last data filed at 10/13/2022 4464  Gross per 24 hour   Intake 4511 ml   Output 4359 ml   Net 152 ml             Physical Exam  Vitals reviewed. Constitutional:       General: He is not in acute distress. Appearance: Normal appearance. HENT:      Head: Normocephalic and atraumatic. Right Ear: External ear normal.      Left Ear: External ear normal.      Nose: Nose normal.      Mouth/Throat:      Mouth: Mucous membranes are moist. Mucous membranes are not dry. Eyes:      General: No scleral icterus. Conjunctiva/sclera: Conjunctivae normal.   Neck:      Vascular: No JVD. Cardiovascular:      Rate and Rhythm: Normal rate and regular rhythm. Heart sounds: S1 normal and S2 normal.   Pulmonary:      Effort: Pulmonary effort is normal. No respiratory distress. Breath sounds: Normal breath sounds. Abdominal:      General: Bowel sounds are normal. There is no distension. Musculoskeletal:         General: No swelling or deformity. Cervical back: Normal range of motion and neck supple. Skin:     General: Skin is dry. Coloration: Skin is not jaundiced. Findings: Rash present. No lesion. Neurological:      Mental Status: He is alert and oriented to person, place, and time. Mental status is at baseline. Psychiatric:         Mood and Affect: Mood normal.         Behavior: Behavior normal.        =======================================================================================     DATA:  Diagnostic tests reviewed by me for today's visit:   (AS NEEDED FOR MY EVALUATION AND MANAGEMENT). Recent Labs     10/11/22  0630 10/12/22  0500 10/13/22  0525   WBC 20.3* 18.0* 13.8*   HCT 27.1* 27.4* 30.5*   PLT 70* 97* 127*       Iron Saturation:  No components found for: PERCENTFE  FERRITIN:    Lab Results   Component Value Date/Time    FERRITIN 1,025.0 10/07/2022 11:56 AM     IRON:    Lab Results   Component Value Date/Time    IRON 344 10/07/2022 11:56 AM     TIBC:    Lab Results   Component Value Date/Time    TIBC 399 10/07/2022 11:56 AM       Recent Labs     10/11/22  0630 10/12/22  0500 10/13/22  0525    138 139   K 3.9 4.2 4.3    104 104   CO2 26 28 29   BUN 22* 24* 19   CREATININE 0.7* 0.8* 0.8*       Recent Labs     10/11/22  0630 10/12/22  0500 10/13/22  0525   CALCIUM 8.5 8.3 8.7   MG 2.20 2.10 2.30   PHOS 5.2* 4.4 4.7       No results for input(s): PH, PCO2, PO2 in the last 72 hours.     Invalid input(s): Leafy Dennis    ABG:  No results found for: PH, PCO2, PO2, HCO3, BE, THGB, TCO2, O2SAT  VBG:    Lab Results   Component Value Date/Time    PHVEN 7.385 10/13/2022 05:25 AM       LDH:    Lab Results   Component Value Date/Time     10/13/2022 05:25 AM     Uric Acid:  No results found for: LABURIC, URICACID    PT/INR:    Lab Results   Component Value Date/Time    PROTIME 14.8 10/13/2022 05:25 AM    INR 1.17 10/13/2022 05:25 AM     Warfarin PT/INR:  No components found for: PTPATWAR, PTINRWAR  PTT:    Lab Results   Component Value Date/Time    APTT 23.6 10/13/2022 05:25 AM   [APTT}  Last 3 Troponin:  No results found for: TROPONINI    U/A:    Lab Results   Component Value Date/Time    NITRITE neg 10/08/2020 03:20 PM    COLORU Yellow 10/07/2022 11:55 AM    PROTEINU Negative 10/07/2022 11:55 AM    PHUR 7.0 10/07/2022 11:56 AM    WBCUA 0 10/07/2022 11:55 AM    RBCUA 12 10/07/2022 11:55 AM    BACTERIA None Seen 10/07/2022 11:55 AM    CLARITYU Clear 10/07/2022 11:55 AM    SPECGRAV 1.010 10/07/2022 11:55 AM    LEUKOCYTESUR Negative 10/07/2022 11:55 AM    UROBILINOGEN 2.0 10/07/2022 11:55 AM    BILIRUBINUR Negative 10/07/2022 11:55 AM    BILIRUBINUR neg 10/08/2020 03:20 PM    BLOODU MODERATE 10/07/2022 11:55 AM    GLUCOSEU Negative 10/07/2022 11:55 AM     Microalbumen/Creatinine ratio:  No components found for: RUCREAT  24 Hour Urine for Protein:  No components found for: RAWUPRO, UHRS3, QJIN65AI, UTV3  24 Hour Urine for Creatinine Clearance:  No components found for: CREAT4, UHRS10, UTV10  Urine Toxicology:  No components found for: IAMMENTA, IBARBIT, IBENZO, ICOCAINE, IMARTHC, IOPIATES, IPHENCYC    HgBA1c:    Lab Results   Component Value Date/Time    LABA1C 5.6 08/06/2022 08:15 AM     RPR:  No results found for: RPR  HIV:  No results found for: HIV  DEMI:    Lab Results   Component Value Date/Time    DEMI Negative 10/10/2022 02:35 PM     RF:    Lab Results   Component Value Date/Time    RF <10.0 10/10/2022 02:35 PM     DSDNA:  No components found for: DNA  AMYLASE:  No results found for: AMYLASE  LIPASE:  No results found for: LIPASE  Fibrinogen Level:  No components found for: FIB       BELOW MENTIONED RADIOLOGY STUDY RESULTS BY ME (AS NEEDED FOR MY EVALUATION AND MANAGEMENT). No results found.

## 2022-10-13 NOTE — PROGRESS NOTES
Hospital Medicine Progress Note     Date:  10/12/2022    PCP: Artemio Nassar MD (Tel: [phone])    Date of Admission: 10/7/2022        Subjective  Patient denies CP, SOB, HA or abdominal pain. No complaints. Objective  Physical exam:  Vitals: /84   Pulse 62   Temp 97.6 °F (36.4 °C) (Temporal)   Resp 16   Ht 5' 10.5\" (1.791 m)   Wt 289 lb 3.9 oz (131.2 kg)   SpO2 100%   BMI 40.92 kg/m²   Gen: Not in distress. Alert. Head: Normocephalic. Atraumatic. Eyes: EOMI. Good acuity. ENT: Oral mucosa moist.  R IJ vascath  Neck: No JVD. No obvious thyromegaly. CVS: Nml S1S2, no MRG, RRR  Pulmomary: Clear bilaterally. No crackles. No wheezes. Gastrointestinal: Soft, NT/ND. Positive bowel sounds. Musculoskeletal: No edema. Warm  Neuro: No focal deficit. Moves extremity spontaneously. Psychiatry: Appropriate affect. Not agitated. Skin: Warm, dry with normal turgor. No rash. Ecchymoses noted      24HR INTAKE/OUTPUT:    Intake/Output Summary (Last 24 hours) at 10/12/2022 2324  Last data filed at 10/12/2022 0932  Gross per 24 hour   Intake 4497 ml   Output 4348 ml   Net 149 ml       I/O last 3 completed shifts: In: 18868 [P.O.:300; I.V.:10; Blood:905]  Out: 8709   No intake/output data recorded.       Meds:    predniSONE  120 mg Oral Daily    Vibegron  75 mg Oral Daily    sertraline  100 mg Oral Daily    sodium chloride flush  5-40 mL IntraVENous 2 times per day    pantoprazole  40 mg Oral QAM AC    vitamin B-12  500 mcg Oral Daily    verapamil  240 mg Oral Daily       Infusions:    citrate dextrose Stopped (10/12/22 0918)    sodium chloride      sodium chloride           PRN Meds: diatrizoate meglumine-sodium, citrate dextrose, diphenhydrAMINE, sodium chloride flush, calcium gluconate, heparin (porcine), melatonin, sodium chloride flush, sodium chloride, ondansetron **OR** ondansetron, polyethylene glycol, acetaminophen **OR** acetaminophen, sodium chloride    Labs/imaging:  CBC:   Recent Labs 10/10/22  0435 10/11/22  0630 10/12/22  0500   WBC 21.7* 20.3* 18.0*   HGB 9.3* 9.0* 9.0*   PLT 24* 70* 97*           BMP:    Recent Labs     10/10/22  0435 10/11/22  0630 10/12/22  0500    140 138   K 3.9 3.9 4.2    105 104   CO2 25 26 28   BUN 19 22* 24*   CREATININE 0.7* 0.7* 0.8*   GLUCOSE 118* 110* 92           Hepatic:   Recent Labs     10/10/22  0435 10/11/22  0630 10/12/22  0500   AST 20 18 15   ALT 19 25 23   BILITOT 0.8 0.5 0.3   ALKPHOS 68 59 51         Troponin: No results for input(s): TROPONINI in the last 72 hours. BNP: No results for input(s): BNP in the last 72 hours. INR:   Recent Labs     10/10/22  0435 10/11/22  0630 10/12/22  0500   INR 1.15* 1.21* 1.12             Reviewed imaging and reports noted      Assessment:  Principal Problem:    TTP (thrombotic thrombocytopenic purpura) (HCC)  Active Problems: Thrombocytopenia ([facility]    Thrombocytopenic ([facility]    Morbid obesity with BMI of 40.0-44.9, adult [facility])    Essential hypertension  Resolved Problems:    * No resolved hospital problems. *        Plan: Thrombotic thrombocytopenic purpura  -Appreciate oncology, pulmonology and nephrology recommendations  - Plasmapheresis Day 4/5  - cont Prednisone 120 mg daily per Hematology  -No platelet transfusion secondary to possibility of forming blood clots  - Anticipate will be here into early next week per Oncology      Essential hypertension  -Continue Verapamil      Anemia  -Secondary to above, continue to monitor and transfuse for hemoglobin less than 8      Anxiety and depression  -Continue Zoloft      Diet: ADULT DIET; Regular    Activity: up as tolerated  Prophylaxis: SCD    Code status: Full Code     ----------    Discussed with patient and CM. Likely here into early next until cleared by Oncology for home.     Franck Dang MD  -------------------------------  Rounding hospitalist

## 2022-10-13 NOTE — PROGRESS NOTES
Hospital Medicine Progress Note     Date:  10/13/2022    PCP: Yas Huntley MD (Tel: [phone])    Date of Admission: 10/7/2022        Subjective  Patient denies CP, SOB, HA or abdominal pain. Ready for PLEX on Saturday and Monday. Objective  Physical exam:  Vitals: BP (!) 141/79   Pulse 78   Temp 97.5 °F (36.4 °C) (Temporal)   Resp 16   Ht 5' 10.5\" (1.791 m)   Wt 285 lb 11.5 oz (129.6 kg)   SpO2 96%   BMI 40.42 kg/m²   Gen: Not in distress. Alert. Head: Normocephalic. Atraumatic. Eyes: EOMI. Good acuity. ENT: Oral mucosa moist.  R IJ vascath  Neck: No JVD. No obvious thyromegaly. CVS: Nml S1S2, no MRG, RRR  Pulmomary: Clear bilaterally. No crackles. No wheezes. Gastrointestinal: Soft, NT/ND. Positive bowel sounds. Musculoskeletal: No edema. Warm  Neuro: No focal deficit. Moves extremity spontaneously. Psychiatry: Appropriate affect. Not agitated. Skin: Warm, dry with normal turgor. No rash. Ecchymoses noted      24HR INTAKE/OUTPUT:    Intake/Output Summary (Last 24 hours) at 10/13/2022 1942  Last data filed at 10/13/2022 1800  Gross per 24 hour   Intake 5601 ml   Output 4359 ml   Net 1242 ml       I/O last 3 completed shifts: In: 48510 [P.O.:1080; I.V.:10]  Out: 8707   No intake/output data recorded.       Meds:    predniSONE  120 mg Oral Daily    Vibegron  75 mg Oral Daily    sertraline  100 mg Oral Daily    sodium chloride flush  5-40 mL IntraVENous 2 times per day    pantoprazole  40 mg Oral QAM AC    vitamin B-12  500 mcg Oral Daily    verapamil  240 mg Oral Daily       Infusions:    citrate dextrose 1,000 mL/hr at 10/13/22 0650    sodium chloride      sodium chloride           PRN Meds: diatrizoate meglumine-sodium, citrate dextrose, diphenhydrAMINE, sodium chloride flush, calcium gluconate, heparin (porcine), melatonin, sodium chloride flush, sodium chloride, ondansetron **OR** ondansetron, polyethylene glycol, acetaminophen **OR** acetaminophen, sodium chloride    Labs/imaging:  CBC:   Recent Labs     10/11/22  0630 10/12/22  0500 10/13/22  0525   WBC 20.3* 18.0* 13.8*   HGB 9.0* 9.0* 10.2*   PLT 70* 97* 127*           BMP:    Recent Labs     10/11/22  0630 10/12/22  0500 10/13/22  0525    138 139   K 3.9 4.2 4.3    104 104   CO2 26 28 29   BUN 22* 24* 19   CREATININE 0.7* 0.8* 0.8*   GLUCOSE 110* 92 79           Hepatic:   Recent Labs     10/11/22  0630 10/12/22  0500 10/13/22  0525   AST 18 15 15   ALT 25 23 21   BILITOT 0.5 0.3 0.4   ALKPHOS 59 51 64         Troponin: No results for input(s): TROPONINI in the last 72 hours. BNP: No results for input(s): BNP in the last 72 hours. INR:   Recent Labs     10/11/22  0630 10/12/22  0500 10/13/22  0525   INR 1.21* 1.12 1.17*             Reviewed imaging and reports noted      Assessment:  Principal Problem:    TTP (thrombotic thrombocytopenic purpura) (HCC)  Active Problems: Thrombocytopenia ([facility]    Thrombocytopenic ([facility]    Morbid obesity with BMI of 40.0-44.9, adult [facility])    Essential hypertension  Resolved Problems:    * No resolved hospital problems. *        Plan: Thrombotic thrombocytopenic purpura  -Appreciate oncology, pulmonology and nephrology recommendations  - Plasmapheresis today, then Saturday and Monday  - cont Prednisone 120 mg daily per Hematology  -No platelet transfusion secondary to possibility of forming blood clots      Essential hypertension  -Continue Verapamil      Anemia  -Secondary to above, continue to monitor and transfuse for hemoglobin less than 8      Anxiety and depression  -Continue Zoloft      Diet: ADULT DIET; Regular    Activity: up as tolerated  Prophylaxis: SCD    Code status: Full Code     ----------    Discussed with patient and CM. Not ready for DC to home.     Cher Mireles MD  -------------------------------  Rounding hospitalist

## 2022-10-13 NOTE — FLOWSHEET NOTE
TPE ( Therapeutic Plasma Exchange)  1  Volume of TPV exchange, with 100 % replacement. Use FFP:  ( total : 3720 ml)   Number of Treatment : 05 of 05 total ( Started on 10/8, QD , SKIP Sunday)    Pt tolerated Well with TPE,   Fluid balance: + 152 ml ( including ACD-A, Ca gluconate, saline rinse blood back to pt)    Initial setting:  AC 3.1;  inlet 45.9;  plasma removal 31.6 , Replacement 27.5, ratio 15 ,      Final values:   ml;  inlet 6751 ml ;   plasma removal 4355 ml , Replacement  3721 ml;  duration 132 minutes  Started time: 0707  End time: 0931    Medication:  ACD-A ( per protocol via machine) to pt: 43 ml total during whole TPE  Ca: gluconate : 4 gm IVPB with whole TPE, started @ 4276    Next TPE scheduled on Evaluated by MDs    Placed TPE flow sheets and blood transfusion downtime papers in the chart for EMR scan    10/13/22 0642 10/13/22 0927   Vital Signs   /84 (!) 134/93   Temp 97 °F (36.1 °C) 97 °F (36.1 °C)   Heart Rate 56 61   Resp 18 16   SpO2 100 % 100 %   Musculoskeletal Details   Musculoskeletal (WDL) WDL WDL   Run Parameters   Blood flow rate (ml/min) 45.9 ml/min  --    Blood Wm Temp 98.6 °F (37 °C)  --    AC flow/volume 3.1/8  --    Inlet flow/volume 45.9/131  --    Plasma flow/volume 31.6/56  --    Replace flow/volume 27.5/28  --    ACD-A ratio/rpm 15  --    Final Values   Apheresis Intake(ml)  --  4511 ml   Apheresis Output(ml)  --  4359 ml   Net Balance  --  152   AC-AC Bag  --  43   Eff. Ratio  --  1

## 2022-10-13 NOTE — PROGRESS NOTES
Oncology and Hematology Care   Progress Note      10/13/2022 9:59 AM        Name: Sarah Davey . Admitted: 10/7/2022    SUBJECTIVE:  Patient doing well. Ambulating in room. Bruises are resolving. No bleeding.      Reviewed interval ancillary notes    Current Medications  predniSONE (DELTASONE) tablet 120 mg, Daily  Vibegron TABS 75 mg (Patient Supplied), Daily  diatrizoate meglumine-sodium (GASTROGRAFIN) 66-10 % solution 20 mL, ONCE PRN  citrate dextrose (ACD Formula A) 0.73-2.45-2.2 GM/100ML solution, Continuous PRN  diphenhydrAMINE (BENADRYL) tablet 12.5 mg, Nightly PRN  sodium chloride flush 0.9 % injection 10 mL, PRN  calcium gluconate 4,000 mg in dextrose 5 % 100 mL IVPB, PRN  heparin (porcine) injection 1,000 Units, PRN  melatonin tablet 6 mg, Nightly PRN  sertraline (ZOLOFT) tablet 100 mg, Daily  sodium chloride flush 0.9 % injection 5-40 mL, 2 times per day  sodium chloride flush 0.9 % injection 5-40 mL, PRN  0.9 % sodium chloride infusion, PRN  ondansetron (ZOFRAN-ODT) disintegrating tablet 4 mg, Q8H PRN   Or  ondansetron (ZOFRAN) injection 4 mg, Q6H PRN  polyethylene glycol (GLYCOLAX) packet 17 g, Daily PRN  acetaminophen (TYLENOL) tablet 650 mg, Q6H PRN   Or  acetaminophen (TYLENOL) suppository 650 mg, Q6H PRN  pantoprazole (PROTONIX) tablet 40 mg, QAM AC  0.9 % sodium chloride infusion, PRN  vitamin B-12 (CYANOCOBALAMIN) tablet 500 mcg, Daily  verapamil (CALAN SR) extended release tablet 240 mg, Daily        Objective:  BP (!) 134/93   Pulse 61   Temp 97 °F (36.1 °C)   Resp 16   Ht 5' 10.5\" (1.791 m)   Wt 285 lb 11.5 oz (129.6 kg)   SpO2 100%   BMI 40.42 kg/m²     Intake/Output Summary (Last 24 hours) at 10/13/2022 0977  Last data filed at 10/13/2022 0927  Gross per 24 hour   Intake 4511 ml   Output 4359 ml   Net 152 ml      Wt Readings from Last 3 Encounters:   10/13/22 285 lb 11.5 oz (129.6 kg)   10/06/22 282 lb (127.9 kg)   09/07/22 278 lb (126.1 kg)       General appearance: Appears comfortable  Eyes: Sclera clear. Pupils equal.  ENT: Moist oral mucosa. Trachea midline, no adenopathy. Cardiovascular: Regular rhythm, normal S1, S2. No murmur. No edema in lower extremities  Respiratory: Not using accessory muscles. Good inspiratory effort. Clear to auscultation bilaterally, no wheeze or crackles. GI: Abdomen soft, no tenderness, not distended  Musculoskeletal: No cyanosis in digits, neck supple  Neurology: CN 2-12 grossly intact. No speech or motor deficits  Psych: Normal affect. Alert and oriented in time, place and person  Skin: Warm, dry, normal turgor    Labs and Tests:  CBC:   Recent Labs     10/11/22  0630 10/12/22  0500 10/13/22  0525   WBC 20.3* 18.0* 13.8*   HGB 9.0* 9.0* 10.2*   PLT 70* 97* 127*     BMP:    Recent Labs     10/11/22  0630 10/12/22  0500 10/13/22  0525    138 139   K 3.9 4.2 4.3    104 104   CO2 26 28 29   BUN 22* 24* 19   CREATININE 0.7* 0.8* 0.8*   GLUCOSE 110* 92 79     Hepatic:   Recent Labs     10/11/22  0630 10/12/22  0500 10/13/22  0525   AST 18 15 15   ALT 25 23 21   BILITOT 0.5 0.3 0.4   ALKPHOS 59 51 64       ASSESSMENT AND PLAN    Principal Problem:    TTP (thrombotic thrombocytopenic purpura) ([facility])  Active Problems: Thrombocytopenia ([facility] [address]    Thrombocytopenic ([facility] [address]    Morbid obesity with BMI of 40.0-44.9, adult [facility])    Essential hypertension  Resolved Problems:    * No resolved hospital problems.  *      TTP  -Anemia and thrombocytopenia  -Schistocytes on smear  -Undetectable haptoglobin and elevated LDH  -Nicole negative  -Above clinical picture highly suspicious for TTP  -ZAMAN TS 13 level very low at 16 normal greater than 65  -On prednisone 1 mg/kg daily   -Nephrology on board with plans to do plasmapheresis for 5 total days  -Daily PT/INR, PTT, Fibrinogen  -Transfuse Cryoprecipitate if Fibrinogen below 100 after completion of that day's pheresis  -Day 5/5 planned plasmapheresis today  - LDH decreased to 257  - plts improved to 127k today  - homocysteine level is WNL  - RA factor - WNL  - DEMI negative   - MMA and dsDNA pending    Kesha Jamison [facility]  Oncology Hematology [address] [facility].  [phone]     The patient was seen and examined. Doing well. Tolerating plasmapheresis without any complications. Platelets 527  LDH decreased to 257. Will do alternate day plasmapheresis x2 - 10/15 and 10/17.  Plasmapheresis cannot be done as outpatient and therefore patient was resting in the hospital  Continue prednisone  Start folic acid    Zoltan Maldonado MD

## 2022-10-14 LAB
A/G RATIO: 1.9 (ref 1.1–2.2)
ALBUMIN SERPL-MCNC: 3.6 G/DL (ref 3.4–5)
ALP BLD-CCNC: 65 U/L (ref 40–129)
ALT SERPL-CCNC: 31 U/L (ref 10–40)
ANION GAP SERPL CALCULATED.3IONS-SCNC: 6 MMOL/L (ref 3–16)
ANISOCYTOSIS: ABNORMAL
APTT: 22.9 SEC (ref 23–34.3)
AST SERPL-CCNC: 18 U/L (ref 15–37)
BASOPHILS ABSOLUTE: 0 K/UL (ref 0–0.2)
BASOPHILS RELATIVE PERCENT: 0 %
BILIRUB SERPL-MCNC: 0.3 MG/DL (ref 0–1)
BILIRUBIN DIRECT: <0.2 MG/DL (ref 0–0.3)
BILIRUBIN, INDIRECT: NORMAL MG/DL (ref 0–1)
BUN BLDV-MCNC: 19 MG/DL (ref 7–20)
CALCIUM IONIZED: 1.13 MMOL/L (ref 1.12–1.32)
CALCIUM SERPL-MCNC: 8.4 MG/DL (ref 8.3–10.6)
CHLORIDE BLD-SCNC: 101 MMOL/L (ref 99–110)
CO2: 29 MMOL/L (ref 21–32)
CREAT SERPL-MCNC: 0.8 MG/DL (ref 0.9–1.3)
EOSINOPHILS ABSOLUTE: 0 K/UL (ref 0–0.6)
EOSINOPHILS RELATIVE PERCENT: 0 %
FIBRINOGEN: 261 MG/DL (ref 207–509)
GFR AFRICAN AMERICAN: >60
GFR NON-AFRICAN AMERICAN: >60
GLUCOSE BLD-MCNC: 90 MG/DL (ref 70–99)
HAPTOGLOBIN: 107 MG/DL (ref 30–200)
HCT VFR BLD CALC: 31.4 % (ref 40.5–52.5)
HEMATOLOGY PATH CONSULT: NO
HEMOGLOBIN: 10.5 G/DL (ref 13.5–17.5)
INR BLD: 1.1 (ref 0.87–1.14)
LACTATE DEHYDROGENASE: 266 U/L (ref 100–190)
LYMPHOCYTES ABSOLUTE: 2.1 K/UL (ref 1–5.1)
LYMPHOCYTES RELATIVE PERCENT: 15 %
MACROCYTES: ABNORMAL
MAGNESIUM: 2.2 MG/DL (ref 1.8–2.4)
MCH RBC QN AUTO: 33.4 PG (ref 26–34)
MCHC RBC AUTO-ENTMCNC: 33.4 G/DL (ref 31–36)
MCV RBC AUTO: 100.1 FL (ref 80–100)
METAMYELOCYTES RELATIVE PERCENT: 1 %
METHYLMALONIC ACID: 0.33 UMOL/L (ref 0–0.4)
MONOCYTES ABSOLUTE: 1.4 K/UL (ref 0–1.3)
MONOCYTES RELATIVE PERCENT: 10 %
NEUTROPHILS ABSOLUTE: 10.4 K/UL (ref 1.7–7.7)
NEUTROPHILS RELATIVE PERCENT: 74 %
PDW BLD-RTO: 18 % (ref 12.4–15.4)
PH VENOUS: 7.41 (ref 7.35–7.45)
PHOSPHORUS: 4.6 MG/DL (ref 2.5–4.9)
PLATELET # BLD: 177 K/UL (ref 135–450)
PLATELET SLIDE REVIEW: ADEQUATE
PMV BLD AUTO: 9.3 FL (ref 5–10.5)
POLYCHROMASIA: ABNORMAL
POTASSIUM SERPL-SCNC: 4 MMOL/L (ref 3.5–5.1)
PROTHROMBIN TIME: 14.1 SEC (ref 11.7–14.5)
RBC # BLD: 3.13 M/UL (ref 4.2–5.9)
SLIDE REVIEW: ABNORMAL
SODIUM BLD-SCNC: 136 MMOL/L (ref 136–145)
TEAR DROP CELLS: ABNORMAL
TOTAL PROTEIN: 5.5 G/DL (ref 6.4–8.2)
WBC # BLD: 13.8 K/UL (ref 4–11)

## 2022-10-14 PROCEDURE — 83735 ASSAY OF MAGNESIUM: CPT

## 2022-10-14 PROCEDURE — 2580000003 HC RX 258: Performed by: FAMILY MEDICINE

## 2022-10-14 PROCEDURE — 85025 COMPLETE CBC W/AUTO DIFF WBC: CPT

## 2022-10-14 PROCEDURE — 82248 BILIRUBIN DIRECT: CPT

## 2022-10-14 PROCEDURE — 6370000000 HC RX 637 (ALT 250 FOR IP): Performed by: INTERNAL MEDICINE

## 2022-10-14 PROCEDURE — 83010 ASSAY OF HAPTOGLOBIN QUANT: CPT

## 2022-10-14 PROCEDURE — 85610 PROTHROMBIN TIME: CPT

## 2022-10-14 PROCEDURE — 85730 THROMBOPLASTIN TIME PARTIAL: CPT

## 2022-10-14 PROCEDURE — 83615 LACTATE (LD) (LDH) ENZYME: CPT

## 2022-10-14 PROCEDURE — 36592 COLLECT BLOOD FROM PICC: CPT

## 2022-10-14 PROCEDURE — 84100 ASSAY OF PHOSPHORUS: CPT

## 2022-10-14 PROCEDURE — 2000000000 HC ICU R&B

## 2022-10-14 PROCEDURE — 80053 COMPREHEN METABOLIC PANEL: CPT

## 2022-10-14 PROCEDURE — 85384 FIBRINOGEN ACTIVITY: CPT

## 2022-10-14 PROCEDURE — 82330 ASSAY OF CALCIUM: CPT

## 2022-10-14 PROCEDURE — 6370000000 HC RX 637 (ALT 250 FOR IP): Performed by: FAMILY MEDICINE

## 2022-10-14 RX ADMIN — FOLIC ACID 1 MG: 1 TABLET ORAL at 09:17

## 2022-10-14 RX ADMIN — DIPHENHYDRAMINE HYDROCHLORIDE 12.5 MG: 25 TABLET ORAL at 21:05

## 2022-10-14 RX ADMIN — VERAPAMIL HYDROCHLORIDE 240 MG: 240 TABLET, FILM COATED, EXTENDED RELEASE ORAL at 09:16

## 2022-10-14 RX ADMIN — Medication 10 ML: at 20:21

## 2022-10-14 RX ADMIN — SERTRALINE 100 MG: 50 TABLET, FILM COATED ORAL at 09:17

## 2022-10-14 RX ADMIN — PANTOPRAZOLE SODIUM 40 MG: 40 TABLET, DELAYED RELEASE ORAL at 09:17

## 2022-10-14 RX ADMIN — PREDNISONE 120 MG: 20 TABLET ORAL at 09:16

## 2022-10-14 RX ADMIN — CYANOCOBALAMIN TAB 1000 MCG 500 MCG: 1000 TAB at 09:16

## 2022-10-14 ASSESSMENT — PAIN SCALES - GENERAL
PAINLEVEL_OUTOF10: 0

## 2022-10-14 NOTE — PROGRESS NOTES
Nutrition Note    RECOMMENDATIONS  PO Diet: Continue current diet    NUTRITION ASSESSMENT   Pt triggered for LOS assessment. On regular diet with documented intakes of % throughout admission. Wt hx in EMR indicates stable wt. No further nutritional concerns at this time. RD will continue to monitor for adequate po intake. Nutrition Related Findings: +7.5L. Lytes obtained today WNL. LBM 10/13, GI WDL. No edema noted. Wounds: None  Nutrition Education:  Education not indicated   Nutrition Goals: PO intake 50% or greater, by next RD assessment     MALNUTRITION ASSESSMENT   Malnutrition Status: No malnutrition    NUTRITION DIAGNOSIS   No nutrition diagnosis at this time       CURRENT NUTRITION THERAPIES  ADULT DIET; Regular     PO Intake: %   PO Supplement Intake:None Ordered    ANTHROPOMETRICS  Current Height: 5' 10.5\" (179.1 cm)  Current Weight: 276 lb 7.3 oz (125.4 kg)    Admission weight: 288 lb (130.6 kg) (standing)  Ideal Body Weight (IBW): 169 lbs  (77 kg)        BMI: 39    The patient will be monitored per nutrition standards of care. Consult dietitian if additional nutrition interventions are needed prior to RD reassessment.      Catherine John MS, RD, LD    Contact: [phone]

## 2022-10-14 NOTE — PROGRESS NOTES
Oncology and Hematology Care   Progress Note      10/14/2022 11:03 AM        Name: Cherelle Narayan . Admitted: 10/7/2022    SUBJECTIVE:      Continues to do well.   Bruises are getting better  No external bleeding      Reviewed interval ancillary notes    Current Medications  folic acid (FOLVITE) tablet 1 mg, Daily  predniSONE (DELTASONE) tablet 120 mg, Daily  Vibegron TABS 75 mg (Patient Supplied), Daily  diatrizoate meglumine-sodium (GASTROGRAFIN) 66-10 % solution 20 mL, ONCE PRN  citrate dextrose (ACD Formula A) 0.73-2.45-2.2 GM/100ML solution, Continuous PRN  diphenhydrAMINE (BENADRYL) tablet 12.5 mg, Nightly PRN  sodium chloride flush 0.9 % injection 10 mL, PRN  calcium gluconate 4,000 mg in dextrose 5 % 100 mL IVPB, PRN  heparin (porcine) injection 1,000 Units, PRN  melatonin tablet 6 mg, Nightly PRN  sertraline (ZOLOFT) tablet 100 mg, Daily  sodium chloride flush 0.9 % injection 5-40 mL, 2 times per day  sodium chloride flush 0.9 % injection 5-40 mL, PRN  0.9 % sodium chloride infusion, PRN  ondansetron (ZOFRAN-ODT) disintegrating tablet 4 mg, Q8H PRN   Or  ondansetron (ZOFRAN) injection 4 mg, Q6H PRN  polyethylene glycol (GLYCOLAX) packet 17 g, Daily PRN  acetaminophen (TYLENOL) tablet 650 mg, Q6H PRN   Or  acetaminophen (TYLENOL) suppository 650 mg, Q6H PRN  pantoprazole (PROTONIX) tablet 40 mg, QAM AC  0.9 % sodium chloride infusion, PRN  vitamin B-12 (CYANOCOBALAMIN) tablet 500 mcg, Daily  verapamil (CALAN SR) extended release tablet 240 mg, Daily      Objective:  /85   Pulse 88   Temp 97.7 °F (36.5 °C) (Temporal)   Resp 20   Ht 5' 10.5\" (1.791 m)   Wt 276 lb 7.3 oz (125.4 kg)   SpO2 96%   BMI 39.11 kg/m²     Intake/Output Summary (Last 24 hours) at 10/14/2022 1103  Last data filed at 10/13/2022 1800  Gross per 24 hour   Intake 850 ml   Output --   Net 850 ml        Wt Readings from Last 3 Encounters:   10/14/22 276 lb 7.3 oz (125.4 kg)   10/06/22 282 lb (127.9 kg)   09/07/22 278 lb (126.1 kg)       Conscious alert oriented  HEENT: No pallor or scleral icterus. Oral mucosa: No mucositis. No thrush. Neck: Supple, no lymphadenopathy. No thyromegaly  Lungs: No rales, wheezes, respiratory efforts are normal.  CVS: S1-S2 normal.  No murmurs or gallops heard. Abdomen: Soft, bowel sounds are heard. No tenderness. Neuro: No focal deficits. No cranial nerve palsies. Alert and oriented. Skin: No rashes, petechiae, ecchymosis. Spine: No tenderness or deformity noted. Extremities: No edema, tenderness, erythema. Labs and Tests:  CBC:   Recent Labs     10/12/22  0500 10/13/22  0525 10/14/22  0406   WBC 18.0* 13.8* 13.8*   HGB 9.0* 10.2* 10.5*   PLT 97* 127* 177       BMP:    Recent Labs     10/12/22  0500 10/13/22  0525 10/14/22  0406    139 136   K 4.2 4.3 4.0    104 101   CO2 28 29 29   BUN 24* 19 19   CREATININE 0.8* 0.8* 0.8*   GLUCOSE 92 79 90       Hepatic:   Recent Labs     10/12/22  0500 10/13/22  0525 10/14/22  0406   AST 15 15 18   ALT 23 21 31   BILITOT 0.3 0.4 0.3   ALKPHOS 51 64 65         ASSESSMENT AND PLAN    Principal Problem:    TTP (thrombotic thrombocytopenic purpura) ([facility])  Active Problems: Thrombocytopenia ([facility] [address]    Thrombocytopenic ([facility] [address]    Morbid obesity with BMI of 40.0-44.9, adult [facility])    Essential hypertension  Resolved Problems:    * No resolved hospital problems. *      TTP    -Anemia and thrombocytopenia  -Schistocytes on smear  -Undetectable haptoglobin and elevated LDH  -Nicole negative    -ZAMAN TS 13 level very low at 16 normal greater than 65.  ADAMTS 13 inhibitor panel is pending  -On prednisone 1 mg/kg daily   - Continue folic acid  -Nephrology on board with plans to do plasmapheresis for 5 total days  - Completed 5 days of plasmapheresis on 10/13/2022  -  - Higher today  - plts improved to 177    - Will do plasmapheresis on 10/15/2022 and 10/17/2022.    - homocysteine level is WNL  - RA factor - WNL  - DEMI negative   - MMA and dsDNA pending        Bhupinder Mcmanus MD

## 2022-10-14 NOTE — PROGRESS NOTES
MD Alina Fry MD Mayer Ferri, MD                  Office: [phone]                      Fax: [phone]             [facility]                   NEPHROLOGY INPATIENT PROGRESS NOTE:     PATIENT NAME: Margie Patel  : 1980  MRN: [id number]      RECOMMENDATIONS:   --plans for total 7 Therapeutic Plasma Exchanges, started 10-8-22  -TPE #6 10-15,  #7 10-17 - as requested by hematologist     - 1 plasma volume exchange. - Replacement fluids w/ FFP, not 5% albumin  - monitor S. fibrinogen level, LDH, CBC, PT/INR, iCalcium, Mag, Phos -> fibrinogen level dropping but still above normal, monitor level  - ACD-A for a/c protocol.  - Calcium replacement protocol. Needing repletion,  - requested dialysis nurse - discussed w/ them,      -Dexamethasone 40 mg daily, -> PO Prednisone   -IV Ig - as per hematologist     -Meryl Michael placement on 10-8-2022  - by Dr Yahaira Wilson - appreciated his assistance   -avoid platelet transfusion  - hold Lisinopril, HCTZ, can use hydralazine PRN for SBP >160s  - at higher risk for decompensation, needing closer monitoring. D/C plan from renal stand point:  - fup 5 TPE - then later this week       D/W pt, team, hematologist,, hospitalist, nurse, ICU, TPE nurse again today   Have discussed with patient family also      IMPRESSION:       Admitted on:  10/7/2022 10:36 AM   For:  Thrombocytopenia ([facility] [address] [D69.6]  Thrombocytopenic ([facility] [address] [D69.6]    ICD-10-CM    1. 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     HTN - was on  Lisinopril, HCTZ   YOHANNES  Obesity high 30s       Hospital Problems             Last Modified POA    * (Principal) TTP (thrombotic thrombocytopenic purpura) ([facility] [address] 10/11/2022 Yes Thrombocytopenia ([facility] 10/11/2022 Yes    Thrombocytopenic ([facility] 10/7/2022 Yes    Morbid obesity with BMI of 40.0-44.9, adult ([facility] 10/11/2022 Yes    Essential hypertension (Chronic) 10/11/2022 Yes       : other supportive care :   - Check daily renal function panel with electrolytes-phosphorus  - Strict monitoring of I/Os, daily weight  - non-Renal feeds/diet  - Current medications reviewed. Please refer to the orders. High Complexity. Multiple complex problems. Discussed with patient and treatment team-  family , nurse   Time spent > 30 (~35) minutes. Thank you for allowing me to participate in this patient's care. Please do not hesitate to contact me anytime. We will follow along with you. Jean Sandoval MD,  [facility] [address] [place] [address] [place]: [phone] or Via [employer]  Fax: [phone]        =======================================================================================   =======================================================================================  Subjective / interval history:   TPE x1 started 10-8  #5  Friday treatment so far tolerating it well no new complaints    Patient was seen comfortably sitting up in the chair today, is little upset that he has to stay no longer but he is happy to hear about his improving platelets  Walking around   Reported no active complaints, . Platelet counts responding to normal now    Past medical, Surgical, Social, Family medical history reviewed by me. MEDICATIONS: reviewed by me. Medications Prior to Admission:  No current facility-administered medications on file prior to encounter.      Current Outpatient Medications on File Prior to Encounter   Medication Sig Dispense Refill    omeprazole (PRILOSEC) 20 MG delayed release capsule 1 daily 90 capsule 3    sildenafil (VIAGRA) 50 MG tablet Take 1 tablet by mouth as needed for Erectile Dysfunction 27 tablet 3    lisinopril (PRINIVIL;ZESTRIL) 40 MG tablet Take 1 tablet by mouth daily 90 tablet 3    verapamil (CALAN SR) 240 MG extended release tablet Take 1 tablet by mouth nightly 90 tablet 3    hydroCHLOROthiazide (HYDRODIURIL) 25 MG tablet Take 1 tablet by mouth daily 90 tablet 3    sertraline (ZOLOFT) 100 MG tablet 1 TABLET BY MOUTH DAILY 90 tablet 3    Vibegron (GEMTESA) 75 MG TABS Take 75 mg by mouth daily OVERACTIVE BLADDER      calcium carbonate (TUMS) 500 MG chewable tablet Take 6 tablets by mouth daily as needed for Heartburn           Current Facility-Administered Medications:     folic acid (FOLVITE) tablet 1 mg, 1 mg, Oral, Daily, Jose Carlos Ragland MD, 1 mg at 10/14/22 0127    predniSONE (DELTASONE) tablet 120 mg, 120 mg, Oral, Daily, Jose Carlos Ragland MD, 120 mg at 10/14/22 8020    Vibegron TABS 75 mg (Patient Supplied), 75 mg, Oral, Daily, Zuleima Bright MD, 75 mg at 10/13/22 1106    diatrizoate meglumine-sodium (GASTROGRAFIN) 66-10 % solution 20 mL, 20 mL, Oral, ONCE PRN, Jose Carlos Ragland MD, 20 mL at 10/10/22 1502    citrate dextrose (ACD Formula A) 0.73-2.45-2.2 GM/100ML solution, , IntraCATHeter, Continuous PRN, Cathy Gray MD, Last Rate: 1,000 mL/hr at 10/13/22 0650, New Bag at 10/13/22 0650    diphenhydrAMINE (BENADRYL) tablet 12.5 mg, 12.5 mg, Oral, Nightly PRN, Pasty Cools A Anson, DO, 12.5 mg at 10/13/22 2056    sodium chloride flush 0.9 % injection 10 mL, 10 mL, IntraCATHeter, PRN, Cathy Gray MD    calcium gluconate 4,000 mg in dextrose 5 % 100 mL IVPB, 4,000 mg, IntraVENous, PRN, Cathy Gray MD, Stopped at 10/13/22 1100    heparin (porcine) injection 1,000 Units, 1,000 Units, IntraVENous, PRN, Marlen Funes MD, 1,000 Units at 10/13/22 0930    melatonin tablet 6 mg, 6 mg, Oral, Nightly PRN, Pasty Cools A Anson, DO, 6 mg at 10/08/22 2128    sertraline (ZOLOFT) tablet 100 mg, 100 mg, Oral, Daily, Zuleima Bright MD, 100 mg at 10/14/22 0917    sodium chloride flush 0.9 % injection 5-40 mL, 5-40 mL, IntraVENous, 2 times per day, Suly Cohen MD, 10 mL at 10/13/22 1936    sodium chloride flush 0.9 % injection 5-40 mL, 5-40 mL, IntraVENous, PRN, Suly Cohen MD    0.9 % sodium chloride infusion, , IntraVENous, PRN, Suly Cohen MD    ondansetron (ZOFRAN-ODT) disintegrating tablet 4 mg, 4 mg, Oral, Q8H PRN **OR** ondansetron (ZOFRAN) injection 4 mg, 4 mg, IntraVENous, Q6H PRN, Suly Cohen MD    polyethylene glycol (GLYCOLAX) packet 17 g, 17 g, Oral, Daily PRN, Suly Cohen MD, 17 g at 10/10/22 2117    acetaminophen (TYLENOL) tablet 650 mg, 650 mg, Oral, Q6H PRN, 650 mg at 10/12/22 1347 **OR** acetaminophen (TYLENOL) suppository 650 mg, 650 mg, Rectal, Q6H PRN, Suly Cohen MD    pantoprazole (PROTONIX) tablet 40 mg, 40 mg, Oral, QAM AC, Itz Vu MD, 40 mg at 10/14/22 0917    0.9 % sodium chloride infusion, , IntraVENous, PRN, Bimal Griggs,     vitamin B-12 (CYANOCOBALAMIN) tablet 500 mcg, 500 mcg, Oral, Daily, Suly Cohen MD, 500 mcg at 10/14/22 0916    verapamil (CALAN SR) extended release tablet 240 mg, 240 mg, Oral, Daily, Suly Cohen MD, 240 mg at 10/14/22 0916        REVIEW OF SYSTEMS:  As mentioned in chief complaint and HPI , Subjective       =======================================================================================     PHYSICAL EXAM:  Recent vital signs and recent I/Os reviewed by me.      Wt Readings from Last 3 Encounters:   10/14/22 276 lb 7.3 oz (125.4 kg)   10/06/22 282 lb (127.9 kg)   09/07/22 278 lb (126.1 kg)     BP Readings from Last 3 Encounters:   10/14/22 116/66   10/06/22 134/86   09/07/22 122/82     Patient Vitals for the past 24 hrs:   BP Temp Temp src Pulse Resp SpO2 Weight   10/14/22 0600 -- -- -- 62 -- -- --   10/14/22 0355 116/66 98 °F (36.7 °C) Temporal 64 16 95 % 276 lb 7.3 oz (125.4 kg)   10/14/22 0200 -- -- -- 61 -- -- --   10/13/22 2355 108/71 97.8 °F (36.6 °C) Temporal 59 16 98 % --   10/13/22 2200 -- -- -- 65 -- -- -- 10/13/22 2000 -- -- -- 80 -- -- --   10/13/22 1938 (!) 141/79 97.5 °F (36.4 °C) Temporal 78 16 96 % --   10/13/22 1900 -- -- -- 68 -- -- --   10/13/22 1800 -- -- -- 80 -- -- --   10/13/22 1600 127/84 97.7 °F (36.5 °C) Temporal 65 18 96 % --   10/13/22 1400 -- -- -- 94 -- -- --   10/13/22 1308 -- -- -- -- 16 96 % --   10/13/22 1300 -- -- -- 83 -- -- --   10/13/22 1107 (!) 134/93 -- -- -- -- -- --   10/13/22 1100 -- -- -- 80 -- -- --         Intake/Output Summary (Last 24 hours) at 10/14/2022 0941  Last data filed at 10/13/2022 1800  Gross per 24 hour   Intake 850 ml   Output --   Net 850 ml             Physical Exam  Vitals reviewed. Constitutional:       General: He is not in acute distress. Appearance: Normal appearance. HENT:      Head: Normocephalic and atraumatic. Right Ear: External ear normal.      Left Ear: External ear normal.      Nose: Nose normal.      Mouth/Throat:      Mouth: Mucous membranes are moist. Mucous membranes are not dry. Eyes:      General: No scleral icterus. Conjunctiva/sclera: Conjunctivae normal.   Neck:      Vascular: No JVD. Cardiovascular:      Rate and Rhythm: Normal rate and regular rhythm. Heart sounds: S1 normal and S2 normal.   Pulmonary:      Effort: Pulmonary effort is normal. No respiratory distress. Breath sounds: Normal breath sounds. Abdominal:      General: Bowel sounds are normal. There is no distension. Musculoskeletal:         General: No swelling or deformity. Cervical back: Normal range of motion and neck supple. Skin:     General: Skin is dry. Coloration: Skin is not jaundiced. Findings: Rash present. No lesion. Neurological:      Mental Status: He is alert and oriented to person, place, and time. Mental status is at baseline.    Psychiatric:         Mood and Affect: Mood normal.         Behavior: Behavior normal.        ======================================================================================= DATA:  Diagnostic tests reviewed by me for today's visit:   (AS NEEDED FOR MY EVALUATION AND MANAGEMENT). Recent Labs     10/12/22  0500 10/13/22  0525 10/14/22  0406   WBC 18.0* 13.8* 13.8*   HCT 27.4* 30.5* 31.4*   PLT 97* 127* 177       Iron Saturation:  No components found for: PERCENTFE  FERRITIN:    Lab Results   Component Value Date/Time    FERRITIN 1,025.0 10/07/2022 11:56 AM     IRON:    Lab Results   Component Value Date/Time    IRON 344 10/07/2022 11:56 AM     TIBC:    Lab Results   Component Value Date/Time    TIBC 399 10/07/2022 11:56 AM       Recent Labs     10/12/22  0500 10/13/22  0525 10/14/22  0406    139 136   K 4.2 4.3 4.0    104 101   CO2 28 29 29   BUN 24* 19 19   CREATININE 0.8* 0.8* 0.8*       Recent Labs     10/12/22  0500 10/13/22  0525 10/14/22  0406   CALCIUM 8.3 8.7 8.4   MG 2.10 2.30 2.20   PHOS 4.4 4.7 4.6       No results for input(s): PH, PCO2, PO2 in the last 72 hours.     Invalid input(s): [facility]    ABG:  No results found for: PH, PCO2, PO2, HCO3, BE, THGB, TCO2, O2SAT  VBG:    Lab Results   Component Value Date/Time    PHVEN 7.413 10/14/2022 04:06 AM       LDH:    Lab Results   Component Value Date/Time     10/14/2022 04:06 AM     Uric Acid:  No results found for: LABURIC, URICACID    PT/INR:    Lab Results   Component Value Date/Time    PROTIME 14.1 10/14/2022 04:06 AM    INR 1.10 10/14/2022 04:06 AM     Warfarin PT/INR:  No components found for: PTPATWAR, PTINRWAR  PTT:    Lab Results   Component Value Date/Time    APTT 22.9 10/14/2022 04:06 AM   [APTT}  Last 3 Troponin:  No results found for: TROPONINI    U/A:    Lab Results   Component Value Date/Time    NITRITE neg 10/08/2020 03:20 PM    COLORU Yellow 10/07/2022 11:55 AM    PROTEINU Negative 10/07/2022 11:55 AM    PHUR 7.0 10/07/2022 11:56 AM    WBCUA 0 10/07/2022 11:55 AM    RBCUA 12 10/07/2022 11:55 AM    BACTERIA None Seen 10/07/2022 11:55 AM    CLARITYU Clear 10/07/2022 11:55 AM    SPECGRAV 1.010 10/07/2022 11:55 AM    LEUKOCYTESUR Negative 10/07/2022 11:55 AM    UROBILINOGEN 2.0 10/07/2022 11:55 AM    BILIRUBINUR Negative 10/07/2022 11:55 AM    BILIRUBINUR neg 10/08/2020 03:20 PM    BLOODU MODERATE 10/07/2022 11:55 AM    GLUCOSEU Negative 10/07/2022 11:55 AM     Microalbumen/Creatinine ratio:  No components found for: RUCREAT  24 Hour Urine for Protein:  No components found for: RAWUPRO, UHRS3, HDCX69TX, UTV3  24 Hour Urine for Creatinine Clearance:  No components found for: CREAT4, UHRS10, UTV10  Urine Toxicology:  No components found for: IAMMENTA, IBARBIT, IBENZO, ICOCAINE, IMARTHC, IOPIATES, IPHENCYC    HgBA1c:    Lab Results   Component Value Date/Time    LABA1C 5.6 08/06/2022 08:15 AM     RPR:  No results found for: RPR  HIV:  No results found for: HIV  DEMI:    Lab Results   Component Value Date/Time    DEMI Negative 10/10/2022 02:35 PM     RF:    Lab Results   Component Value Date/Time    RF <10.0 10/10/2022 02:35 PM     DSDNA:  No components found for: DNA  AMYLASE:  No results found for: AMYLASE  LIPASE:  No results found for: LIPASE  Fibrinogen Level:  No components found for: FIB       BELOW MENTIONED RADIOLOGY STUDY RESULTS BY ME (AS NEEDED FOR MY EVALUATION AND MANAGEMENT). No results found.

## 2022-10-14 NOTE — PROGRESS NOTES
Assumed care of pt. Introduced self to pt. Plan of care discussed and questions answered. Pt denies needs at this time. Call light in reach.

## 2022-10-15 LAB
A/G RATIO: 1.9 (ref 1.1–2.2)
ALBUMIN SERPL-MCNC: 3.8 G/DL (ref 3.4–5)
ALP BLD-CCNC: 61 U/L (ref 40–129)
ALT SERPL-CCNC: 28 U/L (ref 10–40)
ANION GAP SERPL CALCULATED.3IONS-SCNC: 7 MMOL/L (ref 3–16)
ANISOCYTOSIS: ABNORMAL
APTT: 22.8 SEC (ref 23–34.3)
AST SERPL-CCNC: 15 U/L (ref 15–37)
BASOPHILS ABSOLUTE: 0 K/UL (ref 0–0.2)
BASOPHILS RELATIVE PERCENT: 0 %
BILIRUB SERPL-MCNC: 0.6 MG/DL (ref 0–1)
BILIRUBIN DIRECT: <0.2 MG/DL (ref 0–0.3)
BILIRUBIN, INDIRECT: NORMAL MG/DL (ref 0–1)
BLOOD BANK DISPENSE STATUS: NORMAL
BLOOD BANK PRODUCT CODE: NORMAL
BPU ID: NORMAL
BUN BLDV-MCNC: 21 MG/DL (ref 7–20)
CALCIUM IONIZED: 1.18 MMOL/L (ref 1.12–1.32)
CALCIUM SERPL-MCNC: 8.9 MG/DL (ref 8.3–10.6)
CHLORIDE BLD-SCNC: 102 MMOL/L (ref 99–110)
CO2: 28 MMOL/L (ref 21–32)
CREAT SERPL-MCNC: 0.8 MG/DL (ref 0.9–1.3)
DESCRIPTION BLOOD BANK: NORMAL
EOSINOPHILS ABSOLUTE: 0.2 K/UL (ref 0–0.6)
EOSINOPHILS RELATIVE PERCENT: 2 %
FIBRINOGEN: 313 MG/DL (ref 207–509)
GFR AFRICAN AMERICAN: >60
GFR NON-AFRICAN AMERICAN: >60
GLUCOSE BLD-MCNC: 72 MG/DL (ref 70–99)
HCT VFR BLD CALC: 33 % (ref 40.5–52.5)
HEMATOLOGY PATH CONSULT: NO
HEMOGLOBIN: 10.9 G/DL (ref 13.5–17.5)
INR BLD: 1.02 (ref 0.87–1.14)
LACTATE DEHYDROGENASE: 264 U/L (ref 100–190)
LYMPHOCYTES ABSOLUTE: 2.3 K/UL (ref 1–5.1)
LYMPHOCYTES RELATIVE PERCENT: 19 %
MAGNESIUM: 2.2 MG/DL (ref 1.8–2.4)
MCH RBC QN AUTO: 33 PG (ref 26–34)
MCHC RBC AUTO-ENTMCNC: 33.2 G/DL (ref 31–36)
MCV RBC AUTO: 99.3 FL (ref 80–100)
MONOCYTES ABSOLUTE: 1.1 K/UL (ref 0–1.3)
MONOCYTES RELATIVE PERCENT: 9 %
NEUTROPHILS ABSOLUTE: 8.6 K/UL (ref 1.7–7.7)
NEUTROPHILS RELATIVE PERCENT: 70 %
PDW BLD-RTO: 17.3 % (ref 12.4–15.4)
PH VENOUS: 7.37 (ref 7.35–7.45)
PHOSPHORUS: 4.3 MG/DL (ref 2.5–4.9)
PLATELET # BLD: 134 K/UL (ref 135–450)
PLATELET SLIDE REVIEW: ADEQUATE
PMV BLD AUTO: 8.2 FL (ref 5–10.5)
POTASSIUM SERPL-SCNC: 4.2 MMOL/L (ref 3.5–5.1)
PROTHROMBIN TIME: 13.3 SEC (ref 11.7–14.5)
RBC # BLD: 3.32 M/UL (ref 4.2–5.9)
SLIDE REVIEW: ABNORMAL
SODIUM BLD-SCNC: 137 MMOL/L (ref 136–145)
TOTAL PROTEIN: 5.8 G/DL (ref 6.4–8.2)
WBC # BLD: 12.3 K/UL (ref 4–11)

## 2022-10-15 PROCEDURE — 80053 COMPREHEN METABOLIC PANEL: CPT

## 2022-10-15 PROCEDURE — 83735 ASSAY OF MAGNESIUM: CPT

## 2022-10-15 PROCEDURE — 2580000003 HC RX 258: Performed by: INTERNAL MEDICINE

## 2022-10-15 PROCEDURE — 82248 BILIRUBIN DIRECT: CPT

## 2022-10-15 PROCEDURE — 6360000002 HC RX W HCPCS: Performed by: INTERNAL MEDICINE

## 2022-10-15 PROCEDURE — 2000000000 HC ICU R&B

## 2022-10-15 PROCEDURE — 2500000003 HC RX 250 WO HCPCS: Performed by: INTERNAL MEDICINE

## 2022-10-15 PROCEDURE — 6370000000 HC RX 637 (ALT 250 FOR IP): Performed by: FAMILY MEDICINE

## 2022-10-15 PROCEDURE — 83615 LACTATE (LD) (LDH) ENZYME: CPT

## 2022-10-15 PROCEDURE — 84100 ASSAY OF PHOSPHORUS: CPT

## 2022-10-15 PROCEDURE — P9059 PLASMA, FRZ BETWEEN 8-24HOUR: HCPCS

## 2022-10-15 PROCEDURE — 85025 COMPLETE CBC W/AUTO DIFF WBC: CPT

## 2022-10-15 PROCEDURE — P9017 PLASMA 1 DONOR FRZ W/IN 8 HR: HCPCS

## 2022-10-15 PROCEDURE — 85730 THROMBOPLASTIN TIME PARTIAL: CPT

## 2022-10-15 PROCEDURE — 85610 PROTHROMBIN TIME: CPT

## 2022-10-15 PROCEDURE — 2580000003 HC RX 258: Performed by: FAMILY MEDICINE

## 2022-10-15 PROCEDURE — 6370000000 HC RX 637 (ALT 250 FOR IP): Performed by: INTERNAL MEDICINE

## 2022-10-15 PROCEDURE — 82330 ASSAY OF CALCIUM: CPT

## 2022-10-15 PROCEDURE — 36514 APHERESIS PLASMA: CPT

## 2022-10-15 PROCEDURE — 85384 FIBRINOGEN ACTIVITY: CPT

## 2022-10-15 RX ORDER — ANTICOAGULANT CITRATE DEXTROSE SOLUTION FORMULA A 12.25; 11; 3.65 G/500ML; G/500ML; G/500ML
SOLUTION INTRAVENOUS CONTINUOUS PRN
Status: DISCONTINUED | OUTPATIENT
Start: 2022-10-15 | End: 2022-10-25

## 2022-10-15 RX ADMIN — FOLIC ACID 1 MG: 1 TABLET ORAL at 08:40

## 2022-10-15 RX ADMIN — PANTOPRAZOLE SODIUM 40 MG: 40 TABLET, DELAYED RELEASE ORAL at 07:05

## 2022-10-15 RX ADMIN — ANTICOAGULANT CITRATE DEXTROSE SOLUTION FORMULA A: 12.25; 11; 3.65 SOLUTION INTRAVENOUS at 16:52

## 2022-10-15 RX ADMIN — CYANOCOBALAMIN TAB 1000 MCG 500 MCG: 1000 TAB at 08:39

## 2022-10-15 RX ADMIN — PREDNISONE 120 MG: 20 TABLET ORAL at 08:40

## 2022-10-15 RX ADMIN — VERAPAMIL HYDROCHLORIDE 240 MG: 240 TABLET, FILM COATED, EXTENDED RELEASE ORAL at 08:39

## 2022-10-15 RX ADMIN — DIPHENHYDRAMINE HYDROCHLORIDE 12.5 MG: 25 TABLET ORAL at 22:15

## 2022-10-15 RX ADMIN — SERTRALINE 100 MG: 50 TABLET, FILM COATED ORAL at 08:40

## 2022-10-15 RX ADMIN — CALCIUM GLUCONATE 4000 MG: 98 INJECTION, SOLUTION INTRAVENOUS at 16:19

## 2022-10-15 RX ADMIN — POLYETHYLENE GLYCOL 3350 17 G: 17 POWDER, FOR SOLUTION ORAL at 14:20

## 2022-10-15 RX ADMIN — Medication 10 ML: at 14:19

## 2022-10-15 RX ADMIN — Medication 10 ML: at 21:00

## 2022-10-15 ASSESSMENT — PAIN SCALES - GENERAL
PAINLEVEL_OUTOF10: 0

## 2022-10-15 NOTE — PROGRESS NOTES
Therapeutic Plasma Exchange)  1  Volume of TPV exchange, with 100 % replacement.  Use FFP:  ( total : 3720 ml)   Number of Treatment : 6 of 7 total ( Started on 10/8, QD , SKIP Sunday)     Pt tolerated Well with TPE,   Fluid balance: + 142 ml ( including ACD-A, Ca gluconate, saline rinse blood back to pt)     Initial setting:  AC 3.1;  inlet 45.9;  plasma removal 31.6 , Replacement 27.5, ratio 15 ,      Final values:   ml;  inlet 6751 ml ;   plasma removal 4355 ml , Replacement  3721 ml;  duration 145 minutes  Started time: 1645  End time: 1913     Medication:  ACD-A ( per protocol via machine) to pt: 63 ml total during whole TPE  Ca: gluconate : 4 gm IVPB with whole TPE, started @ 0492         Next TPE scheduled on Evaluated by MDs     Placed TPE flow sheets and blood transfusion downtime papers in the chart for EMR scan

## 2022-10-15 NOTE — PROGRESS NOTES
Hospital Medicine Progress Note     Date:  10/14/2022    PCP: Sheree Ashraf MD (Tel: [phone])    Date of Admission: 10/7/2022        Subjective  Patient denies CP, SOB, HA or abdominal pain. Objective  Physical exam:  Vitals: BP (!) 160/86   Pulse 91   Temp 98 °F (36.7 °C) (Temporal)   Resp 20   Ht 5' 10.5\" (1.791 m)   Wt 276 lb 7.3 oz (125.4 kg)   SpO2 98%   BMI 39.11 kg/m²   Gen: Not in distress. Alert. Head: Normocephalic. Atraumatic. Eyes: EOMI. Good acuity. ENT: Oral mucosa moist.  R IJ vascath  Neck: No JVD. No obvious thyromegaly. CVS: Nml S1S2, no MRG, RRR  Pulmomary: Clear bilaterally. No crackles. No wheezes. Gastrointestinal: Soft, NT/ND. Positive bowel sounds. Musculoskeletal: No edema. Warm  Neuro: No focal deficit. Moves extremity spontaneously. Psychiatry: Appropriate affect. Not agitated. Skin: Warm, dry with normal turgor. No rash. Ecchymoses noted      24HR INTAKE/OUTPUT:  No intake or output data in the 24 hours ending 10/14/22 2125    I/O last 3 completed shifts: In: 5601 [P.O.:1080; I.V.:10]  Out: 4359   No intake/output data recorded.       Meds:    folic acid  1 mg Oral Daily    predniSONE  120 mg Oral Daily    Vibegron  75 mg Oral Daily    sertraline  100 mg Oral Daily    sodium chloride flush  5-40 mL IntraVENous 2 times per day    pantoprazole  40 mg Oral QAM AC    vitamin B-12  500 mcg Oral Daily    verapamil  240 mg Oral Daily       Infusions:    citrate dextrose 1,000 mL/hr at 10/13/22 0650    sodium chloride      sodium chloride           PRN Meds: diatrizoate meglumine-sodium, citrate dextrose, diphenhydrAMINE, sodium chloride flush, calcium gluconate, heparin (porcine), melatonin, sodium chloride flush, sodium chloride, ondansetron **OR** ondansetron, polyethylene glycol, acetaminophen **OR** acetaminophen, sodium chloride    Labs/imaging:  CBC:   Recent Labs     10/12/22  0500 10/13/22  0525 10/14/22  0406   WBC 18.0* 13.8* 13.8*   HGB 9.0* 10.2* 10.5*   PLT 97* 127* 177           BMP:    Recent Labs     10/12/22  0500 10/13/22  0525 10/14/22  0406    139 136   K 4.2 4.3 4.0    104 101   CO2 28 29 29   BUN 24* 19 19   CREATININE 0.8* 0.8* 0.8*   GLUCOSE 92 79 90           Hepatic:   Recent Labs     10/12/22  0500 10/13/22  0525 10/14/22  0406   AST 15 15 18   ALT 23 21 31   BILITOT 0.3 0.4 0.3   ALKPHOS 51 64 65         Troponin: No results for input(s): TROPONINI in the last 72 hours. BNP: No results for input(s): BNP in the last 72 hours. INR:   Recent Labs     10/12/22  0500 10/13/22  0525 10/14/22  0406   INR 1.12 1.17* 1.10             Reviewed imaging and reports noted      Assessment:  Principal Problem:    TTP (thrombotic thrombocytopenic purpura) ([facility])  Active Problems: Thrombocytopenia ([facility] [address]    Thrombocytopenic ([facility] [address]    Morbid obesity with BMI of 40.0-44.9, adult [facility])    Essential hypertension  Resolved Problems:    * No resolved hospital problems. *        Plan: Thrombotic thrombocytopenic purpura  -Appreciate oncology, pulmonology and nephrology recommendations  - Plasmapheresis tomorrow and Monday  - cont Prednisone 120 mg daily per Hematology  -No platelet transfusion secondary to possibility of forming blood clots      Essential hypertension  -Continue Verapamil      Anemia  -Secondary to above, continue to monitor and transfuse for hemoglobin less than 8      Anxiety and depression  -Continue Zoloft      Diet: ADULT DIET; Regular    Activity: up as tolerated  Prophylaxis: SCD    Code status: Full Code     ----------    Discussed with patient. Home next week when ok with Oncology.     Moon Ayala MD  -------------------------------  Rounding hospitalist

## 2022-10-15 NOTE — PROGRESS NOTES
Hospital Medicine Progress Note     Date:  10/15/2022    PCP: Artemio Nassar MD (Tel: [phone])    Date of Admission: 10/7/2022        Subjective  Patient denies CP, SOB, HA or abdominal pain. Ready for PLEX today. Objective  Physical exam:  Vitals: BP (!) 142/86   Pulse 61   Temp 97.6 °F (36.4 °C)   Resp 14   Ht 5' 10.5\" (1.791 m)   Wt 279 lb 1.6 oz (126.6 kg)   SpO2 100%   BMI 39.48 kg/m²   Gen: Not in distress. Alert. Head: Normocephalic. Atraumatic. Eyes: EOMI. Good acuity. ENT: Oral mucosa moist.  R IJ vascath  Neck: No JVD. No obvious thyromegaly. CVS: Nml S1S2, no MRG, RRR  Pulmomary: Clear bilaterally. No crackles. No wheezes. Gastrointestinal: Soft, NT/ND. Positive bowel sounds. Musculoskeletal: No edema. Warm  Neuro: No focal deficit. Moves extremity spontaneously. Psychiatry: Appropriate affect. Not agitated. Skin: Warm, dry with normal turgor. No rash. Ecchymoses noted      24HR INTAKE/OUTPUT:    Intake/Output Summary (Last 24 hours) at 10/15/2022 1016  Last data filed at 10/15/2022 0700  Gross per 24 hour   Intake 600 ml   Output --   Net 600 ml       I/O last 3 completed shifts: In: 600 [P.O.:600]  Out: -   No intake/output data recorded.       Meds:    folic acid  1 mg Oral Daily    predniSONE  120 mg Oral Daily    Vibegron  75 mg Oral Daily    sertraline  100 mg Oral Daily    sodium chloride flush  5-40 mL IntraVENous 2 times per day    pantoprazole  40 mg Oral QAM AC    vitamin B-12  500 mcg Oral Daily    verapamil  240 mg Oral Daily       Infusions:    citrate dextrose 1,000 mL/hr at 10/13/22 0650    sodium chloride      sodium chloride           PRN Meds: diatrizoate meglumine-sodium, citrate dextrose, diphenhydrAMINE, sodium chloride flush, calcium gluconate, heparin (porcine), melatonin, sodium chloride flush, sodium chloride, ondansetron **OR** ondansetron, polyethylene glycol, acetaminophen **OR** acetaminophen, sodium chloride    Labs/imaging:  CBC: Recent Labs     10/13/22  0525 10/14/22  0406 10/15/22  0639   WBC 13.8* 13.8* 12.3*   HGB 10.2* 10.5* 10.9*   * 177 134*           BMP:    Recent Labs     10/13/22  0525 10/14/22  0406 10/15/22  0639    136 137   K 4.3 4.0 4.2    101 102   CO2 29 29 28   BUN 19 19 21*   CREATININE 0.8* 0.8* 0.8*   GLUCOSE 79 90 72           Hepatic:   Recent Labs     10/13/22  0525 10/14/22  0406 10/15/22  0639   AST 15 18 15   ALT 21 31 28   BILITOT 0.4 0.3 0.6   ALKPHOS 64 65 61         Troponin: No results for input(s): TROPONINI in the last 72 hours. BNP: No results for input(s): BNP in the last 72 hours. INR:   Recent Labs     10/13/22  0525 10/14/22  0406 10/15/22  0639   INR 1.17* 1.10 1.02             Reviewed imaging and reports noted      Assessment:  Principal Problem:    TTP (thrombotic thrombocytopenic purpura) ([facility])  Active Problems: Thrombocytopenia ([facility] [address]    Thrombocytopenic ([facility] [address]    Morbid obesity with BMI of 40.0-44.9, adult [facility])    Essential hypertension  Resolved Problems:    * No resolved hospital problems. *        Plan: Thrombotic thrombocytopenic purpura  -Appreciate oncology, pulmonology and nephrology recommendations  - Plasmapheresis today and Monday 10/17  - cont Prednisone 120 mg daily per Hematology  -No platelet transfusion secondary to possibility of forming blood clots      Essential hypertension  -Continue Verapamil      Anemia  -Secondary to above, continue to monitor and transfuse for hemoglobin less than 8      Anxiety and depression  -Continue Zoloft      Diet: ADULT DIET; Regular    Activity: up as tolerated  Prophylaxis: SCD    Code status: Full Code     ----------    Discussed with patient and ICU RN. Home when ok with Oncology.     Mars Martin MD  -------------------------------  Rounding hospitalist

## 2022-10-15 NOTE — PROGRESS NOTES
Nursing Assessment Completed. Afebrile. VSS.  NSR. A&Ox4. Speech appropriate. Still scattered bruises healing. Patient denies pain. LS CTA. ABD soft +BSx4. Voiding quantitiy sufficient. Up ad lizy. Gait steady. No acute distress at present. Taking po well. Sitting up in chair. Ambulates independently to bathroom and up in [place]. Will continue to monitor.

## 2022-10-15 NOTE — PROGRESS NOTES
[facility]  Oncology Hematology Care  Progress Note      SUBJECTIVE:   No events over night  Pt doing well  Tolerated pheresis well   ROS: No fever chills sweats, no nausea, vomiting, diarrhea  shortness of breath chest pain or other pain  OBJECTIVE      Medications    Current Facility-Administered Medications: folic acid (FOLVITE) tablet 1 mg, 1 mg, Oral, Daily  predniSONE (DELTASONE) tablet 120 mg, 120 mg, Oral, Daily  Vibegron TABS 75 mg (Patient Supplied), 75 mg, Oral, Daily  diatrizoate meglumine-sodium (GASTROGRAFIN) 66-10 % solution 20 mL, 20 mL, Oral, ONCE PRN  citrate dextrose (ACD Formula A) 0.73-2.45-2.2 GM/100ML solution, , IntraCATHeter, Continuous PRN  diphenhydrAMINE (BENADRYL) tablet 12.5 mg, 12.5 mg, Oral, Nightly PRN  sodium chloride flush 0.9 % injection 10 mL, 10 mL, IntraCATHeter, PRN  calcium gluconate 4,000 mg in dextrose 5 % 100 mL IVPB, 4,000 mg, IntraVENous, PRN  heparin (porcine) injection 1,000 Units, 1,000 Units, IntraVENous, PRN  melatonin tablet 6 mg, 6 mg, Oral, Nightly PRN  sertraline (ZOLOFT) tablet 100 mg, 100 mg, Oral, Daily  sodium chloride flush 0.9 % injection 5-40 mL, 5-40 mL, IntraVENous, 2 times per day  sodium chloride flush 0.9 % injection 5-40 mL, 5-40 mL, IntraVENous, PRN  0.9 % sodium chloride infusion, , IntraVENous, PRN  ondansetron (ZOFRAN-ODT) disintegrating tablet 4 mg, 4 mg, Oral, Q8H PRN **OR** ondansetron (ZOFRAN) injection 4 mg, 4 mg, IntraVENous, Q6H PRN  polyethylene glycol (GLYCOLAX) packet 17 g, 17 g, Oral, Daily PRN  acetaminophen (TYLENOL) tablet 650 mg, 650 mg, Oral, Q6H PRN **OR** acetaminophen (TYLENOL) suppository 650 mg, 650 mg, Rectal, Q6H PRN  pantoprazole (PROTONIX) tablet 40 mg, 40 mg, Oral, QAM AC  0.9 % sodium chloride infusion, , IntraVENous, PRN  vitamin B-12 (CYANOCOBALAMIN) tablet 500 mcg, 500 mcg, Oral, Daily  verapamil (CALAN SR) extended release tablet 240 mg, 240 mg, Oral, Daily  Physical    VITALS:  BP (!) 142/86   Pulse 61   Temp 97.6 °F (36.4 °C)   Resp 14   Ht 5' 10.5\" (1.791 m)   Wt 279 lb 1.6 oz (126.6 kg)   SpO2 100%   BMI 39.48 kg/m²   TEMPERATURE:  Current - Temp: 97.6 °F (36.4 °C); Max - Temp  Av.7 °F (36.5 °C)  Min: 97.5 °F (36.4 °C)  Max: 98 °F (36.7 °C)  PULSE OXIMETRY RANGE: SpO2  Av.2 %  Min: 93 %  Max: 100 %  24HR INTAKE/OUTPUT:    Intake/Output Summary (Last 24 hours) at 10/15/2022 1436  Last data filed at 10/15/2022 1419  Gross per 24 hour   Intake 610 ml   Output --   Net 610 ml       CONSTITUTIONAL:  awake, alert, cooperative, no apparent distress, HEENT oral pharynx , no scleral icterus  HEMATOLOGIC/LYMPHATICS:  no cervical lymphadenopathy, no supraclavicular lymphadenopathy,   LUNGS:  No increased work of breathing, good air exchange, clear to auscultation bilaterally, no crackles or wheezing  CARDIOVASCULAR:  , regular rate and rhythm, normal S1 and S2, no S3 or S4, and no murmur noted  ABDOMEN:  No scars, normal bowel sounds, soft, non-distended, non-tender, no masses palpated, no hepatosplenomegally  MUSCULOSKELETAL:  There is no redness, warmth, or swelling of the joints. EXTREMETIES: No clubbing cynosis or edema  NEUROLOGIC:  Awake, alert, oriented to name, place and time. Cranial nerves II-XII are grossly intact. Motor is 5 out of 5 bilaterally.    SKIN: + bruises       Data      Recent Labs     10/13/22  0525 10/14/22  0406 10/15/22  0639   WBC 13.8* 13.8* 12.3*   HGB 10.2* 10.5* 10.9*   HCT 30.5* 31.4* 33.0*   * 177 134*   MCV 98.7 100.1* 99.3        Recent Labs     10/13/22  0525 10/14/22  0406 10/15/22  0639    136 137   K 4.3 4.0 4.2    101 102   CO2 29 29 28   PHOS 4.7 4.6 4.3   BUN 19 19 21*   CREATININE 0.8* 0.8* 0.8*     Recent Labs     10/13/22  0525 10/14/22  0406 10/15/22  0639   AST 15 18 15   ALT 21 31 28   BILIDIR <0.2 <0.2 <0.2   BILITOT 0.4 0.3 0.6   ALKPHOS 64 65 61       Magnesium:    Lab Results   Component Value Date/Time    MG 2.20 10/15/2022 06:39 AM    MG 2.20 10/14/2022 04:06 AM    MG 2.30 10/13/2022 05:25 AM       Imaging CT CHEST W CONTRAST    Result Date: 10/12/2022  EXAMINATION: CT OF THE CHEST WITH CONTRAST; CT OF THE ABDOMEN AND PELVIS WITH CONTRAST 10/10/2022 5:23 pm; 10/10/2022 5:24 pm TECHNIQUE: CT of the chest was performed with the administration of intravenous contrast. Multiplanar reformatted images are provided for review. Automated exposure control, iterative reconstruction, and/or weight based adjustment of the mA/kV was utilized to reduce the radiation dose to as low as reasonably achievable.; CT of the abdomen and pelvis was performed with the administration of intravenous contrast. Multiplanar reformatted images are provided for review. Automated exposure control, iterative reconstruction, and/or weight based adjustment of the mA/kV was utilized to reduce the radiation dose to as low as reasonably achievable. COMPARISON: 07/16/2019 HISTORY: ORDERING SYSTEM PROVIDED HISTORY: chest pain TECHNOLOGIST PROVIDED HISTORY: Reason for exam:->chest pain Reason for Exam: Chest pain. ; ORDERING SYSTEM PROVIDED HISTORY: abdominal pain TECHNOLOGIST PROVIDED HISTORY: Reason for exam:->abdominal pain Additional Contrast?->Oral Reason for Exam: Abdominal pain. FINDINGS: Chest: Mediastinum: The heart size within normal limits. The thoracic aorta is normal caliber. The main pulmonary artery is normal caliber. The esophagus is unremarkable. Moderate to large hiatal hernia. No pathologically enlarged adenopathy. Lungs/pleura: No focal consolidation, pleural effusion or pneumothorax. The central airways are otherwise patent. Soft Tissues/Bones: Right internal jugular central venous catheter with the tip terminating in the distal SVC. No acute findings in the bones or soft tissues. Abdomen/Pelvis: Organs: Status post cholecystectomy. The liver, spleen, pancreas and adrenal glands are without focal abnormality. No renal or ureteral calculus.   No hydronephrosis or perinephric stranding. The kidneys enhance symmetrically. Right renal cysts the largest measuring up to 2.2 cm. There is slight interval increase in size of a hyperdense lesion measuring 1.4 cm in the right interpolar kidney. GI/Bowel: No dilated loops of bowel or bowel wall thickening. No extraluminal contrast or free air. Mild to moderate amount stool in the colon. The appendix is not definitely visualized. Pelvis: No pathologically enlarged adenopathy or free fluid. The bladder is not well-distended. Peritoneum/Retroperitoneum: The aorta is normal in caliber. The celiac axis, SMA and SID are patent. The portal venous system is patent. No pathologically enlarged adenopathy. No ascites or drainable fluid collection. Bones/Soft Tissues: No acute findings in the bones or soft tissues. 1. No acute findings in the chest, abdomen or pelvis. 2. Moderate to large hiatal hernia. 3. Slightly increasing size of a 1.4 cm hyperdense lesion in the right kidney. Recommend nonemergent renal MRI once acute issues have resolved. CT ABDOMEN PELVIS W IV CONTRAST Additional Contrast? Oral    Result Date: 10/12/2022  EXAMINATION: CT OF THE CHEST WITH CONTRAST; CT OF THE ABDOMEN AND PELVIS WITH CONTRAST 10/10/2022 5:23 pm; 10/10/2022 5:24 pm TECHNIQUE: CT of the chest was performed with the administration of intravenous contrast. Multiplanar reformatted images are provided for review. Automated exposure control, iterative reconstruction, and/or weight based adjustment of the mA/kV was utilized to reduce the radiation dose to as low as reasonably achievable.; CT of the abdomen and pelvis was performed with the administration of intravenous contrast. Multiplanar reformatted images are provided for review. Automated exposure control, iterative reconstruction, and/or weight based adjustment of the mA/kV was utilized to reduce the radiation dose to as low as reasonably achievable.  COMPARISON: 07/16/2019 HISTORY: ORDERING SYSTEM PROVIDED HISTORY: chest pain TECHNOLOGIST PROVIDED HISTORY: Reason for exam:->chest pain Reason for Exam: Chest pain. ; ORDERING SYSTEM PROVIDED HISTORY: abdominal pain TECHNOLOGIST PROVIDED HISTORY: Reason for exam:->abdominal pain Additional Contrast?->Oral Reason for Exam: Abdominal pain. FINDINGS: Chest: Mediastinum: The heart size within normal limits. The thoracic aorta is normal caliber. The main pulmonary artery is normal caliber. The esophagus is unremarkable. Moderate to large hiatal hernia. No pathologically enlarged adenopathy. Lungs/pleura: No focal consolidation, pleural effusion or pneumothorax. The central airways are otherwise patent. Soft Tissues/Bones: Right internal jugular central venous catheter with the tip terminating in the distal SVC. No acute findings in the bones or soft tissues. Abdomen/Pelvis: Organs: Status post cholecystectomy. The liver, spleen, pancreas and adrenal glands are without focal abnormality. No renal or ureteral calculus. No hydronephrosis or perinephric stranding. The kidneys enhance symmetrically. Right renal cysts the largest measuring up to 2.2 cm. There is slight interval increase in size of a hyperdense lesion measuring 1.4 cm in the right interpolar kidney. GI/Bowel: No dilated loops of bowel or bowel wall thickening. No extraluminal contrast or free air. Mild to moderate amount stool in the colon. The appendix is not definitely visualized. Pelvis: No pathologically enlarged adenopathy or free fluid. The bladder is not well-distended. Peritoneum/Retroperitoneum: The aorta is normal in caliber. The celiac axis, SMA and SID are patent. The portal venous system is patent. No pathologically enlarged adenopathy. No ascites or drainable fluid collection. Bones/Soft Tissues: No acute findings in the bones or soft tissues. 1. No acute findings in the chest, abdomen or pelvis. 2. Moderate to large hiatal hernia.  3. Slightly increasing size of a 1.4 cm hyperdense lesion in the right kidney. Recommend nonemergent renal MRI once acute issues have resolved. XR CHEST PORTABLE    Result Date: 10/8/2022  EXAMINATION: ONE XRAY VIEW OF THE CHEST 10/8/2022 12:00 pm COMPARISON: None HISTORY: ORDERING SYSTEM PROVIDED HISTORY: placement of vas cath TECHNOLOGIST PROVIDED HISTORY: Reason for exam:->placement of vas cath Reason for Exam: Placement of vas cath FINDINGS: Right internal jugular central venous catheter with the tip in the mid superior vena cava. Overlying heart monitor leads. Eventration or elevation of the right hemidiaphragm. Clear lungs. No definite findings of pneumothorax or pleural effusion. Prominence of the pulmonary trunk that can be seen with pulmonary hypertension. Mildly prominent hilar contours. Cardiac contour within normal limits. Small to moderate hiatal hernia. No acute fracture. 1. A right internal jugular central line terminates in the mid superior vena cava. No associated pneumothorax. 2. No acute cardiopulmonary process. 3. Small to moderate hiatal hernia.        Problem List  Patient Active Problem List   Diagnosis    Depression    Dyslipidemia    Essential hypertension    Gastroesophageal reflux disease without esophagitis    Bursitis of right shoulder    Erectile dysfunction    YOHANNES (obstructive sleep apnea)    Class 2 severe obesity due to excess calories with serious comorbidity in adult [facility])    Synovial cyst of right popliteal space    Family history of prostate cancer    Thrombocytopenia (HCC)    Thrombocytopenic ([address]    TTP (thrombotic thrombocytopenic purpura) ([address]    Morbid obesity with BMI of 40.0-44.9, adult (HCC)       ASSESSMENT AND PLAN    Anemia and thrombocytopenia  -TTP confirmed  -On prednisone 1 mg/kg daily   - Continue folic acid  Pheresis today done-next is Monday  Defer to dr Dawson Nelson if rituxan to be given outpt   [place] =pt wife had question on this -is generally started if pts are refractory to pheresis and its generally started inpt thus I do not think this is dr Manny Chamberlain   No new recs today     Nella Moulton MD, MD

## 2022-10-15 NOTE — PLAN OF CARE
Problem: ABCDS Injury Assessment  Goal: Absence of physical injury  Outcome: Progressing     Problem: Discharge Planning  Goal: Discharge to home or other facility with appropriate resources  Outcome: Progressing     Problem: Pain  Goal: Verbalizes/displays adequate comfort level or baseline comfort level  Outcome: Progressing

## 2022-10-15 NOTE — PROGRESS NOTES
MD Geri Doshi MD Welby Ghazi, MD                  Office: [phone]                      Fax: [phone]             [facility]                   NEPHROLOGY INPATIENT PROGRESS NOTE:     PATIENT NAME: Marybeth Zapien  : 1980  MRN: [id number]      RECOMMENDATIONS:   --plans for total 7 Therapeutic Plasma Exchanges, started 10-8-22  -TPE #6 10-15,  #7 10-17 - as requested by hematologist     - 1 plasma volume exchange. - Replacement fluids w/ FFP, not 5% albumin  - monitor S. fibrinogen level, LDH, CBC, PT/INR, iCalcium, Mag, Phos -> fibrinogen level dropping but still above normal, monitor level  - ACD-A for a/c protocol.  - Calcium replacement protocol. Needing repletion,  - requested dialysis nurse - discussed w/ them,              Patient is tolerating Plex treatment well. - 6 treatment today. - Treatment #7-on . Medications reviewed  Volume status stable. Electrolytes are stable. Anemia levels are stable              -Dexamethasone 40 mg daily, -> PO Prednisone   -IV Ig - as per hematologist     -Cale Small placement on 10-8-2022  - by Dr Boris Keith - appreciated his assistance   -avoid platelet transfusion  - hold Lisinopril, HCTZ, can use hydralazine PRN for SBP >160s  - at higher risk for decompensation, needing closer monitoring. D/C plan from renal stand point:  - fup 5 TPE - then later this week       D/W pt, team, hematologist,, hospitalist, nurse, ICU, TPE nurse again today   Have discussed with patient family also      IMPRESSION:       Admitted on:  10/7/2022 10:36 AM   For:  Thrombocytopenia ([facility] [address] [D69.6]  Thrombocytopenic ([facility] [address] [D69.6]    ICD-10-CM    1.  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 HTN - was on  Lisinopril, HCTZ   YOHANNES  Obesity high 30s       Hospital Problems             Last Modified POA    * (Principal) TTP (thrombotic thrombocytopenic purpura) ([facility] [address] 10/11/2022 Yes    Thrombocytopenia ([facility] [address] 10/11/2022 Yes    Thrombocytopenic ([facility] [address] 10/7/2022 Yes    Morbid obesity with BMI of 40.0-44.9, adult ([facility] [address] 10/11/2022 Yes    Essential hypertension (Chronic) 10/11/2022 Yes     : other supportive care :   - Check daily renal function panel with electrolytes-phosphorus  - Strict monitoring of I/Os, daily weight  - non-Renal feeds/diet  - Current medications reviewed. Please refer to the orders. High Complexity. Multiple complex problems. Discussed with patient and treatment team-  family , nurse   Time spent > 30 (~35) minutes. Thank you for allowing me to participate in this patient's care. Please do not hesitate to contact me anytime. We will follow along with you. Rober Bateman MD,  [facility] [address] [place] [address] [place]: [phone] or [address]  Fax: [phone]        =======================================================================================   =======================================================================================  Subjective / interval history:   TPE x1 started 10-8  #5  Friday treatment so far tolerating it well no new complaints    Patient was seen comfortably sitting up in the chair today, is little upset that he has to stay no longer but he is happy to hear about his improving platelets  Walking around   Reported no active complaints, . Platelet counts responding to normal now    Past medical, Surgical, Social, Family medical history reviewed by me. MEDICATIONS: reviewed by me. Medications Prior to Admission:  No current facility-administered medications on file prior to encounter.      Current Outpatient Medications on File Prior to Encounter   Medication Sig Dispense Refill    omeprazole ([facility]) 20 MG delayed release capsule 1 daily 90 capsule 3    sildenafil (VIAGRA) 50 MG tablet Take 1 tablet by mouth as needed for Erectile Dysfunction 27 tablet 3    lisinopril (PRINIVIL;ZESTRIL) 40 MG tablet Take 1 tablet by mouth daily 90 tablet 3    verapamil (CALAN SR) 240 MG extended release tablet Take 1 tablet by mouth nightly 90 tablet 3    hydroCHLOROthiazide (HYDRODIURIL) 25 MG tablet Take 1 tablet by mouth daily 90 tablet 3    sertraline (ZOLOFT) 100 MG tablet 1 TABLET BY MOUTH DAILY 90 tablet 3    Vibegron (GEMTESA) 75 MG TABS Take 75 mg by mouth daily OVERACTIVE BLADDER      calcium carbonate (TUMS) 500 MG chewable tablet Take 6 tablets by mouth daily as needed for Heartburn           Current Facility-Administered Medications:     folic acid (FOLVITE) tablet 1 mg, 1 mg, Oral, Daily, Ivette Melton MD, 1 mg at 10/15/22 0840    predniSONE (DELTASONE) tablet 120 mg, 120 mg, Oral, Daily, Ivette Melton MD, 120 mg at 10/15/22 0840    Vibegron TABS 75 mg (Patient Supplied), 75 mg, Oral, Daily, Romana Lefevre, MD, 75 mg at 10/15/22 0840    diatrizoate meglumine-sodium (GASTROGRAFIN) 66-10 % solution 20 mL, 20 mL, Oral, ONCE PRN, Ivette Melton MD, 20 mL at 10/10/22 1502    citrate dextrose (ACD Formula A) 0.73-2.45-2.2 GM/100ML solution, , IntraCATHeter, Continuous PRN, Genoveva Palmer MD, Last Rate: 1,000 mL/hr at 10/13/22 0650, New Bag at 10/13/22 0650    diphenhydrAMINE (BENADRYL) tablet 12.5 mg, 12.5 mg, Oral, Nightly PRN, Shashank Griggs DO, 12.5 mg at 10/14/22 2105    sodium chloride flush 0.9 % injection 10 mL, 10 mL, IntraCATHeter, PRN, Genoveva Palmer MD    calcium gluconate 4,000 mg in dextrose 5 % 100 mL IVPB, 4,000 mg, IntraVENous, PRN, Genoveva Palmer MD, Stopped at 10/13/22 1100    heparin (porcine) injection 1,000 Units, 1,000 Units, IntraVENous, PRN, Berta Lei MD, 1,000 Units at 10/13/22 0930    melatonin tablet 6 mg, 6 mg, Oral, Nightly PRN, Yohana Griggs DO, 6 mg at 10/08/22 2128    sertraline (ZOLOFT) tablet 100 mg, 100 mg, Oral, Daily, Haylie Narayanan MD, 100 mg at 10/15/22 0840    sodium chloride flush 0.9 % injection 5-40 mL, 5-40 mL, IntraVENous, 2 times per day, Haylie Narayanan MD, 10 mL at 10/14/22 2021    sodium chloride flush 0.9 % injection 5-40 mL, 5-40 mL, IntraVENous, PRN, Haylie Narayanan MD    0.9 % sodium chloride infusion, , IntraVENous, PRN, Haylie Narayanan MD    ondansetron (ZOFRAN-ODT) disintegrating tablet 4 mg, 4 mg, Oral, Q8H PRN **OR** ondansetron (ZOFRAN) injection 4 mg, 4 mg, IntraVENous, Q6H PRN, Haylie Narayanan MD    polyethylene glycol (GLYCOLAX) packet 17 g, 17 g, Oral, Daily PRN, Haylie Narayanan MD, 17 g at 10/10/22 2117    acetaminophen (TYLENOL) tablet 650 mg, 650 mg, Oral, Q6H PRN, 650 mg at 10/12/22 1347 **OR** acetaminophen (TYLENOL) suppository 650 mg, 650 mg, Rectal, Q6H PRN, Haylie Narayanan MD    pantoprazole (PROTONIX) tablet 40 mg, 40 mg, Oral, QAM AC, Stephen Jang MD, 40 mg at 10/15/22 0705    0.9 % sodium chloride infusion, , IntraVENous, PRN, Patricia Griggs DO    vitamin B-12 (CYANOCOBALAMIN) tablet 500 mcg, 500 mcg, Oral, Daily, Haylie Narayanan MD, 500 mcg at 10/15/22 0839    verapamil (CALAN SR) extended release tablet 240 mg, 240 mg, Oral, Daily, Haylie Narayanan MD, 240 mg at 10/15/22 0839        REVIEW OF SYSTEMS:  As mentioned in chief complaint and HPI , Subjective       =======================================================================================     PHYSICAL EXAM:  Recent vital signs and recent I/Os reviewed by me.      Wt Readings from Last 3 Encounters:   10/15/22 279 lb 1.6 oz (126.6 kg)   10/06/22 282 lb (127.9 kg)   09/07/22 278 lb (126.1 kg)     BP Readings from Last 3 Encounters:   10/15/22 (!) 142/86   10/06/22 134/86   09/07/22 122/82     Patient Vitals for the past 24 hrs:   BP Temp Temp src Pulse Resp SpO2 Weight   10/15/22 0847 (!) 142/86 97.6 °F (36.4 °C) -- 61 -- -- --   10/15/22 0700 -- -- -- 57 -- -- --   10/15/22 0630 (!) 134/95 -- -- 52 -- 100 % --   10/15/22 0500 114/74 -- -- (!) 47 14 100 % --   10/15/22 0400 132/71 97.5 °F (36.4 °C) Temporal (!) 49 16 93 % 279 lb 1.6 oz (126.6 kg)   10/15/22 0300 -- -- -- 50 -- -- --   10/15/22 0100 126/82 -- -- 58 -- -- --   10/15/22 0000 122/68 -- -- (!) 49 -- -- --   10/14/22 2310 126/81 -- -- 53 14 98 % --   10/14/22 2215 114/81 -- -- 83 14 99 % --   10/14/22 2200 114/81 -- -- -- 16 98 % --   10/14/22 2110 128/84 -- -- 69 -- -- --   10/14/22 2100 (!) 138/112 -- -- 66 16 99 % --   10/14/22 2018 (!) 160/86 98 °F (36.7 °C) Temporal 91 14 98 % --   10/14/22 2000 -- -- -- 77 16 99 % --   10/14/22 1600 -- -- -- 92 -- -- --         Intake/Output Summary (Last 24 hours) at 10/15/2022 1118  Last data filed at 10/15/2022 0700  Gross per 24 hour   Intake 600 ml   Output --   Net 600 ml             Physical Exam  Vitals reviewed. Constitutional:       General: He is not in acute distress. Appearance: Normal appearance. HENT:      Head: Normocephalic and atraumatic. Right Ear: External ear normal.      Left Ear: External ear normal.      Nose: Nose normal.      Mouth/Throat:      Mouth: Mucous membranes are moist. Mucous membranes are not dry. Eyes:      General: No scleral icterus. Conjunctiva/sclera: Conjunctivae normal.   Neck:      Vascular: No JVD. Cardiovascular:      Rate and Rhythm: Normal rate and regular rhythm. Heart sounds: S1 normal and S2 normal.   Pulmonary:      Effort: Pulmonary effort is normal. No respiratory distress. Breath sounds: Normal breath sounds. Abdominal:      General: Bowel sounds are normal. There is no distension. Musculoskeletal:         General: No swelling or deformity. Cervical back: Normal range of motion and neck supple. Skin:     General: Skin is dry. Coloration: Skin is not jaundiced. Findings: Rash present. No lesion.    Neurological: Mental Status: He is alert and oriented to person, place, and time. Mental status is at baseline. Psychiatric:         Mood and Affect: Mood normal.         Behavior: Behavior normal.        =======================================================================================     DATA:  Diagnostic tests reviewed by me for today's visit:   (AS NEEDED FOR MY EVALUATION AND MANAGEMENT). Recent Labs     10/13/22  0525 10/14/22  0406 10/15/22  0639   WBC 13.8* 13.8* 12.3*   HCT 30.5* 31.4* 33.0*   * 177 134*       Iron Saturation:  No components found for: PERCENTFE  FERRITIN:    Lab Results   Component Value Date/Time    FERRITIN 1,025.0 10/07/2022 11:56 AM     IRON:    Lab Results   Component Value Date/Time    IRON 344 10/07/2022 11:56 AM     TIBC:    Lab Results   Component Value Date/Time    TIBC 399 10/07/2022 11:56 AM       Recent Labs     10/13/22  0525 10/14/22  0406 10/15/22  0639    136 137   K 4.3 4.0 4.2    101 102   CO2 29 29 28   BUN 19 19 21*   CREATININE 0.8* 0.8* 0.8*       Recent Labs     10/13/22  0525 10/14/22  0406 10/15/22  0639   CALCIUM 8.7 8.4 8.9   MG 2.30 2.20 2.20   PHOS 4.7 4.6 4.3       No results for input(s): PH, PCO2, PO2 in the last 72 hours.     Invalid input(s): Sharif Dessert    ABG:  No results found for: PH, PCO2, PO2, HCO3, BE, THGB, TCO2, O2SAT  VBG:    Lab Results   Component Value Date/Time    PHVEN 7.372 10/15/2022 06:38 AM       LDH:    Lab Results   Component Value Date/Time     10/15/2022 06:39 AM     Uric Acid:  No results found for: LABURIC, URICACID    PT/INR:    Lab Results   Component Value Date/Time    PROTIME 13.3 10/15/2022 06:39 AM    INR 1.02 10/15/2022 06:39 AM     Warfarin PT/INR:  No components found for: PTPATWAR, PTINRWAR  PTT:    Lab Results   Component Value Date/Time    APTT 22.8 10/15/2022 06:39 AM   [APTT}  Last 3 Troponin:  No results found for: TROPONINI    U/A:    Lab Results   Component Value Date/Time NITRITE neg 10/08/2020 03:20 PM    COLORU Yellow 10/07/2022 11:55 AM    PROTEINU Negative 10/07/2022 11:55 AM    PHUR 7.0 10/07/2022 11:56 AM    WBCUA 0 10/07/2022 11:55 AM    RBCUA 12 10/07/2022 11:55 AM    BACTERIA None Seen 10/07/2022 11:55 AM    CLARITYU Clear 10/07/2022 11:55 AM    SPECGRAV 1.010 10/07/2022 11:55 AM    LEUKOCYTESUR Negative 10/07/2022 11:55 AM    UROBILINOGEN 2.0 10/07/2022 11:55 AM    BILIRUBINUR Negative 10/07/2022 11:55 AM    BILIRUBINUR neg 10/08/2020 03:20 PM    BLOODU MODERATE 10/07/2022 11:55 AM    GLUCOSEU Negative 10/07/2022 11:55 AM     Microalbumen/Creatinine ratio:  No components found for: RUCREAT  24 Hour Urine for Protein:  No components found for: RAWUPRO, UHRS3, UAQO29DE, UTV3  24 Hour Urine for Creatinine Clearance:  No components found for: CREAT4, UHRS10, UTV10  Urine Toxicology:  No components found for: IAMMENTA, IBARBIT, IBENZO, ICOCAINE, IMARTHC, IOPIATES, IPHENCYC    HgBA1c:    Lab Results   Component Value Date/Time    LABA1C 5.6 08/06/2022 08:15 AM     RPR:  No results found for: RPR  HIV:  No results found for: HIV  DEMI:    Lab Results   Component Value Date/Time    EDMI Negative 10/10/2022 02:35 PM     RF:    Lab Results   Component Value Date/Time    RF <10.0 10/10/2022 02:35 PM     DSDNA:  No components found for: DNA  AMYLASE:  No results found for: AMYLASE  LIPASE:  No results found for: LIPASE  Fibrinogen Level:  No components found for: FIB       BELOW MENTIONED RADIOLOGY STUDY RESULTS BY ME (AS NEEDED FOR MY EVALUATION AND MANAGEMENT). No results found.

## 2022-10-15 NOTE — PROGRESS NOTES
Patient is alert and oriented times 4. Up ad lizy. Ambulates well gait steady. Patient is cheerful, social always talking with staff. Friendly and smiling. Toilets self. Order received that patient was able to remove tele box while up but is not to be downgraded.  Vitals stable no acute distress or complaints of headaches, nausea or vomiting.,

## 2022-10-16 LAB
A/G RATIO: 1.8 (ref 1.1–2.2)
ALBUMIN SERPL-MCNC: 3.5 G/DL (ref 3.4–5)
ALP BLD-CCNC: 56 U/L (ref 40–129)
ALT SERPL-CCNC: 20 U/L (ref 10–40)
ANION GAP SERPL CALCULATED.3IONS-SCNC: 5 MMOL/L (ref 3–16)
APTT: 23.5 SEC (ref 23–34.3)
AST SERPL-CCNC: 17 U/L (ref 15–37)
BILIRUB SERPL-MCNC: 0.4 MG/DL (ref 0–1)
BILIRUBIN DIRECT: <0.2 MG/DL (ref 0–0.3)
BILIRUBIN, INDIRECT: NORMAL MG/DL (ref 0–1)
BUN BLDV-MCNC: 27 MG/DL (ref 7–20)
CALCIUM IONIZED: 1.16 MMOL/L (ref 1.12–1.32)
CALCIUM SERPL-MCNC: 8.7 MG/DL (ref 8.3–10.6)
CHLORIDE BLD-SCNC: 99 MMOL/L (ref 99–110)
CO2: 31 MMOL/L (ref 21–32)
CREAT SERPL-MCNC: 0.8 MG/DL (ref 0.9–1.3)
FIBRINOGEN: 237 MG/DL (ref 207–509)
GFR AFRICAN AMERICAN: >60
GFR NON-AFRICAN AMERICAN: >60
GLUCOSE BLD-MCNC: 87 MG/DL (ref 70–99)
HCT VFR BLD CALC: 33.4 % (ref 40.5–52.5)
HEMOGLOBIN: 11.3 G/DL (ref 13.5–17.5)
INR BLD: 1.08 (ref 0.87–1.14)
LACTATE DEHYDROGENASE: 269 U/L (ref 100–190)
MAGNESIUM: 2.1 MG/DL (ref 1.8–2.4)
MCH RBC QN AUTO: 33.7 PG (ref 26–34)
MCHC RBC AUTO-ENTMCNC: 33.7 G/DL (ref 31–36)
MCV RBC AUTO: 99.9 FL (ref 80–100)
PDW BLD-RTO: 17.4 % (ref 12.4–15.4)
PH VENOUS: 7.4 (ref 7.35–7.45)
PHOSPHORUS: 5.3 MG/DL (ref 2.5–4.9)
PLATELET # BLD: 101 K/UL (ref 135–450)
PMV BLD AUTO: 9.2 FL (ref 5–10.5)
POTASSIUM SERPL-SCNC: 4.1 MMOL/L (ref 3.5–5.1)
PROTHROMBIN TIME: 13.9 SEC (ref 11.7–14.5)
RBC # BLD: 3.34 M/UL (ref 4.2–5.9)
SODIUM BLD-SCNC: 135 MMOL/L (ref 136–145)
TOTAL PROTEIN: 5.4 G/DL (ref 6.4–8.2)
WBC # BLD: 13.7 K/UL (ref 4–11)

## 2022-10-16 PROCEDURE — 2580000003 HC RX 258: Performed by: FAMILY MEDICINE

## 2022-10-16 PROCEDURE — 85384 FIBRINOGEN ACTIVITY: CPT

## 2022-10-16 PROCEDURE — 82248 BILIRUBIN DIRECT: CPT

## 2022-10-16 PROCEDURE — 80053 COMPREHEN METABOLIC PANEL: CPT

## 2022-10-16 PROCEDURE — 85610 PROTHROMBIN TIME: CPT

## 2022-10-16 PROCEDURE — 85027 COMPLETE CBC AUTOMATED: CPT

## 2022-10-16 PROCEDURE — 6370000000 HC RX 637 (ALT 250 FOR IP): Performed by: INTERNAL MEDICINE

## 2022-10-16 PROCEDURE — 83615 LACTATE (LD) (LDH) ENZYME: CPT

## 2022-10-16 PROCEDURE — 6370000000 HC RX 637 (ALT 250 FOR IP): Performed by: FAMILY MEDICINE

## 2022-10-16 PROCEDURE — 82330 ASSAY OF CALCIUM: CPT

## 2022-10-16 PROCEDURE — 84100 ASSAY OF PHOSPHORUS: CPT

## 2022-10-16 PROCEDURE — 83735 ASSAY OF MAGNESIUM: CPT

## 2022-10-16 PROCEDURE — 85730 THROMBOPLASTIN TIME PARTIAL: CPT

## 2022-10-16 PROCEDURE — 2000000000 HC ICU R&B

## 2022-10-16 PROCEDURE — 36592 COLLECT BLOOD FROM PICC: CPT

## 2022-10-16 RX ORDER — CLOBETASOL PROPIONATE 0.5 MG/G
CREAM TOPICAL 2 TIMES DAILY
Status: DISCONTINUED | OUTPATIENT
Start: 2022-10-16 | End: 2022-10-26 | Stop reason: HOSPADM

## 2022-10-16 RX ORDER — DIAZEPAM 5 MG/1
5 TABLET ORAL EVERY 12 HOURS PRN
Status: DISCONTINUED | OUTPATIENT
Start: 2022-10-16 | End: 2022-10-16

## 2022-10-16 RX ORDER — DIAZEPAM 5 MG/1
5 TABLET ORAL EVERY 6 HOURS PRN
Status: DISCONTINUED | OUTPATIENT
Start: 2022-10-16 | End: 2022-10-26 | Stop reason: HOSPADM

## 2022-10-16 RX ORDER — DIPHENHYDRAMINE HYDROCHLORIDE 50 MG/ML
25 INJECTION INTRAMUSCULAR; INTRAVENOUS ONCE
Status: COMPLETED | OUTPATIENT
Start: 2022-10-17 | End: 2022-10-17

## 2022-10-16 RX ADMIN — Medication 10 ML: at 09:41

## 2022-10-16 RX ADMIN — DIAZEPAM 5 MG: 5 TABLET ORAL at 21:45

## 2022-10-16 RX ADMIN — VERAPAMIL HYDROCHLORIDE 240 MG: 240 TABLET, FILM COATED, EXTENDED RELEASE ORAL at 11:18

## 2022-10-16 RX ADMIN — PREDNISONE 120 MG: 20 TABLET ORAL at 11:18

## 2022-10-16 RX ADMIN — Medication 10 ML: at 19:59

## 2022-10-16 RX ADMIN — CYANOCOBALAMIN TAB 1000 MCG 500 MCG: 1000 TAB at 09:40

## 2022-10-16 RX ADMIN — SERTRALINE 100 MG: 50 TABLET, FILM COATED ORAL at 09:40

## 2022-10-16 RX ADMIN — PANTOPRAZOLE SODIUM 40 MG: 40 TABLET, DELAYED RELEASE ORAL at 06:39

## 2022-10-16 RX ADMIN — FOLIC ACID 1 MG: 1 TABLET ORAL at 09:40

## 2022-10-16 ASSESSMENT — PAIN SCALES - GENERAL
PAINLEVEL_OUTOF10: 0

## 2022-10-16 NOTE — PROGRESS NOTES
[facility]  Progress Note      SUBJECTIVE:   PT doing ok but ptls dropped  Had slight hives after pheresis   Spent a long time discusssing with pt and wife  I suspect he will need rituxan or caplivi   ROS: No fever chills sweats, no nausea, vomiting, diarrhea  shortness of breath chest pain or other pain  OBJECTIVE      Medications    Current Facility-Administered Medications: calcium gluconate 4,000 mg in dextrose 5 % 100 mL IVPB, 4,000 mg, IntraVENous, PRN  citrate dextrose (ACD Formula A) 0.73-2.45-2.2 GM/100ML solution, , IntraCATHeter, Continuous PRN  folic acid (FOLVITE) tablet 1 mg, 1 mg, Oral, Daily  predniSONE (DELTASONE) tablet 120 mg, 120 mg, Oral, Daily  Vibegron TABS 75 mg (Patient Supplied), 75 mg, Oral, Daily  diatrizoate meglumine-sodium (GASTROGRAFIN) 66-10 % solution 20 mL, 20 mL, Oral, ONCE PRN  diphenhydrAMINE (BENADRYL) tablet 12.5 mg, 12.5 mg, Oral, Nightly PRN  sodium chloride flush 0.9 % injection 10 mL, 10 mL, IntraCATHeter, PRN  heparin (porcine) injection 1,000 Units, 1,000 Units, IntraVENous, PRN  melatonin tablet 6 mg, 6 mg, Oral, Nightly PRN  sertraline (ZOLOFT) tablet 100 mg, 100 mg, Oral, Daily  sodium chloride flush 0.9 % injection 5-40 mL, 5-40 mL, IntraVENous, 2 times per day  sodium chloride flush 0.9 % injection 5-40 mL, 5-40 mL, IntraVENous, PRN  0.9 % sodium chloride infusion, , IntraVENous, PRN  ondansetron (ZOFRAN-ODT) disintegrating tablet 4 mg, 4 mg, Oral, Q8H PRN **OR** ondansetron (ZOFRAN) injection 4 mg, 4 mg, IntraVENous, Q6H PRN  polyethylene glycol (GLYCOLAX) packet 17 g, 17 g, Oral, Daily PRN  acetaminophen (TYLENOL) tablet 650 mg, 650 mg, Oral, Q6H PRN **OR** acetaminophen (TYLENOL) suppository 650 mg, 650 mg, Rectal, Q6H PRN  pantoprazole (PROTONIX) tablet 40 mg, 40 mg, Oral, QAM AC  0.9 % sodium chloride infusion, , IntraVENous, PRN  vitamin B-12 (CYANOCOBALAMIN) tablet 500 mcg, 500 mcg, Oral, Daily  verapamil (CALAN SR) extended release tablet 240 mg, 240 mg, Oral, Daily  Physical    VITALS:  /72   Pulse 50   Temp 97.3 °F (36.3 °C) (Temporal)   Resp 16   Ht 5' 10.5\" (1.791 m)   Wt 279 lb 1.6 oz (126.6 kg)   SpO2 100%   BMI 39.48 kg/m²   TEMPERATURE:  Current - Temp: 97.3 °F (36.3 °C); Max - Temp  Av.4 °F (36.3 °C)  Min: 97.2 °F (36.2 °C)  Max: 97.6 °F (36.4 °C)  PULSE OXIMETRY RANGE: SpO2  Av.2 %  Min: 92 %  Max: 100 %  24HR INTAKE/OUTPUT:    Intake/Output Summary (Last 24 hours) at 10/16/2022 1049  Last data filed at 10/16/2022 0640  Gross per 24 hour   Intake 1120 ml   Output --   Net 1120 ml       CONSTITUTIONAL:  awake, alert, cooperative, no apparent distress, HEENT oral pharynx , no scleral icterus  HEMATOLOGIC/LYMPHATICS:  no cervical lymphadenopathy, no supraclavicular lymphadenopathy,   LUNGS:  No increased work of breathing, good air exchange, clear to auscultation bilaterally, no crackles or wheezing  CARDIOVASCULAR:  , regular rate and rhythm, normal S1 and S2, no S3 or S4, and no murmur noted  ABDOMEN:  No scars, normal bowel sounds, soft, non-distended, non-tender, no masses palpated, no hepatosplenomegally  MUSCULOSKELETAL:  There is no redness, warmth, or swelling of the joints. EXTREMETIES: No clubbing cynosis or edema  NEUROLOGIC:  Awake, alert, oriented to name, place and time. Cranial nerves II-XII are grossly intact. Motor is 5 out of 5 bilaterally.    SKIN: + bruises mild hives         Data      Recent Labs     10/14/22  0406 10/15/22  0639 10/16/22  0330   WBC 13.8* 12.3* 13.7*   HGB 10.5* 10.9* 11.3*   HCT 31.4* 33.0* 33.4*    134* 101*   .1* 99.3 99.9        Recent Labs     10/14/22  0406 10/15/22  0639 10/16/22  0330    137 135*   K 4.0 4.2 4.1    102 99   CO2 29 28 31   PHOS 4.6 4.3 5.3*   BUN 19 21* 27*   CREATININE 0.8* 0.8* 0.8*     Recent Labs     10/14/22  0406 10/15/22  0639 10/16/22  0330   AST 18 15 17   ALT 31 28 20   BILIDIR <0.2 <0.2 <0.2   BILITOT 0.3 0.6 0.4 ALKPHOS 65 61 56       Magnesium:    Lab Results   Component Value Date/Time    MG 2.10 10/16/2022 03:30 AM    MG 2.20 10/15/2022 06:39 AM    MG 2.20 10/14/2022 04:06 AM       Imaging CT CHEST W CONTRAST    Result Date: 10/12/2022  EXAMINATION: CT OF THE CHEST WITH CONTRAST; CT OF THE ABDOMEN AND PELVIS WITH CONTRAST 10/10/2022 5:23 pm; 10/10/2022 5:24 pm TECHNIQUE: CT of the chest was performed with the administration of intravenous contrast. Multiplanar reformatted images are provided for review. Automated exposure control, iterative reconstruction, and/or weight based adjustment of the mA/kV was utilized to reduce the radiation dose to as low as reasonably achievable.; CT of the abdomen and pelvis was performed with the administration of intravenous contrast. Multiplanar reformatted images are provided for review. Automated exposure control, iterative reconstruction, and/or weight based adjustment of the mA/kV was utilized to reduce the radiation dose to as low as reasonably achievable. COMPARISON: 07/16/2019 HISTORY: ORDERING SYSTEM PROVIDED HISTORY: chest pain TECHNOLOGIST PROVIDED HISTORY: Reason for exam:->chest pain Reason for Exam: Chest pain. ; ORDERING SYSTEM PROVIDED HISTORY: abdominal pain TECHNOLOGIST PROVIDED HISTORY: Reason for exam:->abdominal pain Additional Contrast?->Oral Reason for Exam: Abdominal pain. FINDINGS: Chest: Mediastinum: The heart size within normal limits. The thoracic aorta is normal caliber. The main pulmonary artery is normal caliber. The esophagus is unremarkable. Moderate to large hiatal hernia. No pathologically enlarged adenopathy. Lungs/pleura: No focal consolidation, pleural effusion or pneumothorax. The central airways are otherwise patent. Soft Tissues/Bones: Right internal jugular central venous catheter with the tip terminating in the distal SVC. No acute findings in the bones or soft tissues. Abdomen/Pelvis: Organs: Status post cholecystectomy.   The liver, spleen, pancreas and adrenal glands are without focal abnormality. No renal or ureteral calculus. No hydronephrosis or perinephric stranding. The kidneys enhance symmetrically. Right renal cysts the largest measuring up to 2.2 cm. There is slight interval increase in size of a hyperdense lesion measuring 1.4 cm in the right interpolar kidney. GI/Bowel: No dilated loops of bowel or bowel wall thickening. No extraluminal contrast or free air. Mild to moderate amount stool in the colon. The appendix is not definitely visualized. Pelvis: No pathologically enlarged adenopathy or free fluid. The bladder is not well-distended. Peritoneum/Retroperitoneum: The aorta is normal in caliber. The celiac axis, SMA and SID are patent. The portal venous system is patent. No pathologically enlarged adenopathy. No ascites or drainable fluid collection. Bones/Soft Tissues: No acute findings in the bones or soft tissues. 1. No acute findings in the chest, abdomen or pelvis. 2. Moderate to large hiatal hernia. 3. Slightly increasing size of a 1.4 cm hyperdense lesion in the right kidney. Recommend nonemergent renal MRI once acute issues have resolved. CT ABDOMEN PELVIS W IV CONTRAST Additional Contrast? Oral    Result Date: 10/12/2022  EXAMINATION: CT OF THE CHEST WITH CONTRAST; CT OF THE ABDOMEN AND PELVIS WITH CONTRAST 10/10/2022 5:23 pm; 10/10/2022 5:24 pm TECHNIQUE: CT of the chest was performed with the administration of intravenous contrast. Multiplanar reformatted images are provided for review. Automated exposure control, iterative reconstruction, and/or weight based adjustment of the mA/kV was utilized to reduce the radiation dose to as low as reasonably achievable.; CT of the abdomen and pelvis was performed with the administration of intravenous contrast. Multiplanar reformatted images are provided for review.  Automated exposure control, iterative reconstruction, and/or weight based adjustment of the mA/kV No acute findings in the chest, abdomen or pelvis. 2. Moderate to large hiatal hernia. 3. Slightly increasing size of a 1.4 cm hyperdense lesion in the right kidney. Recommend nonemergent renal MRI once acute issues have resolved. XR CHEST PORTABLE    Result Date: 10/8/2022  EXAMINATION: ONE XRAY VIEW OF THE CHEST 10/8/2022 12:00 pm COMPARISON: None HISTORY: ORDERING SYSTEM PROVIDED HISTORY: placement of vas cath TECHNOLOGIST PROVIDED HISTORY: Reason for exam:->placement of vas cath Reason for Exam: Placement of vas cath FINDINGS: Right internal jugular central venous catheter with the tip in the mid superior vena cava. Overlying heart monitor leads. Eventration or elevation of the right hemidiaphragm. Clear lungs. No definite findings of pneumothorax or pleural effusion. Prominence of the pulmonary trunk that can be seen with pulmonary hypertension. Mildly prominent hilar contours. Cardiac contour within normal limits. Small to moderate hiatal hernia. No acute fracture. 1. A right internal jugular central line terminates in the mid superior vena cava. No associated pneumothorax. 2. No acute cardiopulmonary process. 3. Small to moderate hiatal hernia.        Problem List  Patient Active Problem List   Diagnosis    Depression    Dyslipidemia    Essential hypertension    Gastroesophageal reflux disease without esophagitis    Bursitis of right shoulder    Erectile dysfunction    YOHANNES (obstructive sleep apnea)    Class 2 severe obesity due to excess calories with serious comorbidity in adult [facility])    Synovial cyst of right popliteal space    Family history of prostate cancer    Thrombocytopenia (HCC)    Thrombocytopenic ([address]    TTP (thrombotic thrombocytopenic purpura) ([address]    Morbid obesity with BMI of 40.0-44.9, adult (HCC)       ASSESSMENT AND PLAN    Anemia and thrombocytopenia  -TTP confirmed  -On prednisone 1 mg/kg daily   - Continue folic acid  Pheresis tomorrow but given lower plts =I suspect another med will be needed  Rituxan can be considered as can Claudetta Knows is a process to get ordered as it has to start inpt, the pharmacy has to get it in and if continued outpt it has to go through insurance coverage and at times it can be costly -usually we have to go through the drug rep to get the process going   I reassured them that he is stable but likely not home tomorrow as if it were me-id start something in addition to pheresis     May need more benadryl or steroids with pheresis tomorrow due to hives-todl them this is expected       Reese Hernandez MD, MD

## 2022-10-16 NOTE — PLAN OF CARE
Problem: Safety - Adult  Goal: Free from fall injury  Outcome: Progressing     Problem: Skin/Tissue Integrity  Goal: Absence of new skin breakdown  Description: 1. Monitor for areas of redness and/or skin breakdown  2. Assess vascular access sites hourly  3. Every 4-6 hours minimum:  Change oxygen saturation probe site  4. Every 4-6 hours:  If on nasal continuous positive airway pressure, respiratory therapy assess nares and determine need for appliance change or resting period.   Outcome: Progressing     Problem: Discharge Planning  Goal: Discharge to home or other facility with appropriate resources  Outcome: Progressing     Problem: ABCDS Injury Assessment  Goal: Absence of physical injury  Outcome: Progressing

## 2022-10-16 NOTE — PROGRESS NOTES
Hospital Medicine Progress Note     Date:  10/16/2022    PCP: Everett Rivera MD (Tel: [phone])    Date of Admission: 10/7/2022        Subjective  Patient denies CP, SOB, HA or abdominal pain. Patient and wife frustrated with decreasing platelet counts. Objective  Physical exam:  Vitals: /84   Pulse 99   Temp 97.6 °F (36.4 °C) (Temporal)   Resp 14   Ht 5' 10.5\" (1.791 m)   Wt 279 lb 1.6 oz (126.6 kg)   SpO2 98%   BMI 39.48 kg/m²   Gen: Not in distress. Alert. Head: Normocephalic. Atraumatic. Eyes: EOMI. Good acuity. ENT: Oral mucosa moist.  R IJ vascath  Neck: No JVD. No obvious thyromegaly. CVS: Nml S1S2, no MRG, RRR  Pulmomary: Clear bilaterally. No crackles. No wheezes. Gastrointestinal: Soft, NT/ND. Positive bowel sounds. Musculoskeletal: No edema. Warm  Neuro: No focal deficit. Moves extremity spontaneously. Psychiatry: Appropriate affect. Not agitated. Skin: Warm, dry with normal turgor. No rash. Ecchymoses noted, new on lower abdomen      24HR INTAKE/OUTPUT:    Intake/Output Summary (Last 24 hours) at 10/16/2022 1826  Last data filed at 10/16/2022 0640  Gross per 24 hour   Intake 1110 ml   Output --   Net 1110 ml       I/O last 3 completed shifts: In: 2780 [P.O.:1680; I.V.:40]  Out: -   No intake/output data recorded.       Meds:    [START ON 10/17/2022] diphenhydrAMINE  25 mg IntraVENous Once    clobetasol   Topical BID    folic acid  1 mg Oral Daily    predniSONE  120 mg Oral Daily    Vibegron  75 mg Oral Daily    sertraline  100 mg Oral Daily    sodium chloride flush  5-40 mL IntraVENous 2 times per day    pantoprazole  40 mg Oral QAM AC    vitamin B-12  500 mcg Oral Daily    verapamil  240 mg Oral Daily       Infusions:    citrate dextrose 1,000 mL/hr at 10/15/22 1652    sodium chloride      sodium chloride           PRN Meds: diazePAM, calcium gluconate, citrate dextrose, diatrizoate meglumine-sodium, diphenhydrAMINE, sodium chloride flush, heparin (porcine), melatonin, sodium chloride flush, sodium chloride, ondansetron **OR** ondansetron, polyethylene glycol, acetaminophen **OR** acetaminophen, sodium chloride    Labs/imaging:  CBC:   Recent Labs     10/14/22  0406 10/15/22  0639 10/16/22  0330   WBC 13.8* 12.3* 13.7*   HGB 10.5* 10.9* 11.3*    134* 101*           BMP:    Recent Labs     10/14/22  0406 10/15/22  0639 10/16/22  0330    137 135*   K 4.0 4.2 4.1    102 99   CO2 29 28 31   BUN 19 21* 27*   CREATININE 0.8* 0.8* 0.8*   GLUCOSE 90 72 87           Hepatic:   Recent Labs     10/14/22  0406 10/15/22  0639 10/16/22  0330   AST 18 15 17   ALT 31 28 20   BILITOT 0.3 0.6 0.4   ALKPHOS 65 61 56         Troponin: No results for input(s): TROPONINI in the last 72 hours. BNP: No results for input(s): BNP in the last 72 hours. INR:   Recent Labs     10/14/22  0406 10/15/22  0639 10/16/22  0330   INR 1.10 1.02 1.08             Reviewed imaging and reports noted      Assessment:  Principal Problem:    TTP (thrombotic thrombocytopenic purpura) (HCC)  Active Problems: Thrombocytopenia ([facility] [address]    Thrombocytopenic ([facility] [address]    Morbid obesity with BMI of 40.0-44.9, adult [facility])    Essential hypertension  Resolved Problems:    * No resolved hospital problems. *        Plan: Thrombotic thrombocytopenic purpura  -Appreciate oncology, pulmonology and nephrology recommendations  - Plasmapheresis tomorrow  - ? Change to [facility] for outpatient plasmapheresis, will see what Oncology thinks tomorrow  - cont Prednisone 120 mg daily per Hematology  -No platelet transfusion secondary to possibility of forming blood clots      Essential hypertension  -Continue Verapamil      Anemia  -Secondary to above, continue to monitor and transfuse for hemoglobin less than 8      Anxiety and depression  -Continue Zoloft      Diet: ADULT DIET; Regular    Activity: up as tolerated  Prophylaxis: SCD    Code status: Full Code     ----------    Discussed with patient and ICU RN.     D/w wife in detail at bedside. PLEX tomorrow. Will see what Oncology recommends.     Cris Land MD  -------------------------------  Rounding hospitalist

## 2022-10-16 NOTE — PROGRESS NOTES
Shift assessment complete, Pt awake, on phone. Pt walks to bathroom,when needing to void, unable to assess color, amount, and odor. Pt up and walks around unit. Pt used bath wipes, and CHG wipes to clean up. Pt oriented x4, Respiratory WDL, Cardiac WDL, GI WDL, PV WDL, pt has red marks on stomach.  See flowsheet, and MAR for details

## 2022-10-16 NOTE — PROGRESS NOTES
MD Pradeep Morales MD Myla Pigg, MD                  Office: [phone]                      Fax: [phone]             [address] [place] [address]                   NEPHROLOGY INPATIENT PROGRESS NOTE:     PATIENT NAME: Cherelle Narayan  : 1980  MRN: [id number]      RECOMMENDATIONS:   --plans for total 7 Therapeutic Plasma Exchanges, started 10-8-22  -TPE #6 10-15,  #7 10-17 - as requested by hematologist     - 1 plasma volume exchange. - Replacement fluids w/ FFP, not 5% albumin  - monitor S. fibrinogen level, LDH, CBC, PT/INR, iCalcium, Mag, Phos -> fibrinogen level dropping but still above normal, monitor level  - ACD-A for a/c protocol.  - Calcium replacement protocol. Needing repletion,  - requested dialysis nurse - discussed w/ them,              Patient is tolerating Plex treatment well. - 6 treatment yesterday    7 treatment-tomorrow 10-    Medications reviewed  Volume status stable. Electrolytes are stable. Anemia levels are stable  Platelet count 146              -Dexamethasone 40 mg daily, -> PO Prednisone   -IV Ig - as per hematologist     -Cathalene Hams placement on 10-8-2022  - by Dr Amy Cooper - appreciated his assistance   -avoid platelet transfusion  - hold Lisinopril, HCTZ, can use hydralazine PRN for SBP >160s  - at higher risk for decompensation, needing closer monitoring. D/C plan from renal stand point:  - fup 5 TPE - then later this week       D/W pt, team, hematologist,, hospitalist, nurse, ICU, TPE nurse again today   Have discussed with patient family also      IMPRESSION:       Admitted on:  10/7/2022 10:36 AM   For:  Thrombocytopenia ([facility] [address] [D69.6]  Thrombocytopenic ([facility] [address] [D69.6]    ICD-10-CM    1.  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 HTN - was on  Lisinopril, HCTZ   YOHANNES  Obesity high 30s       Hospital Problems             Last Modified POA    * (Principal) TTP (thrombotic thrombocytopenic purpura) ([facility] [address] 10/11/2022 Yes    Thrombocytopenia ([facility] [address] 10/11/2022 Yes    Thrombocytopenic ([facility] [address] 10/7/2022 Yes    Morbid obesity with BMI of 40.0-44.9, adult ([facility] [address] 10/11/2022 Yes    Essential hypertension (Chronic) 10/11/2022 Yes   : other supportive care :   - Check daily renal function panel with electrolytes-phosphorus  - Strict monitoring of I/Os, daily weight  - non-Renal feeds/diet  - Current medications reviewed. Please refer to the orders. High Complexity. Multiple complex problems. Discussed with patient and treatment team-  family , nurse   Time spent > 30 (~35) minutes. Thank you for allowing me to participate in this patient's care. Please do not hesitate to contact me anytime. We will follow along with you. Mary Florence MD,  [facility] [address] [place] [address] [place]: [phone] or [address] [facility]  Fax: [phone]        =======================================================================================   =======================================================================================  Subjective / interval history:   TPE x1 started 10-8  #5  Friday treatment so far tolerating it well no new complaints    Patient was seen comfortably sitting up in the chair today, is little upset that he has to stay no longer but he is happy to hear about his improving platelets  Walking around   Reported no active complaints, . Platelet counts responding to normal now    Past medical, Surgical, Social, Family medical history reviewed by me. MEDICATIONS: reviewed by me. Medications Prior to Admission:  No current facility-administered medications on file prior to encounter.      Current Outpatient Medications on File Prior to Encounter   Medication Sig Dispense Refill omeprazole (PRILOSEC) 20 MG delayed release capsule 1 daily 90 capsule 3    sildenafil (VIAGRA) 50 MG tablet Take 1 tablet by mouth as needed for Erectile Dysfunction 27 tablet 3    lisinopril (PRINIVIL;ZESTRIL) 40 MG tablet Take 1 tablet by mouth daily 90 tablet 3    verapamil (CALAN SR) 240 MG extended release tablet Take 1 tablet by mouth nightly 90 tablet 3    hydroCHLOROthiazide (HYDRODIURIL) 25 MG tablet Take 1 tablet by mouth daily 90 tablet 3    sertraline (ZOLOFT) 100 MG tablet 1 TABLET BY MOUTH DAILY 90 tablet 3    Vibegron (GEMTESA) 75 MG TABS Take 75 mg by mouth daily OVERACTIVE BLADDER      calcium carbonate (TUMS) 500 MG chewable tablet Take 6 tablets by mouth daily as needed for Heartburn           Current Facility-Administered Medications:     calcium gluconate 4,000 mg in dextrose 5 % 100 mL IVPB, 4,000 mg, IntraVENous, PRN, Kaleigh Roche MD, Stopped at 10/15/22 1900    citrate dextrose (ACD Formula A) 0.73-2.45-2.2 GM/100ML solution, , IntraCATHeter, Continuous PRN, Kaleigh Roche MD, Last Rate: 1,000 mL/hr at 10/15/22 1652, New Bag at 11/60/82 4227    folic acid (FOLVITE) tablet 1 mg, 1 mg, Oral, Daily, Chalino Zepeda MD, 1 mg at 10/16/22 0940    predniSONE (DELTASONE) tablet 120 mg, 120 mg, Oral, Daily, Chalino Zepeda MD, 120 mg at 10/15/22 0840    Vibegron TABS 75 mg (Patient Supplied), 75 mg, Oral, Daily, Delwin Collet, MD, 75 mg at 10/16/22 0941    diatrizoate meglumine-sodium (GASTROGRAFIN) 66-10 % solution 20 mL, 20 mL, Oral, ONCE PRN, Chalino Zepeda MD, 20 mL at 10/10/22 1502    diphenhydrAMINE (BENADRYL) tablet 12.5 mg, 12.5 mg, Oral, Nightly PRN, Yohana Griggs DO, 12.5 mg at 10/15/22 2215    sodium chloride flush 0.9 % injection 10 mL, 10 mL, IntraCATHeter, PRN, Jean Sandoval MD    heparin (porcine) injection 1,000 Units, 1,000 Units, IntraVENous, PRN, Jordy Figueroa MD, 1,000 Units at 10/13/22 0930    melatonin tablet 6 mg, 6 mg, Oral, Nightly PRN, Reyna Petty, DO, 6 mg at 10/08/22 2128    sertraline (ZOLOFT) tablet 100 mg, 100 mg, Oral, Daily, Shiela Thurman MD, 100 mg at 10/16/22 0940    sodium chloride flush 0.9 % injection 5-40 mL, 5-40 mL, IntraVENous, 2 times per day, Shiela Thurman MD, 10 mL at 10/16/22 0941    sodium chloride flush 0.9 % injection 5-40 mL, 5-40 mL, IntraVENous, PRN, Shiela Thurman MD    0.9 % sodium chloride infusion, , IntraVENous, PRN, Shiela Thurman MD    ondansetron (ZOFRAN-ODT) disintegrating tablet 4 mg, 4 mg, Oral, Q8H PRN **OR** ondansetron (ZOFRAN) injection 4 mg, 4 mg, IntraVENous, Q6H PRN, Shiela Thurman MD    polyethylene glycol (GLYCOLAX) packet 17 g, 17 g, Oral, Daily PRN, Shiela Thurman MD, 17 g at 10/15/22 1420    acetaminophen (TYLENOL) tablet 650 mg, 650 mg, Oral, Q6H PRN, 650 mg at 10/12/22 1347 **OR** acetaminophen (TYLENOL) suppository 650 mg, 650 mg, Rectal, Q6H PRN, Shiela Thurman MD    pantoprazole (PROTONIX) tablet 40 mg, 40 mg, Oral, QAM AC, Falguni Joseph MD, 40 mg at 10/16/22 0639    0.9 % sodium chloride infusion, , IntraVENous, PRN, Nenita Griggs DO    vitamin B-12 (CYANOCOBALAMIN) tablet 500 mcg, 500 mcg, Oral, Daily, Shiela Thurman MD, 500 mcg at 10/16/22 0940    verapamil (CALAN SR) extended release tablet 240 mg, 240 mg, Oral, Daily, Shiela Thurman MD, 240 mg at 10/15/22 0839        REVIEW OF SYSTEMS:  As mentioned in chief complaint and HPI , Subjective       =======================================================================================     PHYSICAL EXAM:  Recent vital signs and recent I/Os reviewed by me.      Wt Readings from Last 3 Encounters:   10/16/22 279 lb 1.6 oz (126.6 kg)   10/06/22 282 lb (127.9 kg)   09/07/22 278 lb (126.1 kg)     BP Readings from Last 3 Encounters:   10/16/22 122/72   10/06/22 134/86   09/07/22 122/82     Patient Vitals for the past 24 hrs:   BP Temp Temp src Pulse Resp SpO2 Weight   10/16/22 0700 -- -- -- 50 -- -- -- 10/16/22 0640 122/72 -- -- 60 16 100 % --   10/16/22 0400 -- -- -- 53 16 100 % --   10/16/22 0330 (!) 140/91 97.3 °F (36.3 °C) Temporal 56 16 100 % 279 lb 1.6 oz (126.6 kg)   10/16/22 0120 117/69 -- -- 66 14 92 % --   10/16/22 0000 125/82 97.6 °F (36.4 °C) Temporal 58 14 100 % --   10/15/22 2210 120/79 -- -- 91 18 99 % --   10/15/22 2001 (!) 150/98 97.4 °F (36.3 °C) Temporal 69 16 99 % --   10/15/22 2000 (!) 150/98 -- -- (!) 111 -- -- --   10/15/22 1900 (!) 137/95 -- -- 78 20 -- --   10/15/22 1845 (!) 131/91 -- -- 60 15 -- --   10/15/22 1830 132/86 -- -- 57 15 -- --   10/15/22 1815 139/83 -- -- 53 14 100 % --   10/15/22 1800 122/77 -- -- 62 16 100 % --   10/15/22 1745 (!) 132/91 -- -- 66 20 100 % --   10/15/22 1730 (!) 125/90 -- -- 59 16 100 % --   10/15/22 1715 121/77 -- -- 62 15 100 % --   10/15/22 1700 130/86 -- -- 63 16 99 % --   10/15/22 1645 127/88 -- -- 65 16 100 % --   10/15/22 1640 (!) 138/91 97.2 °F (36.2 °C) Temporal 65 14 99 % --   10/15/22 1200 -- -- -- 74 -- -- --   10/15/22 1145 -- -- -- 72 -- -- --         Intake/Output Summary (Last 24 hours) at 10/16/2022 1052  Last data filed at 10/16/2022 0640  Gross per 24 hour   Intake 1120 ml   Output --   Net 1120 ml             Physical Exam  Vitals reviewed. Constitutional:       General: He is not in acute distress. Appearance: Normal appearance. HENT:      Head: Normocephalic and atraumatic. Right Ear: External ear normal.      Left Ear: External ear normal.      Nose: Nose normal.      Mouth/Throat:      Mouth: Mucous membranes are moist. Mucous membranes are not dry. Eyes:      General: No scleral icterus. Conjunctiva/sclera: Conjunctivae normal.   Neck:      Vascular: No JVD. Cardiovascular:      Rate and Rhythm: Normal rate and regular rhythm. Heart sounds: S1 normal and S2 normal.   Pulmonary:      Effort: Pulmonary effort is normal. No respiratory distress. Breath sounds: Normal breath sounds.    Abdominal: General: Bowel sounds are normal. There is no distension. Musculoskeletal:         General: No swelling or deformity. Cervical back: Normal range of motion and neck supple. Skin:     General: Skin is dry. Coloration: Skin is not jaundiced. Findings: Rash present. No lesion. Neurological:      Mental Status: He is alert and oriented to person, place, and time. Mental status is at baseline. Psychiatric:         Mood and Affect: Mood normal.         Behavior: Behavior normal.        =======================================================================================     DATA:  Diagnostic tests reviewed by me for today's visit:   (AS NEEDED FOR MY EVALUATION AND MANAGEMENT). Recent Labs     10/14/22  0406 10/15/22  0639 10/16/22  0330   WBC 13.8* 12.3* 13.7*   HCT 31.4* 33.0* 33.4*    134* 101*       Iron Saturation:  No components found for: PERCENTFE  FERRITIN:    Lab Results   Component Value Date/Time    FERRITIN 1,025.0 10/07/2022 11:56 AM     IRON:    Lab Results   Component Value Date/Time    IRON 344 10/07/2022 11:56 AM     TIBC:    Lab Results   Component Value Date/Time    TIBC 399 10/07/2022 11:56 AM       Recent Labs     10/14/22  0406 10/15/22  0639 10/16/22  0330    137 135*   K 4.0 4.2 4.1    102 99   CO2 29 28 31   BUN 19 21* 27*   CREATININE 0.8* 0.8* 0.8*       Recent Labs     10/14/22  0406 10/15/22  0639 10/16/22  0330   CALCIUM 8.4 8.9 8.7   MG 2.20 2.20 2.10   PHOS 4.6 4.3 5.3*       No results for input(s): PH, PCO2, PO2 in the last 72 hours.     Invalid input(s): Martita Tommy    ABG:  No results found for: PH, PCO2, PO2, HCO3, BE, THGB, TCO2, O2SAT  VBG:    Lab Results   Component Value Date/Time    PHVEN 7.396 10/16/2022 03:30 AM       LDH:    Lab Results   Component Value Date/Time     10/16/2022 03:30 AM     Uric Acid:  No results found for: LABURIC, URICACID    PT/INR:    Lab Results   Component Value Date/Time    PROTIME 13.9 10/16/2022 03:30 AM    INR 1.08 10/16/2022 03:30 AM     Warfarin PT/INR:  No components found for: PTPATWAR, PTINRWAR  PTT:    Lab Results   Component Value Date/Time    APTT 23.5 10/16/2022 03:30 AM   [APTT}  Last 3 Troponin:  No results found for: TROPONINI    U/A:    Lab Results   Component Value Date/Time    NITRITE neg 10/08/2020 03:20 PM    COLORU Yellow 10/07/2022 11:55 AM    PROTEINU Negative 10/07/2022 11:55 AM    PHUR 7.0 10/07/2022 11:56 AM    WBCUA 0 10/07/2022 11:55 AM    RBCUA 12 10/07/2022 11:55 AM    BACTERIA None Seen 10/07/2022 11:55 AM    CLARITYU Clear 10/07/2022 11:55 AM    SPECGRAV 1.010 10/07/2022 11:55 AM    LEUKOCYTESUR Negative 10/07/2022 11:55 AM    UROBILINOGEN 2.0 10/07/2022 11:55 AM    BILIRUBINUR Negative 10/07/2022 11:55 AM    BILIRUBINUR neg 10/08/2020 03:20 PM    BLOODU MODERATE 10/07/2022 11:55 AM    GLUCOSEU Negative 10/07/2022 11:55 AM     Microalbumen/Creatinine ratio:  No components found for: RUCREAT  24 Hour Urine for Protein:  No components found for: RAWUPRO, UHRS3, TAXE17MO, UTV3  24 Hour Urine for Creatinine Clearance:  No components found for: CREAT4, UHRS10, UTV10  Urine Toxicology:  No components found for: IAMMENTA, IBARBIT, IBENZO, ICOCAINE, IMARTHC, IOPIATES, IPHENCYC    HgBA1c:    Lab Results   Component Value Date/Time    LABA1C 5.6 08/06/2022 08:15 AM     RPR:  No results found for: RPR  HIV:  No results found for: HIV  DEMI:    Lab Results   Component Value Date/Time    DEMI Negative 10/10/2022 02:35 PM     RF:    Lab Results   Component Value Date/Time    RF <10.0 10/10/2022 02:35 PM     DSDNA:  No components found for: DNA  AMYLASE:  No results found for: AMYLASE  LIPASE:  No results found for: LIPASE  Fibrinogen Level:  No components found for: FIB       BELOW MENTIONED RADIOLOGY STUDY RESULTS BY ME (AS NEEDED FOR MY EVALUATION AND MANAGEMENT). No results found.

## 2022-10-16 NOTE — ACP (ADVANCE CARE PLANNING)
Advanced Care Planning Note. Purpose of Encounter: Advanced care planning in light of TTP  Parties In Attendance: Patient, wife  Decisional Capacity: Yes  Subjective: Patient denies CP and SOB  Objective: Cr 0.8  Goals of Care Determination: Patient wants full support (CPR, vent, surgery, HD, trach, PEG)  Plan:  Onc and Renal consults. Plasmapheresis and steroids  Code Status: Full code   Time spent on Advanced care Plannin minutes  Advanced Care Planning Documents: Completed advanced directives on chart, wife is the POA.     Richard Durham MD  10/16/2022 6:29 PM

## 2022-10-16 NOTE — PROGRESS NOTES
Bumps on ABD fold now welts. States just started tonight. Finished plasma phoresis treatment tonight (see note progress note of Plasmapheresis RN Juan Sherwood at 1947 pm). He got his night time Benadryl 12.5 mg tab. Notified doctor per [employer]. One more welt up higher on ABD/chest region. Reported platelet count to doctor 134. Will continue to monitor. No SOB reported to trouble breathing.

## 2022-10-16 NOTE — PROGRESS NOTES
Nursing reassessment completed. VSS. Afebrile. No pain. Up ad lizy to bathroom and in room. Rash welts reduced significantly and redness fading. No acute distress. SR up x3. Call light in reach. Patient in good spirits.

## 2022-10-16 NOTE — PROGRESS NOTES
Pt has family in room, awake in chair, no needs at this time. Pt is up walking around unit and room when no family present.

## 2022-10-16 NOTE — PROGRESS NOTES
Plasma pheresis nurse just completed his treatment at change of shift at 1947. Per report, okay to take patient off heart monitor. No order written. Patient still ICU patient. Since patient's HR drops to 40 range will confirm. Red rash under ABD and patient states that he just noticed this rash tonight after phoresis. Red raised bumps present. Provided Zinc paste to patient. Takes benadryl 12.5 mg po prn every night to help him rest.      Will ask doctor for Micotin powder if approprate. Afebrile. /98 (112). HR 69. NSR. RR 16.  100% on RA. LS CTA. ABD soft +BSx4. +ppp pulses present palpable and strong. Taking po adequately and no c/o nausea and GI distress. Wife, mother-in-law and small child just arrived with large pizza for patient. Family eating together. Replaced heart monitor to patient. SR up x3. Call light in reach.

## 2022-10-16 NOTE — PLAN OF CARE
Problem: ABCDS Injury Assessment  Goal: Absence of physical injury  Outcome: Progressing     Problem: Discharge Planning  Goal: Discharge to home or other facility with appropriate resources  Outcome: Progressing     Problem: Pain  Goal: Verbalizes/displays adequate comfort level or baseline comfort level  10/15/2022 2339 by Loren Sanon RN  Outcome: Progressing  10/15/2022 2338 by Loren aSnon RN  Outcome: Progressing     Problem: Safety - Adult  Goal: Free from fall injury  10/15/2022 2339 by Loren Sanon RN  Outcome: Progressing  10/15/2022 2338 by Loren Sanon RN  Outcome: Progressing     Problem: Skin/Tissue Integrity  Goal: Absence of new skin breakdown  Description: 1. Monitor for areas of redness and/or skin breakdown  2. Assess vascular access sites hourly  3. Every 4-6 hours minimum:  Change oxygen saturation probe site  4. Every 4-6 hours:  If on nasal continuous positive airway pressure, respiratory therapy assess nares and determine need for appliance change or resting period.   10/15/2022 2339 by Loren Sanon RN  Outcome: Progressing  10/15/2022 2339 by Loren Sanon RN  Outcome: Progressing

## 2022-10-16 NOTE — PROGRESS NOTES
Rash on lower abdomen and the few little welts that appeared in upper abdomen are reduced in size, in redness and are lessening and improved, since dose of HS sleeper Benadryl was given. Will continue to monitor.

## 2022-10-17 LAB
A/G RATIO: 2.3 (ref 1.1–2.2)
ALBUMIN SERPL-MCNC: 3.7 G/DL (ref 3.4–5)
ALP BLD-CCNC: 50 U/L (ref 40–129)
ALT SERPL-CCNC: 19 U/L (ref 10–40)
ANION GAP SERPL CALCULATED.3IONS-SCNC: 5 MMOL/L (ref 3–16)
APTT: 24.3 SEC (ref 23–34.3)
AST SERPL-CCNC: 17 U/L (ref 15–37)
BASOPHILS ABSOLUTE: 0 K/UL (ref 0–0.2)
BASOPHILS RELATIVE PERCENT: 0.2 %
BILIRUB SERPL-MCNC: 0.7 MG/DL (ref 0–1)
BILIRUBIN DIRECT: <0.2 MG/DL (ref 0–0.3)
BILIRUBIN, INDIRECT: NORMAL MG/DL (ref 0–1)
BLOOD BANK DISPENSE STATUS: NORMAL
BLOOD BANK PRODUCT CODE: NORMAL
BPU ID: NORMAL
BUN BLDV-MCNC: 19 MG/DL (ref 7–20)
CALCIUM IONIZED: 1.17 MMOL/L (ref 1.12–1.32)
CALCIUM SERPL-MCNC: 8.8 MG/DL (ref 8.3–10.6)
CHLORIDE BLD-SCNC: 104 MMOL/L (ref 99–110)
CO2: 28 MMOL/L (ref 21–32)
CREAT SERPL-MCNC: 0.7 MG/DL (ref 0.9–1.3)
DESCRIPTION BLOOD BANK: NORMAL
EOSINOPHILS ABSOLUTE: 0 K/UL (ref 0–0.6)
EOSINOPHILS RELATIVE PERCENT: 0.5 %
FIBRINOGEN: 304 MG/DL (ref 207–509)
GFR AFRICAN AMERICAN: >60
GFR NON-AFRICAN AMERICAN: >60
GLUCOSE BLD-MCNC: 88 MG/DL (ref 70–99)
HCT VFR BLD CALC: 31.4 % (ref 40.5–52.5)
HEMOGLOBIN: 10.8 G/DL (ref 13.5–17.5)
INR BLD: 1.1 (ref 0.87–1.14)
LACTATE DEHYDROGENASE: 327 U/L (ref 100–190)
LYMPHOCYTES ABSOLUTE: 2.1 K/UL (ref 1–5.1)
LYMPHOCYTES RELATIVE PERCENT: 19.1 %
MAGNESIUM: 2.1 MG/DL (ref 1.8–2.4)
MCH RBC QN AUTO: 34.1 PG (ref 26–34)
MCHC RBC AUTO-ENTMCNC: 34.5 G/DL (ref 31–36)
MCV RBC AUTO: 98.8 FL (ref 80–100)
MONOCYTES ABSOLUTE: 1.3 K/UL (ref 0–1.3)
MONOCYTES RELATIVE PERCENT: 12.2 %
NEUTROPHILS ABSOLUTE: 7.3 K/UL (ref 1.7–7.7)
NEUTROPHILS RELATIVE PERCENT: 68 %
PDW BLD-RTO: 17.5 % (ref 12.4–15.4)
PH VENOUS: 7.38 (ref 7.35–7.45)
PHOSPHORUS: 3.7 MG/DL (ref 2.5–4.9)
PLATELET # BLD: 55 K/UL (ref 135–450)
PLATELET SLIDE REVIEW: ABNORMAL
PMV BLD AUTO: 7.3 FL (ref 5–10.5)
POTASSIUM SERPL-SCNC: 4.1 MMOL/L (ref 3.5–5.1)
PROTHROMBIN TIME: 14.1 SEC (ref 11.7–14.5)
RBC # BLD: 3.18 M/UL (ref 4.2–5.9)
SLIDE REVIEW: ABNORMAL
SODIUM BLD-SCNC: 137 MMOL/L (ref 136–145)
TOTAL PROTEIN: 5.3 G/DL (ref 6.4–8.2)
WBC # BLD: 10.7 K/UL (ref 4–11)

## 2022-10-17 PROCEDURE — 6360000002 HC RX W HCPCS: Performed by: INTERNAL MEDICINE

## 2022-10-17 PROCEDURE — 36592 COLLECT BLOOD FROM PICC: CPT

## 2022-10-17 PROCEDURE — 6370000000 HC RX 637 (ALT 250 FOR IP): Performed by: INTERNAL MEDICINE

## 2022-10-17 PROCEDURE — 82330 ASSAY OF CALCIUM: CPT

## 2022-10-17 PROCEDURE — 36514 APHERESIS PLASMA: CPT

## 2022-10-17 PROCEDURE — 2580000003 HC RX 258: Performed by: INTERNAL MEDICINE

## 2022-10-17 PROCEDURE — P9059 PLASMA, FRZ BETWEEN 8-24HOUR: HCPCS

## 2022-10-17 PROCEDURE — 84100 ASSAY OF PHOSPHORUS: CPT

## 2022-10-17 PROCEDURE — 36415 COLL VENOUS BLD VENIPUNCTURE: CPT

## 2022-10-17 PROCEDURE — 85610 PROTHROMBIN TIME: CPT

## 2022-10-17 PROCEDURE — 94760 N-INVAS EAR/PLS OXIMETRY 1: CPT

## 2022-10-17 PROCEDURE — 96413 CHEMO IV INFUSION 1 HR: CPT

## 2022-10-17 PROCEDURE — 1200000000 HC SEMI PRIVATE

## 2022-10-17 PROCEDURE — 85397 CLOTTING FUNCT ACTIVITY: CPT

## 2022-10-17 PROCEDURE — 83735 ASSAY OF MAGNESIUM: CPT

## 2022-10-17 PROCEDURE — 2580000003 HC RX 258: Performed by: FAMILY MEDICINE

## 2022-10-17 PROCEDURE — P9017 PLASMA 1 DONOR FRZ W/IN 8 HR: HCPCS

## 2022-10-17 PROCEDURE — 96415 CHEMO IV INFUSION ADDL HR: CPT

## 2022-10-17 PROCEDURE — 85384 FIBRINOGEN ACTIVITY: CPT

## 2022-10-17 PROCEDURE — 2500000003 HC RX 250 WO HCPCS: Performed by: INTERNAL MEDICINE

## 2022-10-17 PROCEDURE — 83615 LACTATE (LD) (LDH) ENZYME: CPT

## 2022-10-17 PROCEDURE — 82248 BILIRUBIN DIRECT: CPT

## 2022-10-17 PROCEDURE — 80053 COMPREHEN METABOLIC PANEL: CPT

## 2022-10-17 PROCEDURE — 85025 COMPLETE CBC W/AUTO DIFF WBC: CPT

## 2022-10-17 PROCEDURE — 6370000000 HC RX 637 (ALT 250 FOR IP): Performed by: FAMILY MEDICINE

## 2022-10-17 PROCEDURE — 85730 THROMBOPLASTIN TIME PARTIAL: CPT

## 2022-10-17 RX ORDER — ACETAMINOPHEN 325 MG/1
650 TABLET ORAL ONCE
Status: COMPLETED | OUTPATIENT
Start: 2022-10-17 | End: 2022-10-17

## 2022-10-17 RX ORDER — DIPHENHYDRAMINE HYDROCHLORIDE 50 MG/ML
25 INJECTION INTRAMUSCULAR; INTRAVENOUS ONCE
Status: DISCONTINUED | OUTPATIENT
Start: 2022-10-17 | End: 2022-10-17

## 2022-10-17 RX ORDER — DIPHENHYDRAMINE HYDROCHLORIDE 50 MG/ML
25 INJECTION INTRAMUSCULAR; INTRAVENOUS ONCE
Status: COMPLETED | OUTPATIENT
Start: 2022-10-17 | End: 2022-10-17

## 2022-10-17 RX ORDER — ACETAMINOPHEN 325 MG/1
650 TABLET ORAL ONCE
Status: DISCONTINUED | OUTPATIENT
Start: 2022-10-17 | End: 2022-10-17

## 2022-10-17 RX ADMIN — SODIUM CHLORIDE 950 MG: 9 INJECTION, SOLUTION INTRAVENOUS at 18:20

## 2022-10-17 RX ADMIN — Medication 10 ML: at 09:00

## 2022-10-17 RX ADMIN — CLOBETASOL PROPIONATE: 0.5 CREAM TOPICAL at 09:01

## 2022-10-17 RX ADMIN — PANTOPRAZOLE SODIUM 40 MG: 40 TABLET, DELAYED RELEASE ORAL at 06:24

## 2022-10-17 RX ADMIN — SODIUM CHLORIDE, PRESERVATIVE FREE 10 ML: 5 INJECTION INTRAVENOUS at 17:00

## 2022-10-17 RX ADMIN — PREDNISONE 120 MG: 20 TABLET ORAL at 08:57

## 2022-10-17 RX ADMIN — DIAZEPAM 5 MG: 5 TABLET ORAL at 23:04

## 2022-10-17 RX ADMIN — DIPHENHYDRAMINE HYDROCHLORIDE 25 MG: 50 INJECTION, SOLUTION INTRAMUSCULAR; INTRAVENOUS at 17:00

## 2022-10-17 RX ADMIN — DIPHENHYDRAMINE HYDROCHLORIDE 25 MG: 50 INJECTION INTRAMUSCULAR; INTRAVENOUS at 12:35

## 2022-10-17 RX ADMIN — CYANOCOBALAMIN TAB 1000 MCG 500 MCG: 1000 TAB at 08:57

## 2022-10-17 RX ADMIN — FOLIC ACID 1 MG: 1 TABLET ORAL at 08:57

## 2022-10-17 RX ADMIN — ANTICOAGULANT CITRATE DEXTROSE SOLUTION FORMULA A: 12.25; 11; 3.65 SOLUTION INTRAVENOUS at 11:20

## 2022-10-17 RX ADMIN — CLOBETASOL PROPIONATE: 0.5 CREAM TOPICAL at 20:49

## 2022-10-17 RX ADMIN — CLOBETASOL PROPIONATE: 0.5 CREAM TOPICAL at 00:37

## 2022-10-17 RX ADMIN — SODIUM CHLORIDE: 9 INJECTION, SOLUTION INTRAVENOUS at 18:18

## 2022-10-17 RX ADMIN — SODIUM CHLORIDE, PRESERVATIVE FREE 10 ML: 5 INJECTION INTRAVENOUS at 18:17

## 2022-10-17 RX ADMIN — SERTRALINE 100 MG: 50 TABLET, FILM COATED ORAL at 08:57

## 2022-10-17 RX ADMIN — ACETAMINOPHEN 650 MG: 325 TABLET ORAL at 16:59

## 2022-10-17 RX ADMIN — VERAPAMIL HYDROCHLORIDE 240 MG: 240 TABLET, FILM COATED, EXTENDED RELEASE ORAL at 08:57

## 2022-10-17 RX ADMIN — Medication 10 ML: at 20:24

## 2022-10-17 RX ADMIN — CALCIUM GLUCONATE 4000 MG: 98 INJECTION, SOLUTION INTRAVENOUS at 11:22

## 2022-10-17 RX ADMIN — SODIUM CHLORIDE, PRESERVATIVE FREE 10 ML: 5 INJECTION INTRAVENOUS at 19:20

## 2022-10-17 ASSESSMENT — PAIN SCALES - GENERAL
PAINLEVEL_OUTOF10: 0

## 2022-10-17 NOTE — PROGRESS NOTES
Pt arrival to 5T. Pt oriented to unit, room, and call light. Pt verbalizes understanding. POC discussed with pt. Rituxan discussed with pt. Pt verbalizes understanding. Chemo consent signed and placed on chart.

## 2022-10-17 NOTE — CARE COORDINATION
Discharge Planning Note:    CM received a call from Joseph Castleman, [address] at [facility] requesting update on pt status. Joseph Castleman shared that if [facility] needs any [facility], orders should be sent to [facility].     Apolinar Castle [phone]  F: [phone]    Electronically signed by Prisca Worrell RN on 10/17/2022 at 11:24 AM

## 2022-10-17 NOTE — PLAN OF CARE
Problem: ABCDS Injury Assessment  Goal: Absence of physical injury  10/17/2022 1005 by Dustin Pedersen RN  Outcome: Progressing  Flowsheets (Taken 10/17/2022 0800)  Absence of Physical Injury: Implement safety measures based on patient assessment     Problem: Discharge Planning  Goal: Discharge to home or other facility with appropriate resources  10/17/2022 1005 by Dustin Pedersen RN  Outcome: Progressing     Problem: Pain  Goal: Verbalizes/displays adequate comfort level or baseline comfort level  10/17/2022 1005 by Dustin Pedersen RN  Outcome: Progressing  \  Problem: Safety - Adult  Goal: Free from fall injury  10/17/2022 1005 by Dustin Pedersen RN  Outcome: Progressing     Problem: Skin/Tissue Integrity  Goal: Absence of new skin breakdown  Description: 1. Monitor for areas of redness and/or skin breakdown  2. Assess vascular access sites hourly  3. Every 4-6 hours minimum:  Change oxygen saturation probe site  4. Every 4-6 hours:  If on nasal continuous positive airway pressure, respiratory therapy assess nares and determine need for appliance change or resting period.   10/17/2022 1005 by Dustin Pedersen RN  Outcome: Progressing

## 2022-10-17 NOTE — PROGRESS NOTES
Nursing assessment completed. Afebrile. 97.5. Sinus Tachy 101. RR 21. /74 (90). Denies pain. Rash on Lower Abdomen is less but still residual presence but no itching or drainage. Will inquire with Pharmacy re: cream ordered Temovate. Up ad lizy. Taking po fluids well. Voiding quantity sufficient. States 2 bm's today. Skin otherwise pink warm dry. +ppp pulese bilateral pedal and bilateral radial pulses present, palpable and strong. No acute distress. Sitting in chair. Will continue to monitor.

## 2022-10-17 NOTE — PROGRESS NOTES
[facility]cology [facility]   Progress Note      10/17/2022 2:57 PM        Name: Chata Grier . Admitted: 10/7/2022    SUBJECTIVE:  Patient feeling well. Receiving plasmapheresis at this time. No new complaints. No new bruising or bleeding. Transfer to  for Rituxan after plasmapheresis.      Reviewed interval ancillary notes    Current Medications  riTUXimab (RITUXAN) 950 mg in sodium chloride 0.9 % chemo IVPB, Once  diphenhydrAMINE (BENADRYL) injection 25 mg, Once  acetaminophen (TYLENOL) tablet 650 mg, Once  clobetasol (TEMOVATE) 0.05 % cream, BID  diazePAM (VALIUM) tablet 5 mg, Q6H PRN  calcium gluconate 4,000 mg in dextrose 5 % 100 mL IVPB, PRN  citrate dextrose (ACD Formula A) 0.73-2.45-2.2 GM/100ML solution, Continuous PRN  folic acid (FOLVITE) tablet 1 mg, Daily  predniSONE (DELTASONE) tablet 120 mg, Daily  Vibegron TABS 75 mg (Patient Supplied), Daily  diatrizoate meglumine-sodium (GASTROGRAFIN) 66-10 % solution 20 mL, ONCE PRN  diphenhydrAMINE (BENADRYL) tablet 12.5 mg, Nightly PRN  sodium chloride flush 0.9 % injection 10 mL, PRN  heparin (porcine) injection 1,000 Units, PRN  melatonin tablet 6 mg, Nightly PRN  sertraline (ZOLOFT) tablet 100 mg, Daily  sodium chloride flush 0.9 % injection 5-40 mL, 2 times per day  sodium chloride flush 0.9 % injection 5-40 mL, PRN  0.9 % sodium chloride infusion, PRN  ondansetron (ZOFRAN-ODT) disintegrating tablet 4 mg, Q8H PRN   Or  ondansetron (ZOFRAN) injection 4 mg, Q6H PRN  polyethylene glycol (GLYCOLAX) packet 17 g, Daily PRN  acetaminophen (TYLENOL) tablet 650 mg, Q6H PRN   Or  acetaminophen (TYLENOL) suppository 650 mg, Q6H PRN  pantoprazole (PROTONIX) tablet 40 mg, QAM AC  0.9 % sodium chloride infusion, PRN  vitamin B-12 (CYANOCOBALAMIN) tablet 500 mcg, Daily  verapamil (CALAN SR) extended release tablet 240 mg, Daily        Objective:  /75   Pulse 88   Temp 97.5 °F (36.4 °C) (Temporal)   Resp 16   Ht 5' 10.5\" (1.791 m)   Wt 280 lb 6.8 oz (127.2 kg)   SpO2 100%   BMI 39.67 kg/m²     Intake/Output Summary (Last 24 hours) at 10/17/2022 1457  Last data filed at 10/17/2022 0600  Gross per 24 hour   Intake 720 ml   Output --   Net 720 ml      Wt Readings from Last 3 Encounters:   10/17/22 280 lb 6.8 oz (127.2 kg)   10/06/22 282 lb (127.9 kg)   09/07/22 278 lb (126.1 kg)       Conscious alert oriented  HEENT: No pallor or scleral icterus. Oral mucosa: No mucositis. No thrush. Neck: Supple, no lymphadenopathy. No thyromegaly  Lungs: No rales, wheezes, respiratory efforts are normal.  CVS: S1-S2 normal.  No murmurs or gallops heard. Abdomen: Soft, bowel sounds are heard. No tenderness. Neuro: No focal deficits. No cranial nerve palsies. Alert and oriented. Skin: No rashes, petechiae, ecchymosis. Spine: No tenderness or deformity noted. Extremities: No edema, tenderness, erythema. Labs and Tests:  CBC:   Recent Labs     10/15/22  0639 10/16/22  0330 10/17/22  0445   WBC 12.3* 13.7* 10.7   HGB 10.9* 11.3* 10.8*   * 101* 55*     BMP:    Recent Labs     10/15/22  0639 10/16/22  0330 10/17/22  0445    135* 137   K 4.2 4.1 4.1    99 104   CO2 28 31 28   BUN 21* 27* 19   CREATININE 0.8* 0.8* 0.7*   GLUCOSE 72 87 88     Hepatic:   Recent Labs     10/15/22  0639 10/16/22  0330 10/17/22  0445   AST 15 17 17   ALT 28 20 19   BILITOT 0.6 0.4 0.7   ALKPHOS 61 56 50       ASSESSMENT AND PLAN    Principal Problem:    TTP (thrombotic thrombocytopenic purpura) (HCC)  Active Problems: Thrombocytopenia ([facility]    Thrombocytopenic ([facility]    Morbid obesity with BMI of 40.0-44.9, adult [facility])    Essential hypertension  Resolved Problems:    * No resolved hospital problems.  *      TTP     -Anemia and thrombocytopenia  -Schistocytes on smear  -Undetectable haptoglobin and elevated LDH  -Nicole negative  - homocysteine level is WNL  - RA factor - WNL  - DEMI negative  - MMA and dsDNA pending     -ZAMAN TS 13 level very low at 16 normal greater than 65. ADAMTS 13 inhibitor panel is pending  -Continue prednisone 1 mg/kg daily   -Continue folic acid daily  -Nephrology following for plasmapheresis  - Completed 5 days of plasmapheresis on 10/13/2022    -Will do plasmapheresis on 10/15/2022 and 10/17/2022.  -Platelet count is down to 55,000 today. -Rituxan will be given today after plasmapheresis is done.   -Plan to start [facility] this admit and continue after discharge. Clemente Estrella [place]  [facility], [facility].  [phone]    Patient was seen and examined. Events noted. Platelet count is decreased to 55. LDH has increased to 327. Medication list reviewed-no incriminating drugs noted. Check ADAMTS 13 level  Will resume daily plasmapheresis  Will do Rituxan after plasmapheresis is done today. I will look into approval for Cablivi. Had lengthy discussion with the patient and patient's wife over the phone. All the questions were answered to their satisfaction.     Berto Moore MD

## 2022-10-17 NOTE — PROGRESS NOTES
Pt tolerating rituxan. VSS. Pt denies s/s of reaction. Rate increased per policy. Line flushed with 10cc Normal Saline. Brisk blood return noted CVC.

## 2022-10-17 NOTE — PROGRESS NOTES
TPE ( Therapeutic Plasma Exchange)  1  Volume of TPV exchange, with 100 % replacement.  Use  FFP:  ( total : 3720 ml)   Number of Treatment : 7 total ( 11 Started on 10/08, QD, skip Sunday)     Pt tolerated well with TPE,   Fluid balance: + 144 ml ( including ACD-A, Ca gluconate, saline rinse blood back to pt)     Initial setting:  AC 3.3;  inlet 50.0;  plasma removal 31.4 , Replacement 27.7, ratio 15 ,      Final values:   417ml;  inlet 7420 ml ;   plasma removal 4402 ml , Replacement  3719 ml;  duration 136 minutes  Started time: 1244  End time: 1500     Medication:  ACD-A ( per protocol via machine) to pt: 39 ml total during whole TPE  Ca: gluconate : 4 gm IVPB with whole TPE, started @ 8712     Next TPE scheduled on 10/18     Placed TPE flow sheets and blood transfusion downtime papers  in the chart for EMR scan

## 2022-10-17 NOTE — PROGRESS NOTES
Shift assessment complete,   Pt awake and up to bathroom. Pt afebrile, Tele off per MD, Pt bathing self, voids in toilet. Pt requested coffee,to assist in a BM. Pt AxO x 4, respiratory WDL, Cardiac WDL, GI WDL, PV WDL, See flow sheet and MAR for details.

## 2022-10-17 NOTE — PROGRESS NOTES
Original chemotherapy orders reviewed and acknowledged. Appropriateness of chemotherapy treatment regimen rituxan for diagnosis of TTP was verified. Patient educated on chemotherapy regimen. Acknowledgement of informed consent for chemotherapy verified. Pt height, weight, and BSA verified. Appropriate dosing calculations of chemotherapy based on height, weight, and BSA verified. Administration: Chemotherapy drug Rituxan independently verified with Ángel Ritter RN prior to administration. Original order, appropriateness of regimen, drug supplied, height, weight, BSA, dose calculations, expiration dates/times, drug appearance, and two patient identifiers were verified by both RNs. Drug checked for vesicant/irritant status and for risk of hypersensitivity. Most recent laboratory values and allergies, were reviewed. Appropriate IV access available: Positive, brisk blood return via CVC was confirmed prior to administration. Chest x-ray for correct line placement reviewed  Valentino Mason RN and Ángel Ritter RN verified correct rate of chemotherapy and maintenance IV fluids. Patient was educated on chemotherapy regimen prior to administration including indication for treatment related to disease & side effects of chemotherapy drug. Patient verbalizes understanding of all instructions.

## 2022-10-17 NOTE — PROGRESS NOTES
MD Noe Hanley MD Carrol Pillion, MD                  Office: [phone]                      Fax: [phone]             [facility]                   NEPHROLOGY INPATIENT PROGRESS NOTE:     PATIENT NAME: Stefani Rodrigues  : 1980  MRN: [id number]      RECOMMENDATIONS:   --plans for total more Therapeutic Plasma Exchanges,   - started 10-8-22  -TPE #7 10-17   - #8 10-18   - as requested by hematologist     - 1 plasma volume exchange. - Replacement fluids w/ FFP, not 5% albumin  - monitor S. fibrinogen level, LDH, CBC, PT/INR, iCalcium, Mag, Phos -> fibrinogen level dropping but still above normal, monitor level  - ACD-A for a/c protocol.  - Calcium replacement protocol. Needing repletion,  - requested dialysis nurse - discussed w/ them,      -Dexamethasone 40 mg daily, -> PO Prednisone   -IV Ig - as per hematologist     -Gracie Saez placement on 10-8-2022  - by Dr Linda Ramirez - appreciated his assistance   -avoid platelet transfusion  - hold Lisinopril, HCTZ, can use hydralazine PRN for SBP >160s  - at higher risk for decompensation, needing closer monitoring. D/C plan from renal stand point:  - fup 5 TPE - then later this week       D/W pt, team, hematologist,, hospitalist, nurse, ICU, TPE nurse again today   Have discussed with patient family also      IMPRESSION:       Admitted on:  10/7/2022 10:36 AM   For:  Thrombocytopenia ([facility] [D69.6]  Thrombocytopenic ([facility] [D69.6]    ICD-10-CM    1. 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     HTN - was on  Lisinopril, HCTZ   YOHANNES  Obesity high 30s       Hospital Problems             Last Modified POA    * (Principal) TTP (thrombotic thrombocytopenic purpura) ([facility] 10/11/2022 Yes Thrombocytopenia ([facility] 10/11/2022 Yes    Thrombocytopenic ([facility] 10/7/2022 Yes    Morbid obesity with BMI of 40.0-44.9, adult ([facility] 10/11/2022 Yes    Essential hypertension (Chronic) 10/11/2022 Yes       : other supportive care :   - Check daily renal function panel with electrolytes-phosphorus  - Strict monitoring of I/Os, daily weight  - non-Renal feeds/diet  - Current medications reviewed. Please refer to the orders. High Complexity. Multiple complex problems. Discussed with patient and treatment team-  family , nurse   Time spent > 30 (~35) minutes. Thank you for allowing me to participate in this patient's care. Please do not hesitate to contact me anytime. We will follow along with you. Jean Sandoval MD,  [facility] [address] [place] [address] [place]: [phone] or [employer]  Fax: [phone]        =======================================================================================   =======================================================================================  Subjective / interval history:   TPE x1 started 10-8     Patient was seen comfortably resting in the bed,   On plasma exchange, tolerating it well currently,    Past medical, Surgical, Social, Family medical history reviewed by me. MEDICATIONS: reviewed by me. Medications Prior to Admission:  No current facility-administered medications on file prior to encounter.      Current Outpatient Medications on File Prior to Encounter   Medication Sig Dispense Refill    omeprazole (PRILOSEC) 20 MG delayed release capsule 1 daily 90 capsule 3    sildenafil (VIAGRA) 50 MG tablet Take 1 tablet by mouth as needed for Erectile Dysfunction 27 tablet 3    lisinopril (PRINIVIL;ZESTRIL) 40 MG tablet Take 1 tablet by mouth daily 90 tablet 3    verapamil (CALAN SR) 240 MG extended release tablet Take 1 tablet by mouth nightly 90 tablet 3    hydroCHLOROthiazide (HYDRODIURIL) 25 MG tablet Take 1 tablet by mouth daily 90 tablet 3    sertraline (ZOLOFT) 100 MG tablet 1 TABLET BY MOUTH DAILY 90 tablet 3    Vibegron (GEMTESA) 75 MG TABS Take 75 mg by mouth daily OVERACTIVE BLADDER      calcium carbonate (TUMS) 500 MG chewable tablet Take 6 tablets by mouth daily as needed for Heartburn           Current Facility-Administered Medications:     diphenhydrAMINE (BENADRYL) injection 25 mg, 25 mg, IntraVENous, Once, Ignacio Gonzalez MD    acetaminophen (TYLENOL) tablet 650 mg, 650 mg, Oral, Once, Ignacio Gonzlaez MD    riTUXimab-pvvr (RUXIENCE) 950 mg in sodium chloride 0.9 % 250 mL Rapid chemo IVPB, 375 mg/m2, IntraVENous, Once, Ignacio Gonzalez MD    clobetasol (TEMOVATE) 0.05 % cream, , Topical, BID, Cris Land MD, Given at 10/17/22 0901    diazePAM (VALIUM) tablet 5 mg, 5 mg, Oral, Q6H PRN, Cris Land MD, 5 mg at 10/16/22 2145    calcium gluconate 4,000 mg in dextrose 5 % 100 mL IVPB, 4,000 mg, IntraVENous, PRN, Cris Land MD, Last Rate: 50 mL/hr at 10/17/22 1122, 4,000 mg at 10/17/22 1122    citrate dextrose (ACD Formula A) 0.73-2.45-2.2 GM/100ML solution, , IntraCATHeter, Continuous PRN, Cris Land MD, Last Rate: 1,000 mL/hr at 10/17/22 1120, New Bag at 59/24/86 5637    folic acid (FOLVITE) tablet 1 mg, 1 mg, Oral, Daily, Ignacio Gonzalez MD, 1 mg at 10/17/22 0857    predniSONE (DELTASONE) tablet 120 mg, 120 mg, Oral, Daily, Ignacio Gonzalez MD, 120 mg at 10/17/22 0857    Vibegron TABS 75 mg (Patient Supplied), 75 mg, Oral, Daily, Keanu Ansari MD, 75 mg at 10/17/22 0858    diatrizoate meglumine-sodium (GASTROGRAFIN) 66-10 % solution 20 mL, 20 mL, Oral, ONCE PRN, Ignacio Gonzalez MD, 20 mL at 10/10/22 1502    diphenhydrAMINE (BENADRYL) tablet 12.5 mg, 12.5 mg, Oral, Nightly PRN, Yohana Griggs DO, 12.5 mg at 10/15/22 4715    sodium chloride flush 0.9 % injection 10 mL, 10 mL, IntraCATHeter, PRN, Bertin Garrett MD    heparin (porcine) injection 1,000 Units, 1,000 Units, IntraVENous, PRN, Derick Choudhary MD, 1,000 Units at 10/13/22 0930    melatonin tablet 6 mg, 6 mg, Oral, Nightly PRN, Julio Griggs DO, 6 mg at 10/08/22 2128    sertraline (ZOLOFT) tablet 100 mg, 100 mg, Oral, Daily, Salvador Mathews MD, 100 mg at 10/17/22 0857    sodium chloride flush 0.9 % injection 5-40 mL, 5-40 mL, IntraVENous, 2 times per day, Salvador Mathews MD, 10 mL at 10/17/22 0900    sodium chloride flush 0.9 % injection 5-40 mL, 5-40 mL, IntraVENous, PRN, Salvador Mathews MD    0.9 % sodium chloride infusion, , IntraVENous, PRN, Salvador Mathews MD    ondansetron (ZOFRAN-ODT) disintegrating tablet 4 mg, 4 mg, Oral, Q8H PRN **OR** ondansetron (ZOFRAN) injection 4 mg, 4 mg, IntraVENous, Q6H PRN, Salvador Mathews MD    polyethylene glycol (GLYCOLAX) packet 17 g, 17 g, Oral, Daily PRN, Salvador Mathews MD, 17 g at 10/15/22 1420    acetaminophen (TYLENOL) tablet 650 mg, 650 mg, Oral, Q6H PRN, 650 mg at 10/12/22 1347 **OR** acetaminophen (TYLENOL) suppository 650 mg, 650 mg, Rectal, Q6H PRN, Salvador Mathews MD    pantoprazole (PROTONIX) tablet 40 mg, 40 mg, Oral, QA AC, Rona Gallego MD, 40 mg at 10/17/22 0624    0.9 % sodium chloride infusion, , IntraVENous, PRN, Julio Griggs DO    vitamin B-12 (CYANOCOBALAMIN) tablet 500 mcg, 500 mcg, Oral, Daily, Salvador Mathews MD, 500 mcg at 10/17/22 0857    verapamil (CALAN SR) extended release tablet 240 mg, 240 mg, Oral, Daily, Salvador Mathews MD, 240 mg at 10/17/22 0857        REVIEW OF SYSTEMS:  As mentioned in chief complaint and HPI , Subjective       =======================================================================================     PHYSICAL EXAM:  Recent vital signs and recent I/Os reviewed by me.      Wt Readings from Last 3 Encounters:   10/17/22 280 lb 6.8 oz (127.2 kg)   10/06/22 282 lb (127.9 kg)   09/07/22 278 lb (126.1 kg)     BP Readings from Last 3 Encounters:   10/17/22 136/74   10/06/22 134/86   09/07/22 122/82     Patient Vitals for the past 24 hrs:   BP Temp Temp src Pulse Resp SpO2 Weight   10/17/22 1500 136/74 -- -- -- -- -- --   10/17/22 1400 127/75 -- -- -- -- -- --   10/17/22 1218 -- -- -- -- -- 100 % --   10/17/22 0900 134/86 97.5 °F (36.4 °C) Temporal 88 16 100 % --   10/17/22 0700 -- -- -- 54 11 100 % --   10/17/22 0600 -- -- -- (!) 49 18 100 % --   10/17/22 0400 119/71 97.4 °F (36.3 °C) Temporal 51 20 100 % 280 lb 6.8 oz (127.2 kg)   10/17/22 0035 120/73 97.2 °F (36.2 °C) Temporal 54 20 96 % --   10/17/22 0000 -- -- -- 64 20 96 % --   10/16/22 2000 -- -- -- -- -- 100 % --   10/16/22 1944 131/74 97.5 °F (36.4 °C) Temporal (!) 101 21 100 % --       Intake/Output Summary (Last 24 hours) at 10/17/2022 1623  Last data filed at 10/17/2022 0600  Gross per 24 hour   Intake 720 ml   Output --   Net 720 ml           Physical Exam  Vitals reviewed. Constitutional:       General: He is not in acute distress. Appearance: Normal appearance. HENT:      Head: Normocephalic and atraumatic. Right Ear: External ear normal.      Left Ear: External ear normal.      Nose: Nose normal.      Mouth/Throat:      Mouth: Mucous membranes are moist. Mucous membranes are not dry. Eyes:      General: No scleral icterus. Conjunctiva/sclera: Conjunctivae normal.   Neck:      Vascular: No JVD. Cardiovascular:      Rate and Rhythm: Normal rate and regular rhythm. Heart sounds: S1 normal and S2 normal.   Pulmonary:      Effort: Pulmonary effort is normal. No respiratory distress. Breath sounds: Normal breath sounds. Abdominal:      General: Bowel sounds are normal. There is no distension. Musculoskeletal:         General: No swelling or deformity. Cervical back: Normal range of motion and neck supple. Skin:     General: Skin is dry. Coloration: Skin is not jaundiced. Findings: Rash present. No lesion. Neurological:      Mental Status: He is alert and oriented to person, place, and time. Mental status is at baseline. Psychiatric:         Mood and Affect: Mood normal.         Behavior: Behavior normal.        =======================================================================================     DATA:  Diagnostic tests reviewed by me for today's visit:   (AS NEEDED FOR MY EVALUATION AND MANAGEMENT). Recent Labs     10/15/22  0639 10/16/22  0330 10/17/22  0445   WBC 12.3* 13.7* 10.7   HCT 33.0* 33.4* 31.4*   * 101* 55*     Iron Saturation:  No components found for: PERCENTFE  FERRITIN:    Lab Results   Component Value Date/Time    FERRITIN 1,025.0 10/07/2022 11:56 AM     IRON:    Lab Results   Component Value Date/Time    IRON 344 10/07/2022 11:56 AM     TIBC:    Lab Results   Component Value Date/Time    TIBC 399 10/07/2022 11:56 AM       Recent Labs     10/15/22  0639 10/16/22  0330 10/17/22  0445    135* 137   K 4.2 4.1 4.1    99 104   CO2 28 31 28   BUN 21* 27* 19   CREATININE 0.8* 0.8* 0.7*     Recent Labs     10/15/22  0639 10/16/22  0330 10/17/22  0445   CALCIUM 8.9 8.7 8.8   MG 2.20 2.10 2.10   PHOS 4.3 5.3* 3.7     No results for input(s): PH, PCO2, PO2 in the last 72 hours.     Invalid input(s): Cisco Mynor    ABG:  No results found for: PH, PCO2, PO2, HCO3, BE, THGB, TCO2, O2SAT  VBG:    Lab Results   Component Value Date/Time    PHVEN 7.385 10/17/2022 04:45 AM       LDH:    Lab Results   Component Value Date/Time     10/17/2022 04:45 AM     Uric Acid:  No results found for: LABURIC, URICACID    PT/INR:    Lab Results   Component Value Date/Time    PROTIME 14.1 10/17/2022 04:45 AM    INR 1.10 10/17/2022 04:45 AM     Warfarin PT/INR:  No components found for: PTPATWAR, PTINRWAR  PTT:    Lab Results   Component Value Date/Time    APTT 24.3 10/17/2022 04:45 AM   [APTT}  Last 3 Troponin:  No results found for: TROPONINI    U/A:    Lab Results   Component Value Date/Time    NITRITE neg 10/08/2020 03:20 PM    COLORU Yellow 10/07/2022 11:55 AM    PROTEINU Negative 10/07/2022 11:55 AM    PHUR 7.0 10/07/2022 11:56 AM    WBCUA 0 10/07/2022 11:55 AM    RBCUA 12 10/07/2022 11:55 AM    BACTERIA None Seen 10/07/2022 11:55 AM    CLARITYU Clear 10/07/2022 11:55 AM    SPECGRAV 1.010 10/07/2022 11:55 AM    LEUKOCYTESUR Negative 10/07/2022 11:55 AM    UROBILINOGEN 2.0 10/07/2022 11:55 AM    BILIRUBINUR Negative 10/07/2022 11:55 AM    BILIRUBINUR neg 10/08/2020 03:20 PM    BLOODU MODERATE 10/07/2022 11:55 AM    GLUCOSEU Negative 10/07/2022 11:55 AM     Microalbumen/Creatinine ratio:  No components found for: RUCREAT  24 Hour Urine for Protein:  No components found for: RAWUPRO, UHRS3, CPDU12HK, UTV3  24 Hour Urine for Creatinine Clearance:  No components found for: CREAT4, UHRS10, UTV10  Urine Toxicology:  No components found for: IAMMENTA, IBARBIT, IBENZO, ICOCAINE, IMARTHC, IOPIATES, IPHENCYC    HgBA1c:    Lab Results   Component Value Date/Time    LABA1C 5.6 08/06/2022 08:15 AM     RPR:  No results found for: RPR  HIV:  No results found for: HIV  DEMI:    Lab Results   Component Value Date/Time    DEMI Negative 10/10/2022 02:35 PM     RF:    Lab Results   Component Value Date/Time    RF <10.0 10/10/2022 02:35 PM     DSDNA:  No components found for: DNA  AMYLASE:  No results found for: AMYLASE  LIPASE:  No results found for: LIPASE  Fibrinogen Level:  No components found for: FIB       BELOW MENTIONED RADIOLOGY STUDY RESULTS BY ME (AS NEEDED FOR MY EVALUATION AND MANAGEMENT). No results found.

## 2022-10-17 NOTE — PROGRESS NOTES
His hospital bed was delivered today but he needs an order for a gel overlay   Hospital Medicine Progress Note     Date:  10/17/2022    PCP: Debbie Rubio MD (Tel: [phone])    Date of Admission: 10/7/2022        Subjective  Patient denies CP, SOB, HA or abdominal pain. Objective  Physical exam:  Vitals: /77   Pulse (!) 103   Temp 98.2 °F (36.8 °C) (Temporal)   Resp 16   Ht 5' 10.5\" (1.791 m)   Wt 280 lb 6.8 oz (127.2 kg)   SpO2 98%   BMI 39.67 kg/m²   Gen: Not in distress. Alert. Head: Normocephalic. Atraumatic. Eyes: EOMI. Good acuity. ENT: Oral mucosa moist.  R IJ vascath  Neck: No JVD. No obvious thyromegaly. CVS: Nml S1S2, no MRG, RRR  Pulmomary: Clear bilaterally. No crackles. No wheezes. Gastrointestinal: Soft, NT/ND. Positive bowel sounds. Musculoskeletal: No edema. Warm  Neuro: No focal deficit. Moves extremity spontaneously. Psychiatry: Appropriate affect. Not agitated. Skin: Warm, dry with normal turgor. No rash. Ecchymoses noted, new on lower abdomen      24HR INTAKE/OUTPUT:    Intake/Output Summary (Last 24 hours) at 10/17/2022 1933  Last data filed at 10/17/2022 1345  Gross per 24 hour   Intake 1520 ml   Output --   Net 1520 ml       I/O last 3 completed shifts: In: 1520 [P.O.:720; Blood:800]  Out: -   No intake/output data recorded.       Meds:    clobetasol   Topical BID    folic acid  1 mg Oral Daily    predniSONE  120 mg Oral Daily    Vibegron  75 mg Oral Daily    sertraline  100 mg Oral Daily    sodium chloride flush  5-40 mL IntraVENous 2 times per day    pantoprazole  40 mg Oral QAM AC    vitamin B-12  500 mcg Oral Daily    verapamil  240 mg Oral Daily       Infusions:    citrate dextrose 1,000 mL/hr at 10/17/22 1120    sodium chloride 20 mL/hr at 10/17/22 1818    sodium chloride           PRN Meds: diazePAM, calcium gluconate, citrate dextrose, diatrizoate meglumine-sodium, diphenhydrAMINE, sodium chloride flush, heparin (porcine), melatonin, sodium chloride flush, sodium chloride, ondansetron **OR** ondansetron, polyethylene glycol, acetaminophen **OR** acetaminophen, sodium chloride    Labs/imaging:  CBC:   Recent Labs     10/15/22  0639 10/16/22  0330 10/17/22  0445   WBC 12.3* 13.7* 10.7   HGB 10.9* 11.3* 10.8*   * 101* 55*           BMP:    Recent Labs     10/15/22  0639 10/16/22  0330 10/17/22  0445    135* 137   K 4.2 4.1 4.1    99 104   CO2 28 31 28   BUN 21* 27* 19   CREATININE 0.8* 0.8* 0.7*   GLUCOSE 72 87 88           Hepatic:   Recent Labs     10/15/22  0639 10/16/22  0330 10/17/22  0445   AST 15 17 17   ALT 28 20 19   BILITOT 0.6 0.4 0.7   ALKPHOS 61 56 50         Troponin: No results for input(s): TROPONINI in the last 72 hours. BNP: No results for input(s): BNP in the last 72 hours. INR:   Recent Labs     10/15/22  0639 10/16/22  0330 10/17/22  0445   INR 1.02 1.08 1.10             Reviewed imaging and reports noted      Assessment:  Principal Problem:    TTP (thrombotic thrombocytopenic purpura) (HCC)  Active Problems: Thrombocytopenia ([address]    Thrombocytopenic ([facility] [address]    Morbid obesity with BMI of 40.0-44.9, adult [facility])    Essential hypertension  Resolved Problems:    * No resolved hospital problems. *        Plan: Thrombotic thrombocytopenic purpura  -Appreciate oncology, pulmonology and nephrology recommendations  - Plasmapheresis today  - Start Rituxan today  - Start Cablivi  - cont Prednisone 120 mg daily per Hematology  -No platelet transfusion secondary to possibility of forming blood clots      Essential hypertension  -Continue Verapamil      Anemia  -Secondary to above, continue to monitor and transfuse for hemoglobin less than 8      Anxiety and depression  -Continue Zoloft      Diet: ADULT DIET; Regular    Activity: up as tolerated  Prophylaxis: SCD    Code status: Full Code     ----------    Discussed with patient and ICU RN. Not ready for DC to home.     Josefa Camargo MD  -------------------------------  Rounding hospitalist

## 2022-10-17 NOTE — PLAN OF CARE
Problem: ABCDS Injury Assessment  Goal: Absence of physical injury  10/16/2022 2313 by Kelby Avila RN  Outcome: Progressing  10/16/2022 1502 by Fabiola Hernandez RN  Outcome: Progressing     Problem: Discharge Planning  Goal: Discharge to home or other facility with appropriate resources  10/16/2022 2313 by Kelby Avila RN  Outcome: Progressing  10/16/2022 1502 by Fabiola Hernandez RN  Outcome: Progressing     Problem: Pain  Goal: Verbalizes/displays adequate comfort level or baseline comfort level  10/16/2022 2313 by Kelby Avila RN  Outcome: Progressing  10/16/2022 1502 by Fabiola Hernandez RN  Outcome: Progressing  Flowsheets  Taken 10/16/2022 0640 by Kelby Avila RN  Verbalizes/displays adequate comfort level or baseline comfort level: Assess pain using appropriate pain scale  Taken 10/16/2022 0120 by Kelby Avila RN  Verbalizes/displays adequate comfort level or baseline comfort level: Assess pain using appropriate pain scale     Problem: Safety - Adult  Goal: Free from fall injury  10/16/2022 2313 by Kelby Avila RN  Outcome: Progressing  10/16/2022 1502 by Fabiola Hernandez RN  Outcome: Progressing

## 2022-10-18 LAB
A/G RATIO: 1.9 (ref 1.1–2.2)
ALBUMIN SERPL-MCNC: 3.5 G/DL (ref 3.4–5)
ALP BLD-CCNC: 52 U/L (ref 40–129)
ALT SERPL-CCNC: 18 U/L (ref 10–40)
ANION GAP SERPL CALCULATED.3IONS-SCNC: 6 MMOL/L (ref 3–16)
APTT: 24.8 SEC (ref 23–34.3)
AST SERPL-CCNC: 18 U/L (ref 15–37)
BILIRUB SERPL-MCNC: 0.7 MG/DL (ref 0–1)
BILIRUBIN DIRECT: <0.2 MG/DL (ref 0–0.3)
BILIRUBIN, INDIRECT: NORMAL MG/DL (ref 0–1)
BLOOD SMEAR REVIEW: NORMAL
BUN BLDV-MCNC: 19 MG/DL (ref 7–20)
CALCIUM IONIZED: 1.11 MMOL/L (ref 1.12–1.32)
CALCIUM SERPL-MCNC: 8.4 MG/DL (ref 8.3–10.6)
CHLORIDE BLD-SCNC: 105 MMOL/L (ref 99–110)
CO2: 28 MMOL/L (ref 21–32)
CREAT SERPL-MCNC: 0.8 MG/DL (ref 0.9–1.3)
FIBRINOGEN: 264 MG/DL (ref 207–509)
GFR SERPL CREATININE-BSD FRML MDRD: >60 ML/MIN/{1.73_M2}
GLUCOSE BLD-MCNC: 79 MG/DL (ref 70–99)
INR BLD: 1.13 (ref 0.87–1.14)
LACTATE DEHYDROGENASE: 319 U/L (ref 100–190)
MAGNESIUM: 2 MG/DL (ref 1.8–2.4)
PH VENOUS: 7.41 (ref 7.35–7.45)
PHOSPHORUS: 4 MG/DL (ref 2.5–4.9)
POTASSIUM SERPL-SCNC: 3.7 MMOL/L (ref 3.5–5.1)
PROTHROMBIN TIME: 14.4 SEC (ref 11.7–14.5)
SODIUM BLD-SCNC: 139 MMOL/L (ref 136–145)
TOTAL PROTEIN: 5.3 G/DL (ref 6.4–8.2)

## 2022-10-18 PROCEDURE — 6370000000 HC RX 637 (ALT 250 FOR IP): Performed by: INTERNAL MEDICINE

## 2022-10-18 PROCEDURE — 2580000003 HC RX 258: Performed by: INTERNAL MEDICINE

## 2022-10-18 PROCEDURE — 85730 THROMBOPLASTIN TIME PARTIAL: CPT

## 2022-10-18 PROCEDURE — 85025 COMPLETE CBC W/AUTO DIFF WBC: CPT

## 2022-10-18 PROCEDURE — 2500000003 HC RX 250 WO HCPCS: Performed by: INTERNAL MEDICINE

## 2022-10-18 PROCEDURE — P9017 PLASMA 1 DONOR FRZ W/IN 8 HR: HCPCS

## 2022-10-18 PROCEDURE — 36430 TRANSFUSION BLD/BLD COMPNT: CPT

## 2022-10-18 PROCEDURE — 85610 PROTHROMBIN TIME: CPT

## 2022-10-18 PROCEDURE — 36415 COLL VENOUS BLD VENIPUNCTURE: CPT

## 2022-10-18 PROCEDURE — 36514 APHERESIS PLASMA: CPT

## 2022-10-18 PROCEDURE — 82248 BILIRUBIN DIRECT: CPT

## 2022-10-18 PROCEDURE — 6370000000 HC RX 637 (ALT 250 FOR IP): Performed by: FAMILY MEDICINE

## 2022-10-18 PROCEDURE — 94760 N-INVAS EAR/PLS OXIMETRY 1: CPT

## 2022-10-18 PROCEDURE — 83735 ASSAY OF MAGNESIUM: CPT

## 2022-10-18 PROCEDURE — 80053 COMPREHEN METABOLIC PANEL: CPT

## 2022-10-18 PROCEDURE — 2580000003 HC RX 258: Performed by: FAMILY MEDICINE

## 2022-10-18 PROCEDURE — 82330 ASSAY OF CALCIUM: CPT

## 2022-10-18 PROCEDURE — 85384 FIBRINOGEN ACTIVITY: CPT

## 2022-10-18 PROCEDURE — 83615 LACTATE (LD) (LDH) ENZYME: CPT

## 2022-10-18 PROCEDURE — 6360000002 HC RX W HCPCS: Performed by: INTERNAL MEDICINE

## 2022-10-18 PROCEDURE — 1200000000 HC SEMI PRIVATE

## 2022-10-18 PROCEDURE — 84100 ASSAY OF PHOSPHORUS: CPT

## 2022-10-18 PROCEDURE — P9059 PLASMA, FRZ BETWEEN 8-24HOUR: HCPCS

## 2022-10-18 RX ORDER — CALCIUM GLUCONATE 20 MG/ML
1000 INJECTION, SOLUTION INTRAVENOUS ONCE
Status: DISCONTINUED | OUTPATIENT
Start: 2022-10-18 | End: 2022-10-18

## 2022-10-18 RX ADMIN — SERTRALINE 100 MG: 50 TABLET, FILM COATED ORAL at 08:39

## 2022-10-18 RX ADMIN — CALCIUM GLUCONATE 4000 MG: 98 INJECTION, SOLUTION INTRAVENOUS at 13:27

## 2022-10-18 RX ADMIN — CYANOCOBALAMIN TAB 1000 MCG 500 MCG: 1000 TAB at 08:40

## 2022-10-18 RX ADMIN — ANTICOAGULANT CITRATE DEXTROSE SOLUTION FORMULA A: 12.25; 11; 3.65 SOLUTION INTRAVENOUS at 13:26

## 2022-10-18 RX ADMIN — FOLIC ACID 1 MG: 1 TABLET ORAL at 08:39

## 2022-10-18 RX ADMIN — PREDNISONE 120 MG: 20 TABLET ORAL at 08:40

## 2022-10-18 RX ADMIN — Medication 10 ML: at 22:44

## 2022-10-18 RX ADMIN — PANTOPRAZOLE SODIUM 40 MG: 40 TABLET, DELAYED RELEASE ORAL at 06:21

## 2022-10-18 RX ADMIN — Medication 10 ML: at 08:41

## 2022-10-18 RX ADMIN — DIAZEPAM 5 MG: 5 TABLET ORAL at 22:44

## 2022-10-18 RX ADMIN — DIPHENHYDRAMINE HYDROCHLORIDE 12.5 MG: 25 TABLET ORAL at 13:00

## 2022-10-18 RX ADMIN — VERAPAMIL HYDROCHLORIDE 240 MG: 240 TABLET, FILM COATED, EXTENDED RELEASE ORAL at 08:39

## 2022-10-18 NOTE — PROGRESS NOTES
Shift assessment completed. Routine vitals stable. Scheduled medications given by Kelby Smart. Patient is awake, alert and oriented. Respirations are easy and unlabored. Patient does not appear to be in distress, resting comfortably at this time. Call light within reach. No further needs expressed.

## 2022-10-18 NOTE — PROGRESS NOTES
Completion of Chemotherapy: Monitoring during infusion done per policy, see Flowsheets. Blood return verified before, during, and after infusion per policy; no signs of extravasation. Pt tolerated chemotherapy well and without incident. Chemotherapy infusion end time on the [facility] Will continue to monitor.       Electronically signed by Clau Mcdermott RN on 10/17/2022 at 10:53 PM

## 2022-10-18 NOTE — PROGRESS NOTES
MD Dahiana Donaldson MD Vela Borer, MD                  Office: [phone]                      Fax: [phone]             [facility]                   NEPHROLOGY INPATIENT PROGRESS NOTE:     PATIENT NAME: Reynaldo Macdonald  : 1980  MRN: [id number]      RECOMMENDATIONS:   --plans for total more Therapeutic Plasma Exchanges,   - started 10-8-22   - #8 10-18   - #9 10-19   - as requested by hematologist     - 1 plasma volume exchange. - Replacement fluids w/ FFP, not 5% albumin  - monitor S. fibrinogen level, LDH, CBC, PT/INR, iCalcium, Mag, Phos -> fibrinogen level dropping but still above normal, monitor level  - ACD-A for a/c protocol.  - Calcium replacement protocol. Needing repletion,  - requested dialysis nurse - discussed w/ them,      -Dexamethasone 40 mg daily, -> PO Prednisone 1mg/kg/d  , s/p IV Ig - as per hematologist     -Danie Fan placement on 10-8-2022  - by Dr Mark Andrew - appreciated his assistance   -avoid platelet transfusion  - hold Lisinopril, HCTZ, can use hydralazine PRN for SBP >160s  - at higher risk for decompensation, needing closer monitoring. D/C plan from renal stand point:  - fup w/ hematology plan       D/W pt, team, hematologist,, hospitalist, nurse,  , TPE nurse  also  Have discussed with patient family also      IMPRESSION:       Admitted on:  10/7/2022 10:36 AM   For:  Thrombocytopenia ([facility] [D69.6]  Thrombocytopenic ([facility] [D69.6]    ICD-10-CM    1. 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     HTN - was on  Lisinopril, HCTZ   YOHANNES  Obesity high 30s       Hospital Problems             Last Modified POA    * (Principal) TTP (thrombotic thrombocytopenic purpura) ([facility] 10/11/2022 Yes Thrombocytopenia ([facility] 10/11/2022 Yes    Thrombocytopenic ([facility] 10/7/2022 Yes    Morbid obesity with BMI of 40.0-44.9, adult ([facility] 10/11/2022 Yes    Essential hypertension (Chronic) 10/11/2022 Yes     : other supportive care :   - Check daily renal function panel with electrolytes-phosphorus  - Strict monitoring of I/Os, daily weight  - non-Renal feeds/diet  - Current medications reviewed. Please refer to the orders. High Complexity. Multiple complex problems. Discussed with patient and treatment team-  family , nurse   Time spent > 30 (~35) minutes. Thank you for allowing me to participate in this patient's care. Please do not hesitate to contact me anytime. We will follow along with you. Bertin Garrett MD,  [facility] [address] [place] [address] [place]: [phone] or Via [employer]  Fax: [phone]        =======================================================================================   =======================================================================================  Subjective / interval history:   TPE x1 started 10-8     Patient was seen comfortably   in room  So far plasma exchange, tolerating it well currently,    Past medical, Surgical, Social, Family medical history reviewed by me. MEDICATIONS: reviewed by me. Medications Prior to Admission:  No current facility-administered medications on file prior to encounter.      Current Outpatient Medications on File Prior to Encounter   Medication Sig Dispense Refill    omeprazole (PRILOSEC) 20 MG delayed release capsule 1 daily 90 capsule 3    sildenafil (VIAGRA) 50 MG tablet Take 1 tablet by mouth as needed for Erectile Dysfunction 27 tablet 3    lisinopril (PRINIVIL;ZESTRIL) 40 MG tablet Take 1 tablet by mouth daily 90 tablet 3    verapamil (CALAN SR) 240 MG extended release tablet Take 1 tablet by mouth nightly 90 tablet 3    hydroCHLOROthiazide (HYDRODIURIL) 25 MG tablet Take 1 tablet by mouth daily 90 tablet 3 sertraline (ZOLOFT) 100 MG tablet 1 TABLET BY MOUTH DAILY 90 tablet 3    Vibegron (GEMTESA) 75 MG TABS Take 75 mg by mouth daily OVERACTIVE BLADDER      calcium carbonate (TUMS) 500 MG chewable tablet Take 6 tablets by mouth daily as needed for Heartburn           Current Facility-Administered Medications:     clobetasol (TEMOVATE) 0.05 % cream, , Topical, BID, Richard Durham MD, Given at 10/18/22 0841    diazePAM (VALIUM) tablet 5 mg, 5 mg, Oral, Q6H PRN, Richard Durham MD, 5 mg at 10/17/22 2304    calcium gluconate 4,000 mg in dextrose 5 % 100 mL IVPB, 4,000 mg, IntraVENous, PRN, Richard Durham MD, Stopped at 10/17/22 1742    citrate dextrose (ACD Formula A) 0.73-2.45-2.2 GM/100ML solution, , IntraCATHeter, Continuous PRN, Richard Durham MD, Last Rate: 1,000 mL/hr at 10/17/22 1120, New Bag at 55/65/16 3224    folic acid (FOLVITE) tablet 1 mg, 1 mg, Oral, Daily, Tamar Richey MD, 1 mg at 10/18/22 1230    predniSONE (DELTASONE) tablet 120 mg, 120 mg, Oral, Daily, Tamar Richey MD, 120 mg at 10/18/22 0840    Vibegron TABS 75 mg (Patient Supplied), 75 mg, Oral, Daily, Johanny Johnson MD, 75 mg at 10/18/22 0840    diatrizoate meglumine-sodium (GASTROGRAFIN) 66-10 % solution 20 mL, 20 mL, Oral, ONCE PRN, Tamar Richey MD, 20 mL at 10/10/22 1502    diphenhydrAMINE (BENADRYL) tablet 12.5 mg, 12.5 mg, Oral, Nightly PRN, Yohana Griggs DO, 12.5 mg at 10/15/22 2215    sodium chloride flush 0.9 % injection 10 mL, 10 mL, IntraCATHeter, PRN, Adrian Andujar MD    heparin (porcine) injection 1,000 Units, 1,000 Units, IntraVENous, PRN, Alfonso Bauman MD, 1,000 Units at 10/13/22 0930    melatonin tablet 6 mg, 6 mg, Oral, Nightly PRN, Lavon Griggs DO, 6 mg at 10/08/22 2128    sertraline (ZOLOFT) tablet 100 mg, 100 mg, Oral, Daily, Johanny Johnson MD, 100 mg at 10/18/22 0839    sodium chloride flush 0.9 % injection 5-40 mL, 5-40 mL, IntraVENous, 2 times per day, Johanny Johnson MD, 10 mL at 10/18/22 0841    sodium chloride flush 0.9 % injection 5-40 mL, 5-40 mL, IntraVENous, PRN, Gracie Rodriguez MD, 10 mL at 10/17/22 1920    0.9 % sodium chloride infusion, , IntraVENous, PRN, Gracie Rodriguez MD, Last Rate: 20 mL/hr at 10/17/22 1818, New Bag at 10/17/22 1818    ondansetron (ZOFRAN-ODT) disintegrating tablet 4 mg, 4 mg, Oral, Q8H PRN **OR** ondansetron (ZOFRAN) injection 4 mg, 4 mg, IntraVENous, Q6H PRN, Gracie Rodriguez MD    polyethylene glycol (GLYCOLAX) packet 17 g, 17 g, Oral, Daily PRN, Gracie Rodriguez MD, 17 g at 10/15/22 1420    acetaminophen (TYLENOL) tablet 650 mg, 650 mg, Oral, Q6H PRN, 650 mg at 10/12/22 1347 **OR** acetaminophen (TYLENOL) suppository 650 mg, 650 mg, Rectal, Q6H PRN, Gracie Rodriguez MD    pantoprazole (PROTONIX) tablet 40 mg, 40 mg, Oral, QAM AC, Mili Dean MD, 40 mg at 10/18/22 0621    0.9 % sodium chloride infusion, , IntraVENous, PRN, Kenneth Griggs DO    vitamin B-12 (CYANOCOBALAMIN) tablet 500 mcg, 500 mcg, Oral, Daily, Gracie Rodriguez MD, 500 mcg at 10/18/22 0840    verapamil (CALAN SR) extended release tablet 240 mg, 240 mg, Oral, Daily, Gracie Rodriguez MD, 240 mg at 10/18/22 0839        REVIEW OF SYSTEMS:  As mentioned in chief complaint and HPI , Subjective       =======================================================================================     PHYSICAL EXAM:  Recent vital signs and recent I/Os reviewed by me.      Wt Readings from Last 3 Encounters:   10/17/22 280 lb 6.8 oz (127.2 kg)   10/06/22 282 lb (127.9 kg)   09/07/22 278 lb (126.1 kg)     BP Readings from Last 3 Encounters:   10/18/22 126/79   10/06/22 134/86   09/07/22 122/82     Patient Vitals for the past 24 hrs:   BP Temp Temp src Pulse Resp SpO2   10/18/22 0815 126/79 98 °F (36.7 °C) Oral 67 16 99 %   10/18/22 0614 (!) 142/81 97 °F (36.1 °C) Oral 74 16 99 %   10/18/22 0008 (!) 106/50 98.1 °F (36.7 °C) Oral 71 16 96 %   10/17/22 2250 123/74 97.3 °F (36.3 °C) Temporal 73 16 97 %   10/17/22 2235 119/73 97.3 °F (36.3 °C) Temporal 81 16 98 %   10/17/22 2220 126/77 97.3 °F (36.3 °C) Temporal 72 16 97 %   10/17/22 2205 128/77 98 °F (36.7 °C) Temporal 76 16 97 %   10/17/22 2150 133/77 97.8 °F (36.6 °C) Temporal 68 16 98 %   10/17/22 2135 136/76 97.5 °F (36.4 °C) Temporal 80 16 98 %   10/17/22 2120 136/78 97.5 °F (36.4 °C) Temporal 78 16 97 %   10/17/22 2105 131/76 98 °F (36.7 °C) Temporal 82 16 98 %   10/17/22 2050 (!) 151/87 98.1 °F (36.7 °C) Temporal 93 16 98 %   10/17/22 2035 135/69 98 °F (36.7 °C) Temporal 88 16 96 %   10/17/22 2020 127/72 98.2 °F (36.8 °C) Temporal 96 16 96 %   10/17/22 2005 122/68 97.9 °F (36.6 °C) Temporal (!) 104 16 96 %   10/17/22 1950 119/74 98.6 °F (37 °C) Temporal (!) 104 16 98 %   10/17/22 1935 121/73 -- -- 96 16 97 %   10/17/22 1920 133/77 98.2 °F (36.8 °C) Temporal (!) 103 16 98 %   10/17/22 1905 113/70 -- -- (!) 109 16 99 %   10/17/22 1850 (!) 142/77 98.2 °F (36.8 °C) Temporal 85 16 97 %   10/17/22 1835 126/83 -- -- 79 18 98 %   10/17/22 1812 (!) 146/90 98.6 °F (37 °C) Temporal (!) 105 18 100 %   10/17/22 1500 136/74 -- -- -- -- --   10/17/22 1400 127/75 -- -- -- -- --   10/17/22 1218 -- -- -- -- -- 100 %         Intake/Output Summary (Last 24 hours) at 10/18/2022 1149  Last data filed at 10/17/2022 1345  Gross per 24 hour   Intake 800 ml   Output --   Net 800 ml             Physical Exam  Vitals reviewed. Constitutional:       General: He is not in acute distress. Appearance: Normal appearance. HENT:      Head: Normocephalic and atraumatic. Right Ear: External ear normal.      Left Ear: External ear normal.      Nose: Nose normal.      Mouth/Throat:      Mouth: Mucous membranes are moist. Mucous membranes are not dry. Eyes:      General: No scleral icterus. Conjunctiva/sclera: Conjunctivae normal.   Neck:      Vascular: No JVD. Cardiovascular:      Rate and Rhythm: Normal rate and regular rhythm.       Heart sounds: S1 normal and S2 normal.   Pulmonary:      Effort: Pulmonary effort is normal. No respiratory distress. Breath sounds: Normal breath sounds. Abdominal:      General: Bowel sounds are normal. There is no distension. Musculoskeletal:         General: No swelling or deformity. Cervical back: Normal range of motion and neck supple. Skin:     General: Skin is dry. Coloration: Skin is not jaundiced. Findings: Rash present. No lesion. Neurological:      Mental Status: He is alert and oriented to person, place, and time. Mental status is at baseline. Psychiatric:         Mood and Affect: Mood normal.         Behavior: Behavior normal.        =======================================================================================     DATA:  Diagnostic tests reviewed by me for today's visit:   (AS NEEDED FOR MY EVALUATION AND MANAGEMENT). Recent Labs     10/16/22  0330 10/17/22  0445 10/18/22  0630   WBC 13.7* 10.7 11.6*   HCT 33.4* 31.4* 32.0*   * 55* 34*       Iron Saturation:  No components found for: PERCENTFE  FERRITIN:    Lab Results   Component Value Date/Time    FERRITIN 1,025.0 10/07/2022 11:56 AM     IRON:    Lab Results   Component Value Date/Time    IRON 344 10/07/2022 11:56 AM     TIBC:    Lab Results   Component Value Date/Time    TIBC 399 10/07/2022 11:56 AM       Recent Labs     10/16/22  0330 10/17/22  0445 10/18/22  0630   * 137 139   K 4.1 4.1 3.7   CL 99 104 105   CO2 31 28 28   BUN 27* 19 19   CREATININE 0.8* 0.7* 0.8*       Recent Labs     10/16/22  0330 10/17/22  0445 10/18/22  0630   CALCIUM 8.7 8.8 8.4   MG 2.10 2.10 2.00   PHOS 5.3* 3.7 4.0       No results for input(s): PH, PCO2, PO2 in the last 72 hours.     Invalid input(s): Cisco Mynor    ABG:  No results found for: PH, PCO2, PO2, HCO3, BE, THGB, TCO2, O2SAT  VBG:    Lab Results   Component Value Date/Time    PHVEN 7.412 10/18/2022 06:30 AM       LDH:    Lab Results   Component Value Date/Time     10/18/2022 06:30 AM     Uric Acid:  No results found for: LABURIC, URICACID    PT/INR:    Lab Results   Component Value Date/Time    PROTIME 14.4 10/18/2022 06:30 AM    INR 1.13 10/18/2022 06:30 AM     Warfarin PT/INR:  No components found for: PTPATWAR, PTINRWAR  PTT:    Lab Results   Component Value Date/Time    APTT 24.8 10/18/2022 06:30 AM   [APTT}  Last 3 Troponin:  No results found for: TROPONINI    U/A:    Lab Results   Component Value Date/Time    NITRITE neg 10/08/2020 03:20 PM    COLORU Yellow 10/07/2022 11:55 AM    PROTEINU Negative 10/07/2022 11:55 AM    PHUR 7.0 10/07/2022 11:56 AM    WBCUA 0 10/07/2022 11:55 AM    RBCUA 12 10/07/2022 11:55 AM    BACTERIA None Seen 10/07/2022 11:55 AM    CLARITYU Clear 10/07/2022 11:55 AM    SPECGRAV 1.010 10/07/2022 11:55 AM    LEUKOCYTESUR Negative 10/07/2022 11:55 AM    UROBILINOGEN 2.0 10/07/2022 11:55 AM    BILIRUBINUR Negative 10/07/2022 11:55 AM    BILIRUBINUR neg 10/08/2020 03:20 PM    BLOODU MODERATE 10/07/2022 11:55 AM    GLUCOSEU Negative 10/07/2022 11:55 AM     Microalbumen/Creatinine ratio:  No components found for: RUCREAT  24 Hour Urine for Protein:  No components found for: RAWUPRO, UHRS3, SIZU07ID, UTV3  24 Hour Urine for Creatinine Clearance:  No components found for: CREAT4, UHRS10, UTV10  Urine Toxicology:  No components found for: IAMMENTA, IBARBIT, IBENZO, ICOCAINE, IMARTHC, IOPIATES, IPHENCYC    HgBA1c:    Lab Results   Component Value Date/Time    LABA1C 5.6 08/06/2022 08:15 AM     RPR:  No results found for: RPR  HIV:  No results found for: HIV  DEMI:    Lab Results   Component Value Date/Time    DEMI Negative 10/10/2022 02:35 PM     RF:    Lab Results   Component Value Date/Time    RF <10.0 10/10/2022 02:35 PM     DSDNA:  No components found for: DNA  AMYLASE:  No results found for: AMYLASE  LIPASE:  No results found for: LIPASE  Fibrinogen Level:  No components found for: FIB       BELOW MENTIONED RADIOLOGY STUDY RESULTS BY ME (AS NEEDED FOR MY EVALUATION AND MANAGEMENT). No results found.

## 2022-10-18 NOTE — PROGRESS NOTES
Hospital Medicine Progress Note     Date:  10/18/2022    PCP: Nicolle Mcdaniels MD (Tel: [phone])    Date of Admission: 10/7/2022        Subjective  Patient denies CP, SOB, HA or abdominal pain. Tolerated Rituxan. For plasmapheresis daily. Objective  Physical exam:  Vitals: BP (!) 146/92   Pulse 86   Temp 98.5 °F (36.9 °C) (Oral)   Resp 16   Ht 5' 10.5\" (1.791 m)   Wt 280 lb 6.8 oz (127.2 kg)   SpO2 96%   BMI 39.67 kg/m²   Gen: Not in distress. Alert. Head: Normocephalic. Atraumatic. Eyes: EOMI. Good acuity. ENT: Oral mucosa moist.  R IJ vascath  Neck: No JVD. No obvious thyromegaly. CVS: Nml S1S2, no MRG, RRR  Pulmomary: Clear bilaterally. No crackles. No wheezes. Gastrointestinal: Soft, NT/ND. Positive bowel sounds. Musculoskeletal: No edema. Warm  Neuro: No focal deficit. Moves extremity spontaneously. Psychiatry: Appropriate affect. Not agitated. Skin: Warm, dry with normal turgor. No rash. Ecchymoses noted, new on lower abdomen      24HR INTAKE/OUTPUT:    Intake/Output Summary (Last 24 hours) at 10/18/2022 1300  Last data filed at 10/17/2022 1345  Gross per 24 hour   Intake 800 ml   Output --   Net 800 ml       I/O last 3 completed shifts: In: 1520 [P.O.:720; Blood:800]  Out: -   No intake/output data recorded.       Meds:    clobetasol   Topical BID    folic acid  1 mg Oral Daily    predniSONE  120 mg Oral Daily    Vibegron  75 mg Oral Daily    sertraline  100 mg Oral Daily    sodium chloride flush  5-40 mL IntraVENous 2 times per day    pantoprazole  40 mg Oral QAM AC    vitamin B-12  500 mcg Oral Daily    verapamil  240 mg Oral Daily       Infusions:    citrate dextrose 1,000 mL/hr at 10/17/22 1120    sodium chloride 20 mL/hr at 10/17/22 1818    sodium chloride           PRN Meds: diazePAM, calcium gluconate, citrate dextrose, diatrizoate meglumine-sodium, diphenhydrAMINE, sodium chloride flush, heparin (porcine), melatonin, sodium chloride flush, sodium chloride, ondansetron **OR** ondansetron, polyethylene glycol, acetaminophen **OR** acetaminophen, sodium chloride    Labs/imaging:  CBC:   Recent Labs     10/16/22  0330 10/17/22  0445 10/18/22  0630   WBC 13.7* 10.7 11.6*   HGB 11.3* 10.8* 10.9*   * 55* 34*           BMP:    Recent Labs     10/16/22  0330 10/17/22  0445 10/18/22  0630   * 137 139   K 4.1 4.1 3.7   CL 99 104 105   CO2 31 28 28   BUN 27* 19 19   CREATININE 0.8* 0.7* 0.8*   GLUCOSE 87 88 79           Hepatic:   Recent Labs     10/16/22  0330 10/17/22  0445 10/18/22  0630   AST 17 17 18   ALT 20 19 18   BILITOT 0.4 0.7 0.7   ALKPHOS 56 50 52         Troponin: No results for input(s): TROPONINI in the last 72 hours. BNP: No results for input(s): BNP in the last 72 hours. INR:   Recent Labs     10/16/22  0330 10/17/22  0445 10/18/22  0630   INR 1.08 1.10 1.13             Reviewed imaging and reports noted      Assessment:  Principal Problem:    TTP (thrombotic thrombocytopenic purpura) (HCC)  Active Problems: Thrombocytopenia ([facility]    Thrombocytopenic ([facility] [address]    Morbid obesity with BMI of 40.0-44.9, adult [facility])    Essential hypertension  Resolved Problems:    * No resolved hospital problems. *        Plan: Thrombotic thrombocytopenic purpura  - Appreciate oncology, pulmonology and nephrology recommendations  - Plasmapheresis daily per Oncology   - S/P Rituxan 10/17  - Start Shabbir Robetrck during this stay per Oncology  - cont Prednisone 120 mg daily per Hematology  - No platelet transfusion secondary to possibility of forming blood clots      Essential hypertension  -Continue Verapamil      Anemia  -Secondary to above, continue to monitor and transfuse for hemoglobin less than 8      Anxiety and depression  -Continue Zoloft      Diet: ADULT DIET; Regular    Activity: up as tolerated  Prophylaxis: SCD    Code status: Full Code     ----------    Discussed with patient and Lisa CLIFFORD.     Will be here into next week as platelet counts have worsened.     Brenda Gandara MD  -------------------------------  [facility] Hospitalist

## 2022-10-18 NOTE — PROGRESS NOTES
Pt tolerating rituxan. VSS. Pt denies s/s of reaction. Rate increased per policy. Line flushed with 10cc Normal Saline.  Brisk blood return noted CVC

## 2022-10-18 NOTE — PROGRESS NOTES
[facility]   Progress Note      10/18/2022 12:00 PM        Name: Vivek Garrett . Admitted: 10/7/2022    SUBJECTIVE:  Patient is doing okay. Tolerated Rituxan without issues. Will have plasmapheresis this afternoon. No new bruises or bleeding.      Reviewed interval ancillary notes    Current Medications  clobetasol (TEMOVATE) 0.05 % cream, BID  diazePAM (VALIUM) tablet 5 mg, Q6H PRN  calcium gluconate 4,000 mg in dextrose 5 % 100 mL IVPB, PRN  citrate dextrose (ACD Formula A) 0.73-2.45-2.2 GM/100ML solution, Continuous PRN  folic acid (FOLVITE) tablet 1 mg, Daily  predniSONE (DELTASONE) tablet 120 mg, Daily  Vibegron TABS 75 mg (Patient Supplied), Daily  diatrizoate meglumine-sodium (GASTROGRAFIN) 66-10 % solution 20 mL, ONCE PRN  diphenhydrAMINE (BENADRYL) tablet 12.5 mg, Nightly PRN  sodium chloride flush 0.9 % injection 10 mL, PRN  heparin (porcine) injection 1,000 Units, PRN  melatonin tablet 6 mg, Nightly PRN  sertraline (ZOLOFT) tablet 100 mg, Daily  sodium chloride flush 0.9 % injection 5-40 mL, 2 times per day  sodium chloride flush 0.9 % injection 5-40 mL, PRN  0.9 % sodium chloride infusion, PRN  ondansetron (ZOFRAN-ODT) disintegrating tablet 4 mg, Q8H PRN   Or  ondansetron (ZOFRAN) injection 4 mg, Q6H PRN  polyethylene glycol (GLYCOLAX) packet 17 g, Daily PRN  acetaminophen (TYLENOL) tablet 650 mg, Q6H PRN   Or  acetaminophen (TYLENOL) suppository 650 mg, Q6H PRN  pantoprazole (PROTONIX) tablet 40 mg, QAM AC  0.9 % sodium chloride infusion, PRN  vitamin B-12 (CYANOCOBALAMIN) tablet 500 mcg, Daily  verapamil (CALAN SR) extended release tablet 240 mg, Daily        Objective:  /79   Pulse 67   Temp 98 °F (36.7 °C) (Oral)   Resp 16   Ht 5' 10.5\" (1.791 m)   Wt 280 lb 6.8 oz (127.2 kg)   SpO2 99%   BMI 39.67 kg/m²     Intake/Output Summary (Last 24 hours) at 10/18/2022 1200  Last data filed at 10/17/2022 1345  Gross per 24 hour   Intake 800 ml   Output --   Net 800 ml      Wt Readings from Last 3 Encounters:   10/17/22 280 lb 6.8 oz (127.2 kg)   10/06/22 282 lb (127.9 kg)   09/07/22 278 lb (126.1 kg)       General appearance:  Appears comfortable  Eyes: Sclera clear. Pupils equal.  ENT: Moist oral mucosa. Trachea midline, no adenopathy. Cardiovascular: Regular rhythm, normal S1, S2. No murmur. No edema in lower extremities  Respiratory: Not using accessory muscles. Good inspiratory effort. Clear to auscultation bilaterally, no wheeze or crackles. GI: Abdomen soft, no tenderness, not distended  Musculoskeletal: No cyanosis in digits, neck supple  Neurology: CN 2-12 grossly intact. No speech or motor deficits  Psych: Normal affect. Alert and oriented in time, place and person  Skin: Warm, dry, normal turgor    Labs and Tests:  CBC:   Recent Labs     10/16/22  0330 10/17/22  0445 10/18/22  0630   WBC 13.7* 10.7 11.6*   HGB 11.3* 10.8* 10.9*   * 55* 34*     BMP:    Recent Labs     10/16/22  0330 10/17/22  0445 10/18/22  0630   * 137 139   K 4.1 4.1 3.7   CL 99 104 105   CO2 31 28 28   BUN 27* 19 19   CREATININE 0.8* 0.7* 0.8*   GLUCOSE 87 88 79     Hepatic:   Recent Labs     10/16/22  0330 10/17/22  0445 10/18/22  0630   AST 17 17 18   ALT 20 19 18   BILITOT 0.4 0.7 0.7   ALKPHOS 56 50 52       ASSESSMENT AND PLAN    Principal Problem:    TTP (thrombotic thrombocytopenic purpura) ([facility])  Active Problems: Thrombocytopenia ([facility] [address]    Thrombocytopenic ([facility] [address]    Morbid obesity with BMI of 40.0-44.9, adult [facility])    Essential hypertension  Resolved Problems:    * No resolved hospital problems. *      TTP     -Anemia and thrombocytopenia  -Schistocytes on smear  -Undetectable haptoglobin and elevated LDH  -Nicole negative  - homocysteine level is WNL  - RA factor - WNL  - DEMI negative  - MMA and dsDNA pending     -ZAMAN TS 13 level very low at 16 normal greater than 65.  ADAMTS 13 inhibitor panel is pending  -Continue prednisone 1 mg/kg daily   -Continue folic acid daily  -Nephrology following for plasmapheresis  - Completed 5 days of plasmapheresis on 10/13/2022    -Received Rituxan on 10/17/2022.  -Plan to start [address] this admit and continue after discharge.   -Platelet count is down to 34,000 today.  -Continue daily plasmapheresis. -Plan for bone marrow biopsy tomorrow. -LDH today is 319.  -Peripheral smear to be reviewed. -Repeat ADAMTS 13 level is pending at this time. Colby Munguia, [address]  [phone]      Worsening thrombocytopenia despite phoresis, steroids and a dose of rituxan. Explained to patient and wife at bedside that rituxan takes time to act and hopefully will see improvement in counts over the next few day. Caplacizumab approval in process.  If persistently thrombocytopenic tomorrow can consider 120-150% plasma volume exchange    Meseret Bright MD  [facility]

## 2022-10-19 ENCOUNTER — APPOINTMENT (OUTPATIENT)
Dept: CT IMAGING | Age: 42
DRG: 546 | End: 2022-10-19
Attending: FAMILY MEDICINE
Payer: COMMERCIAL

## 2022-10-19 LAB
A/G RATIO: 1.6 (ref 1.1–2.2)
ALBUMIN SERPL-MCNC: 3.4 G/DL (ref 3.4–5)
ALP BLD-CCNC: 51 U/L (ref 40–129)
ALT SERPL-CCNC: 15 U/L (ref 10–40)
ANION GAP SERPL CALCULATED.3IONS-SCNC: 8 MMOL/L (ref 3–16)
APTT: 24.4 SEC (ref 23–34.3)
AST SERPL-CCNC: 15 U/L (ref 15–37)
BASOPHILS ABSOLUTE: 0 K/UL (ref 0–0.2)
BASOPHILS ABSOLUTE: 0 K/UL (ref 0–0.2)
BASOPHILS RELATIVE PERCENT: 0.2 %
BASOPHILS RELATIVE PERCENT: 0.3 %
BILIRUB SERPL-MCNC: 0.4 MG/DL (ref 0–1)
BILIRUBIN DIRECT: <0.2 MG/DL (ref 0–0.3)
BILIRUBIN, INDIRECT: NORMAL MG/DL (ref 0–1)
BLOOD BANK DISPENSE STATUS: NORMAL
BLOOD BANK PRODUCT CODE: NORMAL
BPU ID: NORMAL
BUN BLDV-MCNC: 20 MG/DL (ref 7–20)
CALCIUM IONIZED: 1.11 MMOL/L (ref 1.12–1.32)
CALCIUM SERPL-MCNC: 8.5 MG/DL (ref 8.3–10.6)
CHLORIDE BLD-SCNC: 103 MMOL/L (ref 99–110)
CO2: 26 MMOL/L (ref 21–32)
CREAT SERPL-MCNC: 1 MG/DL (ref 0.9–1.3)
DESCRIPTION BLOOD BANK: NORMAL
EOSINOPHILS ABSOLUTE: 0.1 K/UL (ref 0–0.6)
EOSINOPHILS ABSOLUTE: 0.1 K/UL (ref 0–0.6)
EOSINOPHILS RELATIVE PERCENT: 0.7 %
EOSINOPHILS RELATIVE PERCENT: 0.9 %
FIBRINOGEN: 226 MG/DL (ref 207–509)
GFR SERPL CREATININE-BSD FRML MDRD: >60 ML/MIN/{1.73_M2}
GLUCOSE BLD-MCNC: 79 MG/DL (ref 70–99)
HCT VFR BLD CALC: 31.4 % (ref 40.5–52.5)
HCT VFR BLD CALC: 32 % (ref 40.5–52.5)
HEMATOLOGY PATH CONSULT: NORMAL
HEMATOLOGY PATH CONSULT: YES
HEMOGLOBIN: 10.7 G/DL (ref 13.5–17.5)
HEMOGLOBIN: 10.9 G/DL (ref 13.5–17.5)
INR BLD: 1.17 (ref 0.87–1.14)
LACTATE DEHYDROGENASE: 228 U/L (ref 100–190)
LYMPHOCYTES ABSOLUTE: 1.1 K/UL (ref 1–5.1)
LYMPHOCYTES ABSOLUTE: 1.4 K/UL (ref 1–5.1)
LYMPHOCYTES RELATIVE PERCENT: 12.6 %
LYMPHOCYTES RELATIVE PERCENT: 9.1 %
MAGNESIUM: 1.9 MG/DL (ref 1.8–2.4)
MCH RBC QN AUTO: 33.7 PG (ref 26–34)
MCH RBC QN AUTO: 33.8 PG (ref 26–34)
MCHC RBC AUTO-ENTMCNC: 33.9 G/DL (ref 31–36)
MCHC RBC AUTO-ENTMCNC: 34 G/DL (ref 31–36)
MCV RBC AUTO: 99.3 FL (ref 80–100)
MCV RBC AUTO: 99.4 FL (ref 80–100)
MONOCYTES ABSOLUTE: 1 K/UL (ref 0–1.3)
MONOCYTES ABSOLUTE: 1.4 K/UL (ref 0–1.3)
MONOCYTES RELATIVE PERCENT: 12.6 %
MONOCYTES RELATIVE PERCENT: 8.3 %
NEUTROPHILS ABSOLUTE: 8.5 K/UL (ref 1.7–7.7)
NEUTROPHILS ABSOLUTE: 9.4 K/UL (ref 1.7–7.7)
NEUTROPHILS RELATIVE PERCENT: 73.9 %
NEUTROPHILS RELATIVE PERCENT: 81.4 %
PDW BLD-RTO: 17.2 % (ref 12.4–15.4)
PDW BLD-RTO: 17.5 % (ref 12.4–15.4)
PH VENOUS: 7.4 (ref 7.35–7.45)
PHOSPHORUS: 3.3 MG/DL (ref 2.5–4.9)
PLATELET # BLD: 34 K/UL (ref 135–450)
PLATELET # BLD: 42 K/UL (ref 135–450)
PMV BLD AUTO: 7.2 FL (ref 5–10.5)
PMV BLD AUTO: 8.5 FL (ref 5–10.5)
POTASSIUM SERPL-SCNC: 3.7 MMOL/L (ref 3.5–5.1)
PROTHROMBIN TIME: 14.9 SEC (ref 11.7–14.5)
RBC # BLD: 3.15 M/UL (ref 4.2–5.9)
RBC # BLD: 3.22 M/UL (ref 4.2–5.9)
REPORT: NORMAL
SODIUM BLD-SCNC: 137 MMOL/L (ref 136–145)
TOTAL PROTEIN: 5.5 G/DL (ref 6.4–8.2)
WBC # BLD: 11.4 K/UL (ref 4–11)
WBC # BLD: 11.6 K/UL (ref 4–11)

## 2022-10-19 PROCEDURE — 88305 TISSUE EXAM BY PATHOLOGIST: CPT

## 2022-10-19 PROCEDURE — 1200000000 HC SEMI PRIVATE

## 2022-10-19 PROCEDURE — 80053 COMPREHEN METABOLIC PANEL: CPT

## 2022-10-19 PROCEDURE — 85730 THROMBOPLASTIN TIME PARTIAL: CPT

## 2022-10-19 PROCEDURE — 85610 PROTHROMBIN TIME: CPT

## 2022-10-19 PROCEDURE — 82330 ASSAY OF CALCIUM: CPT

## 2022-10-19 PROCEDURE — 88185 FLOWCYTOMETRY/TC ADD-ON: CPT

## 2022-10-19 PROCEDURE — 38221 DX BONE MARROW BIOPSIES: CPT

## 2022-10-19 PROCEDURE — 88313 SPECIAL STAINS GROUP 2: CPT

## 2022-10-19 PROCEDURE — 6370000000 HC RX 637 (ALT 250 FOR IP): Performed by: INTERNAL MEDICINE

## 2022-10-19 PROCEDURE — 83615 LACTATE (LD) (LDH) ENZYME: CPT

## 2022-10-19 PROCEDURE — 77012 CT SCAN FOR NEEDLE BIOPSY: CPT

## 2022-10-19 PROCEDURE — 6370000000 HC RX 637 (ALT 250 FOR IP): Performed by: FAMILY MEDICINE

## 2022-10-19 PROCEDURE — 85025 COMPLETE CBC W/AUTO DIFF WBC: CPT

## 2022-10-19 PROCEDURE — 36514 APHERESIS PLASMA: CPT

## 2022-10-19 PROCEDURE — 6360000002 HC RX W HCPCS: Performed by: RADIOLOGY

## 2022-10-19 PROCEDURE — 82248 BILIRUBIN DIRECT: CPT

## 2022-10-19 PROCEDURE — 07DR3ZX EXTRACTION OF ILIAC BONE MARROW, PERCUTANEOUS APPROACH, DIAGNOSTIC: ICD-10-PCS | Performed by: RADIOLOGY

## 2022-10-19 PROCEDURE — 84100 ASSAY OF PHOSPHORUS: CPT

## 2022-10-19 PROCEDURE — 88342 IMHCHEM/IMCYTCHM 1ST ANTB: CPT

## 2022-10-19 PROCEDURE — 83735 ASSAY OF MAGNESIUM: CPT

## 2022-10-19 PROCEDURE — 2580000003 HC RX 258: Performed by: FAMILY MEDICINE

## 2022-10-19 PROCEDURE — 88311 DECALCIFY TISSUE: CPT

## 2022-10-19 PROCEDURE — 85384 FIBRINOGEN ACTIVITY: CPT

## 2022-10-19 PROCEDURE — 88184 FLOWCYTOMETRY/ TC 1 MARKER: CPT

## 2022-10-19 RX ORDER — MIDAZOLAM HYDROCHLORIDE 2 MG/2ML
INJECTION, SOLUTION INTRAMUSCULAR; INTRAVENOUS
Status: COMPLETED | OUTPATIENT
Start: 2022-10-19 | End: 2022-10-19

## 2022-10-19 RX ORDER — FENTANYL CITRATE 50 UG/ML
INJECTION, SOLUTION INTRAMUSCULAR; INTRAVENOUS
Status: COMPLETED | OUTPATIENT
Start: 2022-10-19 | End: 2022-10-19

## 2022-10-19 RX ADMIN — FENTANYL CITRATE 50 MCG: 50 INJECTION, SOLUTION INTRAMUSCULAR; INTRAVENOUS at 10:18

## 2022-10-19 RX ADMIN — SERTRALINE 100 MG: 50 TABLET, FILM COATED ORAL at 12:11

## 2022-10-19 RX ADMIN — VERAPAMIL HYDROCHLORIDE 240 MG: 240 TABLET, FILM COATED, EXTENDED RELEASE ORAL at 12:10

## 2022-10-19 RX ADMIN — Medication 10 ML: at 21:43

## 2022-10-19 RX ADMIN — DIPHENHYDRAMINE HYDROCHLORIDE 12.5 MG: 25 TABLET ORAL at 11:50

## 2022-10-19 RX ADMIN — MELATONIN TAB 3 MG 6 MG: 3 TAB at 21:39

## 2022-10-19 RX ADMIN — MIDAZOLAM HYDROCHLORIDE 2 MG: 1 INJECTION, SOLUTION INTRAMUSCULAR; INTRAVENOUS at 10:19

## 2022-10-19 RX ADMIN — FENTANYL CITRATE 50 MCG: 50 INJECTION, SOLUTION INTRAMUSCULAR; INTRAVENOUS at 10:16

## 2022-10-19 RX ADMIN — MIDAZOLAM HYDROCHLORIDE 1 MG: 1 INJECTION, SOLUTION INTRAMUSCULAR; INTRAVENOUS at 10:16

## 2022-10-19 RX ADMIN — FOLIC ACID 1 MG: 1 TABLET ORAL at 12:11

## 2022-10-19 RX ADMIN — CYANOCOBALAMIN TAB 1000 MCG 500 MCG: 1000 TAB at 12:10

## 2022-10-19 RX ADMIN — FENTANYL CITRATE 50 MCG: 50 INJECTION, SOLUTION INTRAMUSCULAR; INTRAVENOUS at 10:21

## 2022-10-19 RX ADMIN — PREDNISONE 120 MG: 20 TABLET ORAL at 12:10

## 2022-10-19 RX ADMIN — MIDAZOLAM HYDROCHLORIDE 1 MG: 1 INJECTION, SOLUTION INTRAMUSCULAR; INTRAVENOUS at 10:22

## 2022-10-19 RX ADMIN — DIAZEPAM 5 MG: 5 TABLET ORAL at 21:39

## 2022-10-19 ASSESSMENT — PAIN SCALES - GENERAL: PAINLEVEL_OUTOF10: 0

## 2022-10-19 NOTE — PLAN OF CARE
Problem: ABCDS Injury Assessment  Goal: Absence of physical injury  Outcome: Progressing     Problem: Discharge Planning  Goal: Discharge to home or other facility with appropriate resources  Outcome: Progressing     Problem: Pain  Goal: Verbalizes/displays adequate comfort level or baseline comfort level  Outcome: Progressing     Problem: Safety - Adult  Goal: Free from fall injury  Outcome: Progressing     Problem: Skin/Tissue Integrity  Goal: Absence of new skin breakdown  Description: 1. Monitor for areas of redness and/or skin breakdown  2. Assess vascular access sites hourly  3. Every 4-6 hours minimum:  Change oxygen saturation probe site  4. Every 4-6 hours:  If on nasal continuous positive airway pressure, respiratory therapy assess nares and determine need for appliance change or resting period.   Outcome: Progressing

## 2022-10-19 NOTE — BRIEF OP NOTE
Brief Postoperative Note    Romi Pendleton  YOB: 1980  [id number]    Pre-operative Diagnosis: TTP    Post-operative Diagnosis: Same    Procedure: CT-guided bone marrow biopsy    Anesthesia: Moderate Sedation    Surgeons: Shahbaz Keith MD    Estimated Blood Loss: Less than 5 mL    Complications: None    Specimens: Was Obtained: 12cc bone marrow aspirate and core    Findings: Successful CT-guided bone marrow biospy.     Electronically signed by Shahbaz Keith MD on 10/19/2022 at 10:29 AM

## 2022-10-19 NOTE — DIALYSIS
TPE ( plasmapheresis)  1.0  Volume of TPV exchange, with 100 % replacement using FFP (3720 ml)    Treatment Number: 8 of 12 total      Patient tolerated treatment well and without any complications.       Fluid balance: + 163 ml ( including ACD-A, Ca gluconate, saline rinse blood back to pt)     Started time: 1326  End time: 1530     Medication:  ACD-A ( per protocol via machine) to pt: 163 ml total during whole TPE  Ca: gluconate : 4 gm IVPB with whole TPE, started @ 1326  Benadryl: 12.5 mg PO @ 1326     Next TPE scheduled on October 19, 2022 approximately 10am     Placed TPE flow sheets in the chart for EMR scan and report given to Mabel Collazo RN

## 2022-10-19 NOTE — PROGRESS NOTES
[facility]   Progress Note      10/19/2022 10:00 AM        Name: Muriel Wing . Admitted: 10/7/2022    SUBJECTIVE:  Patient having bone marrow biopsy this morning. Wife at bedside. No new bruising or bleeding.      Reviewed interval ancillary notes    Current Medications  clobetasol (TEMOVATE) 0.05 % cream, BID  diazePAM (VALIUM) tablet 5 mg, Q6H PRN  calcium gluconate 4,000 mg in dextrose 5 % 100 mL IVPB, PRN  citrate dextrose (ACD Formula A) 0.73-2.45-2.2 GM/100ML solution, Continuous PRN  folic acid (FOLVITE) tablet 1 mg, Daily  predniSONE (DELTASONE) tablet 120 mg, Daily  Vibegron TABS 75 mg (Patient Supplied), Daily  diatrizoate meglumine-sodium (GASTROGRAFIN) 66-10 % solution 20 mL, ONCE PRN  diphenhydrAMINE (BENADRYL) tablet 12.5 mg, Nightly PRN  sodium chloride flush 0.9 % injection 10 mL, PRN  heparin (porcine) injection 1,000 Units, PRN  melatonin tablet 6 mg, Nightly PRN  sertraline (ZOLOFT) tablet 100 mg, Daily  sodium chloride flush 0.9 % injection 5-40 mL, 2 times per day  sodium chloride flush 0.9 % injection 5-40 mL, PRN  0.9 % sodium chloride infusion, PRN  ondansetron (ZOFRAN-ODT) disintegrating tablet 4 mg, Q8H PRN   Or  ondansetron (ZOFRAN) injection 4 mg, Q6H PRN  polyethylene glycol (GLYCOLAX) packet 17 g, Daily PRN  acetaminophen (TYLENOL) tablet 650 mg, Q6H PRN   Or  acetaminophen (TYLENOL) suppository 650 mg, Q6H PRN  pantoprazole (PROTONIX) tablet 40 mg, QAM AC  0.9 % sodium chloride infusion, PRN  vitamin B-12 (CYANOCOBALAMIN) tablet 500 mcg, Daily  verapamil (CALAN SR) extended release tablet 240 mg, Daily        Objective:  BP (!) 143/93   Pulse 74   Temp 98.1 °F (36.7 °C) (Oral)   Resp 16   Ht 5' 10.5\" (1.791 m)   Wt 280 lb 6.8 oz (127.2 kg)   SpO2 99%   BMI 39.67 kg/m²     Intake/Output Summary (Last 24 hours) at 10/19/2022 1000  Last data filed at 10/19/2022 0926  Gross per 24 hour   Intake 4137 ml   Output --   Net 4137 ml      Wt Readings from Last 3 Encounters:   10/17/22 280 lb 6.8 oz (127.2 kg)   10/06/22 282 lb (127.9 kg)   09/07/22 278 lb (126.1 kg)       General appearance:  Appears comfortable  Eyes: Sclera clear. Pupils equal.  ENT: Moist oral mucosa. Trachea midline, no adenopathy. Cardiovascular: Regular rhythm, normal S1, S2. No murmur. No edema in lower extremities  Respiratory: Not using accessory muscles. Good inspiratory effort. Clear to auscultation bilaterally, no wheeze or crackles. GI: Abdomen soft, no tenderness, not distended  Musculoskeletal: No cyanosis in digits, neck supple  Neurology: CN 2-12 grossly intact. No speech or motor deficits  Psych: Normal affect. Alert and oriented in time, place and person  Skin: Warm, dry, normal turgor    Labs and Tests:  CBC:   Recent Labs     10/17/22  0445 10/18/22  0630 10/19/22  0555   WBC 10.7 11.6* 11.4*   HGB 10.8* 10.9* 10.7*   PLT 55* 34* 42*     BMP:    Recent Labs     10/17/22  0445 10/18/22  0630 10/19/22  0555    139 137   K 4.1 3.7 3.7    105 103   CO2 28 28 26   BUN 19 19 20   CREATININE 0.7* 0.8* 1.0   GLUCOSE 88 79 79     Hepatic:   Recent Labs     10/17/22  0445 10/18/22  0630 10/19/22  0555   AST 17 18 15   ALT 19 18 15   BILITOT 0.7 0.7 0.4   ALKPHOS 50 52 51       ASSESSMENT AND PLAN    Principal Problem:    TTP (thrombotic thrombocytopenic purpura) (H[facility])  Active Problems: Thrombocytopenia ([facility] [address]    Thrombocytopenic ([facility] [address]    Morbid obesity with BMI of 40.0-44.9, adult [facility])    Essential hypertension  Resolved Problems:    * No resolved hospital problems. *      1. TTP      -Anemia and thrombocytopenia  -Schistocytes on smear  -Undetectable haptoglobin and elevated LDH  -Nicole negative  - homocysteine level is WNL  - RA factor - WNL  - DEMI negative  - MMA and dsDNA are normal     -ZAMAN TS 13 level very low at 16 normal greater than 65.  Inhibitor Ab is high at 83 with 49% inhibition.   -Continue prednisone 1 mg/kg daily   -Continue folic acid daily  -Nephrology following for plasmapheresis  - Completed 5 days of plasmapheresis on 10/13/2022     -Received Rituxan on 10/17/2022. Considering giving another dose of Rituxan tomorrow.   -Plan to start [facility] this admit and continue after discharge. Working on prior authorization.   -Platelet count is 18,237 today.  -Had bone marrow biopsy today, results pending.   -Continue daily plasmapheresis. -LDH today is down to 228.  -Peripheral smear to be reviewed. -Repeat ADAMTS 13 level is pending at this time. [facility], [address] [facility], [facility].  [phone]    Patient was seen and examined. Agree with above. Talk to the patient and patient's wife at length again. Platelet count has improved to 44,000. Bone marrow aspiration and biopsy today. Continue daily plasmapheresis. Continue prednisone 120 mg daily  Received Rituxan on 10/17/2022. Planning to start [facility] in next 1 to 2 days.     Sanjuanita Matos MD

## 2022-10-19 NOTE — PROGRESS NOTES
TPE ( plasmapheresis)  1.0  Volume of TPV exchange, with 100 % replacement using FFP (3720 ml)     Treatment Number: 9 of 12 total      Patient tolerated treatment well and without any complications. Fluid balance: + 80 ml ( including ACD-A, Ca gluconate, saline rinse blood back to pt)     Started time: 1205  End time: 1340     Medication:  ACD-A ( per protocol via machine) to pt: 80 ml total during whole TPE  Ca: gluconate : 4 gm IVPB with whole TPE, started @ 1205  Benadryl: 12.5 mg PO @ 1150     Next TPE scheduled on October 20, 2022 approximately 10am     Placed TPE flow sheets in the chart for EMR scan and report given to RN.

## 2022-10-19 NOTE — PRE SEDATION
Sedation Pre-Procedure Note    Patient Name: Rosanne Trujillo   YOB: 1980  Room/Bed: [facility]-3536/3170-15  Medical Record Number: [id number]  Date: 10/19/2022   Time: 10:28 AM       Indication:  bone marrow biopsy. Consent: I have discussed with the patient and/or the patient representative the indication, alternatives, and the possible risks and/or complications of the planned procedure and the anesthesia methods. The patient and/or patient representative appear to understand and agree to proceed. Vital Signs:   Vitals:    10/19/22 1020   BP: (!) 137/93   Pulse: 69   Resp: 12   Temp:    SpO2: 100%       Past Medical History:   has a past medical history of Hypertension and YOHANNES (obstructive sleep apnea). Past Surgical History:   has a past surgical history that includes Cholecystectomy; Mouth surgery; Appendectomy; [place] tooth extraction; Vasectomy; and Tonsillectomy. Medications:   Scheduled Meds:    clobetasol   Topical BID    folic acid  1 mg Oral Daily    predniSONE  120 mg Oral Daily    Vibegron  75 mg Oral Daily    sertraline  100 mg Oral Daily    sodium chloride flush  5-40 mL IntraVENous 2 times per day    pantoprazole  40 mg Oral QAM AC    vitamin B-12  500 mcg Oral Daily    verapamil  240 mg Oral Daily     Continuous Infusions:    citrate dextrose 1,000 mL/hr at 10/18/22 1326    sodium chloride 20 mL/hr at 10/17/22 1818    sodium chloride       PRN Meds: diazePAM, calcium gluconate, citrate dextrose, diatrizoate meglumine-sodium, diphenhydrAMINE, sodium chloride flush, heparin (porcine), melatonin, sodium chloride flush, sodium chloride, ondansetron **OR** ondansetron, polyethylene glycol, acetaminophen **OR** acetaminophen, sodium chloride  Home Meds:   Prior to Admission medications    Medication Sig Start Date End Date Taking?  Authorizing Provider   omeprazole (PRILOSEC) 20 MG delayed release capsule 1 daily 9/26/22   Chloé Bobby MD   sildenafil (VIAGRA) 50 MG tablet Take 1 tablet by mouth as needed for Erectile Dysfunction 9/26/22   Colonel Dinora MD   lisinopril (PRINIVIL;ZESTRIL) 40 MG tablet Take 1 tablet by mouth daily 9/26/22   Colonel Dinora MD   verapamil (CALAN SR) 240 MG extended release tablet Take 1 tablet by mouth nightly 9/26/22   M Mauro Saha MD   hydroCHLOROthiazide (HYDRODIURIL) 25 MG tablet Take 1 tablet by mouth daily 9/26/22   M Mauro Saha MD   sertraline (ZOLOFT) 100 MG tablet 1 TABLET BY MOUTH DAILY 9/26/22   M Mauro Saha MD   Vibegron (GEMTESA) 75 MG TABS Take 75 mg by mouth daily OVERACTIVE BLADDER    Historical Provider, MD   calcium carbonate (TUMS) 500 MG chewable tablet Take 6 tablets by mouth daily as needed for Heartburn    Historical Provider, MD     Coumadin Use Last 7 Days:  no  Antiplatelet drug therapy use last 7 days: no  Other anticoagulant use last 7 days: no  Additional Medication Information:  n/a      Pre-Sedation Documentation and Exam:   I have reviewed the patient's history and review of systems.     Mallampati Airway Assessment:  Mallampati Class II - (soft palate, fauces & uvula are visible)    Prior History of Anesthesia Complications:   none    ASA Classification:  Class 2 - A normal healthy patient with mild systemic disease    Sedation/ Anesthesia Plan:   intravenous sedation    Medications Planned:   midazolam (Versed) intravenously and fentanyl intravenously    Patient is an appropriate candidate for plan of sedation: yes    Electronically signed by Nola Dumont MD on 10/19/2022 at 10:28 AM

## 2022-10-19 NOTE — PROGRESS NOTES
MD Sera Haley MD Kerwin Mae, MD                  Office: [phone]                      Fax: [phone]             [facility]                   NEPHROLOGY INPATIENT PROGRESS NOTE:     PATIENT NAME: Arturo Bojorquez  : 1980  MRN: [id number]      RECOMMENDATIONS:   --plans for total more Therapeutic Plasma Exchanges,   - started 10-8-22    - #9 10   - #10 10-  - as requested by hematologist     - 1 plasma volume exchange. - Replacement fluids w/ FFP, not 5% albumin  - monitor S. fibrinogen level, LDH, CBC, PT/INR, iCalcium, Mag, Phos -> fibrinogen level dropping but still above normal, monitor level  - ACD-A for a/c protocol.  - Calcium replacement protocol. Needing repletion,  - requested dialysis nurse - discussed w/ them,      -Dexamethasone 40 mg daily, -> PO Prednisone 1mg/kg/d  , s/p IV Ig - as per hematologist     -Jasonala Mandril placement on 10-8-2022  - by Dr Salvador Harper - appreciated his assistance   -avoid platelet transfusion  - hold Lisinopril, HCTZ, can use hydralazine PRN for SBP >160s  - at higher risk for decompensation, needing closer monitoring. D/C plan from renal stand point:  - fup w/ hematology plan       D/W pt, team, hematologist,, hospitalist, nurse,  , TPE nurse  also  Have discussed with patient family, mother-in-law, wife, also      IMPRESSION:       Admitted on:  10/7/2022 10:36 AM   For:  Thrombocytopenia ([facility] [D69.6]  Thrombocytopenic ([facility] [D69.6]    ICD-10-CM    1. 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     HTN - was on  Lisinopril, HCTZ   YOHANNES  Obesity high 30s       Hospital Problems             Last Modified POA    * (Principal) TTP (thrombotic thrombocytopenic purpura) ([facility] 10/11/2022 Yes    Thrombocytopenia ([facility] 10/11/2022 Yes    [facility] 10/7/2022 Yes    Morbid obesity with BMI of 40.0-44.9, adult ([facility] 10/11/2022 Yes    Essential hypertension (Chronic) 10/11/2022 Yes   : other supportive care :   - Check daily renal function panel with electrolytes-phosphorus  - Strict monitoring of I/Os, daily weight  - non-Renal feeds/diet  - Current medications reviewed. Please refer to the orders. High Complexity. Multiple complex problems. Discussed with patient and treatment team-  family , nurse   Time spent > 30 (~35) minutes. Thank you for allowing me to participate in this patient's care. Please do not hesitate to contact me anytime. We will follow along with you. Ben Booth MD,  [facility] [address] [place] [address] [place]: [phone] or [employer]  Fax: [phone]        =======================================================================================   =======================================================================================  Subjective / interval history:   Patient was seen comfortably   in room   Walking around  So far plasma exchange, tolerating it well currently,    His wife at the bedside, discussed with her too      Past medical, Surgical, Social, Family medical history reviewed by me. MEDICATIONS: reviewed by me. Medications Prior to Admission:  No current facility-administered medications on file prior to encounter.      Current Outpatient Medications on File Prior to Encounter   Medication Sig Dispense Refill    omeprazole (PRILOSEC) 20 MG delayed release capsule 1 daily 90 capsule 3    sildenafil (VIAGRA) 50 MG tablet Take 1 tablet by mouth as needed for Erectile Dysfunction 27 tablet 3    lisinopril (PRINIVIL;ZESTRIL) 40 MG tablet Take 1 tablet by mouth daily 90 tablet 3    verapamil (CALAN SR) 240 MG extended release tablet Take 1 tablet by mouth nightly 90 tablet 3    hydroCHLOROthiazide (HYDRODIURIL) 25 MG tablet Take 1 tablet by mouth daily 90 tablet 3    sertraline (ZOLOFT) 100 MG tablet 1 TABLET BY MOUTH DAILY 90 tablet 3    Vibegron (GEMTESA) 75 MG TABS Take 75 mg by mouth daily OVERACTIVE BLADDER      calcium carbonate (TUMS) 500 MG chewable tablet Take 6 tablets by mouth daily as needed for Heartburn           Current Facility-Administered Medications:     clobetasol (TEMOVATE) 0.05 % cream, , Topical, BID, Frank Pitts MD, Given at 10/17/22 2049    diazePAM (VALIUM) tablet 5 mg, 5 mg, Oral, Q6H PRN, Frank Pitts MD, 5 mg at 10/18/22 2244    calcium gluconate 4,000 mg in dextrose 5 % 100 mL IVPB, 4,000 mg, IntraVENous, PRN, Frank Pitts MD, Last Rate: 66.7 mL/hr at 10/18/22 1327, 4,000 mg at 10/18/22 1327    citrate dextrose (ACD Formula A) 0.73-2.45-2.2 GM/100ML solution, , IntraCATHeter, Continuous PRN, Frank Pitts MD, Last Rate: 1,000 mL/hr at 10/18/22 1326, New Bag at 20/69/03 8731    folic acid (FOLVITE) tablet 1 mg, 1 mg, Oral, Daily, Mili Dean MD, 1 mg at 10/19/22 1211    predniSONE (DELTASONE) tablet 120 mg, 120 mg, Oral, Daily, Mili Dean MD, 120 mg at 10/19/22 1210    Vibegron TABS 75 mg (Patient Supplied), 75 mg, Oral, Daily, Gracie Rodriguez MD, 75 mg at 10/19/22 1210    diatrizoate meglumine-sodium (GASTROGRAFIN) 66-10 % solution 20 mL, 20 mL, Oral, ONCE PRN, Mili Dean MD, 20 mL at 10/10/22 1502    diphenhydrAMINE (BENADRYL) tablet 12.5 mg, 12.5 mg, Oral, Nightly PRN, Yohana Griggs DO, 12.5 mg at 10/19/22 1150    sodium chloride flush 0.9 % injection 10 mL, 10 mL, IntraCATHeter, PRN, Arnaud Cash MD    heparin (porcine) injection 1,000 Units, 1,000 Units, IntraVENous, PRN, Meir Marcos MD, 1,000 Units at 10/13/22 0930    melatonin tablet 6 mg, 6 mg, Oral, Nightly PRN, Kenneth Griggs DO, 6 mg at 10/08/22 2128    sertraline (ZOLOFT) tablet 100 mg, 100 mg, Oral, Daily, Gracie Rodriguez MD, 100 mg at 10/19/22 1211    sodium chloride flush 0.9 % injection 5-40 mL, 5-40 mL, IntraVENous, 2 times per day, Johanny Johnson MD, 10 mL at 10/18/22 2244    sodium chloride flush 0.9 % injection 5-40 mL, 5-40 mL, IntraVENous, PRN, Johanny Johnson MD, 10 mL at 10/17/22 1920    0.9 % sodium chloride infusion, , IntraVENous, PRN, Johanny Johnson MD, Last Rate: 20 mL/hr at 10/17/22 1818, New Bag at 10/17/22 1818    ondansetron (ZOFRAN-ODT) disintegrating tablet 4 mg, 4 mg, Oral, Q8H PRN **OR** ondansetron (ZOFRAN) injection 4 mg, 4 mg, IntraVENous, Q6H PRN, Johanny Johnson MD    polyethylene glycol (GLYCOLAX) packet 17 g, 17 g, Oral, Daily PRN, Johanny Johnson MD, 17 g at 10/15/22 1420    acetaminophen (TYLENOL) tablet 650 mg, 650 mg, Oral, Q6H PRN, 650 mg at 10/12/22 1347 **OR** acetaminophen (TYLENOL) suppository 650 mg, 650 mg, Rectal, Q6H PRN, Johanny Johnson MD    pantoprazole (PROTONIX) tablet 40 mg, 40 mg, Oral, QAM AC, Tamar Richey MD, 40 mg at 10/18/22 0621    0.9 % sodium chloride infusion, , IntraVENous, PRN, Lavon Griggs,     vitamin B-12 (CYANOCOBALAMIN) tablet 500 mcg, 500 mcg, Oral, Daily, Johanny Johnson MD, 500 mcg at 10/19/22 1210    verapamil (CALAN SR) extended release tablet 240 mg, 240 mg, Oral, Daily, Johanny Johnson MD, 240 mg at 10/19/22 1210        REVIEW OF SYSTEMS:  As mentioned in chief complaint and HPI , Subjective       =======================================================================================     PHYSICAL EXAM:  Recent vital signs and recent I/Os reviewed by me.      Wt Readings from Last 3 Encounters:   10/19/22 280 lb 6.8 oz (127.2 kg)   10/06/22 282 lb (127.9 kg)   09/07/22 278 lb (126.1 kg)     BP Readings from Last 3 Encounters:   10/19/22 (!) 143/96   10/06/22 134/86   09/07/22 122/82     Patient Vitals for the past 24 hrs:   BP Temp Temp src Pulse Resp SpO2 Weight   10/19/22 1345 (!) 143/96 96.8 °F (36 °C) -- 77 17 98 % 280 lb 6.8 oz (127.2 kg)   10/19/22 1340 (!) 143/96 96.8 °F (36 °C) Temporal 77 17 98 % --   10/19/22 1306 118/80 96.8 °F (36 °C) Temporal 95 16 98 % --   10/19/22 1042 131/85 -- -- 91 16 97 % --   10/19/22 1020 (!) 137/93 -- -- 69 12 100 % --   10/19/22 1005 (!) 142/129 -- -- 66 15 100 % --   10/19/22 0919 (!) 143/93 98.1 °F (36.7 °C) Oral 74 16 99 % --   10/19/22 0600 120/77 97.9 °F (36.6 °C) Oral 53 16 100 % --   10/18/22 2241 119/66 98.1 °F (36.7 °C) Oral 66 16 98 % --   10/18/22 1650 138/64 97.9 °F (36.6 °C) Oral 73 18 98 % --         Intake/Output Summary (Last 24 hours) at 10/19/2022 1550  Last data filed at 10/19/2022 1517  Gross per 24 hour   Intake 240 ml   Output --   Net 240 ml             Physical Exam  Vitals reviewed. Constitutional:       General: He is not in acute distress. Appearance: Normal appearance. HENT:      Head: Normocephalic and atraumatic. Right Ear: External ear normal.      Left Ear: External ear normal.      Nose: Nose normal.      Mouth/Throat:      Mouth: Mucous membranes are moist. Mucous membranes are not dry. Eyes:      General: No scleral icterus. Conjunctiva/sclera: Conjunctivae normal.   Neck:      Vascular: No JVD. Cardiovascular:      Rate and Rhythm: Normal rate and regular rhythm. Heart sounds: S1 normal and S2 normal.   Pulmonary:      Effort: Pulmonary effort is normal. No respiratory distress. Breath sounds: Normal breath sounds. Abdominal:      General: Bowel sounds are normal. There is no distension. Musculoskeletal:         General: No swelling or deformity. Cervical back: Normal range of motion and neck supple. Skin:     General: Skin is dry. Coloration: Skin is not jaundiced. Findings: Rash present. No lesion. Neurological:      Mental Status: He is alert and oriented to person, place, and time. Mental status is at baseline.    Psychiatric:         Mood and Affect: Mood normal.         Behavior: Behavior normal. =======================================================================================     DATA:  Diagnostic tests reviewed by me for today's visit:   (AS NEEDED FOR MY EVALUATION AND MANAGEMENT). Recent Labs     10/17/22  0445 10/18/22  0630 10/19/22  0555   WBC 10.7 11.6* 11.4*   HCT 31.4* 32.0* 31.4*   PLT 55* 34* 42*       Iron Saturation:  No components found for: PERCENTFE  FERRITIN:    Lab Results   Component Value Date/Time    FERRITIN 1,025.0 10/07/2022 11:56 AM     IRON:    Lab Results   Component Value Date/Time    IRON 344 10/07/2022 11:56 AM     TIBC:    Lab Results   Component Value Date/Time    TIBC 399 10/07/2022 11:56 AM       Recent Labs     10/17/22  0445 10/18/22  0630 10/19/22  0555    139 137   K 4.1 3.7 3.7    105 103   CO2 28 28 26   BUN 19 19 20   CREATININE 0.7* 0.8* 1.0       Recent Labs     10/17/22  0445 10/18/22  0630 10/19/22  0555   CALCIUM 8.8 8.4 8.5   MG 2.10 2.00 1.90   PHOS 3.7 4.0 3.3       No results for input(s): PH, PCO2, PO2 in the last 72 hours.     Invalid input(s): Cisco Mynor    ABG:  No results found for: PH, PCO2, PO2, HCO3, BE, THGB, TCO2, O2SAT  VBG:    Lab Results   Component Value Date/Time    PHVEN 7.403 10/19/2022 05:55 AM       LDH:    Lab Results   Component Value Date/Time     10/19/2022 05:55 AM     Uric Acid:  No results found for: LABURIC, URICACID    PT/INR:    Lab Results   Component Value Date/Time    PROTIME 14.9 10/19/2022 05:55 AM    INR 1.17 10/19/2022 05:55 AM     Warfarin PT/INR:  No components found for: PTPATWAR, PTINRWAR  PTT:    Lab Results   Component Value Date/Time    APTT 24.4 10/19/2022 05:55 AM   [APTT}  Last 3 Troponin:  No results found for: TROPONINI    U/A:    Lab Results   Component Value Date/Time    NITRITE neg 10/08/2020 03:20 PM    COLORU Yellow 10/07/2022 11:55 AM    PROTEINU Negative 10/07/2022 11:55 AM    PHUR 7.0 10/07/2022 11:56 AM    WBCUA 0 10/07/2022 11:55 AM    RBCUA 12 10/07/2022 11:55 AM    BACTERIA None Seen 10/07/2022 11:55 AM    CLARITYU Clear 10/07/2022 11:55 AM    SPECGRAV 1.010 10/07/2022 11:55 AM    LEUKOCYTESUR Negative 10/07/2022 11:55 AM    UROBILINOGEN 2.0 10/07/2022 11:55 AM    BILIRUBINUR Negative 10/07/2022 11:55 AM    BILIRUBINUR neg 10/08/2020 03:20 PM    BLOODU MODERATE 10/07/2022 11:55 AM    GLUCOSEU Negative 10/07/2022 11:55 AM     Microalbumen/Creatinine ratio:  No components found for: RUCREAT  24 Hour Urine for Protein:  No components found for: RAWUPRO, UHRS3, MOQE80ET, UTV3  24 Hour Urine for Creatinine Clearance:  No components found for: CREAT4, UHRS10, UTV10  Urine Toxicology:  No components found for: IAMMENTA, IBARBIT, IBENZO, ICOCAINE, IMARTHC, IOPIATES, IPHENCYC    HgBA1c:    Lab Results   Component Value Date/Time    LABA1C 5.6 08/06/2022 08:15 AM     RPR:  No results found for: RPR  HIV:  No results found for: HIV  DEMI:    Lab Results   Component Value Date/Time    DEMI Negative 10/10/2022 02:35 PM     RF:    Lab Results   Component Value Date/Time    RF <10.0 10/10/2022 02:35 PM     DSDNA:  No components found for: DNA  AMYLASE:  No results found for: AMYLASE  LIPASE:  No results found for: LIPASE  Fibrinogen Level:  No components found for: FIB       BELOW MENTIONED RADIOLOGY STUDY RESULTS BY ME (AS NEEDED FOR MY EVALUATION AND MANAGEMENT). No results found.

## 2022-10-20 LAB
A/G RATIO: 1.8 (ref 1.1–2.2)
ALBUMIN SERPL-MCNC: 3.3 G/DL (ref 3.4–5)
ALP BLD-CCNC: 47 U/L (ref 40–129)
ALT SERPL-CCNC: 23 U/L (ref 10–40)
ANION GAP SERPL CALCULATED.3IONS-SCNC: 6 MMOL/L (ref 3–16)
APTT: 24.2 SEC (ref 23–34.3)
AST SERPL-CCNC: 15 U/L (ref 15–37)
BASOPHILS ABSOLUTE: 0.1 K/UL (ref 0–0.2)
BASOPHILS RELATIVE PERCENT: 0.4 %
BILIRUB SERPL-MCNC: 0.3 MG/DL (ref 0–1)
BILIRUBIN DIRECT: <0.2 MG/DL (ref 0–0.3)
BILIRUBIN, INDIRECT: NORMAL MG/DL (ref 0–1)
BLOOD BANK DISPENSE STATUS: NORMAL
BLOOD BANK PRODUCT CODE: NORMAL
BPU ID: NORMAL
BUN BLDV-MCNC: 19 MG/DL (ref 7–20)
CALCIUM IONIZED: 1.12 MMOL/L (ref 1.12–1.32)
CALCIUM SERPL-MCNC: 8.4 MG/DL (ref 8.3–10.6)
CHLORIDE BLD-SCNC: 105 MMOL/L (ref 99–110)
CO2: 28 MMOL/L (ref 21–32)
CREAT SERPL-MCNC: 0.8 MG/DL (ref 0.9–1.3)
DESCRIPTION BLOOD BANK: NORMAL
EOSINOPHILS ABSOLUTE: 0 K/UL (ref 0–0.6)
EOSINOPHILS RELATIVE PERCENT: 0.2 %
FIBRINOGEN: 250 MG/DL (ref 207–509)
GFR SERPL CREATININE-BSD FRML MDRD: >60 ML/MIN/{1.73_M2}
GLUCOSE BLD-MCNC: 88 MG/DL (ref 70–99)
HCT VFR BLD CALC: 29.6 % (ref 40.5–52.5)
HEMOGLOBIN: 10 G/DL (ref 13.5–17.5)
INR BLD: 1.08 (ref 0.87–1.14)
LACTATE DEHYDROGENASE: 189 U/L (ref 100–190)
LYMPHOCYTES ABSOLUTE: 1.4 K/UL (ref 1–5.1)
LYMPHOCYTES RELATIVE PERCENT: 11 %
MAGNESIUM: 2.1 MG/DL (ref 1.8–2.4)
MCH RBC QN AUTO: 34.2 PG (ref 26–34)
MCHC RBC AUTO-ENTMCNC: 33.9 G/DL (ref 31–36)
MCV RBC AUTO: 101 FL (ref 80–100)
MONOCYTES ABSOLUTE: 1.4 K/UL (ref 0–1.3)
MONOCYTES RELATIVE PERCENT: 11.5 %
NEUTROPHILS ABSOLUTE: 9.5 K/UL (ref 1.7–7.7)
NEUTROPHILS RELATIVE PERCENT: 76.9 %
PDW BLD-RTO: 16.9 % (ref 12.4–15.4)
PH VENOUS: 7.41 (ref 7.35–7.45)
PHOSPHORUS: 4.1 MG/DL (ref 2.5–4.9)
PLATELET # BLD: 59 K/UL (ref 135–450)
PMV BLD AUTO: 9.9 FL (ref 5–10.5)
POTASSIUM SERPL-SCNC: 4 MMOL/L (ref 3.5–5.1)
PROTHROMBIN TIME: 13.9 SEC (ref 11.7–14.5)
RBC # BLD: 2.93 M/UL (ref 4.2–5.9)
SODIUM BLD-SCNC: 139 MMOL/L (ref 136–145)
TOTAL PROTEIN: 5.1 G/DL (ref 6.4–8.2)
WBC # BLD: 12.4 K/UL (ref 4–11)

## 2022-10-20 PROCEDURE — 83735 ASSAY OF MAGNESIUM: CPT

## 2022-10-20 PROCEDURE — 6360000002 HC RX W HCPCS: Performed by: INTERNAL MEDICINE

## 2022-10-20 PROCEDURE — 82330 ASSAY OF CALCIUM: CPT

## 2022-10-20 PROCEDURE — 6370000000 HC RX 637 (ALT 250 FOR IP): Performed by: INTERNAL MEDICINE

## 2022-10-20 PROCEDURE — P9059 PLASMA, FRZ BETWEEN 8-24HOUR: HCPCS

## 2022-10-20 PROCEDURE — 36430 TRANSFUSION BLD/BLD COMPNT: CPT

## 2022-10-20 PROCEDURE — 85025 COMPLETE CBC W/AUTO DIFF WBC: CPT

## 2022-10-20 PROCEDURE — 94760 N-INVAS EAR/PLS OXIMETRY 1: CPT

## 2022-10-20 PROCEDURE — 85610 PROTHROMBIN TIME: CPT

## 2022-10-20 PROCEDURE — 2500000003 HC RX 250 WO HCPCS: Performed by: INTERNAL MEDICINE

## 2022-10-20 PROCEDURE — 2580000003 HC RX 258: Performed by: INTERNAL MEDICINE

## 2022-10-20 PROCEDURE — 85384 FIBRINOGEN ACTIVITY: CPT

## 2022-10-20 PROCEDURE — 85730 THROMBOPLASTIN TIME PARTIAL: CPT

## 2022-10-20 PROCEDURE — 6370000000 HC RX 637 (ALT 250 FOR IP): Performed by: FAMILY MEDICINE

## 2022-10-20 PROCEDURE — 1200000000 HC SEMI PRIVATE

## 2022-10-20 PROCEDURE — 80053 COMPREHEN METABOLIC PANEL: CPT

## 2022-10-20 PROCEDURE — P9017 PLASMA 1 DONOR FRZ W/IN 8 HR: HCPCS

## 2022-10-20 PROCEDURE — 82248 BILIRUBIN DIRECT: CPT

## 2022-10-20 PROCEDURE — 36514 APHERESIS PLASMA: CPT

## 2022-10-20 PROCEDURE — 83615 LACTATE (LD) (LDH) ENZYME: CPT

## 2022-10-20 PROCEDURE — 84100 ASSAY OF PHOSPHORUS: CPT

## 2022-10-20 PROCEDURE — 2580000003 HC RX 258: Performed by: FAMILY MEDICINE

## 2022-10-20 RX ADMIN — PREDNISONE 120 MG: 20 TABLET ORAL at 08:19

## 2022-10-20 RX ADMIN — PANTOPRAZOLE SODIUM 40 MG: 40 TABLET, DELAYED RELEASE ORAL at 05:22

## 2022-10-20 RX ADMIN — VERAPAMIL HYDROCHLORIDE 240 MG: 240 TABLET, FILM COATED, EXTENDED RELEASE ORAL at 08:19

## 2022-10-20 RX ADMIN — Medication 10 ML: at 21:30

## 2022-10-20 RX ADMIN — ACETAMINOPHEN 650 MG: 325 TABLET ORAL at 16:33

## 2022-10-20 RX ADMIN — FOLIC ACID 1 MG: 1 TABLET ORAL at 08:20

## 2022-10-20 RX ADMIN — DIAZEPAM 5 MG: 5 TABLET ORAL at 21:30

## 2022-10-20 RX ADMIN — SERTRALINE 100 MG: 50 TABLET, FILM COATED ORAL at 08:20

## 2022-10-20 RX ADMIN — Medication 10 ML: at 08:15

## 2022-10-20 RX ADMIN — CYANOCOBALAMIN TAB 1000 MCG 500 MCG: 1000 TAB at 08:20

## 2022-10-20 RX ADMIN — ANTICOAGULANT CITRATE DEXTROSE SOLUTION FORMULA A: 12.25; 11; 3.65 SOLUTION INTRAVENOUS at 11:40

## 2022-10-20 RX ADMIN — CALCIUM GLUCONATE 4000 MG: 98 INJECTION, SOLUTION INTRAVENOUS at 11:38

## 2022-10-20 ASSESSMENT — PAIN DESCRIPTION - LOCATION: LOCATION: HEAD

## 2022-10-20 ASSESSMENT — PAIN SCALES - GENERAL
PAINLEVEL_OUTOF10: 0
PAINLEVEL_OUTOF10: 0
PAINLEVEL_OUTOF10: 1

## 2022-10-20 NOTE — PROGRESS NOTES
MD Susan Pineda MD Patterson Shearer, MD                  Office: [phone]                      Fax: [phone]             [address] [place] [address]                   NEPHROLOGY INPATIENT PROGRESS NOTE:     PATIENT NAME: Mike Bowman  : 1980  MRN: [id number]      RECOMMENDATIONS:   --plans for total more Therapeutic Plasma Exchanges,   - started 10-8-22     - #10 10-20  - #11 10-21  - as requested by hematologist     - 1 plasma volume exchange. - Replacement fluids w/ FFP, not 5% albumin  - monitor S. fibrinogen level, LDH, CBC, PT/INR, iCalcium, Mag, Phos -> fibrinogen level dropping but still above normal, monitor level  - ACD-A for a/c protocol.  - Calcium replacement protocol. Needing repletion,  - requested dialysis nurse - discussed w/ them,      -Dexamethasone 40 mg daily, -> PO Prednisone 1mg/kg/d  , s/p IV Ig - as per hematologist     -Glory Solis placement on 10-8-2022  - by Dr Oralia Mario - appreciated his assistance   -avoid platelet transfusion  - hold Lisinopril, HCTZ, can use hydralazine PRN for SBP >160s  - at higher risk for decompensation, needing closer monitoring. D/C plan from renal stand point:  - fup w/ hematology plan       D/W pt, team, hematologist,, hospitalist, nurse,  , TPE nurse  also  Have discussed with patient family, mother-in-law, wife, also      IMPRESSION:       Admitted on:  10/7/2022 10:36 AM   For:  Thrombocytopenia ([facility] [D69.6]  Thrombocytopenic ([facility] [D69.6]    ICD-10-CM    1. 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     HTN - was on  Lisinopril, HCTZ   YOHANNES  Obesity high 30s       Hospital Problems             Last Modified POA    * (Principal) TTP (thrombotic thrombocytopenic purpura) ([facility] 10/11/2022 Yes    Thrombocytopenia ([facility] 10/11/2022 Yes    Thrombocytopenic ([facility] 10/7/2022 Yes    Morbid obesity with BMI of 40.0-44.9, adult ([facility] 10/11/2022 Yes    Essential hypertension (Chronic) 10/11/2022 Yes   : other supportive care :   - Check daily renal function panel with electrolytes-phosphorus  - Strict monitoring of I/Os, daily weight  - non-Renal feeds/diet  - Current medications reviewed. Please refer to the orders. High Complexity. Multiple complex problems. Discussed with patient and treatment team-  family , nurse   Time spent > 30 (~35) minutes. Thank you for allowing me to participate in this patient's care. Please do not hesitate to contact me anytime. We will follow along with you. Gil Lopez MD,  [facility] [address] [place] [address] [place]: [phone] or [address]  Fax: [phone]        =======================================================================================   =======================================================================================  Subjective / interval history:   Patient was seen comfortably in bed while on plasma exchange  I required extra suture, now stable Vas-Cath. Vas-Cath with a poor dressing, I have informed dialysis nurse to ensure proper dressing    So far plasma exchange, tolerating it well currently,         Past medical, Surgical, Social, Family medical history reviewed by me. MEDICATIONS: reviewed by me. Medications Prior to Admission:  No current facility-administered medications on file prior to encounter.      Current Outpatient Medications on File Prior to Encounter   Medication Sig Dispense Refill    omeprazole (PRILOSEC) 20 MG delayed release capsule 1 daily 90 capsule 3    sildenafil (VIAGRA) 50 MG tablet Take 1 tablet by mouth as needed for Erectile Dysfunction 27 tablet 3    lisinopril (PRINIVIL;ZESTRIL) 40 MG tablet Take 1 tablet by mouth daily 90 tablet 3    verapamil (CALAN SR) 240 MG extended release tablet Take 1 tablet by mouth nightly 90 tablet 3    hydroCHLOROthiazide (HYDRODIURIL) 25 MG tablet Take 1 tablet by mouth daily 90 tablet 3    sertraline (ZOLOFT) 100 MG tablet 1 TABLET BY MOUTH DAILY 90 tablet 3    Vibegron (GEMTESA) 75 MG TABS Take 75 mg by mouth daily OVERACTIVE BLADDER      calcium carbonate (TUMS) 500 MG chewable tablet Take 6 tablets by mouth daily as needed for Heartburn           Current Facility-Administered Medications:     clobetasol (TEMOVATE) 0.05 % cream, , Topical, BID, Philip Garcia MD, Given at 10/17/22 2049    diazePAM (VALIUM) tablet 5 mg, 5 mg, Oral, Q6H PRN, Philip Garcia MD, 5 mg at 10/19/22 2139    calcium gluconate 4,000 mg in dextrose 5 % 100 mL IVPB, 4,000 mg, IntraVENous, PRN, Philip Garcia MD, Last Rate: 66.7 mL/hr at 10/18/22 1327, 4,000 mg at 10/18/22 1327    citrate dextrose (ACD Formula A) 0.73-2.45-2.2 GM/100ML solution, , IntraCATHeter, Continuous PRN, Philip Garcia MD, Last Rate: 1,000 mL/hr at 10/18/22 1326, New Bag at 41/50/31 4062    folic acid (FOLVITE) tablet 1 mg, 1 mg, Oral, Daily, Nathen Oliver MD, 1 mg at 10/20/22 0820    predniSONE (DELTASONE) tablet 120 mg, 120 mg, Oral, Daily, Nathen Oliver MD, 120 mg at 10/20/22 7511    Vibegron TABS 75 mg (Patient Supplied), 75 mg, Oral, Daily, Jeannie Mitchell MD, 75 mg at 10/20/22 0820    diatrizoate meglumine-sodium (GASTROGRAFIN) 66-10 % solution 20 mL, 20 mL, Oral, ONCE PRN, Nathen Oliver MD, 20 mL at 10/10/22 1502    diphenhydrAMINE (BENADRYL) tablet 12.5 mg, 12.5 mg, Oral, Nightly PRN, Yohana Griggs DO, 12.5 mg at 10/19/22 1150    sodium chloride flush 0.9 % injection 10 mL, 10 mL, IntraCATHeter, PRN, Simone Vera MD    heparin (porcine) injection 1,000 Units, 1,000 Units, IntraVENous, PRN, Sunny Blue MD, 1,000 Units at 10/13/22 0930    melatonin tablet 6 mg, 6 mg, Oral, Nightly PRN, Rusty Griggs DO, 6 mg at 10/19/22 0374    sertraline (ZOLOFT) tablet 100 mg, 100 mg, Oral, Daily, Meka Carrillo MD, 100 mg at 10/20/22 0820    sodium chloride flush 0.9 % injection 5-40 mL, 5-40 mL, IntraVENous, 2 times per day, Meka Carrillo MD, 10 mL at 10/20/22 0815    sodium chloride flush 0.9 % injection 5-40 mL, 5-40 mL, IntraVENous, PRN, Meka Carrillo MD, 10 mL at 10/17/22 1920    0.9 % sodium chloride infusion, , IntraVENous, PRN, Meka Carrillo MD, Last Rate: 20 mL/hr at 10/17/22 1818, New Bag at 10/17/22 1818    ondansetron (ZOFRAN-ODT) disintegrating tablet 4 mg, 4 mg, Oral, Q8H PRN **OR** ondansetron (ZOFRAN) injection 4 mg, 4 mg, IntraVENous, Q6H PRN, Meka Carrillo MD    polyethylene glycol (GLYCOLAX) packet 17 g, 17 g, Oral, Daily PRN, Meka Carrillo MD, 17 g at 10/15/22 1420    acetaminophen (TYLENOL) tablet 650 mg, 650 mg, Oral, Q6H PRN, 650 mg at 10/12/22 1347 **OR** acetaminophen (TYLENOL) suppository 650 mg, 650 mg, Rectal, Q6H PRN, Meka Carrillo MD    pantoprazole (PROTONIX) tablet 40 mg, 40 mg, Oral, QAM AC, Bre Knight MD, 40 mg at 10/20/22 0522    0.9 % sodium chloride infusion, , IntraVENous, PRN, Daivd April Griggs DO    vitamin B-12 (CYANOCOBALAMIN) tablet 500 mcg, 500 mcg, Oral, Daily, Meka Carrillo MD, 500 mcg at 10/20/22 0820    verapamil (CALAN SR) extended release tablet 240 mg, 240 mg, Oral, Daily, Meka Carrillo MD, 240 mg at 10/20/22 0320        REVIEW OF SYSTEMS:  As mentioned in chief complaint and HPI , Subjective       =======================================================================================     PHYSICAL EXAM:  Recent vital signs and recent I/Os reviewed by me.      Wt Readings from Last 3 Encounters:   10/19/22 280 lb 6.8 oz (127.2 kg)   10/06/22 282 lb (127.9 kg)   09/07/22 278 lb (126.1 kg)     BP Readings from Last 3 Encounters:   10/20/22 (!) 143/80   10/06/22 134/86   09/07/22 122/82     Patient Vitals for the past 24 hrs:   BP Temp Temp src Pulse Resp SpO2 Weight   10/20/22 5020 -- -- -- -- 16 100 % --   10/20/22 0817 (!) 143/80 97.9 °F (36.6 °C) Oral 72 18 100 % --   10/20/22 0524 122/73 97.2 °F (36.2 °C) Oral 55 18 98 % --   10/20/22 0023 111/72 97.7 °F (36.5 °C) Oral 65 18 98 % --   10/19/22 2122 (!) 128/90 98 °F (36.7 °C) Oral 95 18 98 % --   10/19/22 1641 125/81 97.8 °F (36.6 °C) Oral 86 18 96 % --   10/19/22 1345 (!) 143/96 96.8 °F (36 °C) -- 77 17 98 % 280 lb 6.8 oz (127.2 kg)   10/19/22 1340 (!) 143/96 96.8 °F (36 °C) Temporal 77 17 98 % --   10/19/22 1306 118/80 96.8 °F (36 °C) Temporal 95 16 98 % --   10/19/22 1206 128/71 97.5 °F (36.4 °C) -- 64 18 100 % 280 lb 6.8 oz (127.2 kg)   10/19/22 1042 131/85 -- -- 91 16 97 % --         Intake/Output Summary (Last 24 hours) at 10/20/2022 1033  Last data filed at 10/19/2022 1517  Gross per 24 hour   Intake 4655 ml   Output 4396 ml   Net 259 ml             Physical Exam  Vitals reviewed. Constitutional:       General: He is not in acute distress. Appearance: Normal appearance. HENT:      Head: Normocephalic and atraumatic. Right Ear: External ear normal.      Left Ear: External ear normal.      Nose: Nose normal.      Mouth/Throat:      Mouth: Mucous membranes are moist. Mucous membranes are not dry. Eyes:      General: No scleral icterus. Conjunctiva/sclera: Conjunctivae normal.   Neck:      Vascular: No JVD. Cardiovascular:      Rate and Rhythm: Normal rate and regular rhythm. Heart sounds: S1 normal and S2 normal.   Pulmonary:      Effort: Pulmonary effort is normal. No respiratory distress. Breath sounds: Normal breath sounds. Abdominal:      General: Bowel sounds are normal. There is no distension. Musculoskeletal:         General: No swelling or deformity. Cervical back: Normal range of motion and neck supple. Skin:     General: Skin is dry. Coloration: Skin is not jaundiced. Findings: Rash present. No lesion.    Neurological:      Mental Status: He is alert and oriented to person, place, and time. Mental status is at baseline. Psychiatric:         Mood and Affect: Mood normal.         Behavior: Behavior normal.        =======================================================================================     DATA:  Diagnostic tests reviewed by me for today's visit:   (AS NEEDED FOR MY EVALUATION AND MANAGEMENT). Recent Labs     10/18/22  0630 10/19/22  0555 10/20/22  0545   WBC 11.6* 11.4* 12.4*   HCT 32.0* 31.4* 29.6*   PLT 34* 42* 59*       Iron Saturation:  No components found for: PERCENTFE  FERRITIN:    Lab Results   Component Value Date/Time    FERRITIN 1,025.0 10/07/2022 11:56 AM     IRON:    Lab Results   Component Value Date/Time    IRON 344 10/07/2022 11:56 AM     TIBC:    Lab Results   Component Value Date/Time    TIBC 399 10/07/2022 11:56 AM       Recent Labs     10/18/22  0630 10/19/22  0555 10/20/22  0545    137 139   K 3.7 3.7 4.0    103 105   CO2 28 26 28   BUN 19 20 19   CREATININE 0.8* 1.0 0.8*       Recent Labs     10/18/22  0630 10/19/22  0555 10/20/22  0545   CALCIUM 8.4 8.5 8.4   MG 2.00 1.90 2.10   PHOS 4.0 3.3 4.1       No results for input(s): PH, PCO2, PO2 in the last 72 hours.     Invalid input(s): Mcintosh Bettina    ABG:  No results found for: PH, PCO2, PO2, HCO3, BE, THGB, TCO2, O2SAT  VBG:    Lab Results   Component Value Date/Time    PHVEN 7.412 10/20/2022 05:45 AM       LDH:    Lab Results   Component Value Date/Time     10/20/2022 05:45 AM     Uric Acid:  No results found for: LABURIC, URICACID    PT/INR:    Lab Results   Component Value Date/Time    PROTIME 13.9 10/20/2022 05:45 AM    INR 1.08 10/20/2022 05:45 AM     Warfarin PT/INR:  No components found for: PTPATWAR, PTINRWAR  PTT:    Lab Results   Component Value Date/Time    APTT 24.2 10/20/2022 05:45 AM   [APTT}  Last 3 Troponin:  No results found for: TROPONINI    U/A:    Lab Results   Component Value Date/Time    NITRITE neg 10/08/2020 03:20 PM    COLORU Yellow 10/07/2022 11:55 AM    PROTEINU Negative 10/07/2022 11:55 AM    PHUR 7.0 10/07/2022 11:56 AM    WBCUA 0 10/07/2022 11:55 AM    RBCUA 12 10/07/2022 11:55 AM    BACTERIA None Seen 10/07/2022 11:55 AM    CLARITYU Clear 10/07/2022 11:55 AM    SPECGRAV 1.010 10/07/2022 11:55 AM    LEUKOCYTESUR Negative 10/07/2022 11:55 AM    UROBILINOGEN 2.0 10/07/2022 11:55 AM    BILIRUBINUR Negative 10/07/2022 11:55 AM    BILIRUBINUR neg 10/08/2020 03:20 PM    BLOODU MODERATE 10/07/2022 11:55 AM    GLUCOSEU Negative 10/07/2022 11:55 AM     Microalbumen/Creatinine ratio:  No components found for: RUCREAT  24 Hour Urine for Protein:  No components found for: RAWUPRO, UHRS3, YRRQ24HA, UTV3  24 Hour Urine for Creatinine Clearance:  No components found for: CREAT4, UHRS10, UTV10  Urine Toxicology:  No components found for: IAMMENTA, IBARBIT, IBENZO, ICOCAINE, IMARTHC, IOPIATES, IPHENCYC    HgBA1c:    Lab Results   Component Value Date/Time    LABA1C 5.6 08/06/2022 08:15 AM     RPR:  No results found for: RPR  HIV:  No results found for: HIV  DEMI:    Lab Results   Component Value Date/Time    DEMI Negative 10/10/2022 02:35 PM     RF:    Lab Results   Component Value Date/Time    RF <10.0 10/10/2022 02:35 PM     DSDNA:  No components found for: DNA  AMYLASE:  No results found for: AMYLASE  LIPASE:  No results found for: LIPASE  Fibrinogen Level:  No components found for: FIB       BELOW MENTIONED RADIOLOGY STUDY RESULTS BY ME (AS NEEDED FOR MY EVALUATION AND MANAGEMENT). No results found.

## 2022-10-20 NOTE — PROGRESS NOTES
[facility]   Progress Note      10/20/2022 9:47 AM        Name: Roxy Blankenship . Admitted: 10/7/2022    SUBJECTIVE:  He is feeling well, no external bleeding, he is concerned about his central line coming loose.      Reviewed interval ancillary notes    Current Medications  clobetasol (TEMOVATE) 0.05 % cream, BID  diazePAM (VALIUM) tablet 5 mg, Q6H PRN  calcium gluconate 4,000 mg in dextrose 5 % 100 mL IVPB, PRN  citrate dextrose (ACD Formula A) 0.73-2.45-2.2 GM/100ML solution, Continuous PRN  folic acid (FOLVITE) tablet 1 mg, Daily  predniSONE (DELTASONE) tablet 120 mg, Daily  Vibegron TABS 75 mg (Patient Supplied), Daily  diatrizoate meglumine-sodium (GASTROGRAFIN) 66-10 % solution 20 mL, ONCE PRN  diphenhydrAMINE (BENADRYL) tablet 12.5 mg, Nightly PRN  sodium chloride flush 0.9 % injection 10 mL, PRN  heparin (porcine) injection 1,000 Units, PRN  melatonin tablet 6 mg, Nightly PRN  sertraline (ZOLOFT) tablet 100 mg, Daily  sodium chloride flush 0.9 % injection 5-40 mL, 2 times per day  sodium chloride flush 0.9 % injection 5-40 mL, PRN  0.9 % sodium chloride infusion, PRN  ondansetron (ZOFRAN-ODT) disintegrating tablet 4 mg, Q8H PRN   Or  ondansetron (ZOFRAN) injection 4 mg, Q6H PRN  polyethylene glycol (GLYCOLAX) packet 17 g, Daily PRN  acetaminophen (TYLENOL) tablet 650 mg, Q6H PRN   Or  acetaminophen (TYLENOL) suppository 650 mg, Q6H PRN  pantoprazole (PROTONIX) tablet 40 mg, QAM AC  0.9 % sodium chloride infusion, PRN  vitamin B-12 (CYANOCOBALAMIN) tablet 500 mcg, Daily  verapamil (CALAN SR) extended release tablet 240 mg, Daily        Objective:  BP (!) 143/80   Pulse 72   Temp 97.9 °F (36.6 °C) (Oral)   Resp 18   Ht 5' 10.5\" (1.791 m)   Wt 280 lb 6.8 oz (127.2 kg)   SpO2 100%   BMI 39.67 kg/m²     Intake/Output Summary (Last 24 hours) at 10/20/2022 0947  Last data filed at 10/19/2022 1517  Gross per 24 hour   Intake 4655 ml   Output 4396 ml   Net 259 ml      Wt Readings from Last 3 Encounters:   10/19/22 280 lb 6.8 oz (127.2 kg)   10/06/22 282 lb (127.9 kg)   09/07/22 278 lb (126.1 kg)       General appearance:  Appears comfortable  Eyes: Sclera clear. Pupils equal.  ENT: Moist oral mucosa. Trachea midline, no adenopathy. Cardiovascular: Regular rhythm, normal S1, S2. No murmur. No edema in lower extremities  Respiratory: Not using accessory muscles. Good inspiratory effort. Clear to auscultation bilaterally, no wheeze or crackles. GI: Abdomen soft, no tenderness, not distended  Musculoskeletal: No cyanosis in digits, neck supple  Neurology: CN 2-12 grossly intact. No speech or motor deficits  Psych: Normal affect. Alert and oriented in time, place and person  Skin: Warm, dry, normal turgor    Labs and Tests:  CBC:   Recent Labs     10/18/22  0630 10/19/22  0555 10/20/22  0545   WBC 11.6* 11.4* 12.4*   HGB 10.9* 10.7* 10.0*   PLT 34* 42* 59*     BMP:    Recent Labs     10/18/22  0630 10/19/22  0555 10/20/22  0545    137 139   K 3.7 3.7 4.0    103 105   CO2 28 26 28   BUN 19 20 19   CREATININE 0.8* 1.0 0.8*   GLUCOSE 79 79 88     Hepatic:   Recent Labs     10/18/22  0630 10/19/22  0555 10/20/22  0545   AST 18 15 15   ALT 18 15 23   BILITOT 0.7 0.4 0.3   ALKPHOS 52 51 47       ASSESSMENT AND PLAN    Principal Problem:    TTP (thrombotic thrombocytopenic purpura) (HCC)  Active Problems: Thrombocytopenia ([address]    Thrombocytopenic ([facility]    Morbid obesity with BMI of 40.0-44.9, adult [facility])    Essential hypertension  Resolved Problems:    * No resolved hospital problems. *      1. TTP      -Anemia and thrombocytopenia  -Schistocytes on smear  -Undetectable haptoglobin and elevated LDH  -Nicole negative  - homocysteine level is WNL  - RA factor - WNL  - DEMI negative  - MMA and dsDNA are normal     -ZAMAN TS 13 level very low at 16 normal greater than 65.  Inhibitor Ab is high at 83 with 49% inhibition.   -Continue prednisone 1 mg/kg daily   -Continue folic acid daily  -Nephrology following for plasmapheresis  - Completed 5 days of plasmapheresis on 10/13/2022     -Received Rituxan on 10/17/2022. Considering giving another dose of Rituxan today.   -Plan to start [facility] this admit and continue after discharge. Working on prior authorization.   -Platelet count is 23U today.  -Had bone marrow biopsy today, results pending.   -Continue daily plasmapheresis. -LDH today is down to 189 today  -Peripheral smear to be reviewed. -Repeat ADAMTS 13 level is pending at this time. Zella Schlatter, CNP  [facility]     Patient was seen and examined. Doing very well. Platelet count has improved to 59,000. LDH decreased to 189. Bone marrow aspiration and biopsy did not show any evidence of occult malignancy/lymphoma. Continue daily plasmapheresis. Will hold off with her Rituxan as platelet count has increased and LDH has decreased. Will start [facility] in a.m.     Tayo Hernandez MD

## 2022-10-20 NOTE — FLOWSHEET NOTE
TPE ( Therapeutic Plasma Exchange)  1.0 Volume of TPV exchange, with 100 % replacement.  Use  FFP: 3670ml  Number of Treatment : 10 of 12     Fluid balance: + 102 ml ( including ACD-A, Ca gluconate, saline rinse blood back to pt)    Started time: 1131  End time: 1400    Medication:  ACD-A ( per protocol via machine) to pt: 81 ml total during whole TPE  Ca: gluconate : 4 gm IVPB with whole TPE, started @ 1130    Next TPE scheduled on 10/20    Placed TPE flow sheets and blood transfusion downtime papers placed in the chart for EMR scan   Report provided to Juanpablo Irvin RN

## 2022-10-20 NOTE — CARE COORDINATION
CM reached out to [facility] to see if they could follow. Per [facility], they would not be able to do daily plasmapheresis.

## 2022-10-20 NOTE — PROGRESS NOTES
Hospital Medicine Progress Note     Date:  10/19/2022    PCP: Maria Del Rosario Romano MD (Tel: [phone])    Date of Admission: 10/7/2022    Hospital Course:  37 yo M who was directly admitted for TTP. Followed by Oncology and Renal.  Started on IV Decadron and IVIG. Transferred to ICU on 10/9. [facility]ascath [facility] by [facility]. Started on Plasmapheresis. Started on Rituxan on 10/17. Cablivi to be initiated. S/P IR BM bx on 10/19. Subjective  Patient denies CP, SOB, HA or abdominal pain. For PLEX today. Objective  Physical exam:  Vitals: /81   Pulse 86   Temp 97.8 °F (36.6 °C) (Oral)   Resp 18   Ht 5' 10.5\" (1.791 m)   Wt 280 lb 6.8 oz (127.2 kg)   SpO2 96%   BMI 39.67 kg/m²   Gen: Not in distress. Alert. Head: Normocephalic. Atraumatic. Eyes: EOMI. Good acuity. ENT: Oral mucosa moist.  R IJ vascath  Neck: No JVD. No obvious thyromegaly. CVS: Nml S1S2, no MRG, RRR  Pulmomary: Clear bilaterally. No crackles. No wheezes. Gastrointestinal: Soft, NT/ND. Positive bowel sounds. Musculoskeletal: No edema. Warm  Neuro: No focal deficit. Moves extremity spontaneously. Psychiatry: Appropriate affect. Not agitated. Skin: Warm, dry with normal turgor. No rash. Ecchymoses noted, new on lower abdomen      24HR INTAKE/OUTPUT:    Intake/Output Summary (Last 24 hours) at 10/19/2022 2113  Last data filed at 10/19/2022 1517  Gross per 24 hour   Intake 4655 ml   Output 4396 ml   Net 259 ml       I/O last 3 completed shifts: In: 8792 [P.O.:480; Blood:3897]  Out: 4396   No intake/output data recorded.       Meds:    clobetasol   Topical BID    folic acid  1 mg Oral Daily    predniSONE  120 mg Oral Daily    Vibegron  75 mg Oral Daily    sertraline  100 mg Oral Daily    sodium chloride flush  5-40 mL IntraVENous 2 times per day    pantoprazole  40 mg Oral QAM AC    vitamin B-12  500 mcg Oral Daily    verapamil  240 mg Oral Daily       Infusions:    citrate dextrose 1,000 mL/hr at 10/18/22 1326    sodium chloride 20 mL/hr at 10/17/22 1818    sodium chloride           PRN Meds: diazePAM, calcium gluconate, citrate dextrose, diatrizoate meglumine-sodium, diphenhydrAMINE, sodium chloride flush, heparin (porcine), melatonin, sodium chloride flush, sodium chloride, ondansetron **OR** ondansetron, polyethylene glycol, acetaminophen **OR** acetaminophen, sodium chloride    Labs/imaging:  CBC:   Recent Labs     10/17/22  0445 10/18/22  0630 10/19/22  0555   WBC 10.7 11.6* 11.4*   HGB 10.8* 10.9* 10.7*   PLT 55* 34* 42*           BMP:    Recent Labs     10/17/22  0445 10/18/22  0630 10/19/22  0555    139 137   K 4.1 3.7 3.7    105 103   CO2 28 28 26   BUN 19 19 20   CREATININE 0.7* 0.8* 1.0   GLUCOSE 88 79 79           Hepatic:   Recent Labs     10/17/22  0445 10/18/22  0630 10/19/22  0555   AST 17 18 15   ALT 19 18 15   BILITOT 0.7 0.7 0.4   ALKPHOS 50 52 51         Troponin: No results for input(s): TROPONINI in the last 72 hours. BNP: No results for input(s): BNP in the last 72 hours. INR:   Recent Labs     10/17/22  0445 10/18/22  0630 10/19/22  0555   INR 1.10 1.13 1.17*             Reviewed imaging and reports noted      Assessment:  Principal Problem:    TTP (thrombotic thrombocytopenic purpura) (HCC)  Active Problems: Thrombocytopenia ([facility]    Thrombocytopenic ([facility]    Morbid obesity with BMI of 40.0-44.9, adult [facility])    Essential hypertension  Resolved Problems:    * No resolved hospital problems. *        Plan:   Thrombotic thrombocytopenic purpura  - Appreciate oncology, pulmonology and nephrology recommendations  - Plasmapheresis daily per Oncology   - S/P Rituxan 10/17  - Start Audrene Herter during this stay per Oncology  - cont Prednisone 120 mg daily per Hematology  - S/P IR BM Bx on 10/19  - No platelet transfusion secondary to possibility of forming blood clots      Essential hypertension  -Continue Verapamil      Anemia  -Secondary to above, continue to monitor and transfuse for hemoglobin less than 8      Anxiety and depression  -Continue Zoloft      Diet: ADULT DIET; Regular    Activity: up as tolerated  Prophylaxis: SCD    Code status: Full Code     ----------    Discussed with patient, eGri CLIFFORD and CM. D/W wife. Thrombocytopenia continues to worsen.       Philip Garcia MD  -------------------------------  Rounding hospitalist

## 2022-10-20 NOTE — PROGRESS NOTES
Hospital Medicine Progress Note     Date:  10/20/2022    PCP: Sophia Hernandez MD (Tel: [phone])    Date of Admission: 10/7/2022    Hospital Course:  35 yo M who was directly admitted for TTP. Followed by Oncology and Renal.  Started on IV Decadron and IVIG. Transferred to ICU on 10/9. Vascath placed by [facility]. Started on Plasmapheresis. Started on Rituxan on 10/17. Cablivi to be initiated. S/P IR BM bx on 10/19. Subjective  Patient denies CP, SOB, HA or abdominal pain. For PLEX later today. Objective  Physical exam:  Vitals: BP (!) 141/78   Pulse 82   Temp 98 °F (36.7 °C) (Oral)   Resp 18   Ht 5' 10.5\" (1.791 m)   Wt 279 lb 15.8 oz (127 kg)   SpO2 96%   BMI 39.61 kg/m²   Gen: Not in distress. Alert. Head: Normocephalic. Atraumatic. Eyes: EOMI. Good acuity. ENT: Oral mucosa moist.  R IJ vascath  Neck: No JVD. No obvious thyromegaly. CVS: Nml S1S2, no MRG, RRR  Pulmomary: Clear bilaterally. No crackles. No wheezes. Gastrointestinal: Soft, NT/ND. Positive bowel sounds. Musculoskeletal: No edema. Warm  Neuro: No focal deficit. Moves extremity spontaneously. Psychiatry: Appropriate affect. Not agitated. Skin: Warm, dry with normal turgor. No rash. Ecchymoses noted, new on lower abdomen      24HR INTAKE/OUTPUT:    Intake/Output Summary (Last 24 hours) at 10/20/2022 1750  Last data filed at 10/20/2022 1403  Gross per 24 hour   Intake 4491 ml   Output 4389 ml   Net 102 ml       I/O last 3 completed shifts: In: 9194 [P.O.:240]  Out: 7332   I/O this shift:   In: 4491   Out: 4389       Meds:    clobetasol   Topical BID    folic acid  1 mg Oral Daily    predniSONE  120 mg Oral Daily    Vibegron  75 mg Oral Daily    sertraline  100 mg Oral Daily    sodium chloride flush  5-40 mL IntraVENous 2 times per day    pantoprazole  40 mg Oral QAM AC    vitamin B-12  500 mcg Oral Daily    verapamil  240 mg Oral Daily       Infusions:    citrate dextrose 1,000 mL/hr at 10/20/22 1140    sodium chloride 20 mL/hr at 10/17/22 1818    sodium chloride           PRN Meds: diazePAM, calcium gluconate, citrate dextrose, diatrizoate meglumine-sodium, diphenhydrAMINE, sodium chloride flush, heparin (porcine), melatonin, sodium chloride flush, sodium chloride, ondansetron **OR** ondansetron, polyethylene glycol, acetaminophen **OR** acetaminophen, sodium chloride    Labs/imaging:  CBC:   Recent Labs     10/18/22  0630 10/19/22  0555 10/20/22  0545   WBC 11.6* 11.4* 12.4*   HGB 10.9* 10.7* 10.0*   PLT 34* 42* 59*           BMP:    Recent Labs     10/18/22  0630 10/19/22  0555 10/20/22  0545    137 139   K 3.7 3.7 4.0    103 105   CO2 28 26 28   BUN 19 20 19   CREATININE 0.8* 1.0 0.8*   GLUCOSE 79 79 88           Hepatic:   Recent Labs     10/18/22  0630 10/19/22  0555 10/20/22  0545   AST 18 15 15   ALT 18 15 23   BILITOT 0.7 0.4 0.3   ALKPHOS 52 51 47         Troponin: No results for input(s): TROPONINI in the last 72 hours. BNP: No results for input(s): BNP in the last 72 hours. INR:   Recent Labs     10/18/22  0630 10/19/22  0555 10/20/22  0545   INR 1.13 1.17* 1.08             Reviewed imaging and reports noted      Assessment:  Principal Problem:    TTP (thrombotic thrombocytopenic purpura) (HCC)  Active Problems: Thrombocytopenia ([facility] [address]    Thrombocytopenic ([facility] [address]    Morbid obesity with BMI of 40.0-44.9, adult [facility])    Essential hypertension  Resolved Problems:    * No resolved hospital problems. *        Plan:   Thrombotic thrombocytopenic purpura  - Appreciate oncology, pulmonology and nephrology recommendations  - Plasmapheresis daily per Oncology   - S/P Rituxan 10/17, possibly again today  - Start [place] during this stay per Oncology  - cont Prednisone 120 mg daily per Hematology  - S/P IR BM Bx on 10/19  - No platelet transfusion secondary to possibility of forming blood clots      Essential hypertension  -Continue Verapamil      Anemia  -Secondary to above, continue to monitor and transfuse for hemoglobin less than 8      Anxiety and depression  -Continue Zoloft      Diet: ADULT DIET; Regular    Activity: up as tolerated  Prophylaxis: SCD    Code status: Full Code     ----------    Discussed with patient. Thrombocytopenia finally improving. Possible Rituxan today.     Cher Mireles MD  -------------------------------  Rounding hospitalist

## 2022-10-21 LAB
A/G RATIO: -1.6 (ref 1.1–2.2)
ALBUMIN SERPL-MCNC: 3.6 G/DL (ref 3.4–5)
ALP BLD-CCNC: 50 U/L (ref 40–129)
ALT SERPL-CCNC: 20 U/L (ref 10–40)
ANION GAP SERPL CALCULATED.3IONS-SCNC: 13 MMOL/L (ref 3–16)
APTT: 24 SEC (ref 23–34.3)
AST SERPL-CCNC: 16 U/L (ref 15–37)
BASOPHILS ABSOLUTE: 0.1 K/UL (ref 0–0.2)
BASOPHILS RELATIVE PERCENT: 0.4 %
BILIRUB SERPL-MCNC: <0.2 MG/DL (ref 0–1)
BILIRUBIN DIRECT: <0.2 MG/DL (ref 0–0.3)
BILIRUBIN, INDIRECT: NORMAL MG/DL (ref 0–1)
BLOOD BANK DISPENSE STATUS: NORMAL
BLOOD BANK PRODUCT CODE: NORMAL
BPU ID: NORMAL
BUN BLDV-MCNC: 15 MG/DL (ref 7–20)
CALCIUM IONIZED: 1.13 MMOL/L (ref 1.12–1.32)
CALCIUM SERPL-MCNC: 7.8 MG/DL (ref 8.3–10.6)
CHLORIDE BLD-SCNC: 106 MMOL/L (ref 99–110)
CO2: 22 MMOL/L (ref 21–32)
CREAT SERPL-MCNC: 0.8 MG/DL (ref 0.9–1.3)
DESCRIPTION BLOOD BANK: NORMAL
EOSINOPHILS ABSOLUTE: 0.1 K/UL (ref 0–0.6)
EOSINOPHILS RELATIVE PERCENT: 0.5 %
FIBRINOGEN: 248 MG/DL (ref 207–509)
GFR SERPL CREATININE-BSD FRML MDRD: >60 ML/MIN/{1.73_M2}
GLUCOSE BLD-MCNC: 77 MG/DL (ref 70–99)
HCT VFR BLD CALC: 32 % (ref 40.5–52.5)
HEMOGLOBIN: 10.7 G/DL (ref 13.5–17.5)
INR BLD: 1.15 (ref 0.87–1.14)
LACTATE DEHYDROGENASE: 180 U/L (ref 100–190)
LYMPHOCYTES ABSOLUTE: 1.9 K/UL (ref 1–5.1)
LYMPHOCYTES RELATIVE PERCENT: 13 %
MAGNESIUM: 1.9 MG/DL (ref 1.8–2.4)
MCH RBC QN AUTO: 33.6 PG (ref 26–34)
MCHC RBC AUTO-ENTMCNC: 33.5 G/DL (ref 31–36)
MCV RBC AUTO: 100.1 FL (ref 80–100)
MONOCYTES ABSOLUTE: 1.4 K/UL (ref 0–1.3)
MONOCYTES RELATIVE PERCENT: 9.7 %
NEUTROPHILS ABSOLUTE: 10.9 K/UL (ref 1.7–7.7)
NEUTROPHILS RELATIVE PERCENT: 76.4 %
PDW BLD-RTO: 17.2 % (ref 12.4–15.4)
PH VENOUS: 7.39 (ref 7.35–7.45)
PHOSPHORUS: 3.7 MG/DL (ref 2.5–4.9)
PLATELET # BLD: 87 K/UL (ref 135–450)
PMV BLD AUTO: 9.2 FL (ref 5–10.5)
POTASSIUM SERPL-SCNC: 4.1 MMOL/L (ref 3.5–5.1)
PROTHROMBIN TIME: 14.6 SEC (ref 11.7–14.5)
RBC # BLD: 3.2 M/UL (ref 4.2–5.9)
SODIUM BLD-SCNC: 141 MMOL/L (ref 136–145)
TOTAL PROTEIN: 1.4 G/DL (ref 6.4–8.2)
WBC # BLD: 14.3 K/UL (ref 4–11)

## 2022-10-21 PROCEDURE — 85610 PROTHROMBIN TIME: CPT

## 2022-10-21 PROCEDURE — 6360000002 HC RX W HCPCS: Performed by: INTERNAL MEDICINE

## 2022-10-21 PROCEDURE — 85384 FIBRINOGEN ACTIVITY: CPT

## 2022-10-21 PROCEDURE — 83735 ASSAY OF MAGNESIUM: CPT

## 2022-10-21 PROCEDURE — 85025 COMPLETE CBC W/AUTO DIFF WBC: CPT

## 2022-10-21 PROCEDURE — 85730 THROMBOPLASTIN TIME PARTIAL: CPT

## 2022-10-21 PROCEDURE — 80053 COMPREHEN METABOLIC PANEL: CPT

## 2022-10-21 PROCEDURE — 82330 ASSAY OF CALCIUM: CPT

## 2022-10-21 PROCEDURE — C9047 INJECTION, CAPLACIZUMAB-YHDP: HCPCS | Performed by: INTERNAL MEDICINE

## 2022-10-21 PROCEDURE — 96413 CHEMO IV INFUSION 1 HR: CPT

## 2022-10-21 PROCEDURE — 84100 ASSAY OF PHOSPHORUS: CPT

## 2022-10-21 PROCEDURE — 83615 LACTATE (LD) (LDH) ENZYME: CPT

## 2022-10-21 PROCEDURE — 6370000000 HC RX 637 (ALT 250 FOR IP): Performed by: FAMILY MEDICINE

## 2022-10-21 PROCEDURE — 2580000003 HC RX 258: Performed by: FAMILY MEDICINE

## 2022-10-21 PROCEDURE — 2580000003 HC RX 258: Performed by: INTERNAL MEDICINE

## 2022-10-21 PROCEDURE — 82248 BILIRUBIN DIRECT: CPT

## 2022-10-21 PROCEDURE — P9059 PLASMA, FRZ BETWEEN 8-24HOUR: HCPCS

## 2022-10-21 PROCEDURE — 6370000000 HC RX 637 (ALT 250 FOR IP): Performed by: INTERNAL MEDICINE

## 2022-10-21 PROCEDURE — 36514 APHERESIS PLASMA: CPT

## 2022-10-21 PROCEDURE — 96415 CHEMO IV INFUSION ADDL HR: CPT

## 2022-10-21 PROCEDURE — 1200000000 HC SEMI PRIVATE

## 2022-10-21 PROCEDURE — 2500000003 HC RX 250 WO HCPCS: Performed by: INTERNAL MEDICINE

## 2022-10-21 PROCEDURE — P9017 PLASMA 1 DONOR FRZ W/IN 8 HR: HCPCS

## 2022-10-21 RX ADMIN — FOLIC ACID 1 MG: 1 TABLET ORAL at 09:27

## 2022-10-21 RX ADMIN — SODIUM CHLORIDE 500 ML: 9 INJECTION, SOLUTION INTRAVENOUS at 18:41

## 2022-10-21 RX ADMIN — CAPLACIZUMAB 11 MG: KIT at 17:47

## 2022-10-21 RX ADMIN — POLYETHYLENE GLYCOL 3350 17 G: 17 POWDER, FOR SOLUTION ORAL at 06:17

## 2022-10-21 RX ADMIN — PANTOPRAZOLE SODIUM 40 MG: 40 TABLET, DELAYED RELEASE ORAL at 05:09

## 2022-10-21 RX ADMIN — SERTRALINE 100 MG: 50 TABLET, FILM COATED ORAL at 09:28

## 2022-10-21 RX ADMIN — VERAPAMIL HYDROCHLORIDE 240 MG: 240 TABLET, FILM COATED, EXTENDED RELEASE ORAL at 09:28

## 2022-10-21 RX ADMIN — CAPLACIZUMAB 11 MG: KIT at 20:26

## 2022-10-21 RX ADMIN — Medication 5 ML: at 09:40

## 2022-10-21 RX ADMIN — PREDNISONE 120 MG: 20 TABLET ORAL at 09:27

## 2022-10-21 RX ADMIN — ANTICOAGULANT CITRATE DEXTROSE SOLUTION FORMULA A 1000 ML: 12.25; 11; 3.65 SOLUTION INTRAVENOUS at 18:40

## 2022-10-21 RX ADMIN — DIAZEPAM 5 MG: 5 TABLET ORAL at 21:40

## 2022-10-21 RX ADMIN — Medication 10 ML: at 21:41

## 2022-10-21 RX ADMIN — MELATONIN TAB 3 MG 6 MG: 3 TAB at 21:40

## 2022-10-21 RX ADMIN — ACETAMINOPHEN 650 MG: 325 TABLET ORAL at 16:03

## 2022-10-21 RX ADMIN — CYANOCOBALAMIN TAB 1000 MCG 500 MCG: 1000 TAB at 09:27

## 2022-10-21 RX ADMIN — DIPHENHYDRAMINE HYDROCHLORIDE 12.5 MG: 25 TABLET ORAL at 18:15

## 2022-10-21 RX ADMIN — CALCIUM GLUCONATE 4000 MG: 98 INJECTION, SOLUTION INTRAVENOUS at 18:25

## 2022-10-21 ASSESSMENT — PAIN SCALES - GENERAL
PAINLEVEL_OUTOF10: 2
PAINLEVEL_OUTOF10: 0

## 2022-10-21 ASSESSMENT — PAIN DESCRIPTION - LOCATION: LOCATION: HEAD

## 2022-10-21 NOTE — PROGRESS NOTES
Hospital Medicine Progress Note     Date:  10/21/2022    PCP: Sheree Ashraf MD (Tel: [phone])    Date of Admission: 10/7/2022    Hospital Course:  35 yo M who was directly admitted for TTP. Followed by Oncology and Renal.  Started on IV Decadron and IVIG. Transferred to ICU on 10/9. [facility] by [facility]. Started on Plasmapheresis. Started on Rituxan on 10/17, repeat on 10/24. Cablivi to be initiated on 10/21. S/P IR BM bx on 10/19. Subjective  Patient denies CP, SOB, HA or abdominal pain. No complaints. For PLEX daily. Objective  Physical exam:  Vitals: BP (!) 143/87   Pulse 78   Temp 97.9 °F (36.6 °C) (Oral)   Resp 20   Ht 5' 10.5\" (1.791 m)   Wt 279 lb 15.8 oz (127 kg)   SpO2 99%   BMI 39.61 kg/m²   Gen: Not in distress. Alert. Head: Normocephalic. Atraumatic. Eyes: EOMI. Good acuity. ENT: Oral mucosa moist.  R IJ vascath  Neck: No JVD. No obvious thyromegaly. CVS: Nml S1S2, no MRG, RRR  Pulmomary: Clear bilaterally. No crackles. No wheezes. Gastrointestinal: Soft, NT/ND. Positive bowel sounds. Musculoskeletal: No edema. Warm  Neuro: No focal deficit. Moves extremity spontaneously. Psychiatry: Appropriate affect. Not agitated. Skin: Warm, dry with normal turgor. No rash. Ecchymoses noted, new on lower abdomen      24HR INTAKE/OUTPUT:    Intake/Output Summary (Last 24 hours) at 10/21/2022 1354  Last data filed at 10/20/2022 1403  Gross per 24 hour   Intake 4491 ml   Output 4389 ml   Net 102 ml       I/O last 3 completed shifts: In: 4491   Out: 4389   No intake/output data recorded.       Meds:    caplacizumab-yhdp  11 mg IntraVENous Once    caplacizumab-yhdp  11 mg SubCUTAneous Once    clobetasol   Topical BID    folic acid  1 mg Oral Daily    predniSONE  120 mg Oral Daily    Vibegron  75 mg Oral Daily    sertraline  100 mg Oral Daily    sodium chloride flush  5-40 mL IntraVENous 2 times per day    pantoprazole  40 mg Oral QAM AC    vitamin B-12  500 mcg Oral Daily verapamil  240 mg Oral Daily       Infusions:    citrate dextrose 1,000 mL/hr at 10/20/22 1140    sodium chloride 20 mL/hr at 10/17/22 1818    sodium chloride           PRN Meds: diazePAM, calcium gluconate, citrate dextrose, diatrizoate meglumine-sodium, diphenhydrAMINE, sodium chloride flush, heparin (porcine), melatonin, sodium chloride flush, sodium chloride, ondansetron **OR** ondansetron, polyethylene glycol, acetaminophen **OR** acetaminophen, sodium chloride    Labs/imaging:  CBC:   Recent Labs     10/19/22  0555 10/20/22  0545 10/21/22  0510   WBC 11.4* 12.4* 14.3*   HGB 10.7* 10.0* 10.7*   PLT 42* 59* 87*           BMP:    Recent Labs     10/19/22  0555 10/20/22  0545 10/21/22  0510    139 141   K 3.7 4.0 4.1    105 106   CO2 26 28 22   BUN 20 19 15   CREATININE 1.0 0.8* 0.8*   GLUCOSE 79 88 77           Hepatic:   Recent Labs     10/19/22  0555 10/20/22  0545 10/21/22  0510   AST 15 15 16   ALT 15 23 20   BILITOT 0.4 0.3 <0.2   ALKPHOS 51 47 50         Troponin: No results for input(s): TROPONINI in the last 72 hours. BNP: No results for input(s): BNP in the last 72 hours. INR:   Recent Labs     10/19/22  0555 10/20/22  0545 10/21/22  0510   INR 1.17* 1.08 1. 15*             Reviewed imaging and reports noted      Assessment:  Principal Problem:    TTP (thrombotic thrombocytopenic purpura) (HCC)  Active Problems: Thrombocytopenia ([facility] [address]    Thrombocytopenic ([facility] [address]    Morbid obesity with BMI of 40.0-44.9, adult [facility])    Essential hypertension  Resolved Problems:    * No resolved hospital problems. *        Plan:   Thrombotic thrombocytopenic purpura  - Appreciate oncology, pulmonology and nephrology recommendations  - Plasmapheresis daily per Oncology   - S/P Rituxan 10/17, again on 10/24  - [address] today per Onc  - cont Prednisone 120 mg daily per Hematology  - S/P IR BM Bx on 10/19  - No platelet transfusion secondary to possibility of forming blood clots      Essential hypertension  -Continue Verapamil      Anemia  -Secondary to above, continue to monitor and transfuse for hemoglobin less than 8      Anxiety and depression  -Continue Zoloft      Diet: ADULT DIET; Regular    Activity: up as tolerated  Prophylaxis: SCD    Code status: Full Code     ----------    Discussed with patient, nursing and CM. For PLEX daily. Rituxan again on 10/24. Starting [place] today.     Ishaan Abdul MD  -------------------------------  [facility]

## 2022-10-21 NOTE — PROGRESS NOTES
Original chemotherapy orders reviewed and acknowledged. Appropriateness of chemotherapy treatment regimen Cablivi  for diagnosis of TTP was verified. Patient educated on chemotherapy regimen. Acknowledgement of informed consent for chemotherapy obtained. Pt height, weight, and BSA verified. Appropriate dosing calculations of chemotherapy based on height, weight, and BSA verified. Administration: Chemotherapy drug Cablivi independently verified with Carlos Green RN prior to administration. Original order, appropriateness of regimen, drug supplied, height, weight, BSA, dose calculations, expiration dates/times, drug appearance, and two patient identifiers were verified by both RNs. Drug checked for vesicant/irritant status and for risk of hypersensitivity. Most recent laboratory values and allergies, were reviewed. Appropriate IV access available: Positive, brisk blood return via CVC was confirmed prior to administration. Chest x-ray for correct line placement reviewed  Jassi Corea RN and Carlos Green RN verified correct rate of chemotherapy and maintenance IV fluids. Patient was educated on chemotherapy regimen prior to administration including indication for treatment related to disease & side effects of chemotherapy drug. Patient verbalizes understanding of all instructions.

## 2022-10-21 NOTE — PROGRESS NOTES
Nutrition Note    RECOMMENDATIONS  PO Diet: Regular    NUTRITION ASSESSMENT   Follow up. Pt continues on regular diet with consistent intakes of % of meals. Weight remains stable. Pt had bone marrow bx 10/19 negative for malignancies . No nutrition concerns, will continue to monitor pt during admission. Nutrition Related Findings: +15.2 L since admit; lytes WNL; no edema noted; Last BM 10/18. Wounds: Surgical Incision (Bone marrow biopsy site)  Nutrition Education:  Education not indicated   Nutrition Goals: PO intake 75% or greater     MALNUTRITION ASSESSMENT   Malnutrition Status: No malnutrition      NUTRITION DIAGNOSIS   No nutrition diagnosis at this time       CURRENT NUTRITION THERAPIES  ADULT DIET; Regular     PO Intake: %   PO Supplement Intake:None Ordered      ANTHROPOMETRICS  Current Height: 5' 10.5\" (179.1 cm)  Current Weight: 279 lb 15.8 oz (127 kg)    Admission weight: 288 lb (130.6 kg) (standing)  Ideal Body Weight (IBW): 169 lbs  (77 kg)    Usual Bodyweight         BMI: 39.5     The patient will be monitored per nutrition standards of care. Consult dietitian if additional nutrition interventions are needed prior to RD reassessment.      Johan Powers, [address] [place] [address],     Contact: [phone]

## 2022-10-21 NOTE — PLAN OF CARE
Problem: ABCDS Injury Assessment  Goal: Absence of physical injury  Outcome: Progressing     Problem: Discharge Planning  Goal: Discharge to home or other facility with appropriate resources  Outcome: Progressing  Flowsheets (Taken 10/21/2022 0841 by [facility])  Discharge to home or other facility with appropriate resources: Identify barriers to discharge with patient and caregiver     Problem: Pain  Goal: Verbalizes/displays adequate comfort level or baseline comfort level  Outcome: Progressing  Flowsheets (Taken 10/21/2022 1129 by Carol [facility])  Verbalizes/displays adequate comfort level or baseline comfort level: Assess pain using appropriate pain scale     Problem: Safety - Adult  Goal: Free from fall injury  Outcome: Progressing     Problem: Skin/Tissue Integrity  Goal: Absence of new skin breakdown  Description: 1. Monitor for areas of redness and/or skin breakdown  2. Assess vascular access sites hourly  3. Every 4-6 hours minimum:  Change oxygen saturation probe site  4. Every 4-6 hours:  If on nasal continuous positive airway pressure, respiratory therapy assess nares and determine need for appliance change or resting period.   Outcome: Progressing

## 2022-10-21 NOTE — PROGRESS NOTES
[facility]   Progress Note      10/21/2022 8:47 AM        Name: Arturo Bojorquez . Admitted: 10/7/2022    SUBJECTIVE:  He is feeling well, no external bleeding, no new bruising.       Reviewed interval ancillary notes    Current Medications  caplacizumab-yhdp (CABLIVI) injection 11 mg, Once  caplacizumab-yhdp (CABLIVI) injection 11 mg, Once  clobetasol (TEMOVATE) 0.05 % cream, BID  diazePAM (VALIUM) tablet 5 mg, Q6H PRN  calcium gluconate 4,000 mg in dextrose 5 % 100 mL IVPB, PRN  citrate dextrose (ACD Formula A) 0.73-2.45-2.2 GM/100ML solution, Continuous PRN  folic acid (FOLVITE) tablet 1 mg, Daily  predniSONE (DELTASONE) tablet 120 mg, Daily  Vibegron TABS 75 mg (Patient Supplied), Daily  diatrizoate meglumine-sodium (GASTROGRAFIN) 66-10 % solution 20 mL, ONCE PRN  diphenhydrAMINE (BENADRYL) tablet 12.5 mg, Nightly PRN  sodium chloride flush 0.9 % injection 10 mL, PRN  heparin (porcine) injection 1,000 Units, PRN  melatonin tablet 6 mg, Nightly PRN  sertraline (ZOLOFT) tablet 100 mg, Daily  sodium chloride flush 0.9 % injection 5-40 mL, 2 times per day  sodium chloride flush 0.9 % injection 5-40 mL, PRN  0.9 % sodium chloride infusion, PRN  ondansetron (ZOFRAN-ODT) disintegrating tablet 4 mg, Q8H PRN   Or  ondansetron (ZOFRAN) injection 4 mg, Q6H PRN  polyethylene glycol (GLYCOLAX) packet 17 g, Daily PRN  acetaminophen (TYLENOL) tablet 650 mg, Q6H PRN   Or  acetaminophen (TYLENOL) suppository 650 mg, Q6H PRN  pantoprazole (PROTONIX) tablet 40 mg, QAM AC  0.9 % sodium chloride infusion, PRN  vitamin B-12 (CYANOCOBALAMIN) tablet 500 mcg, Daily  verapamil (CALAN SR) extended release tablet 240 mg, Daily      Objective:  BP (!) 147/97   Pulse 77   Temp 97.8 °F (36.6 °C) (Oral)   Resp 16   Ht 5' 10.5\" (1.791 m)   Wt 279 lb 15.8 oz (127 kg)   SpO2 100%   BMI 39.61 kg/m²     Intake/Output Summary (Last 24 hours) at 10/21/2022 0847  Last data filed at 10/20/2022 1403  Gross per 24 hour Intake 4491 ml   Output 4389 ml   Net 102 ml        Wt Readings from Last 3 Encounters:   10/20/22 279 lb 15.8 oz (127 kg)   10/06/22 282 lb (127.9 kg)   09/07/22 278 lb (126.1 kg)       General appearance:  Appears comfortable  Eyes: Sclera clear. Pupils equal.  ENT: Moist oral mucosa. Trachea midline, no adenopathy. Cardiovascular: Regular rhythm, normal S1, S2. No murmur. No edema in lower extremities  Respiratory: Not using accessory muscles. Good inspiratory effort. Clear to auscultation bilaterally, no wheeze or crackles. GI: Abdomen soft, no tenderness, not distended  Musculoskeletal: No cyanosis in digits, neck supple  Neurology: CN 2-12 grossly intact. No speech or motor deficits  Psych: Normal affect. Alert and oriented in time, place and person  Skin: Warm, dry, normal turgor    Labs and Tests:  CBC:   Recent Labs     10/19/22  0555 10/20/22  0545 10/21/22  0510   WBC 11.4* 12.4* 14.3*   HGB 10.7* 10.0* 10.7*   PLT 42* 59* 87*       BMP:    Recent Labs     10/19/22  0555 10/20/22  0545 10/21/22  0510    139 141   K 3.7 4.0 4.1    105 106   CO2 26 28 22   BUN 20 19 15   CREATININE 1.0 0.8* 0.8*   GLUCOSE 79 88 77       Hepatic:   Recent Labs     10/19/22  0555 10/20/22  0545 10/21/22  0510   AST 15 15 16   ALT 15 23 20   BILITOT 0.4 0.3 <0.2   ALKPHOS 51 47 50         ASSESSMENT AND PLAN    Principal Problem:    TTP (thrombotic thrombocytopenic purpura) (HCC)  Active Problems: Thrombocytopenia ([address]    Thrombocytopenic ([facility] [address]    Morbid obesity with BMI of 40.0-44.9, adult [facility])    Essential hypertension  Resolved Problems:    * No resolved hospital problems. *      1. TTP      -Anemia and thrombocytopenia  -Schistocytes on smear  -Undetectable haptoglobin and elevated LDH  -Nicole negative  - homocysteine level is WNL  - RA factor - WNL  - DEMI negative  - MMA and dsDNA are normal     -ZAMAN TS 13 level very low at 16 normal greater than 65. Inhibitor Ab is high at 83 with 49% inhibition. -Continue prednisone 1 mg/kg daily   -Continue folic acid daily  -Nephrology following for plasmapheresis  - Completed 5 days of plasmapheresis on 10/13/2022     -Received Rituxan on 10/17/2022.  -Plan to start [facility] today and continue after discharge.   -Platelet count is 87X today.  -Had bone marrow biopsy 10/19/22, results pending.   -Continue daily plasmapheresis. -LDH today is down to 180 today  -Repeat ADAMTS 13 level is 12. Angeli Bay CNP  [facility]     Patient was seen and examined. No thrush. Platelet count is 87  LDH is 180. Continue prednisone 120 mg p.o. daily. Continue plasmapheresis every day. Spoke with Dr. Rasta Hampton -due to staffing issues he may not get plasmapheresis and certainly but renal will do their best to continue daily plasmapheresis. [facility] will be started today. Received Rituxan on 10/17/2020.   It will be repeated on 10/24/2020    Giselle Mclaughlin MD

## 2022-10-21 NOTE — PROGRESS NOTES
MD Cristina Cordero MD Melanie Certain, MD                  Office: [phone]                      Fax: [phone]             [facility]                   NEPHROLOGY INPATIENT PROGRESS NOTE:     PATIENT NAME: Mariya Herrera  : 1980  MRN: [id number]      RECOMMENDATIONS:   --plans for ongoing daily more Therapeutic Plasma Exchanges,   - started on 10-8-22     - #11 10-21  - #12 10-22   - #13 10-23 --I have requested them  to dialysis needs, if they are able to arrange with the staffing issues on    - as requested by hematologist    -jarret with Dr. Vimal Washington     - 1 plasma volume exchange. - Replacement fluids w/ FFP, not 5% albumin  - monitor S. fibrinogen level, LDH, CBC, PT/INR, iCalcium, Mag, Phos -> fibrinogen level dropping but still above normal, monitor level  - ACD-A for a/c protocol.  - Calcium replacement protocol. Needing repletion,  - requested dialysis nurse - discussed w/ them,      -Dexamethasone 40 mg daily, -> PO Prednisone 1mg/kg/d  , s/p IV Ig - as per hematologist     -Jalen Gather placement on 10-8-2022  - by Dr Edgar Bojorquez - appreciated his assistance   -avoid platelet transfusion  - hold Lisinopril, HCTZ, can use hydralazine PRN for SBP >160s  - at higher risk for decompensation, needing closer monitoring. D/C plan from renal stand point:  - fup w/ hematology plan       D/W pt, team, hematologist,, hospitalist, nurse,  , TPE nurse  also  Have discussed with patient family, mother-in-law, wife, also      IMPRESSION:       Admitted on:  10/7/2022 10:36 AM   For:  Thrombocytopenia ([facility] [address] [D69.6]  Thrombocytopenic ([facility] [address] [D69.6]    ICD-10-CM    1. 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     HTN - was on  Lisinopril, HCTZ   YOHANNES  Obesity high 30s       Hospital Problems             Last Modified POA    * (Principal) TTP (thrombotic thrombocytopenic purpura) ([facility] [address] 10/11/2022 Yes    Thrombocytopenia ([facility] [address] 10/11/2022 Yes    Thrombocytopenic ([facility] [address] 10/7/2022 Yes    Morbid obesity with BMI of 40.0-44.9, adult ([facility] [address] 10/11/2022 Yes    Essential hypertension (Chronic) 10/11/2022 Yes   : other supportive care :   - Check daily renal function panel with electrolytes-phosphorus  - Strict monitoring of I/Os, daily weight  - non-Renal feeds/diet  - Current medications reviewed. Please refer to the orders. High Complexity. Multiple complex problems. Discussed with patient and treatment team-  family , nurse   Time spent > 30 (~35) minutes. Thank you for allowing me to participate in this patient's care. Please do not hesitate to contact me anytime. We will follow along with you. Maricel Snider MD,  [facility] [address] [place] [address] [place]: [phone] or [employer]  Fax: [phone]        =======================================================================================   =======================================================================================  Subjective / interval history:   Patient was seen comfortably in room     So far plasma exchange, tolerating it well currently,     No current active complaints. Patient denied chest pain / dizziness/lightheadedness/syncope/ SOB / leg edema. Past medical, Surgical, Social, Family medical history reviewed by me. MEDICATIONS: reviewed by me. Medications Prior to Admission:  No current facility-administered medications on file prior to encounter.      Current Outpatient Medications on File Prior to Encounter   Medication Sig Dispense Refill    omeprazole (PRILOSEC) 20 MG delayed release capsule 1 daily 90 capsule 3    sildenafil (VIAGRA) 50 MG tablet Take 1 tablet by mouth as needed for Erectile Dysfunction 27 tablet 3 lisinopril (PRINIVIL;ZESTRIL) 40 MG tablet Take 1 tablet by mouth daily 90 tablet 3    verapamil (CALAN SR) 240 MG extended release tablet Take 1 tablet by mouth nightly 90 tablet 3    hydroCHLOROthiazide (HYDRODIURIL) 25 MG tablet Take 1 tablet by mouth daily 90 tablet 3    sertraline (ZOLOFT) 100 MG tablet 1 TABLET BY MOUTH DAILY 90 tablet 3    Vibegron (GEMTESA) 75 MG TABS Take 75 mg by mouth daily OVERACTIVE BLADDER      calcium carbonate (TUMS) 500 MG chewable tablet Take 6 tablets by mouth daily as needed for Heartburn           Current Facility-Administered Medications:     caplacizumab-yhdp (CABLIVI) injection 11 mg, 11 mg, IntraVENous, Once, Wesley Klein MD    caplacizumab-yhdp (CABLIVI) injection 11 mg, 11 mg, SubCUTAneous, Once, Wesley Klein MD    clobetasol (TEMOVATE) 0.05 % cream, , Topical, BID, Wesley Klein MD, Given at 10/17/22 2049    diazePAM (VALIUM) tablet 5 mg, 5 mg, Oral, Q6H PRN, Wesley Klein MD, 5 mg at 10/20/22 2130    calcium gluconate 4,000 mg in dextrose 5 % 100 mL IVPB, 4,000 mg, IntraVENous, PRN, Wesley Klein MD, Stopped at 10/20/22 1253    citrate dextrose (ACD Formula A) 0.73-2.45-2.2 GM/100ML solution, , IntraCATHeter, Continuous PRN, Wesley Klein MD, Last Rate: 1,000 mL/hr at 10/20/22 1140, New Bag at 94/53/43 2331    folic acid (FOLVITE) tablet 1 mg, 1 mg, Oral, Daily, Efe Stewart MD, 1 mg at 10/21/22 8259    predniSONE (DELTASONE) tablet 120 mg, 120 mg, Oral, Daily, Efe Stewart MD, 120 mg at 10/21/22 6804    Vibegron TABS 75 mg (Patient Supplied), 75 mg, Oral, Daily, Tiana Soto MD, 75 mg at 10/21/22 0929    diatrizoate meglumine-sodium (GASTROGRAFIN) 66-10 % solution 20 mL, 20 mL, Oral, ONCE PRN, Efe Stewart MD, 20 mL at 10/10/22 1502    diphenhydrAMINE (BENADRYL) tablet 12.5 mg, 12.5 mg, Oral, Nightly PRN, Yohana Griggs DO, 12.5 mg at 10/19/22 1150    sodium chloride flush 0.9 % injection 10 mL, 10 mL, IntraCATHeter, PRN, Randall Hoang MD heparin (porcine) injection 1,000 Units, 1,000 Units, IntraVENous, PRN, Rafael Chang MD, 1,000 Units at 10/13/22 0930    melatonin tablet 6 mg, 6 mg, Oral, Nightly PRN, Jaqueline Arturo A DO [place], 6 mg at 10/19/22 2139    sertraline (ZOLOFT) tablet 100 mg, 100 mg, Oral, Daily, Benjmain Mason MD, 100 mg at 10/21/22 6381    sodium chloride flush 0.9 % injection 5-40 mL, 5-40 mL, IntraVENous, 2 times per day, Benjamin Mason MD, 5 mL at 10/21/22 0940    sodium chloride flush 0.9 % injection 5-40 mL, 5-40 mL, IntraVENous, PRN, Benjamin Mason MD, 10 mL at 10/17/22 1920    0.9 % sodium chloride infusion, , IntraVENous, PRN, Benjamin Mason MD, Last Rate: 20 mL/hr at 10/17/22 1818, New Bag at 10/17/22 1818    ondansetron (ZOFRAN-ODT) disintegrating tablet 4 mg, 4 mg, Oral, Q8H PRN **OR** ondansetron (ZOFRAN) injection 4 mg, 4 mg, IntraVENous, Q6H PRN, Benjamin Mason MD    polyethylene glycol (GLYCOLAX) packet 17 g, 17 g, Oral, Daily PRN, Benjamin Mason MD, 17 g at 10/21/22 0617    acetaminophen (TYLENOL) tablet 650 mg, 650 mg, Oral, Q6H PRN, 650 mg at 10/20/22 1633 **OR** acetaminophen (TYLENOL) suppository 650 mg, 650 mg, Rectal, Q6H PRN, Benjamin Mason MD    pantoprazole (PROTONIX) tablet 40 mg, 40 mg, Oral, QAJustice BRADEN MD, 40 mg at 10/21/22 0509    0.9 % sodium chloride infusion, , IntraVENous, PRN, Jaqueline Arturo MEAGAN Griggs DO    vitamin B-12 (CYANOCOBALAMIN) tablet 500 mcg, 500 mcg, Oral, Daily, Benjamin Mason MD, 500 mcg at 10/21/22 0106    verapamil (CALAN SR) extended release tablet 240 mg, 240 mg, Oral, Daily, Benjamin Mason MD, 240 mg at 10/21/22 8849        REVIEW OF SYSTEMS:  As mentioned in chief complaint and HPI , Subjective       =======================================================================================     PHYSICAL EXAM:  Recent vital signs and recent I/Os reviewed by me.      Wt Readings from Last 3 Encounters:   10/20/22 279 lb 15.8 oz (127 kg)   10/06/22 282 lb (127.9 kg)   09/07/22 278 lb (126.1 kg)     BP Readings from Last 3 Encounters:   10/21/22 (!) 147/97   10/06/22 134/86   09/07/22 122/82     Patient Vitals for the past 24 hrs:   BP Temp Temp src Pulse Resp SpO2 Weight   10/21/22 0841 -- -- -- 77 -- -- --   10/21/22 0828 (!) 147/97 97.8 °F (36.6 °C) Oral 77 16 100 % --   10/21/22 0500 (!) 145/73 97.2 °F (36.2 °C) Temporal 90 18 95 % --   10/20/22 2348 123/82 97 °F (36.1 °C) Temporal 60 18 96 % --   10/20/22 1954 139/88 98.1 °F (36.7 °C) Temporal 68 18 97 % --   10/20/22 1722 (!) 141/78 98 °F (36.7 °C) Oral 82 18 96 % --   10/20/22 1403 130/81 97.9 °F (36.6 °C) -- 64 18 -- --   10/20/22 1130 134/85 97.6 °F (36.4 °C) -- 87 16 -- 279 lb 15.8 oz (127 kg)   10/20/22 1106 (!) 142/87 97.7 °F (36.5 °C) Temporal 67 16 96 % --         Intake/Output Summary (Last 24 hours) at 10/21/2022 1054  Last data filed at 10/20/2022 1403  Gross per 24 hour   Intake 4491 ml   Output 4389 ml   Net 102 ml             Physical Exam  Vitals reviewed. Constitutional:       General: He is not in acute distress. Appearance: Normal appearance. HENT:      Head: Normocephalic and atraumatic. Right Ear: External ear normal.      Left Ear: External ear normal.      Nose: Nose normal.      Mouth/Throat:      Mouth: Mucous membranes are moist. Mucous membranes are not dry. Eyes:      General: No scleral icterus. Conjunctiva/sclera: Conjunctivae normal.   Neck:      Vascular: No JVD. Cardiovascular:      Rate and Rhythm: Normal rate and regular rhythm. Heart sounds: S1 normal and S2 normal.   Pulmonary:      Effort: Pulmonary effort is normal. No respiratory distress. Breath sounds: Normal breath sounds. Abdominal:      General: Bowel sounds are normal. There is no distension. Musculoskeletal:         General: No swelling or deformity. Cervical back: Normal range of motion and neck supple. Skin:     General: Skin is dry.       Coloration: Skin is not jaundiced. Findings: Rash present. No lesion. Neurological:      Mental Status: He is alert and oriented to person, place, and time. Mental status is at baseline. Psychiatric:         Mood and Affect: Mood normal.         Behavior: Behavior normal.        =======================================================================================     DATA:  Diagnostic tests reviewed by me for today's visit:   (AS NEEDED FOR MY EVALUATION AND MANAGEMENT). Recent Labs     10/19/22  0555 10/20/22  0545 10/21/22  0510   WBC 11.4* 12.4* 14.3*   HCT 31.4* 29.6* 32.0*   PLT 42* 59* 87*       Iron Saturation:  No components found for: PERCENTFE  FERRITIN:    Lab Results   Component Value Date/Time    FERRITIN 1,025.0 10/07/2022 11:56 AM     IRON:    Lab Results   Component Value Date/Time    IRON 344 10/07/2022 11:56 AM     TIBC:    Lab Results   Component Value Date/Time    TIBC 399 10/07/2022 11:56 AM       Recent Labs     10/19/22  0555 10/20/22  0545 10/21/22  0510    139 141   K 3.7 4.0 4.1    105 106   CO2 26 28 22   BUN 20 19 15   CREATININE 1.0 0.8* 0.8*       Recent Labs     10/19/22  0555 10/20/22  0545 10/21/22  0510   CALCIUM 8.5 8.4 7.8*   MG 1.90 2.10 1.90   PHOS 3.3 4.1 3.7       No results for input(s): PH, PCO2, PO2 in the last 72 hours.     Invalid input(s): Seema Lunger    ABG:  No results found for: PH, PCO2, PO2, HCO3, BE, THGB, TCO2, O2SAT  VBG:    Lab Results   Component Value Date/Time    PHVEN 7.395 10/21/2022 05:10 AM       LDH:    Lab Results   Component Value Date/Time     10/21/2022 05:10 AM     Uric Acid:  No results found for: Hanna Parada    PT/INR:    Lab Results   Component Value Date/Time    PROTIME 14.6 10/21/2022 05:10 AM    INR 1.15 10/21/2022 05:10 AM     Warfarin PT/INR:  No components found for: PTPATWAR, PTINRWAR  PTT:    Lab Results   Component Value Date/Time    APTT 24.0 10/21/2022 05:10 AM   [APTT}  Last 3 Troponin:  No results found for: TROPONINI    U/A:    Lab Results   Component Value Date/Time    NITRITE neg 10/08/2020 03:20 PM    COLORU Yellow 10/07/2022 11:55 AM    PROTEINU Negative 10/07/2022 11:55 AM    PHUR 7.0 10/07/2022 11:56 AM    WBCUA 0 10/07/2022 11:55 AM    RBCUA 12 10/07/2022 11:55 AM    BACTERIA None Seen 10/07/2022 11:55 AM    CLARITYU Clear 10/07/2022 11:55 AM    SPECGRAV 1.010 10/07/2022 11:55 AM    LEUKOCYTESUR Negative 10/07/2022 11:55 AM    UROBILINOGEN 2.0 10/07/2022 11:55 AM    BILIRUBINUR Negative 10/07/2022 11:55 AM    BILIRUBINUR neg 10/08/2020 03:20 PM    BLOODU MODERATE 10/07/2022 11:55 AM    GLUCOSEU Negative 10/07/2022 11:55 AM     Microalbumen/Creatinine ratio:  No components found for: RUCREAT  24 Hour Urine for Protein:  No components found for: RAWUPRO, UHRS3, OQWP42KY, UTV3  24 Hour Urine for Creatinine Clearance:  No components found for: CREAT4, UHRS10, UTV10  Urine Toxicology:  No components found for: IAMMENTA, IBARBIT, IBENZO, ICOCAINE, IMARTHC, IOPIATES, IPHENCYC    HgBA1c:    Lab Results   Component Value Date/Time    LABA1C 5.6 08/06/2022 08:15 AM     RPR:  No results found for: RPR  HIV:  No results found for: HIV  DEMI:    Lab Results   Component Value Date/Time    DEMI Negative 10/10/2022 02:35 PM     RF:    Lab Results   Component Value Date/Time    RF <10.0 10/10/2022 02:35 PM     DSDNA:  No components found for: DNA  AMYLASE:  No results found for: AMYLASE  LIPASE:  No results found for: LIPASE  Fibrinogen Level:  No components found for: FIB       BELOW MENTIONED RADIOLOGY STUDY RESULTS BY ME (AS NEEDED FOR MY EVALUATION AND MANAGEMENT). No results found.

## 2022-10-22 LAB
A/G RATIO: 1.5 (ref 1.1–2.2)
ALBUMIN SERPL-MCNC: 3.2 G/DL (ref 3.4–5)
ALP BLD-CCNC: 51 U/L (ref 40–129)
ALT SERPL-CCNC: 21 U/L (ref 10–40)
ANION GAP SERPL CALCULATED.3IONS-SCNC: 8 MMOL/L (ref 3–16)
APTT: 27.4 SEC (ref 23–34.3)
AST SERPL-CCNC: 17 U/L (ref 15–37)
BASOPHILS ABSOLUTE: 0 K/UL (ref 0–0.2)
BASOPHILS RELATIVE PERCENT: 0.2 %
BILIRUB SERPL-MCNC: 0.3 MG/DL (ref 0–1)
BILIRUBIN DIRECT: <0.2 MG/DL (ref 0–0.3)
BILIRUBIN, INDIRECT: NORMAL MG/DL (ref 0–1)
BLOOD BANK DISPENSE STATUS: NORMAL
BLOOD BANK PRODUCT CODE: NORMAL
BPU ID: NORMAL
BUN BLDV-MCNC: 15 MG/DL (ref 7–20)
CALCIUM IONIZED: 1.12 MMOL/L (ref 1.12–1.32)
CALCIUM SERPL-MCNC: 7.9 MG/DL (ref 8.3–10.6)
CHLORIDE BLD-SCNC: 106 MMOL/L (ref 99–110)
CO2: 26 MMOL/L (ref 21–32)
CREAT SERPL-MCNC: 0.8 MG/DL (ref 0.9–1.3)
DESCRIPTION BLOOD BANK: NORMAL
EOSINOPHILS ABSOLUTE: 0 K/UL (ref 0–0.6)
EOSINOPHILS RELATIVE PERCENT: 0.2 %
FIBRINOGEN: 228 MG/DL (ref 207–509)
GFR SERPL CREATININE-BSD FRML MDRD: >60 ML/MIN/{1.73_M2}
GLUCOSE BLD-MCNC: 71 MG/DL (ref 70–99)
HCT VFR BLD CALC: 32.8 % (ref 40.5–52.5)
HEMOGLOBIN: 11 G/DL (ref 13.5–17.5)
INR BLD: 1.16 (ref 0.87–1.14)
LACTATE DEHYDROGENASE: 214 U/L (ref 100–190)
LYMPHOCYTES ABSOLUTE: 1.8 K/UL (ref 1–5.1)
LYMPHOCYTES RELATIVE PERCENT: 12.8 %
MAGNESIUM: 2 MG/DL (ref 1.8–2.4)
MCH RBC QN AUTO: 34 PG (ref 26–34)
MCHC RBC AUTO-ENTMCNC: 33.5 G/DL (ref 31–36)
MCV RBC AUTO: 101.4 FL (ref 80–100)
MONOCYTES ABSOLUTE: 1.4 K/UL (ref 0–1.3)
MONOCYTES RELATIVE PERCENT: 9.9 %
NEUTROPHILS ABSOLUTE: 10.5 K/UL (ref 1.7–7.7)
NEUTROPHILS RELATIVE PERCENT: 76.9 %
PDW BLD-RTO: 17 % (ref 12.4–15.4)
PH VENOUS: 7.43 (ref 7.35–7.45)
PHOSPHORUS: 3.7 MG/DL (ref 2.5–4.9)
PLATELET # BLD: 123 K/UL (ref 135–450)
PMV BLD AUTO: 9 FL (ref 5–10.5)
POTASSIUM SERPL-SCNC: 3.5 MMOL/L (ref 3.5–5.1)
PROTHROMBIN TIME: 14.7 SEC (ref 11.7–14.5)
RBC # BLD: 3.23 M/UL (ref 4.2–5.9)
SODIUM BLD-SCNC: 140 MMOL/L (ref 136–145)
TOTAL PROTEIN: 5.3 G/DL (ref 6.4–8.2)
WBC # BLD: 13.7 K/UL (ref 4–11)

## 2022-10-22 PROCEDURE — 6370000000 HC RX 637 (ALT 250 FOR IP): Performed by: INTERNAL MEDICINE

## 2022-10-22 PROCEDURE — 85610 PROTHROMBIN TIME: CPT

## 2022-10-22 PROCEDURE — 6360000002 HC RX W HCPCS: Performed by: INTERNAL MEDICINE

## 2022-10-22 PROCEDURE — 6370000000 HC RX 637 (ALT 250 FOR IP): Performed by: FAMILY MEDICINE

## 2022-10-22 PROCEDURE — 83615 LACTATE (LD) (LDH) ENZYME: CPT

## 2022-10-22 PROCEDURE — 85730 THROMBOPLASTIN TIME PARTIAL: CPT

## 2022-10-22 PROCEDURE — 2580000003 HC RX 258: Performed by: FAMILY MEDICINE

## 2022-10-22 PROCEDURE — 1200000000 HC SEMI PRIVATE

## 2022-10-22 PROCEDURE — 82248 BILIRUBIN DIRECT: CPT

## 2022-10-22 PROCEDURE — 2580000003 HC RX 258: Performed by: INTERNAL MEDICINE

## 2022-10-22 PROCEDURE — 85025 COMPLETE CBC W/AUTO DIFF WBC: CPT

## 2022-10-22 PROCEDURE — 82330 ASSAY OF CALCIUM: CPT

## 2022-10-22 PROCEDURE — 80053 COMPREHEN METABOLIC PANEL: CPT

## 2022-10-22 PROCEDURE — 36514 APHERESIS PLASMA: CPT

## 2022-10-22 PROCEDURE — 85384 FIBRINOGEN ACTIVITY: CPT

## 2022-10-22 PROCEDURE — 84100 ASSAY OF PHOSPHORUS: CPT

## 2022-10-22 PROCEDURE — 2500000003 HC RX 250 WO HCPCS: Performed by: INTERNAL MEDICINE

## 2022-10-22 PROCEDURE — P9017 PLASMA 1 DONOR FRZ W/IN 8 HR: HCPCS

## 2022-10-22 PROCEDURE — 83735 ASSAY OF MAGNESIUM: CPT

## 2022-10-22 PROCEDURE — P9059 PLASMA, FRZ BETWEEN 8-24HOUR: HCPCS

## 2022-10-22 RX ADMIN — ANTICOAGULANT CITRATE DEXTROSE SOLUTION FORMULA A: 12.25; 11; 3.65 SOLUTION INTRAVENOUS at 15:50

## 2022-10-22 RX ADMIN — DIAZEPAM 5 MG: 5 TABLET ORAL at 21:22

## 2022-10-22 RX ADMIN — PREDNISONE 120 MG: 20 TABLET ORAL at 10:22

## 2022-10-22 RX ADMIN — Medication 10 ML: at 21:22

## 2022-10-22 RX ADMIN — PANTOPRAZOLE SODIUM 40 MG: 40 TABLET, DELAYED RELEASE ORAL at 06:27

## 2022-10-22 RX ADMIN — MELATONIN TAB 3 MG 6 MG: 3 TAB at 21:22

## 2022-10-22 RX ADMIN — VERAPAMIL HYDROCHLORIDE 240 MG: 240 TABLET, FILM COATED, EXTENDED RELEASE ORAL at 10:22

## 2022-10-22 RX ADMIN — FOLIC ACID 1 MG: 1 TABLET ORAL at 10:21

## 2022-10-22 RX ADMIN — SERTRALINE 100 MG: 50 TABLET, FILM COATED ORAL at 10:21

## 2022-10-22 RX ADMIN — Medication 10 ML: at 10:26

## 2022-10-22 RX ADMIN — CYANOCOBALAMIN TAB 1000 MCG 500 MCG: 1000 TAB at 10:21

## 2022-10-22 RX ADMIN — CALCIUM GLUCONATE 4000 MG: 98 INJECTION, SOLUTION INTRAVENOUS at 15:40

## 2022-10-22 ASSESSMENT — PAIN SCALES - GENERAL
PAINLEVEL_OUTOF10: 0
PAINLEVEL_OUTOF10: 0

## 2022-10-22 NOTE — PROGRESS NOTES
MD Jackie Arevalo MD Rufino Gaster, MD                  Office: [phone]                      Fax: [phone]             [address] [place] [address]                   NEPHROLOGY INPATIENT PROGRESS NOTE:     PATIENT NAME: Isaac Ortiz  : 1980  MRN: [id number]      RECOMMENDATIONS:   --plans for ongoing daily more Therapeutic Plasma Exchanges,   - started on 10-8-22     - #11 10-21  - #12 10-22 - TPE today  - #13 10-23 -discussed with coordinator. No staff available for TPE tomorrow but platelet count improving, will plan for Monday. - 1 plasma volume exchange. - Replacement fluids w/ FFP, not 5% albumin  - monitor S. fibrinogen level, LDH, CBC, PT/INR, iCalcium, Mag, Phos -> fibrinogen WNL  - ACD-A for a/c protocol.  - Calcium replacement protocol. Needing repletion,  - requested dialysis nurse - discussed w/ them,      -Dexamethasone 40 mg daily, -> PO Prednisone 1mg/kg/d  , s/p IV Ig - as per hematologist.     Also on [address] [place] [address] placement on 10-8-2022  - by Dr Katherin Santos - appreciated his assistance   -avoid platelet transfusion  - hold Lisinopril, HCTZ, can use hydralazine PRN for SBP >160s    D/C plan from renal stand point:  - fup w/ hematology plan         IMPRESSION:       Admitted on:  10/7/2022 10:36 AM   For:  Thrombocytopenia ([facility] [D69.6]  Thrombocytopenic ([facility] [D69.6]    ICD-10-CM    1. 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     HTN - was on  Lisinopril, HCTZ   YOHANNES  Obesity high 30s       Hospital Problems             Last Modified POA    * (Principal) TTP (thrombotic thrombocytopenic purpura) ([address] 10/11/2022 Yes    Thrombocytopenia ([address] 10/11/2022 Yes    Thrombocytopenic ([address] 10/7/2022 Yes    Morbid obesity with BMI of 40.0-44.9, adult ([address] 10/11/2022 Yes    Essential hypertension (Chronic) 10/11/2022 Yes     Kena Sinclair MD,  [facility] [address] [place]: [phone] or [address] [employer]  Fax: [phone]        =======================================================================================   =======================================================================================  Subjective / interval history:   Patient was seen comfortably in room   For TPE today     No current active complaints. Patient denied chest pain / dizziness/lightheadedness/syncope/ SOB / leg edema. Past medical, Surgical, Social, Family medical history reviewed by me. MEDICATIONS: reviewed by me. Medications Prior to Admission:  No current facility-administered medications on file prior to encounter.      Current Outpatient Medications on File Prior to Encounter   Medication Sig Dispense Refill    omeprazole (PRILOSEC) 20 MG delayed release capsule 1 daily 90 capsule 3    sildenafil (VIAGRA) 50 MG tablet Take 1 tablet by mouth as needed for Erectile Dysfunction 27 tablet 3    lisinopril (PRINIVIL;ZESTRIL) 40 MG tablet Take 1 tablet by mouth daily 90 tablet 3    verapamil (CALAN SR) 240 MG extended release tablet Take 1 tablet by mouth nightly 90 tablet 3    hydroCHLOROthiazide (HYDRODIURIL) 25 MG tablet Take 1 tablet by mouth daily 90 tablet 3    sertraline (ZOLOFT) 100 MG tablet 1 TABLET BY MOUTH DAILY 90 tablet 3    Vibegron (GEMTESA) 75 MG TABS Take 75 mg by mouth daily OVERACTIVE BLADDER      calcium carbonate (TUMS) 500 MG chewable tablet Take 6 tablets by mouth daily as needed for Heartburn           Current Facility-Administered Medications:     caplacizumab-yhdp (CABLIVI) injection 11 mg  (Patient Supplied), 11 mg, SubCUTAneous, Q24H, Alejo Morfin MD    clobetasol (TEMOVATE) 0.05 % cream, , Topical, BID, Estefany Barnes MD, Given at 10/17/22 2049    diazePAM (VALIUM) tablet 5 mg, 5 mg, Oral, Q6H PRN, Melody Kwon MD, 5 mg at 10/21/22 2140    calcium gluconate 4,000 mg in dextrose 5 % 100 mL IVPB, 4,000 mg, IntraVENous, PRN, Melody Kwon MD, Stopped at 10/21/22 1956    citrate dextrose (ACD Formula A) 0.73-2.45-2.2 GM/100ML solution, , IntraCATHeter, Continuous PRN, Melody Kwon MD, Last Rate: 1,000 mL/hr at 10/21/22 1840, 1,000 mL at 20/63/83 4350    folic acid (FOLVITE) tablet 1 mg, 1 mg, Oral, Daily, Leila Fleming MD, 1 mg at 10/22/22 1021    predniSONE (DELTASONE) tablet 120 mg, 120 mg, Oral, Daily, Leila Fleming MD, 120 mg at 10/22/22 1022    Vibegron TABS 75 mg (Patient Supplied), 75 mg, Oral, Daily, Yunior Waldron MD, 75 mg at 10/22/22 1022    diatrizoate meglumine-sodium (GASTROGRAFIN) 66-10 % solution 20 mL, 20 mL, Oral, ONCE PRN, Leila Fleming MD, 20 mL at 10/10/22 1502    diphenhydrAMINE (BENADRYL) tablet 12.5 mg, 12.5 mg, Oral, Nightly PRN, Ahmad MEAGAN Anson, DO, 12.5 mg at 10/21/22 1815    sodium chloride flush 0.9 % injection 10 mL, 10 mL, IntraCATHeter, PRN, Talita Noel MD    heparin (porcine) injection 1,000 Units, 1,000 Units, IntraVENous, PRN, Be Rosas MD, 1,000 Units at 10/13/22 0930    melatonin tablet 6 mg, 6 mg, Oral, Nightly PRN, Yarelis Meline A Anson, DO, 6 mg at 10/21/22 2140    sertraline (ZOLOFT) tablet 100 mg, 100 mg, Oral, Daily, Yunior Waldron MD, 100 mg at 10/22/22 1021    sodium chloride flush 0.9 % injection 5-40 mL, 5-40 mL, IntraVENous, 2 times per day, Yunior Waldron MD, 10 mL at 10/22/22 1026    sodium chloride flush 0.9 % injection 5-40 mL, 5-40 mL, IntraVENous, PRN, Yunior Waldron MD, 10 mL at 10/17/22 1920    0.9 % sodium chloride infusion, , IntraVENous, PRN, Yunior Waldron MD, Last Rate: 20 mL/hr at 10/17/22 1818, New Bag at 10/17/22 1818    ondansetron (ZOFRAN-ODT) disintegrating tablet 4 mg, 4 mg, Oral, Q8H PRN **OR** ondansetron (ZOFRAN) injection 4 mg, 4 mg, IntraVENous, Q6H PRN, MYRA Vega Peña MD    polyethylene glycol [facility]) packet 17 g, 17 g, Oral, Daily PRN, Mari Farmer MD, 17 g at 10/21/22 0617    acetaminophen (TYLENOL) tablet 650 mg, 650 mg, Oral, Q6H PRN, 650 mg at 10/21/22 1603 **OR** acetaminophen (TYLENOL) suppository 650 mg, 650 mg, Rectal, Q6H PRN, Mari Farmer MD    pantoprazole (PROTONIX) tablet 40 mg, 40 mg, Oral, QAM AC, Birgit Vasquez MD, 40 mg at 10/22/22 7667    vitamin B-12 (CYANOCOBALAMIN) tablet 500 mcg, 500 mcg, Oral, Daily, Mari Farmer MD, 500 mcg at 10/22/22 1021    verapamil (CALAN SR) extended release tablet 240 mg, 240 mg, Oral, Daily, Mari Farmer MD, 240 mg at 10/22/22 1022        REVIEW OF SYSTEMS:  As mentioned in chief complaint and HPI , Subjective       =======================================================================================     PHYSICAL EXAM:  Recent vital signs and recent I/Os reviewed by me.      Wt Readings from Last 3 Encounters:   10/21/22 279 lb 15.8 oz (127 kg)   10/06/22 282 lb (127.9 kg)   09/07/22 278 lb (126.1 kg)     BP Readings from Last 3 Encounters:   10/22/22 122/82   10/06/22 134/86   09/07/22 122/82     Patient Vitals for the past 24 hrs:   BP Temp Temp src Pulse Resp SpO2 Weight   10/22/22 1015 122/82 98.1 °F (36.7 °C) Oral 75 16 98 % --   10/22/22 0624 119/81 97.6 °F (36.4 °C) Oral 61 18 99 % --   10/21/22 2350 124/76 98.1 °F (36.7 °C) Oral 61 16 99 % --   10/21/22 2136 122/79 97.4 °F (36.3 °C) Oral 91 16 98 % --   10/21/22 1956 136/84 98.2 °F (36.8 °C) -- 63 17 -- --   10/21/22 1825 129/83 97.8 °F (36.6 °C) -- 73 17 98 % 279 lb 15.8 oz (127 kg)   10/21/22 1745 128/72 98 °F (36.7 °C) Oral 89 16 -- --   10/21/22 1603 (!) 141/80 98.4 °F (36.9 °C) Oral 83 18 98 % --   10/21/22 1223 (!) 143/87 -- -- 78 -- 99 % --   10/21/22 1129 (!) 139/92 97.9 °F (36.6 °C) Oral (!) 111 20 98 % --       Intake/Output Summary (Last 24 hours) at 10/22/2022 1103  Last data filed at 10/21/2022 1956  Gross per 24 hour   Intake 4352 ml   Output 4140 ml   Net 212 ml     Physical Exam  Vitals reviewed. Constitutional:       General: He is not in acute distress. Appearance: Normal appearance. HENT:      Head: Normocephalic and atraumatic. Right Ear: External ear normal.      Left Ear: External ear normal.      Nose: Nose normal.      Mouth/Throat:      Mouth: Mucous membranes are moist. Mucous membranes are not dry. Eyes:      General: No scleral icterus. Conjunctiva/sclera: Conjunctivae normal.   Neck:      Vascular: No JVD. Cardiovascular:      Rate and Rhythm: Normal rate and regular rhythm. Heart sounds: S1 normal and S2 normal.   Pulmonary:      Effort: Pulmonary effort is normal. No respiratory distress. Breath sounds: Normal breath sounds. Abdominal:      General: Bowel sounds are normal. There is no distension. Musculoskeletal:         General: No swelling or deformity. Cervical back: Normal range of motion and neck supple. Skin:     General: Skin is dry. Coloration: Skin is not jaundiced. Neurological:      Mental Status: He is alert and oriented to person, place, and time. Mental status is at baseline. Psychiatric:         Mood and Affect: Mood normal.         Behavior: Behavior normal.        =======================================================================================     DATA:  Diagnostic tests reviewed by me for today's visit:   (AS NEEDED FOR MY EVALUATION AND MANAGEMENT).        Recent Labs     10/20/22  0545 10/21/22  0510 10/22/22  0630   WBC 12.4* 14.3* 13.7*   HCT 29.6* 32.0* 32.8*   PLT 59* 87* 123*     Iron Saturation:  No components found for: PERCENTFE  FERRITIN:    Lab Results   Component Value Date/Time    FERRITIN 1,025.0 10/07/2022 11:56 AM     IRON:    Lab Results   Component Value Date/Time    IRON 344 10/07/2022 11:56 AM     TIBC:    Lab Results   Component Value Date/Time    TIBC 399 10/07/2022 11:56 AM       Recent Labs 10/20/22  0545 10/21/22  0510 10/22/22  0630    141 140   K 4.0 4.1 3.5    106 106   CO2 28 22 26   BUN 19 15 15   CREATININE 0.8* 0.8* 0.8*     Recent Labs     10/20/22  0545 10/21/22  0510 10/22/22  0630   CALCIUM 8.4 7.8* 7.9*   MG 2.10 1.90 2.00   PHOS 4.1 3.7 3.7     No results for input(s): PH, PCO2, PO2 in the last 72 hours.     Invalid input(s): Joss Perks    ABG:  No results found for: PH, PCO2, PO2, HCO3, BE, THGB, TCO2, O2SAT  VBG:    Lab Results   Component Value Date/Time    PHVEN 7.427 10/22/2022 05:56 AM       LDH:    Lab Results   Component Value Date/Time     10/22/2022 06:30 AM     Uric Acid:  No results found for: LABURIC, URICACID    PT/INR:    Lab Results   Component Value Date/Time    PROTIME 14.7 10/22/2022 06:30 AM    INR 1.16 10/22/2022 06:30 AM     Warfarin PT/INR:  No components found for: PTPATWAR, PTINRWAR  PTT:    Lab Results   Component Value Date/Time    APTT 27.4 10/22/2022 06:30 AM   [APTT}  Last 3 Troponin:  No results found for: TROPONINI    U/A:    Lab Results   Component Value Date/Time    NITRITE neg 10/08/2020 03:20 PM    COLORU Yellow 10/07/2022 11:55 AM    PROTEINU Negative 10/07/2022 11:55 AM    PHUR 7.0 10/07/2022 11:56 AM    WBCUA 0 10/07/2022 11:55 AM    RBCUA 12 10/07/2022 11:55 AM    BACTERIA None Seen 10/07/2022 11:55 AM    CLARITYU Clear 10/07/2022 11:55 AM    SPECGRAV 1.010 10/07/2022 11:55 AM    LEUKOCYTESUR Negative 10/07/2022 11:55 AM    UROBILINOGEN 2.0 10/07/2022 11:55 AM    BILIRUBINUR Negative 10/07/2022 11:55 AM    BILIRUBINUR neg 10/08/2020 03:20 PM    BLOODU MODERATE 10/07/2022 11:55 AM    GLUCOSEU Negative 10/07/2022 11:55 AM     Microalbumen/Creatinine ratio:  No components found for: RUCREAT  24 Hour Urine for Protein:  No components found for: RAWUPRO, UHRS3, EBRM50TY, UTV3  24 Hour Urine for Creatinine Clearance:  No components found for: CREAT4, UHRS10, UTV10  Urine Toxicology:  No components found for: IAMMENTA, IBARBIT, IBENZO, ICOCAINE, IMARTHC, IOPIATES, IPHENCYC    HgBA1c:    Lab Results   Component Value Date/Time    LABA1C 5.6 08/06/2022 08:15 AM     RPR:  No results found for: RPR  HIV:  No results found for: HIV  DEMI:    Lab Results   Component Value Date/Time    DEMI Negative 10/10/2022 02:35 PM     RF:    Lab Results   Component Value Date/Time    RF <10.0 10/10/2022 02:35 PM     DSDNA:  No components found for: DNA  AMYLASE:  No results found for: AMYLASE  LIPASE:  No results found for: LIPASE  Fibrinogen Level:  No components found for: FIB       BELOW MENTIONED RADIOLOGY STUDY RESULTS BY ME (AS NEEDED FOR MY EVALUATION AND MANAGEMENT). No results found.

## 2022-10-22 NOTE — PROGRESS NOTES
Assessment complete. VSS. Patient resting in room. Respirations even and easy. Call light in reach. Fall precautions in place. No needs expressed at this time.

## 2022-10-22 NOTE — PROGRESS NOTES
TPE ( plasmapheresis)  1.0  Volume of TPV exchange, with 100 % replacement using FFP (3720 ml)     Treatment Number: 11 of 12 total      Patient tolerated treatment well and without any complications. Fluid balance: + 108 ml ( including ACD-A, Ca gluconate, saline rinse blood back to pt)     Started time: 1825  End time: 1956     Medication:  ACD-A ( per protocol via machine) to pt: 88 ml total during whole TPE  Ca: gluconate : 4 gm IVPB with whole TPE, started @ 1825  Benadryl: 12.5 mg PO @ 2898     Next TPE scheduled on October 22, 2022. Placed TPE flow sheets in the chart for EMR scan and report given to RN.

## 2022-10-22 NOTE — PROGRESS NOTES
Hospitalist Progress Note      PCP: Ton Toro MD    Date of Admission: 10/7/2022    LOS: 15      Case Summary:   44-year-old gentleman with history of hypertension, depression/anxiety who was admitted for TTP on plasmapheresis  Bone marrow biopsy done 10/19/2022  Schistocytes on smear  -Undetectable haptoglobin and elevated LDH  -Nicole negative  - homocysteine level is WNL  - RA factor - WNL  - DEMI negative  - MMA and dsDNA are normal  -ZAMAN TS 13 level very low at 16 normal greater than 65. Inhibitor Ab is high at 83 with 49% inhibition. Active Hospital Problems    Diagnosis Date Noted    TTP (thrombotic thrombocytopenic purpura) ([address] [M31.19] 10/11/2022     Priority: Medium    Morbid obesity with BMI of 40.0-44.9, adult ([address] [E66.01, Z68.41] 10/11/2022     Priority: Medium    Thrombocytopenia ([address] [D69.6] 10/07/2022     Priority: Medium    Thrombocytopenic ([address] [D69.6] 10/07/2022     Priority: Medium    Essential hypertension [I10] 02/22/2017         Principal Problem:    TTP (thrombotic thrombocytopenic purpura) ([facility])  Active Problems: Thrombocytopenia ([address]    Thrombocytopenic ([address]    Morbid obesity with BMI of 40.0-44.9, adult [facility])    Essential hypertension  Resolved Problems:    * No resolved hospital problems. *    For plasmapheresis today.   Continue steroids as per hematology  Hematology oncology following with recommendation  Continue PPI and antihypertensive  Continue on folic acid        Medications:  Reviewed  Infusion Medications    citrate dextrose 1,000 mL (10/21/22 1840)    sodium chloride 20 mL/hr at 10/17/22 1818     Scheduled Medications    caplacizumab-yhdp  11 mg SubCUTAneous Q24H    clobetasol   Topical BID    folic acid  1 mg Oral Daily    predniSONE  120 mg Oral Daily    Vibegron  75 mg Oral Daily    sertraline  100 mg Oral Daily    sodium chloride flush  5-40 mL IntraVENous 2 times per day    pantoprazole  40 mg Oral QAM AC    vitamin B-12  500 mcg Oral Daily verapamil  240 mg Oral Daily     PRN Meds: diazePAM, calcium gluconate, citrate dextrose, diatrizoate meglumine-sodium, diphenhydrAMINE, sodium chloride flush, heparin (porcine), melatonin, sodium chloride flush, sodium chloride, ondansetron **OR** ondansetron, polyethylene glycol, acetaminophen **OR** acetaminophen      DVT Prophylaxis: SCD  Diet: ADULT DIET; Regular  Code Status: Full Code  ____________________________________________________________________________    Subjective:   Overnight Events:   Seen with significant event at bedside and plan of care explained questions addressed      Physical Exam:  /80   Pulse 79   Temp 97.8 °F (36.6 °C) (Oral)   Resp 18   Ht 5' 10.5\" (1.791 m)   Wt 279 lb 15.8 oz (127 kg)   SpO2 98%   BMI 39.61 kg/m²   General appearance: No apparent distress, appears stated age and cooperative. HEENT: Normocephalic, atraumatic, MMM, No sclera icterus/conjuctival palor  Neck: Supple, no thyromegally. No jugular venous distention. Respiratory:  Normal respiratory effort. Clear to auscultation, no Rales/Wheezes/Rhonchi. Cardiovascular: S1/S2 without murmurs, rubs or gallops. RRR  Abdomen: Soft, non-tender, non-distended, bowel sounds present. Musculoskeletal: No clubbing, cyanosis or edema bilaterally. Skin: Skin color, texture, turgor normal.  No rashes or lesions.   Neurologic:  Cranial nerves: II-XII intact, TALON, No focal sensory/motor deficits  Psychiatric: Alert and oriented, thought content appropriate  Capillary Refill: Brisk,< 3 seconds   Peripheral Pulses: +2 palpable, equal bilaterally       Intake/Output Summary (Last 24 hours) at 10/22/2022 1509  Last data filed at 10/21/2022 1956  Gross per 24 hour   Intake 4352 ml   Output 4140 ml   Net 212 ml       Labs:   Recent Labs     10/20/22  0545 10/21/22  0510 10/22/22  0630   WBC 12.4* 14.3* 13.7*   HGB 10.0* 10.7* 11.0*   HCT 29.6* 32.0* 32.8*   PLT 59* 87* 123*      Recent Labs     10/20/22  0545 10/21/22  0510 10/22/22  0630    141 140   K 4.0 4.1 3.5    106 106   CO2 28 22 26   BUN 19 15 15   CREATININE 0.8* 0.8* 0.8*   CALCIUM 8.4 7.8* 7.9*   PHOS 4.1 3.7 3.7   AST 15 16 17   ALT 23 20 21   BILIDIR <0.2 <0.2 <0.2   BILITOT 0.3 <0.2 0.3   ALKPHOS 47 50 51     No results for input(s): CKTOTAL, TROPONINI in the last 72 hours. Urinalysis:    Lab Results   Component Value Date/Time    NITRU Negative 10/07/2022 11:55 AM    WBCUA 0 10/07/2022 11:55 AM    BACTERIA None Seen 10/07/2022 11:55 AM    RBCUA 12 10/07/2022 11:55 AM    BLOODU MODERATE 10/07/2022 11:55 AM    SPECGRAV 1.010 10/07/2022 11:55 AM    GLUCOSEU Negative 10/07/2022 11:55 AM       Radiology:  CT BIOPSY BONE MARROW   Final Result   Successful CT guided bone marrow aspiration and core biopsy of the right   iliac bone. CT GUIDED NEEDLE PLACEMENT   Final Result   Successful CT guided bone marrow aspiration and core biopsy of the right   iliac bone. CT CHEST W CONTRAST   Final Result   1. No acute findings in the chest, abdomen or pelvis. 2. Moderate to large hiatal hernia. 3. Slightly increasing size of a 1.4 cm hyperdense lesion in the right   kidney. Recommend nonemergent renal MRI once acute issues have resolved. CT ABDOMEN PELVIS W IV CONTRAST Additional Contrast? Oral   Final Result   1. No acute findings in the chest, abdomen or pelvis. 2. Moderate to large hiatal hernia. 3. Slightly increasing size of a 1.4 cm hyperdense lesion in the right   kidney. Recommend nonemergent renal MRI once acute issues have resolved. XR CHEST PORTABLE   Final Result   1. A right internal jugular central line terminates in the mid superior vena   cava. No associated pneumothorax. 2. No acute cardiopulmonary process. 3. Small to moderate hiatal hernia. Nga Florian MD      Please excuse brevity and/or typos. This report was transcribed using voice recognition software.  Every effort was made to ensure accuracy, however, inadvertent computerized transcription errors may be present.

## 2022-10-22 NOTE — PROGRESS NOTES
[facility]   Progress Note      10/22/2022 6:59 PM        Name: Cherelle Narayan . Admitted: 10/7/2022    SUBJECTIVE:  Feeling very well. No new complaints.      Reviewed interval ancillary notes    Current Medications  caplacizumab-yhdp (CABLIVI) injection 11 mg  (Patient Supplied), Q24H  clobetasol (TEMOVATE) 0.05 % cream, BID  diazePAM (VALIUM) tablet 5 mg, Q6H PRN  calcium gluconate 4,000 mg in dextrose 5 % 100 mL IVPB, PRN  citrate dextrose (ACD Formula A) 0.73-2.45-2.2 GM/100ML solution, Continuous PRN  folic acid (FOLVITE) tablet 1 mg, Daily  predniSONE (DELTASONE) tablet 120 mg, Daily  Vibegron TABS 75 mg (Patient Supplied), Daily  diatrizoate meglumine-sodium (GASTROGRAFIN) 66-10 % solution 20 mL, ONCE PRN  diphenhydrAMINE (BENADRYL) tablet 12.5 mg, Nightly PRN  sodium chloride flush 0.9 % injection 10 mL, PRN  heparin (porcine) injection 1,000 Units, PRN  melatonin tablet 6 mg, Nightly PRN  sertraline (ZOLOFT) tablet 100 mg, Daily  sodium chloride flush 0.9 % injection 5-40 mL, 2 times per day  sodium chloride flush 0.9 % injection 5-40 mL, PRN  0.9 % sodium chloride infusion, PRN  ondansetron (ZOFRAN-ODT) disintegrating tablet 4 mg, Q8H PRN   Or  ondansetron (ZOFRAN) injection 4 mg, Q6H PRN  polyethylene glycol (GLYCOLAX) packet 17 g, Daily PRN  acetaminophen (TYLENOL) tablet 650 mg, Q6H PRN   Or  acetaminophen (TYLENOL) suppository 650 mg, Q6H PRN  pantoprazole (PROTONIX) tablet 40 mg, QAM AC  vitamin B-12 (CYANOCOBALAMIN) tablet 500 mcg, Daily  verapamil (CALAN SR) extended release tablet 240 mg, Daily      Objective:  /77   Pulse 76   Temp 97.9 °F (36.6 °C)   Resp 16   Ht 5' 10.5\" (1.791 m)   Wt 279 lb 15.8 oz (127 kg)   SpO2 98%   BMI 39.61 kg/m²     Intake/Output Summary (Last 24 hours) at 10/22/2022 1859  Last data filed at 10/21/2022 1956  Gross per 24 hour   Intake 4352 ml   Output 4140 ml   Net 212 ml      Wt Readings from Last 3 Encounters:   10/21/22 279 lb 15.8 oz (127 kg)   10/06/22 282 lb (127.9 kg)   09/07/22 278 lb (126.1 kg)       General appearance:  Appears comfortable  Eyes: Sclera clear. Pupils equal.  ENT: Moist oral mucosa. Trachea midline, no adenopathy. Cardiovascular: Regular rhythm, normal S1, S2. No murmur. No edema in lower extremities  Respiratory: Not using accessory muscles. Good inspiratory effort. Clear to auscultation bilaterally, no wheeze or crackles. GI: Abdomen soft, no tenderness, not distended  Musculoskeletal: No cyanosis in digits, neck supple  Neurology: CN 2-12 grossly intact. No speech or motor deficits  Psych: Normal affect. Alert and oriented in time, place and person  Skin: Warm, dry, normal turgor    Labs and Tests:  CBC:   Recent Labs     10/20/22  0545 10/21/22  0510 10/22/22  0630   WBC 12.4* 14.3* 13.7*   HGB 10.0* 10.7* 11.0*   PLT 59* 87* 123*     BMP:    Recent Labs     10/20/22  0545 10/21/22  0510 10/22/22  0630    141 140   K 4.0 4.1 3.5    106 106   CO2 28 22 26   BUN 19 15 15   CREATININE 0.8* 0.8* 0.8*   GLUCOSE 88 77 71     Hepatic:   Recent Labs     10/20/22  0545 10/21/22  0510 10/22/22  0630   AST 15 16 17   ALT 23 20 21   BILITOT 0.3 <0.2 0.3   ALKPHOS 47 50 51       ASSESSMENT AND PLAN    Principal Problem:    TTP (thrombotic thrombocytopenic purpura) ([facility])  Active Problems: Thrombocytopenia ([facility]    Thrombocytopenic ([facility]    Morbid obesity with BMI of 40.0-44.9, adult [facility])    Essential hypertension  Resolved Problems:    * No resolved hospital problems. *      1. TTP      -Anemia and thrombocytopenia  -Schistocytes on smear  -Undetectable haptoglobin and elevated LDH  -Nicole negative  - homocysteine level is WNL  - RA factor - WNL  - DEMI negative  - MMA and dsDNA are normal     -ZAMAN TS 13 level very low at 16 normal greater than 65.  Inhibitor Ab is high at 83 with 49% inhibition.   -Continue prednisone 1 mg/kg daily   -Continue folic acid daily  -Nephrology following for plasmapheresis  -Rituxan on 10/17/2022. Will be repeated on 10/24/2022.  -Continue daily Cablivi. -Received Rituxan on 10/17/2022.  -Plan to start [facility] today and continue after discharge.   -Platelet count is 87H today.  -Had bone marrow biopsy 10/19/22, results pending.   -Continue daily plasmapheresis. -LDH today is down to 180 today  -Repeat ADAMTS 13 level is 12. Reviewed her labs. Her anemia and thrombocytopenia have improved. Continue daily plasmapheresis and Cablivi. Des Line.  Nicolás Moss MD, ite Musa   Hematology and Oncology  [facility]  [phone]

## 2022-10-22 NOTE — FLOWSHEET NOTE
TPE ( Therapeutic Plasma Exchange)  1.0  Volume of TPV exchange, with 100 % replacement.   FFP: 3720ml  Number of Treatment : 12   Pt tolerated well with TPE,   Fluid balance: + 102 ml ( including ACD-A, Ca gluconate, saline rinse blood back to pt)  Started time: 1550  End time: 1738    Medication:  ACD-A ( per protocol via machine) to pt: 93 ml total during whole TPE  Ca: gluconate : 4 gm IVPB with whole TPE, started @ 1340    Next TPE scheduled on 10/24    Placed TPE flow sheets and blood transfusion downtime papers printed in the chart for EMR scan

## 2022-10-23 LAB
ANION GAP SERPL CALCULATED.3IONS-SCNC: 9 MMOL/L (ref 3–16)
APTT: 24.9 SEC (ref 23–34.3)
BUN BLDV-MCNC: 17 MG/DL (ref 7–20)
CALCIUM IONIZED: 1.14 MMOL/L (ref 1.12–1.32)
CALCIUM SERPL-MCNC: 8.7 MG/DL (ref 8.3–10.6)
CHLORIDE BLD-SCNC: 103 MMOL/L (ref 99–110)
CO2: 26 MMOL/L (ref 21–32)
CREAT SERPL-MCNC: 0.9 MG/DL (ref 0.9–1.3)
FIBRINOGEN: 259 MG/DL (ref 207–509)
GFR SERPL CREATININE-BSD FRML MDRD: >60 ML/MIN/{1.73_M2}
GLUCOSE BLD-MCNC: 75 MG/DL (ref 70–99)
HCT VFR BLD CALC: 34.5 % (ref 40.5–52.5)
HEMOGLOBIN: 11.8 G/DL (ref 13.5–17.5)
INR BLD: 1.12 (ref 0.87–1.14)
LACTATE DEHYDROGENASE: 180 U/L (ref 100–190)
MAGNESIUM: 2.2 MG/DL (ref 1.8–2.4)
MCH RBC QN AUTO: 34.9 PG (ref 26–34)
MCHC RBC AUTO-ENTMCNC: 34.2 G/DL (ref 31–36)
MCV RBC AUTO: 102.2 FL (ref 80–100)
PDW BLD-RTO: 17.4 % (ref 12.4–15.4)
PH VENOUS: 7.41 (ref 7.35–7.45)
PHOSPHORUS: 4.1 MG/DL (ref 2.5–4.9)
PLATELET # BLD: 186 K/UL (ref 135–450)
PMV BLD AUTO: 8.5 FL (ref 5–10.5)
POTASSIUM SERPL-SCNC: 3.8 MMOL/L (ref 3.5–5.1)
PROTHROMBIN TIME: 14.4 SEC (ref 11.7–14.5)
RBC # BLD: 3.38 M/UL (ref 4.2–5.9)
SODIUM BLD-SCNC: 138 MMOL/L (ref 136–145)
WBC # BLD: 15.1 K/UL (ref 4–11)

## 2022-10-23 PROCEDURE — 6370000000 HC RX 637 (ALT 250 FOR IP): Performed by: FAMILY MEDICINE

## 2022-10-23 PROCEDURE — P9059 PLASMA, FRZ BETWEEN 8-24HOUR: HCPCS

## 2022-10-23 PROCEDURE — 85730 THROMBOPLASTIN TIME PARTIAL: CPT

## 2022-10-23 PROCEDURE — P9017 PLASMA 1 DONOR FRZ W/IN 8 HR: HCPCS

## 2022-10-23 PROCEDURE — 6370000000 HC RX 637 (ALT 250 FOR IP): Performed by: INTERNAL MEDICINE

## 2022-10-23 PROCEDURE — 2580000003 HC RX 258: Performed by: FAMILY MEDICINE

## 2022-10-23 PROCEDURE — 84100 ASSAY OF PHOSPHORUS: CPT

## 2022-10-23 PROCEDURE — 85610 PROTHROMBIN TIME: CPT

## 2022-10-23 PROCEDURE — 82330 ASSAY OF CALCIUM: CPT

## 2022-10-23 PROCEDURE — 83735 ASSAY OF MAGNESIUM: CPT

## 2022-10-23 PROCEDURE — 83615 LACTATE (LD) (LDH) ENZYME: CPT

## 2022-10-23 PROCEDURE — 80048 BASIC METABOLIC PNL TOTAL CA: CPT

## 2022-10-23 PROCEDURE — 85384 FIBRINOGEN ACTIVITY: CPT

## 2022-10-23 PROCEDURE — 85027 COMPLETE CBC AUTOMATED: CPT

## 2022-10-23 PROCEDURE — 2580000003 HC RX 258: Performed by: INTERNAL MEDICINE

## 2022-10-23 PROCEDURE — 1200000000 HC SEMI PRIVATE

## 2022-10-23 RX ORDER — PREDNISONE 20 MG/1
80 TABLET ORAL DAILY
Status: DISCONTINUED | OUTPATIENT
Start: 2022-10-24 | End: 2022-10-26 | Stop reason: HOSPADM

## 2022-10-23 RX ADMIN — Medication 10 ML: at 20:59

## 2022-10-23 RX ADMIN — MELATONIN TAB 3 MG 6 MG: 3 TAB at 20:59

## 2022-10-23 RX ADMIN — SERTRALINE 100 MG: 50 TABLET, FILM COATED ORAL at 09:42

## 2022-10-23 RX ADMIN — DIAZEPAM 5 MG: 5 TABLET ORAL at 20:58

## 2022-10-23 RX ADMIN — FOLIC ACID 1 MG: 1 TABLET ORAL at 09:42

## 2022-10-23 RX ADMIN — SODIUM CHLORIDE, PRESERVATIVE FREE 10 ML: 5 INJECTION INTRAVENOUS at 09:35

## 2022-10-23 RX ADMIN — Medication 10 ML: at 09:35

## 2022-10-23 RX ADMIN — CYANOCOBALAMIN TAB 1000 MCG 500 MCG: 1000 TAB at 09:41

## 2022-10-23 RX ADMIN — PREDNISONE 120 MG: 20 TABLET ORAL at 09:40

## 2022-10-23 RX ADMIN — SODIUM CHLORIDE, PRESERVATIVE FREE 10 ML: 5 INJECTION INTRAVENOUS at 09:34

## 2022-10-23 RX ADMIN — VERAPAMIL HYDROCHLORIDE 240 MG: 240 TABLET, FILM COATED, EXTENDED RELEASE ORAL at 09:42

## 2022-10-23 RX ADMIN — PANTOPRAZOLE SODIUM 40 MG: 40 TABLET, DELAYED RELEASE ORAL at 06:28

## 2022-10-23 ASSESSMENT — PAIN SCALES - GENERAL
PAINLEVEL_OUTOF10: 0

## 2022-10-23 NOTE — PROGRESS NOTES
Head to toe assessment complete. Vital signs obtained. AM medication administered. Pt tolerated well. Pt denies pain. Pt denies further needs at this time.

## 2022-10-23 NOTE — PROGRESS NOTES
Hospitalist Progress Note      PCP: Chloé Bobby MD    Date of Admission: 10/7/2022    Chief Complaint: TTP    Hospital Course:   Patient feels well  No chest pain  No shortness of breath  No nausea vomiting  No plasmapheresis today per nephrology      Medications:  Reviewed      Exam:    BP (!) 128/93   Pulse 62   Temp 98.1 °F (36.7 °C) (Temporal)   Resp 16   Ht 5' 10.5\" (1.791 m)   Wt 279 lb 15.8 oz (127 kg)   SpO2 100%   BMI 39.61 kg/m²     General appearance: No apparent distress, appears stated age and cooperative. HEENT: Pupils equal, round, and reactive to light. Conjunctivae/corneas clear. Neck: Supple, with full range of motion. No jugular venous distention. Trachea midline. Respiratory:  Normal respiratory effort. Clear to auscultation, bilaterally without RALES/WHEEZES/Rhonchi. Cardiovascular: Regular rate and rhythm with normal S1/S2 without MURMURS, rubs or gallops. Abdomen: Soft, non-tender, non-distended with normal bowel sounds. Musculoskeletal: No clubbing, cyanosis or EDEMA bilaterally. Full range of motion without deformity. Skin: Skin color, texture, turgor normal.  No rashes or lesions. Neurologic:  Neurovascularly intact without any focal sensory/motor deficits.  Cranial nerves: II-XII intact, grossly non-focal.  Right dialysis catheter in right IJ    Labs:   Recent Labs     10/21/22  0510 10/22/22  0630 10/23/22  0940   WBC 14.3* 13.7* 15.1*   HGB 10.7* 11.0* 11.8*   HCT 32.0* 32.8* 34.5*   PLT 87* 123* 186     Recent Labs     10/21/22  0510 10/22/22  0630 10/23/22  0616 10/23/22  0940    140  --  138   K 4.1 3.5  --  3.8    106  --  103   CO2 22 26  --  26   BUN 15 15  --  17   CREATININE 0.8* 0.8*  --  0.9   CALCIUM 7.8* 7.9*  --  8.7   PHOS 3.7 3.7 4.1  --      Recent Labs     10/21/22  0510 10/22/22  0630   AST 16 17   ALT 20 21   BILIDIR <0.2 <0.2   BILITOT <0.2 0.3   ALKPHOS 50 51     Recent Labs     10/21/22  0510 10/22/22  0630 10/23/22  0616   INR 1.15* 1.16* 1.12     No results for input(s): Ofe Alvarez in the last 72 hours. Urinalysis:      Lab Results   Component Value Date/Time    NITRU Negative 10/07/2022 11:55 AM    WBCUA 0 10/07/2022 11:55 AM    BACTERIA None Seen 10/07/2022 11:55 AM    RBCUA 12 10/07/2022 11:55 AM    BLOODU MODERATE 10/07/2022 11:55 AM    SPECGRAV 1.010 10/07/2022 11:55 AM    GLUCOSEU Negative 10/07/2022 11:55 AM       Radiology:  CT BIOPSY BONE MARROW   Final Result   Successful CT guided bone marrow aspiration and core biopsy of the right   iliac bone. CT GUIDED NEEDLE PLACEMENT   Final Result   Successful CT guided bone marrow aspiration and core biopsy of the right   iliac bone. CT CHEST W CONTRAST   Final Result   1. No acute findings in the chest, abdomen or pelvis. 2. Moderate to large hiatal hernia. 3. Slightly increasing size of a 1.4 cm hyperdense lesion in the right   kidney. Recommend nonemergent renal MRI once acute issues have resolved. CT ABDOMEN PELVIS W IV CONTRAST Additional Contrast? Oral   Final Result   1. No acute findings in the chest, abdomen or pelvis. 2. Moderate to large hiatal hernia. 3. Slightly increasing size of a 1.4 cm hyperdense lesion in the right   kidney. Recommend nonemergent renal MRI once acute issues have resolved. XR CHEST PORTABLE   Final Result   1. A right internal jugular central line terminates in the mid superior vena   cava. No associated pneumothorax. 2. No acute cardiopulmonary process. 3. Small to moderate hiatal hernia.              Assessment/Plan:    Active Hospital Problems    Diagnosis Date Noted    TTP (thrombotic thrombocytopenic purpura) ([facility] [M31.19] 10/11/2022     Priority: Medium    Morbid obesity with BMI of 40.0-44.9, adult ([address] [E66.01, Z68.41] 10/11/2022     Priority: Medium    Thrombocytopenia ([address] [D69.6] 10/07/2022     Priority: Medium    Thrombocytopenic ([address] [D69.6] 10/07/2022     Priority: Medium Essential hypertension [I10] 02/22/2017       Acute Medical Issues Being Addressed:    76-year-old gentleman admitted to the hospital with low platelets    For TTP with anemia and thrombocytopenia  Smear showed schistocytes  Undetectable haptoglobin and elevated LDH  Nicole negative  Homocystine level is normal  RA factor normal  DEMI negative  Colin TS 13 level very low at 16 normally greater than 65 in the beat antibodies high at 83 with 45% intubation  Has been receiving plasmapheresis  Given Rituxan on 10/17  Repeat on 34/36  Continue folic acid  On prednisone 1 mg/kg  On daily Rossy believe he  Platelet count is improved to 123  Bone marrow biopsy done on 10/19 results are pending  Repeat Colin TS 13 level is 12  To get plasmapheresis tomorrow    DVT Prophylaxis: SCD only patient is very mobile  Diet: ADULT DIET;  Regular  Code Status: Full Code      Dispo - once acute medical processes have resolved    Elizabeth Wu MD

## 2022-10-23 NOTE — PROGRESS NOTES
[facility]   Progress Note      10/23/2022 5:38 PM        Name: Isaac Ortiz . Admitted: 10/7/2022    SUBJECTIVE:  Continues to do well. No complaints.     Reviewed interval ancillary notes    Current Medications  [START ON 10/24/2022] predniSONE (DELTASONE) tablet 80 mg, Daily  [START ON 10/24/2022] riTUXimab (RITUXAN) 940 mg in sodium chloride 0.9 % chemo IVPB, Once  caplacizumab-yhdp (CABLIVI) injection 11 mg  (Patient Supplied), Q24H  clobetasol (TEMOVATE) 0.05 % cream, BID  diazePAM (VALIUM) tablet 5 mg, Q6H PRN  calcium gluconate 4,000 mg in dextrose 5 % 100 mL IVPB, PRN  citrate dextrose (ACD Formula A) 0.73-2.45-2.2 GM/100ML solution, Continuous PRN  folic acid (FOLVITE) tablet 1 mg, Daily  Vibegron TABS 75 mg (Patient Supplied), Daily  diatrizoate meglumine-sodium (GASTROGRAFIN) 66-10 % solution 20 mL, ONCE PRN  diphenhydrAMINE (BENADRYL) tablet 12.5 mg, Nightly PRN  sodium chloride flush 0.9 % injection 10 mL, PRN  heparin (porcine) injection 1,000 Units, PRN  melatonin tablet 6 mg, Nightly PRN  sertraline (ZOLOFT) tablet 100 mg, Daily  sodium chloride flush 0.9 % injection 5-40 mL, 2 times per day  sodium chloride flush 0.9 % injection 5-40 mL, PRN  0.9 % sodium chloride infusion, PRN  ondansetron (ZOFRAN-ODT) disintegrating tablet 4 mg, Q8H PRN   Or  ondansetron (ZOFRAN) injection 4 mg, Q6H PRN  polyethylene glycol (GLYCOLAX) packet 17 g, Daily PRN  acetaminophen (TYLENOL) tablet 650 mg, Q6H PRN   Or  acetaminophen (TYLENOL) suppository 650 mg, Q6H PRN  pantoprazole (PROTONIX) tablet 40 mg, QAM AC  vitamin B-12 (CYANOCOBALAMIN) tablet 500 mcg, Daily  verapamil (CALAN SR) extended release tablet 240 mg, Daily      Objective:  /85   Pulse 81   Temp 98.4 °F (36.9 °C) (Oral)   Resp 16   Ht 5' 10.5\" (1.791 m)   Wt 279 lb 15.8 oz (127 kg)   SpO2 96%   BMI 39.61 kg/m²     Intake/Output Summary (Last 24 hours) at 10/23/2022 1082  Last data filed at 10/23/2022 1227  Gross per 24 hour   Intake 860 ml   Output --   Net 860 ml      Wt Readings from Last 3 Encounters:   10/21/22 279 lb 15.8 oz (127 kg)   10/06/22 282 lb (127.9 kg)   09/07/22 278 lb (126.1 kg)       General appearance:  Appears comfortable  Eyes: Sclera clear. Pupils equal.  ENT: Moist oral mucosa. Trachea midline, no adenopathy. Cardiovascular: Regular rhythm, normal S1, S2. No murmur. No edema in lower extremities  Respiratory: Not using accessory muscles. Good inspiratory effort. Clear to auscultation bilaterally, no wheeze or crackles. GI: Abdomen soft, no tenderness, not distended  Musculoskeletal: No cyanosis in digits, neck supple  Neurology: CN 2-12 grossly intact. No speech or motor deficits  Psych: Normal affect. Alert and oriented in time, place and person  Skin: Warm, dry, normal turgor    Labs and Tests:  CBC:   Recent Labs     10/21/22  0510 10/22/22  0630 10/23/22  0940   WBC 14.3* 13.7* 15.1*   HGB 10.7* 11.0* 11.8*   PLT 87* 123* 186     BMP:    Recent Labs     10/21/22  0510 10/22/22  0630 10/23/22  0940    140 138   K 4.1 3.5 3.8    106 103   CO2 22 26 26   BUN 15 15 17   CREATININE 0.8* 0.8* 0.9   GLUCOSE 77 71 75     Hepatic:   Recent Labs     10/21/22  0510 10/22/22  0630   AST 16 17   ALT 20 21   BILITOT <0.2 0.3   ALKPHOS 50 51       ASSESSMENT AND PLAN    Principal Problem:    TTP (thrombotic thrombocytopenic purpura) (HCC)  Active Problems: Thrombocytopenia ([facility]    Thrombocytopenic ([facility]    Morbid obesity with BMI of 40.0-44.9, adult [facility])    Essential hypertension  Resolved Problems:    * No resolved hospital problems. *      1. TTP      -Anemia and thrombocytopenia  -Schistocytes on smear  -Undetectable haptoglobin and elevated LDH  -Nicole negative  - homocysteine level is WNL  - RA factor - WNL  - DEMI negative  - MMA and dsDNA are normal     -ZAMAN TS 13 level very low at 16 normal greater than 65.  Inhibitor Ab is high at 83 with 49% inhibition.   -Continue prednisone 1 mg/kg daily   -Continue folic acid daily  -Nephrology following for plasmapheresis  -Rituxan on 10/17/2022. Will be repeated on 10/24/2022.  -Continue daily Cablivi. -Received Rituxan on 10/17/2022.  -Plan to start [facility] today and continue after discharge.   -Platelet count is 29O today.  -Had bone marrow biopsy 10/19/22, results pending.   -Continue daily plasmapheresis. -LDH today is down to 180 today  -Repeat ADAMTS 13 level is 12. Reviewed her labs. Her anemia has improved and her thrombocytopenia has resolved. Skip plasmapheresis today. Plan therapy tomorrow. Decrease prednisone. We will give second dose of Rituxan tomorrow. [facility] will be continued. Hopefully discharge early next week. Particia Perez.  Ying Barr MD, [address]  Hematology and Oncology  [facility]  [phone]

## 2022-10-23 NOTE — PROGRESS NOTES
MD Alina Fry MD Mayer Ferri, MD                  Office: [phone]                      Fax: [phone] [address] [facility]                   NEPHROLOGY INPATIENT PROGRESS NOTE:     PATIENT NAME: Margie Patel  : 1980  MRN: [id number]      RECOMMENDATIONS:   --plans for ongoing daily more Therapeutic Plasma Exchanges,   - started on 10-8-22     - #11 10-21  - #12 10-22 - TPE today  - #13 10-23 -discussed with coordinator. No staff available for TPE today but platelet count improving, will plan for Monday. May be able to decrease to every other day. Discussed with Heme. - 1 plasma volume exchange. - Replacement fluids-FFP to replete ADAMTS 13(initial level at 16, with high inhibitor Ab level at 83 with 4(% inhibition)  - monitor S. fibrinogen level, LDH, CBC, PT/INR, iCalcium, Mag, Phos -> fibrinogen WNL  - ACD-A for a/c protocol.  - Calcium replacement protocol. Needing repletion,  - requested dialysis nurse - discussed w/ them,      -Dexamethasone 40 mg daily, -> PO Prednisone 1mg/kg/d  , s/p IV Ig - as per hematologist.     Also on [address] [place] [address] placement on 10-8-2022  - by Dr Yahaira Wilson - appreciated his assistance   -avoid platelet transfusion  - hold Lisinopril, HCTZ, on Verapamil    D/C plan from renal stand point:  - fup w/ hematology plan         IMPRESSION:       Admitted on:  10/7/2022 10:36 AM   For:  Thrombocytopenia ([facility] [address] [D69.6]  Thrombocytopenic ([facility] [D69.6]    ICD-10-CM    1. Thrombocytopenia (HCC)  D69.6 Prepare Fresh Frozen Plasma          TTP  As (+) : MAHA on PBS w/ schistocytes, elevated LDH, low hapto, w/ anemia + thrombocytopenia  ADAMTS 13 activity-very low  No renal failure        Other major problems: Management per primary and other consulting teams. HTN - was on  Lisinopril, HCTZ. Currently on Verapamil.    YOHANNES  Obesity high 30s     Hospital Problems             Last Modified POA    * (Principal) TTP (thrombotic thrombocytopenic purpura) ([facility] 10/11/2022 Yes    Thrombocytopenia ([facility] 10/11/2022 Yes    Thrombocytopenic ([facility] 10/7/2022 Yes    Morbid obesity with BMI of 40.0-44.9, adult ([facility] 10/11/2022 Yes    Essential hypertension (Chronic) 10/11/2022 Yes     Malcolm Coleman MD,  [facility] [address] [place]: [phone] or [address]  Fax: [phone]        =======================================================================================   =======================================================================================  Subjective / interval history:   Patient was seen comfortably in room   S/P TPE yesterday     No current active complaints. Patient denied chest pain / dizziness/lightheadedness/syncope/ SOB / leg edema. Past medical, Surgical, Social, Family medical history reviewed by me. MEDICATIONS: reviewed by me. Medications Prior to Admission:  No current facility-administered medications on file prior to encounter.      Current Outpatient Medications on File Prior to Encounter   Medication Sig Dispense Refill    omeprazole (PRILOSEC) 20 MG delayed release capsule 1 daily 90 capsule 3    sildenafil (VIAGRA) 50 MG tablet Take 1 tablet by mouth as needed for Erectile Dysfunction 27 tablet 3    lisinopril (PRINIVIL;ZESTRIL) 40 MG tablet Take 1 tablet by mouth daily 90 tablet 3    verapamil (CALAN SR) 240 MG extended release tablet Take 1 tablet by mouth nightly 90 tablet 3    hydroCHLOROthiazide (HYDRODIURIL) 25 MG tablet Take 1 tablet by mouth daily 90 tablet 3    sertraline (ZOLOFT) 100 MG tablet 1 TABLET BY MOUTH DAILY 90 tablet 3    Vibegron (GEMTESA) 75 MG TABS Take 75 mg by mouth daily OVERACTIVE BLADDER      calcium carbonate (TUMS) 500 MG chewable tablet Take 6 tablets by mouth daily as needed for Heartburn           Current Facility-Administered Medications:     caplacizumab-yhdp (CABLIVI) injection 11 mg  (Patient Supplied), 11 mg, SubCUTAneous, Q24H, Lang Coad Galo Barnett MD, 11 mg at 10/22/22 1828    clobetasol (TEMOVATE) 0.05 % cream, , Topical, BID, Te Mann MD, Given at 10/17/22 2049    diazePAM (VALIUM) tablet 5 mg, 5 mg, Oral, Q6H PRN, Te Mann MD, 5 mg at 10/22/22 2122    calcium gluconate 4,000 mg in dextrose 5 % 100 mL IVPB, 4,000 mg, IntraVENous, PRN, Te Mann MD, Stopped at 10/22/22 1740    citrate dextrose (ACD Formula A) 0.73-2.45-2.2 GM/100ML solution, , IntraCATHeter, Continuous PRN, Te Mann MD, Last Rate: 1,000 mL/hr at 10/22/22 1550, New Bag at 26/77/51 6917    folic acid (FOLVITE) tablet 1 mg, 1 mg, Oral, Daily, Itz Vu MD, 1 mg at 10/23/22 9958    predniSONE (DELTASONE) tablet 120 mg, 120 mg, Oral, Daily, Itz Vu MD, 120 mg at 10/23/22 0940    Vibegron TABS 75 mg (Patient Supplied), 75 mg, Oral, Daily, Suly Cohen MD, 75 mg at 10/23/22 0943    diatrizoate meglumine-sodium (GASTROGRAFIN) 66-10 % solution 20 mL, 20 mL, Oral, ONCE PRN, Itz Vu MD, 20 mL at 10/10/22 1502    diphenhydrAMINE (BENADRYL) tablet 12.5 mg, 12.5 mg, Oral, Nightly PRN, Yohana Griggs, DO, 12.5 mg at 10/21/22 1815    sodium chloride flush 0.9 % injection 10 mL, 10 mL, IntraCATHeter, PRN, Vel Gauthier MD, 10 mL at 10/23/22 0934    heparin (porcine) injection 1,000 Units, 1,000 Units, IntraVENous, PRN, Akosua Hay MD, 1,000 Units at 10/13/22 0930    melatonin tablet 6 mg, 6 mg, Oral, Nightly PRN, Bimal Griggs, DO, 6 mg at 10/22/22 2122    sertraline (ZOLOFT) tablet 100 mg, 100 mg, Oral, Daily, Suly Cohen MD, 100 mg at 10/23/22 0942    sodium chloride flush 0.9 % injection 5-40 mL, 5-40 mL, IntraVENous, 2 times per day, Suly Cohen MD, 10 mL at 10/23/22 0935    sodium chloride flush 0.9 % injection 5-40 mL, 5-40 mL, IntraVENous, PRN, Suly Cohen MD, 10 mL at 10/23/22 0935    0.9 % sodium chloride infusion, , IntraVENous, PRN, Suly Cohen MD, Last Rate: 20 mL/hr at 10/17/22 1818, New Bag at 10/17/22 1818    ondansetron (ZOFRAN-ODT) disintegrating tablet 4 mg, 4 mg, Oral, Q8H PRN **OR** ondansetron (ZOFRAN) injection 4 mg, 4 mg, IntraVENous, Q6H PRN, Mari Farmer MD    polyethylene glycol (GLYCOLAX) packet 17 g, 17 g, Oral, Daily PRN, Mari Farmer MD, 17 g at 10/21/22 0617    acetaminophen (TYLENOL) tablet 650 mg, 650 mg, Oral, Q6H PRN, 650 mg at 10/21/22 1603 **OR** acetaminophen (TYLENOL) suppository 650 mg, 650 mg, Rectal, Q6H PRN, Mari Farmer MD    pantoprazole (PROTONIX) tablet 40 mg, 40 mg, Oral, QAM AC, Birgit Vasquez MD, 40 mg at 10/23/22 7162    vitamin B-12 (CYANOCOBALAMIN) tablet 500 mcg, 500 mcg, Oral, Daily, Mari Farmer MD, 500 mcg at 10/23/22 0941    verapamil (CALAN SR) extended release tablet 240 mg, 240 mg, Oral, Daily, Mari Farmer MD, 240 mg at 10/23/22 5523        REVIEW OF SYSTEMS:  As mentioned in chief complaint and HPI , Subjective       =======================================================================================     PHYSICAL EXAM:  Recent vital signs and recent I/Os reviewed by me. Wt Readings from Last 3 Encounters:   10/21/22 279 lb 15.8 oz (127 kg)   10/06/22 282 lb (127.9 kg)   09/07/22 278 lb (126.1 kg)     BP Readings from Last 3 Encounters:   10/23/22 (!) 128/93   10/06/22 134/86   09/07/22 122/82     Patient Vitals for the past 24 hrs:   BP Temp Temp src Pulse Resp SpO2   10/23/22 0939 (!) 128/93 98.1 °F (36.7 °C) Temporal 62 16 100 %   10/23/22 0626 124/73 97.2 °F (36.2 °C) Oral 57 16 96 %   10/22/22 2008 126/75 97.6 °F (36.4 °C) Oral 84 16 97 %   10/22/22 1738 122/83 97.6 °F (36.4 °C) -- 66 16 --   10/22/22 1551 124/77 97.9 °F (36.6 °C) -- 76 16 --   10/22/22 1223 126/80 97.8 °F (36.6 °C) Oral 79 18 98 %       Intake/Output Summary (Last 24 hours) at 10/23/2022 1037  Last data filed at 10/22/2022 1738  Gross per 24 hour   Intake 4108 ml   Output 4002 ml   Net 106 ml     Physical Exam  Vitals reviewed. Constitutional:       General: He is not in acute distress. Appearance: Normal appearance. HENT:      Head: Normocephalic and atraumatic. Right Ear: External ear normal.      Left Ear: External ear normal.      Nose: Nose normal.      Mouth/Throat:      Mouth: Mucous membranes are moist. Mucous membranes are not dry. Eyes:      General: No scleral icterus. Conjunctiva/sclera: Conjunctivae normal.   Neck:      Vascular: No JVD. Cardiovascular:      Rate and Rhythm: Normal rate and regular rhythm. Heart sounds: S1 normal and S2 normal.   Pulmonary:      Effort: Pulmonary effort is normal. No respiratory distress. Breath sounds: Normal breath sounds. Abdominal:      General: Bowel sounds are normal. There is no distension. Musculoskeletal:         General: No swelling or deformity. Cervical back: Normal range of motion and neck supple. Skin:     General: Skin is dry. Coloration: Skin is not jaundiced. Neurological:      Mental Status: He is alert and oriented to person, place, and time. Mental status is at baseline. Psychiatric:         Mood and Affect: Mood normal.         Behavior: Behavior normal.        =======================================================================================     DATA:  Diagnostic tests reviewed by me for today's visit:   (AS NEEDED FOR MY EVALUATION AND MANAGEMENT).        Recent Labs     10/21/22  0510 10/22/22  0630 10/23/22  0940   WBC 14.3* 13.7* 15.1*   HCT 32.0* 32.8* 34.5*   PLT 87* 123* 186     Iron Saturation:  No components found for: PERCENTFE  FERRITIN:    Lab Results   Component Value Date/Time    FERRITIN 1,025.0 10/07/2022 11:56 AM     IRON:    Lab Results   Component Value Date/Time    IRON 344 10/07/2022 11:56 AM     TIBC:    Lab Results   Component Value Date/Time    TIBC 399 10/07/2022 11:56 AM       Recent Labs     10/21/22  0510 10/22/22  0630    140   K 4.1 3.5    106   CO2 22 26   BUN 15 15 CREATININE 0.8* 0.8*     Recent Labs     10/21/22  0510 10/22/22  0630 10/23/22  0616   CALCIUM 7.8* 7.9*  --    MG 1.90 2.00 2.20   PHOS 3.7 3.7 4.1     No results for input(s): PH, PCO2, PO2 in the last 72 hours.     Invalid input(s): Leafy Dennis    ABG:  No results found for: PH, PCO2, PO2, HCO3, BE, THGB, TCO2, O2SAT  VBG:    Lab Results   Component Value Date/Time    PHVEN 7.409 10/23/2022 06:16 AM       LDH:    Lab Results   Component Value Date/Time     10/23/2022 06:16 AM     Uric Acid:  No results found for: LABURIC, URICACID    PT/INR:    Lab Results   Component Value Date/Time    PROTIME 14.4 10/23/2022 06:16 AM    INR 1.12 10/23/2022 06:16 AM     Warfarin PT/INR:  No components found for: PTPATWAR, PTINRWAR  PTT:    Lab Results   Component Value Date/Time    APTT 24.9 10/23/2022 06:16 AM   [APTT}  Last 3 Troponin:  No results found for: TROPONINI    U/A:    Lab Results   Component Value Date/Time    NITRITE neg 10/08/2020 03:20 PM    COLORU Yellow 10/07/2022 11:55 AM    PROTEINU Negative 10/07/2022 11:55 AM    PHUR 7.0 10/07/2022 11:56 AM    WBCUA 0 10/07/2022 11:55 AM    RBCUA 12 10/07/2022 11:55 AM    BACTERIA None Seen 10/07/2022 11:55 AM    CLARITYU Clear 10/07/2022 11:55 AM    SPECGRAV 1.010 10/07/2022 11:55 AM    LEUKOCYTESUR Negative 10/07/2022 11:55 AM    UROBILINOGEN 2.0 10/07/2022 11:55 AM    BILIRUBINUR Negative 10/07/2022 11:55 AM    BILIRUBINUR neg 10/08/2020 03:20 PM    BLOODU MODERATE 10/07/2022 11:55 AM    GLUCOSEU Negative 10/07/2022 11:55 AM     Microalbumen/Creatinine ratio:  No components found for: RUCREAT  24 Hour Urine for Protein:  No components found for: RAWUPRO, UHRS3, KUGV92OM, UTV3  24 Hour Urine for Creatinine Clearance:  No components found for: CREAT4, UHRS10, UTV10  Urine Toxicology:  No components found for: Peg Sickle, ICOCAINE, IMARTHC, IOPIATES, IPHENCYC    HgBA1c:    Lab Results   Component Value Date/Time    LABA1C 5.6 08/06/2022 08:15 AM     RPR:  No results found for: RPR  HIV:  No results found for: HIV  DEMI:    Lab Results   Component Value Date/Time    DEMI Negative 10/10/2022 02:35 PM     RF:    Lab Results   Component Value Date/Time    RF <10.0 10/10/2022 02:35 PM     DSDNA:  No components found for: DNA  AMYLASE:  No results found for: AMYLASE  LIPASE:  No results found for: LIPASE  Fibrinogen Level:  No components found for: FIB       BELOW MENTIONED RADIOLOGY STUDY RESULTS BY ME (AS NEEDED FOR MY EVALUATION AND MANAGEMENT). No results found.

## 2022-10-23 NOTE — CARE COORDINATION
Chart reviewed for d/c planning and per CM note on 10/20 [facility] was notified and they are unable to do daily plasmapheresis. CM will follow for d/c plan.       Rony Sanchez RN, BSN  [phone]

## 2022-10-24 LAB
APTT: 24.6 SEC (ref 23–34.3)
BASOPHILS ABSOLUTE: 0.1 K/UL (ref 0–0.2)
BASOPHILS RELATIVE PERCENT: 0.4 %
BLOOD BANK DISPENSE STATUS: NORMAL
BLOOD BANK PRODUCT CODE: NORMAL
BPU ID: NORMAL
CALCIUM IONIZED: 1.07 MMOL/L (ref 1.12–1.32)
DESCRIPTION BLOOD BANK: NORMAL
EOSINOPHILS ABSOLUTE: 0 K/UL (ref 0–0.6)
EOSINOPHILS RELATIVE PERCENT: 0.3 %
FIBRINOGEN: 257 MG/DL (ref 207–509)
HCT VFR BLD CALC: 34.9 % (ref 40.5–52.5)
HEMOGLOBIN: 11.7 G/DL (ref 13.5–17.5)
INR BLD: 1.04 (ref 0.87–1.14)
LACTATE DEHYDROGENASE: 160 U/L (ref 100–190)
LYMPHOCYTES ABSOLUTE: 1.9 K/UL (ref 1–5.1)
LYMPHOCYTES RELATIVE PERCENT: 13.6 %
MAGNESIUM: 2.2 MG/DL (ref 1.8–2.4)
MCH RBC QN AUTO: 34.3 PG (ref 26–34)
MCHC RBC AUTO-ENTMCNC: 33.6 G/DL (ref 31–36)
MCV RBC AUTO: 102 FL (ref 80–100)
MONOCYTES ABSOLUTE: 1.4 K/UL (ref 0–1.3)
MONOCYTES RELATIVE PERCENT: 9.9 %
NEUTROPHILS ABSOLUTE: 10.7 K/UL (ref 1.7–7.7)
NEUTROPHILS RELATIVE PERCENT: 75.8 %
PDW BLD-RTO: 17.3 % (ref 12.4–15.4)
PH VENOUS: 7.36 (ref 7.35–7.45)
PHOSPHORUS: 4 MG/DL (ref 2.5–4.9)
PLATELET # BLD: 238 K/UL (ref 135–450)
PMV BLD AUTO: 7.8 FL (ref 5–10.5)
PROTHROMBIN TIME: 13.5 SEC (ref 11.7–14.5)
RBC # BLD: 3.42 M/UL (ref 4.2–5.9)
WBC # BLD: 14.1 K/UL (ref 4–11)

## 2022-10-24 PROCEDURE — 2500000003 HC RX 250 WO HCPCS: Performed by: INTERNAL MEDICINE

## 2022-10-24 PROCEDURE — 6360000002 HC RX W HCPCS: Performed by: INTERNAL MEDICINE

## 2022-10-24 PROCEDURE — 6370000000 HC RX 637 (ALT 250 FOR IP): Performed by: INTERNAL MEDICINE

## 2022-10-24 PROCEDURE — 85730 THROMBOPLASTIN TIME PARTIAL: CPT

## 2022-10-24 PROCEDURE — 36514 APHERESIS PLASMA: CPT

## 2022-10-24 PROCEDURE — 83615 LACTATE (LD) (LDH) ENZYME: CPT

## 2022-10-24 PROCEDURE — 85384 FIBRINOGEN ACTIVITY: CPT

## 2022-10-24 PROCEDURE — 2580000003 HC RX 258: Performed by: INTERNAL MEDICINE

## 2022-10-24 PROCEDURE — 85025 COMPLETE CBC W/AUTO DIFF WBC: CPT

## 2022-10-24 PROCEDURE — 83735 ASSAY OF MAGNESIUM: CPT

## 2022-10-24 PROCEDURE — 2580000003 HC RX 258: Performed by: FAMILY MEDICINE

## 2022-10-24 PROCEDURE — 6370000000 HC RX 637 (ALT 250 FOR IP): Performed by: FAMILY MEDICINE

## 2022-10-24 PROCEDURE — 84100 ASSAY OF PHOSPHORUS: CPT

## 2022-10-24 PROCEDURE — 85610 PROTHROMBIN TIME: CPT

## 2022-10-24 PROCEDURE — 1200000000 HC SEMI PRIVATE

## 2022-10-24 PROCEDURE — 82330 ASSAY OF CALCIUM: CPT

## 2022-10-24 PROCEDURE — 36430 TRANSFUSION BLD/BLD COMPNT: CPT

## 2022-10-24 RX ORDER — DIPHENHYDRAMINE HCL 25 MG
25 TABLET ORAL PRN
Status: DISCONTINUED | OUTPATIENT
Start: 2022-10-24 | End: 2022-10-26 | Stop reason: HOSPADM

## 2022-10-24 RX ADMIN — FOLIC ACID 1 MG: 1 TABLET ORAL at 08:56

## 2022-10-24 RX ADMIN — DIPHENHYDRAMINE HYDROCHLORIDE 25 MG: 25 TABLET ORAL at 10:53

## 2022-10-24 RX ADMIN — MELATONIN TAB 3 MG 6 MG: 3 TAB at 20:53

## 2022-10-24 RX ADMIN — Medication 10 ML: at 08:59

## 2022-10-24 RX ADMIN — VERAPAMIL HYDROCHLORIDE 240 MG: 240 TABLET, FILM COATED, EXTENDED RELEASE ORAL at 08:57

## 2022-10-24 RX ADMIN — Medication 10 ML: at 20:53

## 2022-10-24 RX ADMIN — SERTRALINE 100 MG: 50 TABLET, FILM COATED ORAL at 08:56

## 2022-10-24 RX ADMIN — CYANOCOBALAMIN TAB 1000 MCG 500 MCG: 1000 TAB at 08:56

## 2022-10-24 RX ADMIN — PREDNISONE 80 MG: 20 TABLET ORAL at 08:56

## 2022-10-24 RX ADMIN — SODIUM CHLORIDE 940 MG: 9 INJECTION, SOLUTION INTRAVENOUS at 16:11

## 2022-10-24 RX ADMIN — CALCIUM GLUCONATE 4000 MG: 98 INJECTION, SOLUTION INTRAVENOUS at 11:31

## 2022-10-24 RX ADMIN — DIAZEPAM 5 MG: 5 TABLET ORAL at 20:53

## 2022-10-24 RX ADMIN — ANTICOAGULANT CITRATE DEXTROSE SOLUTION FORMULA A: 12.25; 11; 3.65 SOLUTION INTRAVENOUS at 11:34

## 2022-10-24 ASSESSMENT — PAIN SCALES - GENERAL
PAINLEVEL_OUTOF10: 0

## 2022-10-24 NOTE — DIALYSIS
TPE ( plasmapheresis)  1.0  Volume of TPV exchange, with 100 % replacement using FFP (3720 ml)    Treatment Number: 14 of pending total      Patient tolerated treatment well and without any complications. Patient stable upon dialysis RN exiting the bedside.      Fluid balance: + 152 ml ( including ACD-A, Ca gluconate, saline rinse blood back to pt)     Initial setting:  ;  inlet 7498;  plasma removal 4415 , Replacement 3720, ratio 15 ,      Final values:   ml;  inlet 8390 ml ;   plasma removal 4467 ml , Replacement  3713 ml;  duration 125 minutes  Started time: 1134  End time: 1339     Medication:  ACD-A ( per protocol via machine) to pt: 119 ml total during whole TPE  Ca: gluconate : 4 gm IVPB with whole TPE, started @ 1134  Benadryl: 25 mg PO @ 1100     Next TPE scheduled on Wednesday October 26, 2022     Placed TPE flow sheets in the chart for EMR scan and report given to Brenda Fierro RN

## 2022-10-24 NOTE — PLAN OF CARE
Problem: ABCDS Injury Assessment  Goal: Absence of physical injury  10/24/2022 1002 by Addie Joyner RN  Outcome: Progressing  10/24/2022 0430 by Tyler Lara RN  Outcome: Progressing     Problem: Discharge Planning  Goal: Discharge to home or other facility with appropriate resources  10/24/2022 1002 by Addie Joyner RN  Outcome: Progressing  10/24/2022 0430 by Tyler Lara RN  Outcome: Progressing     Problem: Pain  Goal: Verbalizes/displays adequate comfort level or baseline comfort level  10/24/2022 1002 by Addie Joyner RN  Outcome: Progressing  10/24/2022 0430 by Tyler Lara RN  Outcome: Progressing     Problem: Safety - Adult  Goal: Free from fall injury  10/24/2022 1002 by Addie Joyner RN  Outcome: Progressing  10/24/2022 0430 by Tyler Lara RN  Outcome: Progressing     Problem: Skin/Tissue Integrity  Goal: Absence of new skin breakdown  Description: 1. Monitor for areas of redness and/or skin breakdown  2. Assess vascular access sites hourly  3. Every 4-6 hours minimum:  Change oxygen saturation probe site  4. Every 4-6 hours:  If on nasal continuous positive airway pressure, respiratory therapy assess nares and determine need for appliance change or resting period.   10/24/2022 1002 by Addie Joyner RN  Outcome: Progressing  10/24/2022 0430 by Tyler Lara RN  Outcome: Progressing     Problem: Neurosensory - Adult  Goal: Achieves maximal functionality and self care  10/24/2022 1002 by Addie Joyner RN  Outcome: Progressing  10/24/2022 0430 by Tyler Lara RN  Outcome: Progressing     Problem: Respiratory - Adult  Goal: Achieves optimal ventilation and oxygenation  10/24/2022 1002 by Addie Joyner RN  Outcome: Progressing  10/24/2022 0430 by Tyler Lara RN  Outcome: Progressing     Problem: Skin/Tissue Integrity - Adult  Goal: Skin integrity remains intact  10/24/2022 1002 by Addie Joyner RN  Outcome: Progressing  Flowsheets (Taken 10/24/2022 0849)  Skin Integrity Remains Intact: Monitor for areas of redness and/or skin breakdown  10/24/2022 0430 by Bill Alves RN  Outcome: Progressing  Goal: Oral mucous membranes remain intact  10/24/2022 1002 by Erma Dawn RN  Outcome: Progressing  10/24/2022 0430 by Bill Alves RN  Outcome: Progressing     Problem: Musculoskeletal - Adult  Goal: Return mobility to safest level of function  10/24/2022 1002 by Erma Dawn RN  Outcome: Progressing  10/24/2022 0430 by Bill Alves RN  Outcome: Progressing     Problem: Gastrointestinal - Adult  Goal: Minimal or absence of nausea and vomiting  10/24/2022 1002 by Erma Dawn RN  Outcome: Progressing  10/24/2022 0430 by Bill Alves RN  Outcome: Progressing     Problem: Genitourinary - Adult  Goal: Absence of urinary retention  10/24/2022 1002 by Erma Dawn RN  Outcome: Progressing  10/24/2022 0430 by Bill Alves RN  Outcome: Progressing     Problem: Metabolic/Fluid and Electrolytes - Adult  Goal: Electrolytes maintained within normal limits  10/24/2022 1002 by Erma Dawn RN  Outcome: Progressing  10/24/2022 0430 by Bill Alves RN  Outcome: Progressing  Goal: Hemodynamic stability and optimal renal function maintained  10/24/2022 1002 by Erma Dawn RN  Outcome: Progressing  10/24/2022 0430 by Bill Alves RN  Outcome: Progressing     Problem: Hematologic - Adult  Goal: Maintains hematologic stability  10/24/2022 1002 by Erma Dawn RN  Outcome: Progressing  10/24/2022 0430 by Bill Alves RN  Outcome: Progressing

## 2022-10-24 NOTE — PROGRESS NOTES
[facility]   Progress Note      10/24/2022 9:37 AM        Name: Chata Grier . Admitted: 10/7/2022    SUBJECTIVE:  He is feeling well, sitting up in chair this morning. Denies any bleeding. No chest pain or shortness of breath.      Reviewed interval ancillary notes    Current Medications  diphenhydrAMINE (BENADRYL) tablet 25 mg, PRN  predniSONE (DELTASONE) tablet 80 mg, Daily  riTUXimab (RITUXAN) 940 mg in sodium chloride 0.9 % chemo IVPB, Once  caplacizumab-yhdp (CABLIVI) injection 11 mg  (Patient Supplied), Q24H  clobetasol (TEMOVATE) 0.05 % cream, BID  diazePAM (VALIUM) tablet 5 mg, Q6H PRN  calcium gluconate 4,000 mg in dextrose 5 % 100 mL IVPB, PRN  citrate dextrose (ACD Formula A) 0.73-2.45-2.2 GM/100ML solution, Continuous PRN  folic acid (FOLVITE) tablet 1 mg, Daily  Vibegron TABS 75 mg (Patient Supplied), Daily  diatrizoate meglumine-sodium (GASTROGRAFIN) 66-10 % solution 20 mL, ONCE PRN  diphenhydrAMINE (BENADRYL) tablet 12.5 mg, Nightly PRN  sodium chloride flush 0.9 % injection 10 mL, PRN  heparin (porcine) injection 1,000 Units, PRN  melatonin tablet 6 mg, Nightly PRN  sertraline (ZOLOFT) tablet 100 mg, Daily  sodium chloride flush 0.9 % injection 5-40 mL, 2 times per day  sodium chloride flush 0.9 % injection 5-40 mL, PRN  0.9 % sodium chloride infusion, PRN  ondansetron (ZOFRAN-ODT) disintegrating tablet 4 mg, Q8H PRN   Or  ondansetron (ZOFRAN) injection 4 mg, Q6H PRN  polyethylene glycol (GLYCOLAX) packet 17 g, Daily PRN  acetaminophen (TYLENOL) tablet 650 mg, Q6H PRN   Or  acetaminophen (TYLENOL) suppository 650 mg, Q6H PRN  pantoprazole (PROTONIX) tablet 40 mg, QAM AC  vitamin B-12 (CYANOCOBALAMIN) tablet 500 mcg, Daily  verapamil (CALAN SR) extended release tablet 240 mg, Daily        Objective:  /87   Pulse 79   Temp 98.1 °F (36.7 °C)   Resp 18   Ht 5' 10.5\" (1.791 m)   Wt 279 lb 15.8 oz (127 kg)   SpO2 98%   BMI 39.61 kg/m²     Intake/Output Summary (Last 24 hours) at 10/24/2022 1201  Last data filed at 10/24/2022 1156  Gross per 24 hour   Intake 1943 ml   Output --   Net 1943 ml      Wt Readings from Last 3 Encounters:   10/21/22 279 lb 15.8 oz (127 kg)   10/06/22 282 lb (127.9 kg)   09/07/22 278 lb (126.1 kg)       General appearance:  Appears comfortable  Eyes: Sclera clear. Pupils equal.  ENT: Moist oral mucosa. Trachea midline, no adenopathy. Cardiovascular: Regular rhythm, normal S1, S2. No murmur. No edema in lower extremities  Respiratory: Not using accessory muscles. Good inspiratory effort. Clear to auscultation bilaterally, no wheeze or crackles. GI: Abdomen soft, no tenderness, not distended  Musculoskeletal: No cyanosis in digits, neck supple  Neurology: CN 2-12 grossly intact. No speech or motor deficits  Psych: Normal affect. Alert and oriented in time, place and person  Skin: Warm, dry, normal turgor    Labs and Tests:  CBC:   Recent Labs     10/22/22  0630 10/23/22  0940 10/24/22  0807   WBC 13.7* 15.1* 14.1*   HGB 11.0* 11.8* 11.7*   * 186 238     BMP:    Recent Labs     10/22/22  0630 10/23/22  0940    138   K 3.5 3.8    103   CO2 26 26   BUN 15 17   CREATININE 0.8* 0.9   GLUCOSE 71 75     Hepatic:   Recent Labs     10/22/22  0630   AST 17   ALT 21   BILITOT 0.3   ALKPHOS 51       ASSESSMENT AND PLAN    Principal Problem:    TTP (thrombotic thrombocytopenic purpura) (HCC)  Active Problems: Thrombocytopenia ([facility]    Thrombocytopenic ([facility]    Morbid obesity with BMI of 40.0-44.9, adult [facility])    Essential hypertension  Resolved Problems:    * No resolved hospital problems. *      1. TTP      -Anemia and thrombocytopenia  -Schistocytes on smear  -Undetectable haptoglobin and elevated LDH  -Nicole negative  - homocysteine level is WNL  - RA factor - WNL  - DEMI negative  - MMA and dsDNA are normal     -ZAMAN TS 13 level very low at 16 normal greater than 65.  Inhibitor Ab is high at 83 with 49% inhibition.   -Continue prednisone 1 mg/kg daily   -Continue folic acid daily  -Nephrology following for plasmapheresis  -Rituxan on 10/17/2022.    -Continue daily Cablivi. -Received Rituxan on 10/17/2022. Second dose of Rituxan will be given today. Plasmapheresis today.  Heidy Mantle started on 10/21/22 and will continue after discharge.   -Platelet count is 328C today.  -Had bone marrow biopsy 10/19/22, results pending. -LDH today is down to 160 today  -Repeat ADAMTS 13 level is 12 on 10/17/2022      LORENZA Herrera [facility]  [facility], [address] [place] [address].  [phone]    Patient was seen and examined. Doing well. Has some bruises at the site of Cablivi injections. Did not have plasmapheresis on 10/23/2022. He will receive plasmapheresis today. Will do Rituxan today. Cablivi injections every day will be continued. Will skip plasmapheresis tomorrow on 10/25/2022 and see how he does. If platelet count continues to stay within normal limits, will do a plasmapheresis on 10/26/2022 and then discontinue.   I have left message with Dr. Sourav Roberson to discuss    Bernardo Bowens MD

## 2022-10-24 NOTE — PROGRESS NOTES
Shift assessment completed. Routine vitals obtained by Student Nurse. Scheduled medications given. Patient is awake, alert and oriented. Respirations are easy and unlabored. Patient does not appear to be in distress, denies pain. No current needs expressed. Call light within reach. Pt independent in room.

## 2022-10-24 NOTE — PROGRESS NOTES
Infusion complete, CVC flushed, blood return noted. Vital signs taken, Pt slightly Tachy, but vital signs otherwise WNL. Will continue to monitor.

## 2022-10-24 NOTE — PROGRESS NOTES
VSS. CVC flushed, brisk blood return noted. Patient does not exhibit any signs or symptoms of an adverse reaction to the chemotherapy.

## 2022-10-24 NOTE — PROGRESS NOTES
Nona Fanning, MD Denver Six, MD Davis Perch, MD                  Office: [phone]                      Fax: [phone]             [address] [place] [address]                   NEPHROLOGY INPATIENT PROGRESS NOTE:     PATIENT NAME: Radha Plasencia  : 1980  MRN: [id number]      RECOMMENDATIONS:   --plans for ongoing daily more Therapeutic Plasma Exchanges,   - started on 10-8-22     - #11 10-21  - #12 10-22 -  - #13 10-23 -discussed with coordinator. No staff available for TPE today but platelet count improving, will plan for Monday. May be able to decrease to every other day. Discussed with Heme. Platelet count better today. Next TPE on Wednesday. Replace Ca. - 1 plasma volume exchange. - Replacement fluids-FFP to replete ADAMTS 13(initial level at 16, with high inhibitor Ab level at 83 with 4(% inhibition)  - monitor S. fibrinogen level, LDH, CBC, PT/INR, iCalcium, Mag, Phos -> fibrinogen WNL  - ACD-A for a/c protocol.  - Calcium replacement protocol. Needing repletion,  - requested dialysis nurse - discussed w/ them,      -Dexamethasone 40 mg daily, -> PO Prednisone 1mg/kg/d  , s/p IV Ig - as per hematologist.     Also on [address] [place] [address] placement on 10-8-2022  - by Dr Nani Garibay - appreciated his assistance   -avoid platelet transfusion  - hold Lisinopril, HCTZ, on Verapamil    D/C plan from renal stand point:  - fup w/ hematology plan         IMPRESSION:       Admitted on:  10/7/2022 10:36 AM   For:  Thrombocytopenia ([facility] [facility] [D69.6]  Thrombocytopenic ([facility] [facility] [D69.6]    ICD-10-CM    1. Thrombocytopenia (HCC)  D69.6 Prepare Fresh Frozen Plasma          TTP  As (+) : MAHA on PBS w/ schistocytes, elevated LDH, low hapto, w/ anemia + thrombocytopenia  ADAMTS 13 activity-very low  No renal failure        Other major problems: Management per primary and other consulting teams. HTN - was on  Lisinopril, HCTZ. Currently on Verapamil.    YOHANNES  Obesity high 30s     Hospital Problems Last Modified POA    * (Principal) TTP (thrombotic thrombocytopenic purpura) ([facility] [address] 10/11/2022 Yes    Thrombocytopenia ([facility] 10/11/2022 Yes    Thrombocytopenic ([facility] [facility] 10/7/2022 Yes    Morbid obesity with BMI of 40.0-44.9, adult ([facility] [address] [facility] 10/11/2022 Yes    Essential hypertension (Chronic) 10/11/2022 Yes     Raisa Moura MD,  [facility] [id number] [place] [address] [place]: [phone] or [employer]  Fax: [phone]        =======================================================================================   =======================================================================================  Subjective / interval history:   Patient was seen comfortably in room   For TPE today  No bleeding episodes     No current active complaints. Patient denied chest pain / dizziness/lightheadedness/syncope/ SOB / leg edema. Past medical, Surgical, Social, Family medical history reviewed by me. MEDICATIONS: reviewed by me. Medications Prior to Admission:  No current facility-administered medications on file prior to encounter.      Current Outpatient Medications on File Prior to Encounter   Medication Sig Dispense Refill    omeprazole (PRILOSEC) 20 MG delayed release capsule 1 daily 90 capsule 3    sildenafil (VIAGRA) 50 MG tablet Take 1 tablet by mouth as needed for Erectile Dysfunction 27 tablet 3    lisinopril (PRINIVIL;ZESTRIL) 40 MG tablet Take 1 tablet by mouth daily 90 tablet 3    verapamil (CALAN SR) 240 MG extended release tablet Take 1 tablet by mouth nightly 90 tablet 3    hydroCHLOROthiazide (HYDRODIURIL) 25 MG tablet Take 1 tablet by mouth daily 90 tablet 3    sertraline (ZOLOFT) 100 MG tablet 1 TABLET BY MOUTH DAILY 90 tablet 3    Vibegron (GEMTESA) 75 MG TABS Take 75 mg by mouth daily OVERACTIVE BLADDER      calcium carbonate (TUMS) 500 MG chewable tablet Take 6 tablets by mouth daily as needed for Heartburn           Current Facility-Administered Medications:     diphenhydrAMINE (BENADRYL) tablet 25 mg, 25 mg, Oral, PRN, Suhas Campbell MD, 25 mg at 10/24/22 1053    predniSONE (DELTASONE) tablet 80 mg, 80 mg, Oral, Daily, Gabi Burnett MD, 80 mg at 10/24/22 0856    riTUXimab (RITUXAN) 940 mg in sodium chloride 0.9 % chemo IVPB, 375 mg/m2, IntraVENous, Once, Gabi Burnett MD    caplacizumab-yhdp (CABLIVI) injection 11 mg  (Patient Supplied), 11 mg, SubCUTAneous, Q24H, Kiarra Michael MD, 11 mg at 10/23/22 1315    clobetasol (TEMOVATE) 0.05 % cream, , Topical, BID, Rebecca Forte MD, Given at 10/17/22 2049    diazePAM (VALIUM) tablet 5 mg, 5 mg, Oral, Q6H PRN, Rebecca Forte MD, 5 mg at 10/23/22 2058    calcium gluconate 4,000 mg in dextrose 5 % 100 mL IVPB, 4,000 mg, IntraVENous, PRN, Rebecca Forte MD, Stopped at 10/22/22 1740    citrate dextrose (ACD Formula A) 0.73-2.45-2.2 GM/100ML solution, , IntraCATHeter, Continuous PRN, Rebecca Forte MD, Last Rate: 1,000 mL/hr at 10/22/22 1550, New Bag at 95/38/03 1532    folic acid (FOLVITE) tablet 1 mg, 1 mg, Oral, Daily, Kiarra Michael MD, 1 mg at 10/24/22 0377    Vibegron TABS 75 mg (Patient Supplied), 75 mg, Oral, Daily, Gloria Reeves MD, 75 mg at 10/24/22 0857    diatrizoate meglumine-sodium (GASTROGRAFIN) 66-10 % solution 20 mL, 20 mL, Oral, ONCE PRN, Kiarra Michael MD, 20 mL at 10/10/22 1502    diphenhydrAMINE (BENADRYL) tablet 12.5 mg, 12.5 mg, Oral, Nightly PRN, Yohana Griggs DO, 12.5 mg at 10/21/22 1815    sodium chloride flush 0.9 % injection 10 mL, 10 mL, IntraCATHeter, PRN, Gil Lopez MD, 10 mL at 10/23/22 0934    heparin (porcine) injection 1,000 Units, 1,000 Units, IntraVENous, PRN, Vel Amaya MD, 1,000 Units at 10/13/22 0930    melatonin tablet 6 mg, 6 mg, Oral, Nightly PRN, Tricia Griggs DO, 6 mg at 10/23/22 2059    sertraline (ZOLOFT) tablet 100 mg, 100 mg, Oral, Daily, Gloria Reeves MD, 100 mg at 10/24/22 0856    sodium chloride flush 0.9 % injection 5-40 mL, 5-40 mL, IntraVENous, 2 times per day, Ravi Mcintosh MD, 10 mL at 10/24/22 0859    sodium chloride flush 0.9 % injection 5-40 mL, 5-40 mL, IntraVENous, PRN, Ravi Mcintosh MD, 10 mL at 10/23/22 0935    0.9 % sodium chloride infusion, , IntraVENous, PRN, Ravi Mcintosh MD, Last Rate: 20 mL/hr at 10/17/22 1818, New Bag at 10/17/22 1818    ondansetron (ZOFRAN-ODT) disintegrating tablet 4 mg, 4 mg, Oral, Q8H PRN **OR** ondansetron (ZOFRAN) injection 4 mg, 4 mg, IntraVENous, Q6H PRN, Ravi Mcintosh MD    polyethylene glycol (GLYCOLAX) packet 17 g, 17 g, Oral, Daily PRN, Ravi Mcintosh MD, 17 g at 10/21/22 0617    acetaminophen (TYLENOL) tablet 650 mg, 650 mg, Oral, Q6H PRN, 650 mg at 10/21/22 1603 **OR** acetaminophen (TYLENOL) suppository 650 mg, 650 mg, Rectal, Q6H PRN, Ravi Mcintosh MD    pantoprazole (PROTONIX) tablet 40 mg, 40 mg, Oral, QA AC, Efe Mills MD, 40 mg at 10/23/22 8491    vitamin B-12 (CYANOCOBALAMIN) tablet 500 mcg, 500 mcg, Oral, Daily, Ravi Mcintosh MD, 500 mcg at 10/24/22 0856    verapamil (CALAN SR) extended release tablet 240 mg, 240 mg, Oral, Daily, Ravi Mcintosh MD, 240 mg at 10/24/22 0857        REVIEW OF SYSTEMS:  As mentioned in chief complaint and HPI , Subjective       =======================================================================================     PHYSICAL EXAM:  Recent vital signs and recent I/Os reviewed by me.      Wt Readings from Last 3 Encounters:   10/21/22 279 lb 15.8 oz (127 kg)   10/06/22 282 lb (127.9 kg)   09/07/22 278 lb (126.1 kg)     BP Readings from Last 3 Encounters:   10/24/22 138/88   10/06/22 134/86   09/07/22 122/82     Patient Vitals for the past 24 hrs:   BP Temp Temp src Pulse Resp SpO2   10/24/22 1045 138/88 97.7 °F (36.5 °C) Temporal 74 17 --   10/24/22 0845 -- -- -- 59 -- --   10/24/22 0730 (!) 134/91 97 °F (36.1 °C) Temporal 59 15 100 %   10/24/22 0015 120/72 98.1 °F (36.7 °C) Oral 63 16 96 %   10/23/22 2045 134/80 97.8 °F (36.6 °C) Oral 98 18 98 %   10/23/22 1745 134/77 98.2 °F (36.8 °C) Oral 81 16 97 %   10/23/22 1227 127/85 98.4 °F (36.9 °C) Oral 81 16 96 %       Intake/Output Summary (Last 24 hours) at 10/24/2022 1101  Last data filed at 10/24/2022 0849  Gross per 24 hour   Intake 980 ml   Output --   Net 980 ml     Physical Exam  Vitals reviewed. Constitutional:       General: He is not in acute distress. Appearance: Normal appearance. HENT:      Head: Normocephalic and atraumatic. Right Ear: External ear normal.      Left Ear: External ear normal.      Nose: Nose normal.      Mouth/Throat:      Mouth: Mucous membranes are moist. Mucous membranes are not dry. Eyes:      General: No scleral icterus. Conjunctiva/sclera: Conjunctivae normal.   Neck:      Vascular: No JVD. Cardiovascular:      Rate and Rhythm: Normal rate and regular rhythm. Heart sounds: S1 normal and S2 normal.   Pulmonary:      Effort: Pulmonary effort is normal. No respiratory distress. Breath sounds: Normal breath sounds. Abdominal:      General: Bowel sounds are normal. There is no distension. Musculoskeletal:         General: No swelling or deformity. Cervical back: Normal range of motion and neck supple. Skin:     General: Skin is dry. Coloration: Skin is not jaundiced. Neurological:      Mental Status: He is alert and oriented to person, place, and time. Mental status is at baseline. Psychiatric:         Mood and Affect: Mood normal.         Behavior: Behavior normal.        =======================================================================================     DATA:  Diagnostic tests reviewed by me for today's visit:   (AS NEEDED FOR MY EVALUATION AND MANAGEMENT).        Recent Labs     10/22/22  0630 10/23/22  0940 10/24/22  0807   WBC 13.7* 15.1* 14.1*   HCT 32.8* 34.5* 34.9*   * 186 238     Iron Saturation:  No components found for: PERCENTFE  FERRITIN:    Lab Results   Component Value RUCREAT  24 Hour Urine for Protein:  No components found for: RAWUPRO, UHRS3, YELF88PH, UTV3  24 Hour Urine for Creatinine Clearance:  No components found for: CREAT4, UHRS10, UTV10  Urine Toxicology:  No components found for: Zadie Guard, IBENZO, ICOCAINE, IMARTHC, IOPIATES, IPHENCYC    HgBA1c:    Lab Results   Component Value Date/Time    LABA1C 5.6 08/06/2022 08:15 AM     RPR:  No results found for: RPR  HIV:  No results found for: HIV  DEMI:    Lab Results   Component Value Date/Time    DEMI Negative 10/10/2022 02:35 PM     RF:    Lab Results   Component Value Date/Time    RF <10.0 10/10/2022 02:35 PM     DSDNA:  No components found for: DNA  AMYLASE:  No results found for: AMYLASE  LIPASE:  No results found for: LIPASE  Fibrinogen Level:  No components found for: FIB       BELOW MENTIONED RADIOLOGY STUDY RESULTS BY ME (AS NEEDED FOR MY EVALUATION AND MANAGEMENT). No results found.

## 2022-10-24 NOTE — PROGRESS NOTES
Hospitalist Progress Note      PCP: Debbie Rubio MD    Date of Admission: 10/7/2022    Chief Complaint: TTP    Hospital Course:   Patient feels well  No new complaints  No headache no nausea vomiting  Platelet count up to 238    Medications:  Reviewed      Exam:    /76   Pulse 73   Temp 97.3 °F (36.3 °C)   Resp 18   Ht 5' 10.5\" (1.791 m)   Wt 279 lb 15.8 oz (127 kg)   SpO2 98%   BMI 39.61 kg/m²     General appearance: No apparent distress, appears stated age and cooperative. HEENT: Pupils equal, round, and reactive to light. Conjunctivae/corneas clear. Neck: Supple, with full range of motion. No jugular venous distention. Trachea midline. Respiratory:  Normal respiratory effort. Clear to auscultation, bilaterally without RALES/WHEEZES/Rhonchi. Cardiovascular: Regular rate and rhythm with normal S1/S2 without MURMURS, rubs or gallops. Abdomen: Soft, non-tender, non-distended with normal bowel sounds. Musculoskeletal: No clubbing, cyanosis or EDEMA bilaterally. Full range of motion without deformity. Skin: Skin color, texture, turgor normal.  No rashes or lesions. Neurologic:  Neurovascularly intact without any focal sensory/motor deficits.  Cranial nerves: II-XII intact, grossly non-focal.  Right dialysis catheter in right IJ  No change in physical exam from 10/23    Labs:   Recent Labs     10/22/22  0630 10/23/22  0940 10/24/22  0807   WBC 13.7* 15.1* 14.1*   HGB 11.0* 11.8* 11.7*   HCT 32.8* 34.5* 34.9*   * 186 238       Recent Labs     10/22/22  0630 10/23/22  0616 10/23/22  0940 10/24/22  0520     --  138  --    K 3.5  --  3.8  --      --  103  --    CO2 26  --  26  --    BUN 15  --  17  --    CREATININE 0.8*  --  0.9  --    CALCIUM 7.9*  --  8.7  --    PHOS 3.7 4.1  --  4.0       Recent Labs     10/22/22  0630   AST 17   ALT 21   BILIDIR <0.2   BILITOT 0.3   ALKPHOS 51       Recent Labs     10/22/22  0630 10/23/22  0616 10/24/22  0520   INR 1.16* 1.12 1.04 No results for input(s): [facility] in the last 72 hours. Urinalysis:      Lab Results   Component Value Date/Time    NITRU Negative 10/07/2022 11:55 AM    WBCUA 0 10/07/2022 11:55 AM    BACTERIA None Seen 10/07/2022 11:55 AM    RBCUA 12 10/07/2022 11:55 AM    BLOODU MODERATE 10/07/2022 11:55 AM    SPECGRAV 1.010 10/07/2022 11:55 AM    GLUCOSEU Negative 10/07/2022 11:55 AM       Radiology:  CT BIOPSY BONE MARROW   Final Result   Successful CT guided bone marrow aspiration and core biopsy of the right   iliac bone. CT GUIDED NEEDLE PLACEMENT   Final Result   Successful CT guided bone marrow aspiration and core biopsy of the right   iliac bone. CT CHEST W CONTRAST   Final Result   1. No acute findings in the chest, abdomen or pelvis. 2. Moderate to large hiatal hernia. 3. Slightly increasing size of a 1.4 cm hyperdense lesion in the right   kidney. Recommend nonemergent renal MRI once acute issues have resolved. CT ABDOMEN PELVIS W IV CONTRAST Additional Contrast? Oral   Final Result   1. No acute findings in the chest, abdomen or pelvis. 2. Moderate to large hiatal hernia. 3. Slightly increasing size of a 1.4 cm hyperdense lesion in the right   kidney. Recommend nonemergent renal MRI once acute issues have resolved. XR CHEST PORTABLE   Final Result   1. A right internal jugular central line terminates in the mid superior vena   cava. No associated pneumothorax. 2. No acute cardiopulmonary process. 3. Small to moderate hiatal hernia.              Assessment/Plan:    Active Hospital Problems    Diagnosis Date Noted    TTP (thrombotic thrombocytopenic purpura) ([facility] [M31.19] 10/11/2022     Priority: Medium    Morbid obesity with BMI of 40.0-44.9, adult ([address] [E66.01, Z68.41] 10/11/2022     Priority: Medium    Thrombocytopenia ([address] [D69.6] 10/07/2022     Priority: Medium    Thrombocytopenic ([address] [D69.6] 10/07/2022     Priority: Medium    Essential hypertension [I10] 02/22/2017       Acute Medical Issues Being Addressed:    41-year-old gentleman admitted to the hospital with low platelets    For TTP with anemia and thrombocytopenia  Smear showed schistocytes  Undetectable haptoglobin and elevated LDH  Nicole negative  Homocystine level is normal  RA factor normal  DEMI negative  Colin TS 13 level very low at 16 normally greater than 65 in the beat antibodies high at 83 with 45% intubation  Has been receiving plasmapheresis  Given Rituxan on 10/17  Repeat on 65/75  Continue folic acid  On prednisone 1 mg/kg  On daily cablivi  Platelet count is improved to 123  Bone marrow biopsy done on 10/19 results are pending  Repeat Colin TS 13 level is 12  Plasmapheresis today if platelet counts are good to get a repeat plasmapheresis on 1026 and stop  On prednisone 80    Probable discharge on 1026    DVT Prophylaxis: SCD only patient is very mobile  Diet: ADULT DIET;  Regular  Code Status: Full Code      Dispo - once acute medical processes have resolved    Lulu Echeverria MD

## 2022-10-24 NOTE — PROGRESS NOTES
Original chemotherapy orders reviewed and acknowledged. Appropriateness of chemotherapy treatment regimen riTUXimab-pvvr (Ruxience) for diagnosis of TTP was verified. Patient educated on chemotherapy regimen. Acknowledgement of informed consent for chemotherapy verified. Pt height, weight, and BSA verified. Appropriate dosing calculations of chemotherapy based on height, weight, and BSA verified. Administration: Chemotherapy drug riTUXimab-pvvr (Ruxience) independently verified with Rebeka Chow RN prior to administration. Original order, appropriateness of regimen, drug supplied, height, weight, BSA, dose calculations, expiration dates/times, drug appearance, and two patient identifiers were verified by both RNs. Drug checked for vesicant/irritant status and for risk of hypersensitivity. Most recent laboratory values and allergies, were reviewed. Appropriate IV access available: Positive, brisk blood return via CVC was confirmed prior to administration. Chest x-ray for correct line placement reviewed  Venessa Cai RN and Rebeka Chow RN verified correct rate of chemotherapy and maintenance IV fluids. Patient was educated on chemotherapy regimen prior to administration including indication for treatment related to disease & side effects of chemotherapy drug. Patient verbalizes understanding of all instructions.

## 2022-10-24 NOTE — PLAN OF CARE
Pt remains alert & orientated x4, steady on feet and independent in room. Pt aware of treatment plan. Pt denies pain and shortness of breath. Pt denies n/v.  Pt uses call light appropriately. Pt plans to discharge home with wife, when medically stable. VSS. Problem: ABCDS Injury Assessment  Goal: Absence of physical injury  Outcome: Progressing     Problem: Discharge Planning  Goal: Discharge to home or other facility with appropriate resources  Outcome: Progressing     Problem: Pain  Goal: Verbalizes/displays adequate comfort level or baseline comfort level  Outcome: Progressing     Problem: Safety - Adult  Goal: Free from fall injury  Outcome: Progressing     Problem: Skin/Tissue Integrity  Goal: Absence of new skin breakdown  Description: 1. Monitor for areas of redness and/or skin breakdown  2. Assess vascular access sites hourly  3. Every 4-6 hours minimum:  Change oxygen saturation probe site  4. Every 4-6 hours:  If on nasal continuous positive airway pressure, respiratory therapy assess nares and determine need for appliance change or resting period.   Outcome: Progressing     Problem: Neurosensory - Adult  Goal: Achieves maximal functionality and self care  Outcome: Progressing     Problem: Respiratory - Adult  Goal: Achieves optimal ventilation and oxygenation  Outcome: Progressing     Problem: Skin/Tissue Integrity - Adult  Goal: Skin integrity remains intact  Outcome: Progressing  Goal: Oral mucous membranes remain intact  Outcome: Progressing     Problem: Musculoskeletal - Adult  Goal: Return mobility to safest level of function  Outcome: Progressing     Problem: Gastrointestinal - Adult  Goal: Minimal or absence of nausea and vomiting  Outcome: Progressing     Problem: Genitourinary - Adult  Goal: Absence of urinary retention  Outcome: Progressing     Problem: Metabolic/Fluid and Electrolytes - Adult  Goal: Electrolytes maintained within normal limits  Outcome: Progressing  Goal: Hemodynamic stability and optimal renal function maintained  Outcome: Progressing     Problem: Hematologic - Adult  Goal: Maintains hematologic stability  Outcome: Progressing

## 2022-10-25 LAB
ANION GAP SERPL CALCULATED.3IONS-SCNC: 6 MMOL/L (ref 3–16)
ANISOCYTOSIS: ABNORMAL
APTT: 25.4 SEC (ref 23–34.3)
BANDED NEUTROPHILS RELATIVE PERCENT: 6 % (ref 0–7)
BASOPHILS ABSOLUTE: 0 K/UL (ref 0–0.2)
BASOPHILS RELATIVE PERCENT: 0 %
BUN BLDV-MCNC: 21 MG/DL (ref 7–20)
CALCIUM IONIZED: 1.11 MMOL/L (ref 1.12–1.32)
CALCIUM SERPL-MCNC: 8.6 MG/DL (ref 8.3–10.6)
CHLORIDE BLD-SCNC: 105 MMOL/L (ref 99–110)
CO2: 29 MMOL/L (ref 21–32)
CREAT SERPL-MCNC: 0.8 MG/DL (ref 0.9–1.3)
EOSINOPHILS ABSOLUTE: 0 K/UL (ref 0–0.6)
EOSINOPHILS RELATIVE PERCENT: 0 %
FIBRINOGEN: 264 MG/DL (ref 207–509)
GFR SERPL CREATININE-BSD FRML MDRD: >60 ML/MIN/{1.73_M2}
GLUCOSE BLD-MCNC: 82 MG/DL (ref 70–99)
HCT VFR BLD CALC: 32.6 % (ref 40.5–52.5)
HEMOGLOBIN: 10.9 G/DL (ref 13.5–17.5)
HYPOCHROMIA: ABNORMAL
INR BLD: 1.05 (ref 0.87–1.14)
LACTATE DEHYDROGENASE: 200 U/L (ref 100–190)
LYMPHOCYTES ABSOLUTE: 1.7 K/UL (ref 1–5.1)
LYMPHOCYTES RELATIVE PERCENT: 13 %
MAGNESIUM: 2.1 MG/DL (ref 1.8–2.4)
MCH RBC QN AUTO: 34.2 PG (ref 26–34)
MCHC RBC AUTO-ENTMCNC: 33.5 G/DL (ref 31–36)
MCV RBC AUTO: 102.2 FL (ref 80–100)
MONOCYTES ABSOLUTE: 1.3 K/UL (ref 0–1.3)
MONOCYTES RELATIVE PERCENT: 10 %
NEUTROPHILS ABSOLUTE: 10.2 K/UL (ref 1.7–7.7)
NEUTROPHILS RELATIVE PERCENT: 71 %
OVALOCYTES: ABNORMAL
PDW BLD-RTO: 16.8 % (ref 12.4–15.4)
PH VENOUS: 7.39 (ref 7.35–7.45)
PHOSPHORUS: 3.9 MG/DL (ref 2.5–4.9)
PLATELET # BLD: 225 K/UL (ref 135–450)
PLATELET SLIDE REVIEW: ADEQUATE
PMV BLD AUTO: 7.7 FL (ref 5–10.5)
POLYCHROMASIA: ABNORMAL
POTASSIUM SERPL-SCNC: 4 MMOL/L (ref 3.5–5.1)
PROTHROMBIN TIME: 13.6 SEC (ref 11.7–14.5)
RBC # BLD: 3.19 M/UL (ref 4.2–5.9)
SLIDE REVIEW: ABNORMAL
SODIUM BLD-SCNC: 140 MMOL/L (ref 136–145)
STOMATOCYTES: ABNORMAL
TEAR DROP CELLS: ABNORMAL
TOXIC GRANULATION: PRESENT
WBC # BLD: 13.2 K/UL (ref 4–11)

## 2022-10-25 PROCEDURE — 6370000000 HC RX 637 (ALT 250 FOR IP): Performed by: INTERNAL MEDICINE

## 2022-10-25 PROCEDURE — 80048 BASIC METABOLIC PNL TOTAL CA: CPT

## 2022-10-25 PROCEDURE — 36592 COLLECT BLOOD FROM PICC: CPT

## 2022-10-25 PROCEDURE — 85025 COMPLETE CBC W/AUTO DIFF WBC: CPT

## 2022-10-25 PROCEDURE — 82330 ASSAY OF CALCIUM: CPT

## 2022-10-25 PROCEDURE — 83615 LACTATE (LD) (LDH) ENZYME: CPT

## 2022-10-25 PROCEDURE — P9017 PLASMA 1 DONOR FRZ W/IN 8 HR: HCPCS

## 2022-10-25 PROCEDURE — 85610 PROTHROMBIN TIME: CPT

## 2022-10-25 PROCEDURE — 85384 FIBRINOGEN ACTIVITY: CPT

## 2022-10-25 PROCEDURE — 2580000003 HC RX 258: Performed by: FAMILY MEDICINE

## 2022-10-25 PROCEDURE — 84100 ASSAY OF PHOSPHORUS: CPT

## 2022-10-25 PROCEDURE — 1200000000 HC SEMI PRIVATE

## 2022-10-25 PROCEDURE — 85730 THROMBOPLASTIN TIME PARTIAL: CPT

## 2022-10-25 PROCEDURE — 6370000000 HC RX 637 (ALT 250 FOR IP): Performed by: FAMILY MEDICINE

## 2022-10-25 PROCEDURE — 83735 ASSAY OF MAGNESIUM: CPT

## 2022-10-25 PROCEDURE — P9059 PLASMA, FRZ BETWEEN 8-24HOUR: HCPCS

## 2022-10-25 RX ORDER — ANTICOAGULANT CITRATE DEXTROSE SOLUTION FORMULA A 12.25; 11; 3.65 G/500ML; G/500ML; G/500ML
SOLUTION INTRAVENOUS CONTINUOUS PRN
Status: DISCONTINUED | OUTPATIENT
Start: 2022-10-26 | End: 2022-10-26 | Stop reason: HOSPADM

## 2022-10-25 RX ORDER — SENNA AND DOCUSATE SODIUM 50; 8.6 MG/1; MG/1
2 TABLET, FILM COATED ORAL DAILY PRN
Status: DISCONTINUED | OUTPATIENT
Start: 2022-10-25 | End: 2022-10-26 | Stop reason: HOSPADM

## 2022-10-25 RX ADMIN — SERTRALINE 100 MG: 50 TABLET, FILM COATED ORAL at 10:41

## 2022-10-25 RX ADMIN — FOLIC ACID 1 MG: 1 TABLET ORAL at 10:41

## 2022-10-25 RX ADMIN — DIAZEPAM 5 MG: 5 TABLET ORAL at 20:48

## 2022-10-25 RX ADMIN — PREDNISONE 80 MG: 20 TABLET ORAL at 10:41

## 2022-10-25 RX ADMIN — Medication 10 ML: at 20:48

## 2022-10-25 RX ADMIN — Medication 10 ML: at 10:41

## 2022-10-25 RX ADMIN — MELATONIN TAB 3 MG 6 MG: 3 TAB at 20:48

## 2022-10-25 RX ADMIN — PANTOPRAZOLE SODIUM 40 MG: 40 TABLET, DELAYED RELEASE ORAL at 05:38

## 2022-10-25 RX ADMIN — CYANOCOBALAMIN TAB 1000 MCG 500 MCG: 1000 TAB at 10:41

## 2022-10-25 RX ADMIN — STANDARDIZED SENNA CONCENTRATE AND DOCUSATE SODIUM 2 TABLET: 8.6; 5 TABLET ORAL at 16:00

## 2022-10-25 RX ADMIN — VERAPAMIL HYDROCHLORIDE 240 MG: 240 TABLET, FILM COATED, EXTENDED RELEASE ORAL at 10:41

## 2022-10-25 RX ADMIN — POLYETHYLENE GLYCOL 3350 17 G: 17 POWDER, FOR SOLUTION ORAL at 10:47

## 2022-10-25 ASSESSMENT — PAIN SCALES - GENERAL
PAINLEVEL_OUTOF10: 0
PAINLEVEL_OUTOF10: 0

## 2022-10-25 NOTE — CARE COORDINATION
SW informed pt most likely discharging home tomorrow. Hospitalist stated no longer a need for [facility]. Stated patient is completely independent and will be discharging home with no case management needs.     Electronically signed by BEN Cruz, TYESHA on 10/25/2022 at 3:18 PM

## 2022-10-25 NOTE — PROGRESS NOTES
Shift assessment completed. Routine vitals stable. Scheduled medications given by Vita George. Patient is awake, alert and oriented. Respirations are easy and unlabored. PRN Miralax given for constipation. Patient does not appear to be in distress, resting comfortably at this time. Call light within reach. No further needs expressed.

## 2022-10-25 NOTE — PROGRESS NOTES
MD Cuong Prasad MD Paz Bitter, MD                  Office: [phone]                      Fax: [phone]             [address] [place] [address]                   NEPHROLOGY INPATIENT PROGRESS NOTE:     PATIENT NAME: Sylvia Moreland  : 1980  MRN: [id number]      RECOMMENDATIONS:   --plans for ongoing daily more Therapeutic Plasma Exchanges,   - started on 10-8-22     - #11 10-21  - #12 10-22 -  - #13 10-23 -discussed with coordinator. No staff available for TPE today but platelet count improving, will plan for Monday. May be able to decrease to every other day. Discussed with Heme. Platelet count stable today. Next TPE on Wednesday. Replace Ca. - 1 plasma volume exchange. - Replacement fluids-FFP to replete ADAMTS 13(initial level at 16, with high inhibitor Ab level at 83 with 4(% inhibition)  - monitor S. fibrinogen level, LDH, CBC, PT/INR, iCalcium, Mag, Phos -> fibrinogen WNL  - ACD-A for a/c protocol.  - Calcium replacement protocol. Needing repletion,  - requested dialysis nurse - discussed w/ them,      Would need tunneled cath if pursuing outpt TPE    -Dexamethasone 40 mg daily, -> PO Prednisone 1mg/kg/d  , s/p IV Ig - as per hematologist.     Also on [address] [place] [address] placement on 10-8-2022  - by Dr Sima Rodriguez - appreciated his assistance   -avoid platelet transfusion  - hold Lisinopril, HCTZ, on Verapamil    D/C plan from renal stand point:  - fup w/ hematology plan         IMPRESSION:       Admitted on:  10/7/2022 10:36 AM   For:  Thrombocytopenia ([facility] [D69.6]  Thrombocytopenic ([facility] [facility] [D69.6]    ICD-10-CM    1. Thrombocytopenia (HCC)  D69.6 Prepare Fresh Frozen Plasma          TTP-improving  As (+) : MAHA on PBS w/ schistocytes, elevated LDH, low hapto, w/ anemia + thrombocytopenia  ADAMTS 13 activity-very low  No renal failure        Other major problems: Management per primary and other consulting teams. HTN - was on  Lisinopril, HCTZ.   Currently on Verapamil. YOHANNES  Obesity high 30s     Hospital Problems             Last Modified POA    * (Principal) TTP (thrombotic thrombocytopenic purpura) ([facility] [facility] 10/11/2022 Yes    Thrombocytopenia ([facility] [address] [facility] 10/11/2022 Yes    Thrombocytopenic ([facility] [facility] 10/7/2022 Yes    Morbid obesity with BMI of 40.0-44.9, adult ([facility] 10/11/2022 Yes    Essential hypertension (Chronic) 10/11/2022 Yes     Pradeep Gupta MD,  [facility] [id number] [place] [address] [place]: [phone] or [employer]  Fax: [phone]        =======================================================================================   =======================================================================================  Subjective / interval history:   Patient was seen comfortably in room   No TPE today  No bleeding episodes     No current active complaints. Patient denied chest pain / dizziness/lightheadedness/syncope/ SOB / leg edema. Past medical, Surgical, Social, Family medical history reviewed by me. MEDICATIONS: reviewed by me. Medications Prior to Admission:  No current facility-administered medications on file prior to encounter.      Current Outpatient Medications on File Prior to Encounter   Medication Sig Dispense Refill    omeprazole (PRILOSEC) 20 MG delayed release capsule 1 daily 90 capsule 3    sildenafil (VIAGRA) 50 MG tablet Take 1 tablet by mouth as needed for Erectile Dysfunction 27 tablet 3    lisinopril (PRINIVIL;ZESTRIL) 40 MG tablet Take 1 tablet by mouth daily 90 tablet 3    verapamil (CALAN SR) 240 MG extended release tablet Take 1 tablet by mouth nightly 90 tablet 3    hydroCHLOROthiazide (HYDRODIURIL) 25 MG tablet Take 1 tablet by mouth daily 90 tablet 3    sertraline (ZOLOFT) 100 MG tablet 1 TABLET BY MOUTH DAILY 90 tablet 3    Vibegron (GEMTESA) 75 MG TABS Take 75 mg by mouth daily OVERACTIVE BLADDER      calcium carbonate (TUMS) 500 MG chewable tablet Take 6 tablets by mouth daily as needed for Heartburn           Current Facility-Administered Medications:     diphenhydrAMINE (BENADRYL) tablet 25 mg, 25 mg, Oral, PRN, Suhas Rodriguez MD, 25 mg at 10/24/22 1053    predniSONE (DELTASONE) tablet 80 mg, 80 mg, Oral, Daily, Sveta Dozier MD, 80 mg at 10/25/22 1041    caplacizumab-yhdp (CABLIVI) injection 11 mg  (Patient Supplied), 11 mg, SubCUTAneous, Q24H, Efe Mills MD, 11 mg at 10/25/22 1040    clobetasol (TEMOVATE) 0.05 % cream, , Topical, BID, Franck Dang MD, Given at 10/17/22 2049    diazePAM (VALIUM) tablet 5 mg, 5 mg, Oral, Q6H PRN, Franck Dang MD, 5 mg at 10/24/22 2053    calcium gluconate 4,000 mg in dextrose 5 % 100 mL IVPB, 4,000 mg, IntraVENous, PRN, Franck Dang MD, Stopped at 10/24/22 1400    citrate dextrose (ACD Formula A) 0.73-2.45-2.2 GM/100ML solution, , IntraCATHeter, Continuous PRN, Franck Dang MD, Last Rate: 1,000 mL/hr at 10/24/22 1134, New Bag at 73/33/61 2827    folic acid (FOLVITE) tablet 1 mg, 1 mg, Oral, Daily, Efe Mills MD, 1 mg at 10/25/22 1041    Vibegron TABS 75 mg (Patient Supplied), 75 mg, Oral, Daily, Ravi Mcintosh MD, 75 mg at 10/25/22 1041    diatrizoate meglumine-sodium (GASTROGRAFIN) 66-10 % solution 20 mL, 20 mL, Oral, ONCE PRN, Efe Mills MD, 20 mL at 10/10/22 1502    diphenhydrAMINE (BENADRYL) tablet 12.5 mg, 12.5 mg, Oral, Nightly PRN, Yohana Griggs DO, 12.5 mg at 10/21/22 1815    sodium chloride flush 0.9 % injection 10 mL, 10 mL, IntraCATHeter, PRN, Maricel Snider MD, 10 mL at 10/23/22 0934    heparin (porcine) injection 1,000 Units, 1,000 Units, IntraVENous, PRN, Tana James MD, 1,000 Units at 10/13/22 0930    melatonin tablet 6 mg, 6 mg, Oral, Nightly PRN, Danni Griggs DO, 6 mg at 10/24/22 2053    sertraline (ZOLOFT) tablet 100 mg, 100 mg, Oral, Daily, Ravi Mcintosh MD, 100 mg at 10/25/22 1041    sodium chloride flush 0.9 % injection 5-40 mL, 5-40 mL, IntraVENous, 2 times per day, Ravi Mcintosh MD, 10 mL at 10/24/22 2053 sodium chloride flush 0.9 % injection 5-40 mL, 5-40 mL, IntraVENous, PRN, Gracie Rodriguez MD, 10 mL at 10/23/22 0935    0.9 % sodium chloride infusion, , IntraVENous, PRN, Gracie Rodriguez MD, Last Rate: 20 mL/hr at 10/17/22 1818, New Bag at 10/17/22 1818    ondansetron (ZOFRAN-ODT) disintegrating tablet 4 mg, 4 mg, Oral, Q8H PRN **OR** ondansetron (ZOFRAN) injection 4 mg, 4 mg, IntraVENous, Q6H PRN, Gracie Rodriguez MD    polyethylene glycol (GLYCOLAX) packet 17 g, 17 g, Oral, Daily PRN, Gracie Rodriguez MD, 17 g at 10/25/22 1047    acetaminophen (TYLENOL) tablet 650 mg, 650 mg, Oral, Q6H PRN, 650 mg at 10/21/22 1603 **OR** acetaminophen (TYLENOL) suppository 650 mg, 650 mg, Rectal, Q6H PRN, Gracie Rodriguez MD    pantoprazole (PROTONIX) tablet 40 mg, 40 mg, Oral, QAM AC, Mili Dean MD, 40 mg at 10/25/22 1768    vitamin B-12 (CYANOCOBALAMIN) tablet 500 mcg, 500 mcg, Oral, Daily, Gracie Rodriguez MD, 500 mcg at 10/25/22 1041    verapamil (CALAN SR) extended release tablet 240 mg, 240 mg, Oral, Daily, Gracie Rodriguez MD, 240 mg at 10/25/22 1041        REVIEW OF SYSTEMS:  As mentioned in chief complaint and HPI , Subjective       =======================================================================================     PHYSICAL EXAM:  Recent vital signs and recent I/Os reviewed by me.      Wt Readings from Last 3 Encounters:   10/21/22 279 lb 15.8 oz (127 kg)   10/06/22 282 lb (127.9 kg)   09/07/22 278 lb (126.1 kg)     BP Readings from Last 3 Encounters:   10/25/22 131/80   10/06/22 134/86   09/07/22 122/82     Patient Vitals for the past 24 hrs:   BP Temp Temp src Pulse Resp SpO2   10/25/22 1030 131/80 97.7 °F (36.5 °C) Oral 99 16 98 %   10/25/22 0536 116/71 97.8 °F (36.6 °C) Oral 61 18 98 %   10/24/22 2045 (!) 143/88 98.1 °F (36.7 °C) Oral 97 18 --   10/24/22 1945 137/81 97.8 °F (36.6 °C) Oral (!) 105 18 97 %   10/24/22 1849 (!) 143/86 98.6 °F (37 °C) Oral 83 18 98 %   10/24/22 1743 138/84 98.2 °F (36.8 °C) Temporal 79 18 97 %   10/24/22 1711 134/84 98.1 °F (36.7 °C) -- 86 -- 95 %   10/24/22 1640 123/68 97.9 °F (36.6 °C) Temporal 94 18 97 %   10/24/22 1559 136/77 98.2 °F (36.8 °C) Temporal 91 18 95 %   10/24/22 1345 118/80 97.5 °F (36.4 °C) Temporal 73 18 98 %   10/24/22 1319 117/76 97.3 °F (36.3 °C) -- 73 18 98 %   10/24/22 1252 134/77 97 °F (36.1 °C) -- 83 18 98 %   10/24/22 1200 113/77 97 °F (36.1 °C) -- 82 18 96 %   10/24/22 1145 131/87 98.1 °F (36.7 °C) -- 79 18 98 %       Intake/Output Summary (Last 24 hours) at 10/25/2022 1052  Last data filed at 10/24/2022 1358  Gross per 24 hour   Intake 4316 ml   Output 800 ml   Net 3516 ml     Physical Exam  Vitals reviewed. Constitutional:       General: He is not in acute distress. Appearance: Normal appearance. HENT:      Head: Normocephalic and atraumatic. Right Ear: External ear normal.      Left Ear: External ear normal.      Nose: Nose normal.      Mouth/Throat:      Mouth: Mucous membranes are moist. Mucous membranes are not dry. Eyes:      General: No scleral icterus. Conjunctiva/sclera: Conjunctivae normal.   Neck:      Vascular: No JVD. Cardiovascular:      Rate and Rhythm: Normal rate and regular rhythm. Heart sounds: S1 normal and S2 normal.   Pulmonary:      Effort: Pulmonary effort is normal. No respiratory distress. Breath sounds: Normal breath sounds. Abdominal:      General: Bowel sounds are normal. There is no distension. Musculoskeletal:         General: No swelling or deformity. Cervical back: Normal range of motion and neck supple. Skin:     General: Skin is dry. Coloration: Skin is not jaundiced. Neurological:      Mental Status: He is alert and oriented to person, place, and time. Mental status is at baseline.    Psychiatric:         Mood and Affect: Mood normal.         Behavior: Behavior normal. =======================================================================================     DATA:  Diagnostic tests reviewed by me for today's visit:   (AS NEEDED FOR MY EVALUATION AND MANAGEMENT). Recent Labs     10/23/22  0940 10/24/22  0807 10/25/22  0540   WBC 15.1* 14.1* 13.2*   HCT 34.5* 34.9* 32.6*    238 225     Iron Saturation:  No components found for: PERCENTFE  FERRITIN:    Lab Results   Component Value Date/Time    FERRITIN 1,025.0 10/07/2022 11:56 AM     IRON:    Lab Results   Component Value Date/Time    IRON 344 10/07/2022 11:56 AM     TIBC:    Lab Results   Component Value Date/Time    TIBC 399 10/07/2022 11:56 AM       Recent Labs     10/23/22  0940 10/25/22  0540    140   K 3.8 4.0    105   CO2 26 29   BUN 17 21*   CREATININE 0.9 0.8*     Recent Labs     10/23/22  0616 10/23/22  0940 10/24/22  0520 10/25/22  0540   CALCIUM  --  8.7  --  8.6   MG 2.20  --  2.20 2.10   PHOS 4.1  --  4.0 3.9     No results for input(s): PH, PCO2, PO2 in the last 72 hours.     Invalid input(s): Seema Lunger    ABG:  No results found for: PH, PCO2, PO2, HCO3, BE, THGB, TCO2, O2SAT  VBG:    Lab Results   Component Value Date/Time    PHVEN 7.392 10/25/2022 05:40 AM       LDH:    Lab Results   Component Value Date/Time     10/25/2022 05:40 AM     Uric Acid:  No results found for: LABURIC, URICACID    PT/INR:    Lab Results   Component Value Date/Time    PROTIME 13.6 10/25/2022 05:40 AM    INR 1.05 10/25/2022 05:40 AM     Warfarin PT/INR:  No components found for: PTPATWAR, PTINRWAR  PTT:    Lab Results   Component Value Date/Time    APTT 25.4 10/25/2022 05:40 AM   [APTT}  Last 3 Troponin:  No results found for: TROPONINI    U/A:    Lab Results   Component Value Date/Time    NITRITE neg 10/08/2020 03:20 PM    COLORU Yellow 10/07/2022 11:55 AM    PROTEINU Negative 10/07/2022 11:55 AM    PHUR 7.0 10/07/2022 11:56 AM    WBCUA 0 10/07/2022 11:55 AM    RBCUA 12 10/07/2022 11:55 AM BACTERIA None Seen 10/07/2022 11:55 AM    CLARITYU Clear 10/07/2022 11:55 AM    SPECGRAV 1.010 10/07/2022 11:55 AM    LEUKOCYTESUR Negative 10/07/2022 11:55 AM    UROBILINOGEN 2.0 10/07/2022 11:55 AM    BILIRUBINUR Negative 10/07/2022 11:55 AM    BILIRUBINUR neg 10/08/2020 03:20 PM    BLOODU MODERATE 10/07/2022 11:55 AM    GLUCOSEU Negative 10/07/2022 11:55 AM     Microalbumen/Creatinine ratio:  No components found for: RUCREAT  24 Hour Urine for Protein:  No components found for: RAWUPRO, UHRS3, PLSM72MX, UTV3  24 Hour Urine for Creatinine Clearance:  No components found for: CREAT4, UHRS10, UTV10  Urine Toxicology:  No components found for: IAMMENTA, IBARBIT, IBENZO, ICOCAINE, IMARTHC, IOPIATES, IPHENCYC    HgBA1c:    Lab Results   Component Value Date/Time    LABA1C 5.6 08/06/2022 08:15 AM     RPR:  No results found for: RPR  HIV:  No results found for: HIV  DEMI:    Lab Results   Component Value Date/Time    DEMI Negative 10/10/2022 02:35 PM     RF:    Lab Results   Component Value Date/Time    RF <10.0 10/10/2022 02:35 PM     DSDNA:  No components found for: DNA  AMYLASE:  No results found for: AMYLASE  LIPASE:  No results found for: LIPASE  Fibrinogen Level:  No components found for: FIB       BELOW MENTIONED RADIOLOGY STUDY RESULTS BY ME (AS NEEDED FOR MY EVALUATION AND MANAGEMENT). No results found.

## 2022-10-25 NOTE — PROGRESS NOTES
[facility]   Progress Note      10/25/2022 8:57 AM        Name: Mike Bowman . Admitted: 10/7/2022    SUBJECTIVE:  He is feeling well. No new complaints. He has a large bruise on his abdomen from [address] injection given a couple days ago.      Reviewed interval ancillary notes    Current Medications  diphenhydrAMINE (BENADRYL) tablet 25 mg, PRN  predniSONE (DELTASONE) tablet 80 mg, Daily  caplacizumab-yhdp (CABLIVI) injection 11 mg  (Patient Supplied), Q24H  clobetasol (TEMOVATE) 0.05 % cream, BID  diazePAM (VALIUM) tablet 5 mg, Q6H PRN  calcium gluconate 4,000 mg in dextrose 5 % 100 mL IVPB, PRN  citrate dextrose (ACD Formula A) 0.73-2.45-2.2 GM/100ML solution, Continuous PRN  folic acid (FOLVITE) tablet 1 mg, Daily  Vibegron TABS 75 mg (Patient Supplied), Daily  diatrizoate meglumine-sodium (GASTROGRAFIN) 66-10 % solution 20 mL, ONCE PRN  diphenhydrAMINE (BENADRYL) tablet 12.5 mg, Nightly PRN  sodium chloride flush 0.9 % injection 10 mL, PRN  heparin (porcine) injection 1,000 Units, PRN  melatonin tablet 6 mg, Nightly PRN  sertraline (ZOLOFT) tablet 100 mg, Daily  sodium chloride flush 0.9 % injection 5-40 mL, 2 times per day  sodium chloride flush 0.9 % injection 5-40 mL, PRN  0.9 % sodium chloride infusion, PRN  ondansetron (ZOFRAN-ODT) disintegrating tablet 4 mg, Q8H PRN   Or  ondansetron (ZOFRAN) injection 4 mg, Q6H PRN  polyethylene glycol (GLYCOLAX) packet 17 g, Daily PRN  acetaminophen (TYLENOL) tablet 650 mg, Q6H PRN   Or  acetaminophen (TYLENOL) suppository 650 mg, Q6H PRN  pantoprazole (PROTONIX) tablet 40 mg, QAM AC  vitamin B-12 (CYANOCOBALAMIN) tablet 500 mcg, Daily  verapamil (CALAN SR) extended release tablet 240 mg, Daily        Objective:  /71   Pulse 61   Temp 97.8 °F (36.6 °C) (Oral)   Resp 18   Ht 5' 10.5\" (1.791 m)   Wt 279 lb 15.8 oz (127 kg)   SpO2 98%   BMI 39.61 kg/m²     Intake/Output Summary (Last 24 hours) at 10/25/2022 8047  Last data filed at 10/24/2022 1358  Gross per 24 hour   Intake 4316 ml   Output 800 ml   Net 3516 ml      Wt Readings from Last 3 Encounters:   10/21/22 279 lb 15.8 oz (127 kg)   10/06/22 282 lb (127.9 kg)   09/07/22 278 lb (126.1 kg)       General appearance:  Appears comfortable  Eyes: Sclera clear. Pupils equal.  ENT: Moist oral mucosa. Trachea midline, no adenopathy. Cardiovascular: Regular rhythm, normal S1, S2. No murmur. No edema in lower extremities  Respiratory: Not using accessory muscles. Good inspiratory effort. Clear to auscultation bilaterally, no wheeze or crackles. GI: Abdomen soft, no tenderness, not distended  Musculoskeletal: No cyanosis in digits, neck supple  Neurology: CN 2-12 grossly intact. No speech or motor deficits  Psych: Normal affect. Alert and oriented in time, place and person  Skin: Warm, dry, normal turgor    Labs and Tests:  CBC:   Recent Labs     10/23/22  0940 10/24/22  0807 10/25/22  0540   WBC 15.1* 14.1* 13.2*   HGB 11.8* 11.7* 10.9*    238 225     BMP:    Recent Labs     10/23/22  0940 10/25/22  0540    140   K 3.8 4.0    105   CO2 26 29   BUN 17 21*   CREATININE 0.9 0.8*   GLUCOSE 75 82     Hepatic: No results for input(s): AST, ALT, ALB, BILITOT, ALKPHOS in the last 72 hours. ASSESSMENT AND PLAN    Principal Problem:    TTP (thrombotic thrombocytopenic purpura) ([facility])  Active Problems: Thrombocytopenia ([facility]    Thrombocytopenic ([facility]    Morbid obesity with BMI of 40.0-44.9, adult [facility])    Essential hypertension  Resolved Problems:    * No resolved hospital problems. *      1. TTP      -Anemia and thrombocytopenia  -Schistocytes on smear  -Undetectable haptoglobin and elevated LDH  -Nicole negative  - homocysteine level is WNL  - RA factor - WNL  - DEMI negative  - MMA and dsDNA are normal     -ZAMAN TS 13 level very low at 16 normal greater than 65.  Inhibitor Ab is high at 83 with 49% inhibition.   -Continue prednisone 1 mg/kg daily   -Continue folic acid daily  -Nephrology following for plasmapheresis  -Rituxan on 10/17/2022.    -Continue daily Cablivi. -Received Rituxan on 10/17/2022. Second dose of Rituxan given 10/24/22. Plasmapheresis 10/24/22. -Will skip plasmapheresis tomorrow on 10/25/2022 and see how he does. If platelet count continues to stay within normal limits, will do a plasmapheresis on 10/26/2022 and then discontinue.  -Adry Frank started on 10/21/22 and will continue after discharge.   -Platelet count is 348V today.  -Had bone marrow biopsy 10/19/22, results pending.    -LDH today is down to 160 today  -Repeat ADAMTS 13 level is 12 on 10/17/2022          Angeli Bay, [address] [facility]  [facility]

## 2022-10-25 NOTE — PROGRESS NOTES
Hospitalist Progress Note      PCP: Sheree Ashraf MD    Date of Admission: 10/7/2022    Chief Complaint: Thrombocytopenia    Subjective:   Denies new acute complaints. Mild suprapubic ecchymosis  Plts 225 in am  Hgb 10.9  No plasmapheresis today per hematology      Medications:  Reviewed    Infusion Medications    citrate dextrose 1,000 mL/hr at 10/24/22 1134    sodium chloride 20 mL/hr at 10/17/22 1818     Scheduled Medications    predniSONE  80 mg Oral Daily    caplacizumab-yhdp  11 mg SubCUTAneous Q24H    clobetasol   Topical BID    folic acid  1 mg Oral Daily    Vibegron  75 mg Oral Daily    sertraline  100 mg Oral Daily    sodium chloride flush  5-40 mL IntraVENous 2 times per day    pantoprazole  40 mg Oral QAM AC    vitamin B-12  500 mcg Oral Daily    verapamil  240 mg Oral Daily     PRN Meds: diphenhydrAMINE, diazePAM, calcium gluconate, citrate dextrose, diatrizoate meglumine-sodium, diphenhydrAMINE, sodium chloride flush, heparin (porcine), melatonin, sodium chloride flush, sodium chloride, ondansetron **OR** ondansetron, polyethylene glycol, acetaminophen **OR** acetaminophen      Intake/Output Summary (Last 24 hours) at 10/25/2022 1327  Last data filed at 10/24/2022 1358  Gross per 24 hour   Intake 673 ml   Output --   Net 673 ml       Exam:    BP (!) 147/69   Pulse 80   Temp 97.5 °F (36.4 °C) (Oral)   Resp 16   Ht 5' 10.5\" (1.791 m)   Wt 279 lb 15.8 oz (127 kg)   SpO2 98%   BMI 39.61 kg/m²     Gen/overall appearance: Not in acute distress. Alert. Head: Normocephalic, atraumatic, R IJ  Eyes: EOMI, no scleral icterus  CVS: regular rate and rhythm, Normal S1S2  Pulm: Clear to auscultation bilaterally. No crackles/wheezes  Extremities: No edema.  No erythema or warmth  Neuro: No gross focal deficits noted  Skin: Warm, dry, suprapubic ecchymosis    Labs:   Recent Labs     10/23/22  0940 10/24/22  0807 10/25/22  0540   WBC 15.1* 14.1* 13.2*   HGB 11.8* 11.7* 10.9*   HCT 34.5* 34.9* 32.6*  238 225     Recent Labs     10/23/22  0616 10/23/22  0940 10/24/22  0520 10/25/22  0540   NA  --  138  --  140   K  --  3.8  --  4.0   CL  --  103  --  105   CO2  --  26  --  29   BUN  --  17  --  21*   CREATININE  --  0.9  --  0.8*   CALCIUM  --  8.7  --  8.6   PHOS 4.1  --  4.0 3.9     No results for input(s): AST, ALT, BILIDIR, BILITOT, ALKPHOS in the last 72 hours. Recent Labs     10/23/22  0616 10/24/22  0520 10/25/22  0540   INR 1.12 1.04 1.05     No results for input(s): [place] in the last 72 hours. Assessment/Plan:    Active Hospital Problems    Diagnosis Date Noted    TTP (thrombotic thrombocytopenic purpura) ([facility] [M31.19] 10/11/2022     Priority: Medium    Morbid obesity with BMI of 40.0-44.9, adult ([facility] [E66.01, Z68.41] 10/11/2022     Priority: Medium    Thrombocytopenia ([facility] [D69.6] 10/07/2022     Priority: Medium    Thrombocytopenic ([facility] [D69.6] 10/07/2022     Priority: Medium    Essential hypertension [I10] 02/22/2017       TTP with anemia and thrombocytopenia  Smear showed schistocytes  Undetectable haptoglobin and elevated LDH  Nicole negative  Homocystine level is normal  RA factor normal  DEMI negative  Colin TS 13 level very low at 16 normally greater than 65 in the beat antibodies high at 83 with 45% intubation  Has been receiving plasmapheresis  Given Rituxan on 10/17 and 10/24  On prednisone 1 mg/kg  On daily cablivi  Continue folic acid  Bone marrow biopsy done on 10/19 results are pending  Hopeful for last plasmapheresis tomorrow    PMH of YOHANNES, obesity, htn    DVT Prophylaxis: scds  Diet: ADULT DIET;  Regular  Code Status: Full Code    Rangel Smith MD

## 2022-10-26 VITALS
RESPIRATION RATE: 20 BRPM | HEIGHT: 70 IN | WEIGHT: 284.39 LBS | DIASTOLIC BLOOD PRESSURE: 78 MMHG | OXYGEN SATURATION: 98 % | HEART RATE: 78 BPM | BODY MASS INDEX: 40.71 KG/M2 | TEMPERATURE: 98.4 F | SYSTOLIC BLOOD PRESSURE: 124 MMHG

## 2022-10-26 LAB
APTT: 25.3 SEC (ref 23–34.3)
BASOPHILS ABSOLUTE: 0.1 K/UL (ref 0–0.2)
BASOPHILS RELATIVE PERCENT: 0.5 %
BLOOD BANK DISPENSE STATUS: NORMAL
BLOOD BANK PRODUCT CODE: NORMAL
BPU ID: NORMAL
CALCIUM IONIZED: 1.11 MMOL/L (ref 1.12–1.32)
DESCRIPTION BLOOD BANK: NORMAL
EOSINOPHILS ABSOLUTE: 0.1 K/UL (ref 0–0.6)
EOSINOPHILS RELATIVE PERCENT: 0.8 %
FIBRINOGEN: 283 MG/DL (ref 207–509)
HCT VFR BLD CALC: 31.9 % (ref 40.5–52.5)
HEMOGLOBIN: 10.8 G/DL (ref 13.5–17.5)
INR BLD: 1.06 (ref 0.87–1.14)
LACTATE DEHYDROGENASE: 177 U/L (ref 100–190)
LYMPHOCYTES ABSOLUTE: 1.4 K/UL (ref 1–5.1)
LYMPHOCYTES RELATIVE PERCENT: 13.2 %
MAGNESIUM: 2.3 MG/DL (ref 1.8–2.4)
MCH RBC QN AUTO: 34.6 PG (ref 26–34)
MCHC RBC AUTO-ENTMCNC: 34 G/DL (ref 31–36)
MCV RBC AUTO: 101.9 FL (ref 80–100)
MONOCYTES ABSOLUTE: 1.2 K/UL (ref 0–1.3)
MONOCYTES RELATIVE PERCENT: 10.9 %
NEUTROPHILS ABSOLUTE: 8 K/UL (ref 1.7–7.7)
NEUTROPHILS RELATIVE PERCENT: 74.6 %
PDW BLD-RTO: 16.8 % (ref 12.4–15.4)
PH VENOUS: 7.41 (ref 7.35–7.45)
PHOSPHORUS: 3.6 MG/DL (ref 2.5–4.9)
PLATELET # BLD: 239 K/UL (ref 135–450)
PMV BLD AUTO: 7 FL (ref 5–10.5)
PROTHROMBIN TIME: 13.7 SEC (ref 11.7–14.5)
RBC # BLD: 3.13 M/UL (ref 4.2–5.9)
WBC # BLD: 10.7 K/UL (ref 4–11)

## 2022-10-26 PROCEDURE — 36514 APHERESIS PLASMA: CPT

## 2022-10-26 PROCEDURE — 6370000000 HC RX 637 (ALT 250 FOR IP): Performed by: INTERNAL MEDICINE

## 2022-10-26 PROCEDURE — 2580000003 HC RX 258: Performed by: FAMILY MEDICINE

## 2022-10-26 PROCEDURE — 85730 THROMBOPLASTIN TIME PARTIAL: CPT

## 2022-10-26 PROCEDURE — 84100 ASSAY OF PHOSPHORUS: CPT

## 2022-10-26 PROCEDURE — 85610 PROTHROMBIN TIME: CPT

## 2022-10-26 PROCEDURE — 6370000000 HC RX 637 (ALT 250 FOR IP): Performed by: FAMILY MEDICINE

## 2022-10-26 PROCEDURE — 83735 ASSAY OF MAGNESIUM: CPT

## 2022-10-26 PROCEDURE — 82330 ASSAY OF CALCIUM: CPT

## 2022-10-26 PROCEDURE — 85025 COMPLETE CBC W/AUTO DIFF WBC: CPT

## 2022-10-26 PROCEDURE — 2500000003 HC RX 250 WO HCPCS: Performed by: INTERNAL MEDICINE

## 2022-10-26 PROCEDURE — 6360000002 HC RX W HCPCS: Performed by: INTERNAL MEDICINE

## 2022-10-26 PROCEDURE — 36430 TRANSFUSION BLD/BLD COMPNT: CPT

## 2022-10-26 PROCEDURE — 2580000003 HC RX 258: Performed by: INTERNAL MEDICINE

## 2022-10-26 PROCEDURE — 85384 FIBRINOGEN ACTIVITY: CPT

## 2022-10-26 PROCEDURE — 83615 LACTATE (LD) (LDH) ENZYME: CPT

## 2022-10-26 RX ORDER — PREDNISONE 20 MG/1
80 TABLET ORAL DAILY
Qty: 28 TABLET | Refills: 0 | Status: SHIPPED | OUTPATIENT
Start: 2022-10-27 | End: 2022-11-03

## 2022-10-26 RX ORDER — FOLIC ACID 1 MG/1
1 TABLET ORAL DAILY
Qty: 30 TABLET | Refills: 3 | Status: SHIPPED | OUTPATIENT
Start: 2022-10-27

## 2022-10-26 RX ADMIN — CALCIUM GLUCONATE 4000 MG: 98 INJECTION, SOLUTION INTRAVENOUS at 11:37

## 2022-10-26 RX ADMIN — VERAPAMIL HYDROCHLORIDE 240 MG: 240 TABLET, FILM COATED, EXTENDED RELEASE ORAL at 08:40

## 2022-10-26 RX ADMIN — FOLIC ACID 1 MG: 1 TABLET ORAL at 08:40

## 2022-10-26 RX ADMIN — SERTRALINE 100 MG: 50 TABLET, FILM COATED ORAL at 08:41

## 2022-10-26 RX ADMIN — ANTICOAGULANT CITRATE DEXTROSE SOLUTION FORMULA A: 12.25; 11; 3.65 SOLUTION INTRAVENOUS at 11:37

## 2022-10-26 RX ADMIN — PANTOPRAZOLE SODIUM 40 MG: 40 TABLET, DELAYED RELEASE ORAL at 06:00

## 2022-10-26 RX ADMIN — Medication 10 ML: at 11:42

## 2022-10-26 RX ADMIN — CYANOCOBALAMIN TAB 1000 MCG 500 MCG: 1000 TAB at 08:41

## 2022-10-26 RX ADMIN — PREDNISONE 80 MG: 20 TABLET ORAL at 08:39

## 2022-10-26 ASSESSMENT — PAIN SCALES - GENERAL
PAINLEVEL_OUTOF10: 0

## 2022-10-26 NOTE — PROGRESS NOTES
MD Cristina Cordero MD Melanie Certain, MD                  Office: [phone]                      Fax: [phone]             [facility]                   NEPHROLOGY INPATIENT PROGRESS NOTE:     PATIENT NAME: Mariya Herrera  : 1980  MRN: [id number]      RECOMMENDATIONS:   --plans for ongoing daily more Therapeutic Plasma Exchanges,   - started on 10-8-22     - #11 10-21  - #12 10-22 -  - #13 10-23 -discussed with coordinator. No staff available for TPE today but platelet count improving, will plan for Monday. May be able to decrease to every other day. Discussed with Heme. Had TPE 10/24 and today will be the  treatment  Discussed with Dr. Vimal Washington yesterday, TPE today then remove catheter and he will follow with Hematology as outpatient. Replace Ca    Platelet count stable today. - 1 plasma volume exchange. - Replacement fluids-FFP to replete ADAMTS 13(initial level at 16, with high inhibitor Ab level at 83 with 4(% inhibition)  - monitor S. fibrinogen level, LDH, CBC, PT/INR, iCalcium, Mag, Phos -> fibrinogen WNL  - ACD-A for a/c protocol.  - Calcium replacement protocol. Needing repletion,  - requested dialysis nurse - discussed w/ them,      -Dexamethasone 40 mg daily, -> PO Prednisone 1mg/kg/d  , s/p IV Ig - as per hematologist.     Also on [address] [place] [address] placement on 10-8-2022  - by Dr Edgar Bojorquez - appreciated his assistance   -avoid platelet transfusion  - hold Lisinopril, HCTZ, on Verapamil. BP at goal.     D/C plan from renal stand point:  - okay for discharge from renal perspective after TPE today and f/u with Hematology. IMPRESSION:       Admitted on:  10/7/2022 10:36 AM   For:  Thrombocytopenia ([facility] [D69.6]  Thrombocytopenic ([facility] [D69.6]    ICD-10-CM    1.  Thrombocytopenia (H[facility])  D69.6 Prepare Fresh Frozen Plasma          TTP-improved  As (+) : MAHA on PBS w/ schistocytes, elevated LDH, low hapto, w/ anemia + thrombocytopenia  ADAMTS 13 activity-very low  No renal failure        Other major problems: Management per primary and other consulting teams. HTN - was on  Lisinopril, HCTZ. Currently on Verapamil. YOHANNES  Obesity high 30s     Hospital Problems             Last Modified POA    * (Principal) TTP (thrombotic thrombocytopenic purpura) ([facility] 10/11/2022 Yes    Thrombocytopenia ([facility] 10/11/2022 Yes    Thrombocytopenic ([facility] 10/7/2022 Yes    Morbid obesity with BMI of 40.0-44.9, adult ([facility] 10/11/2022 Yes    Essential hypertension (Chronic) 10/11/2022 Yes     Yovany Long MD,  [facility] [address] [place]: [phone] or [employer]  Fax: [phone]        =======================================================================================   =======================================================================================  Subjective / interval history:   Patient was seen comfortably in room   For TPE today  No bleeding episodes     No current active complaints. Patient denied chest pain / dizziness/lightheadedness/syncope/ SOB / leg edema. Past medical, Surgical, Social, Family medical history reviewed by me. MEDICATIONS: reviewed by me. Medications Prior to Admission:  No current facility-administered medications on file prior to encounter.      Current Outpatient Medications on File Prior to Encounter   Medication Sig Dispense Refill    omeprazole (PRILOSEC) 20 MG delayed release capsule 1 daily 90 capsule 3    sildenafil (VIAGRA) 50 MG tablet Take 1 tablet by mouth as needed for Erectile Dysfunction 27 tablet 3    lisinopril (PRINIVIL;ZESTRIL) 40 MG tablet Take 1 tablet by mouth daily 90 tablet 3    verapamil (CALAN SR) 240 MG extended release tablet Take 1 tablet by mouth nightly 90 tablet 3    hydroCHLOROthiazide (HYDRODIURIL) 25 MG tablet Take 1 tablet by mouth daily 90 tablet 3    sertraline (ZOLOFT) 100 MG tablet 1 TABLET BY MOUTH DAILY 90 tablet 3 Vibegron (GEMTESA) 75 MG TABS Take 75 mg by mouth daily OVERACTIVE BLADDER      calcium carbonate (TUMS) 500 MG chewable tablet Take 6 tablets by mouth daily as needed for Heartburn           Current Facility-Administered Medications:     sennosides-docusate sodium (SENOKOT-S) 8.6-50 MG tablet 2 tablet, 2 tablet, Oral, Daily PRN, Clarisse Victoria MD, 2 tablet at 10/25/22 1600    calcium gluconate 4,000 mg in dextrose 5 % 100 mL IVPB, 4,000 mg, IntraVENous, PRN, Suhas Campbell MD    citrate dextrose (ACD Formula A) 0.73-2.45-2.2 GM/100ML solution, , IntraCATHeter, Continuous PRN, Suhas Campbell MD    diphenhydrAMINE (BENADRYL) tablet 25 mg, 25 mg, Oral, PRN, Suhas Campbell MD, 25 mg at 10/24/22 1053    predniSONE (DELTASONE) tablet 80 mg, 80 mg, Oral, Daily, Gabi Burnett MD, 80 mg at 10/26/22 0907    caplacizumab-yhdp (CABLIVI) injection 11 mg  (Patient Supplied), 11 mg, SubCUTAneous, Q24H, Kiarra Michael MD, 11 mg at 10/25/22 1040    clobetasol (TEMOVATE) 0.05 % cream, , Topical, BID, Rebecca Forte MD, Given at 10/17/22 2049    diazePAM (VALIUM) tablet 5 mg, 5 mg, Oral, Q6H PRN, Rebecca Forte MD, 5 mg at 76/66/43 8423    folic acid (FOLVITE) tablet 1 mg, 1 mg, Oral, Daily, Kiarra Michael MD, 1 mg at 10/26/22 0840    Vibegron TABS 75 mg (Patient Supplied), 75 mg, Oral, Daily, Gloria Reeves MD, 75 mg at 10/26/22 0839    diatrizoate meglumine-sodium (GASTROGRAFIN) 66-10 % solution 20 mL, 20 mL, Oral, ONCE PRN, Kiarra Michael MD, 20 mL at 10/10/22 1502    diphenhydrAMINE (BENADRYL) tablet 12.5 mg, 12.5 mg, Oral, Nightly PRN, Yohana RHODES Anson, DO, 12.5 mg at 10/21/22 1815    sodium chloride flush 0.9 % injection 10 mL, 10 mL, IntraCATHeter, PRN, Gil Lopez MD, 10 mL at 10/23/22 0934    heparin (porcine) injection 1,000 Units, 1,000 Units, IntraVENous, PRN, Vel Amaya MD, 1,000 Units at 10/13/22 0930    melatonin tablet 6 mg, 6 mg, Oral, Nightly PRN, Yohana Schreiberzi, DO, 6 mg °C) -- 66 18 98 %   10/25/22 2042 126/85 98.2 °F (36.8 °C) Oral 74 18 98 %   10/25/22 1545 126/77 97.9 °F (36.6 °C) Oral 87 16 98 %   10/25/22 1121 (!) 147/69 97.5 °F (36.4 °C) Oral 80 16 98 %       Intake/Output Summary (Last 24 hours) at 10/26/2022 1114  Last data filed at 10/26/2022 0745  Gross per 24 hour   Intake 240 ml   Output --   Net 240 ml     Physical Exam  Vitals reviewed. Constitutional:       General: He is not in acute distress. Appearance: Normal appearance. HENT:      Head: Normocephalic and atraumatic. Right Ear: External ear normal.      Left Ear: External ear normal.      Nose: Nose normal.      Mouth/Throat:      Mouth: Mucous membranes are moist. Mucous membranes are not dry. Eyes:      General: No scleral icterus. Conjunctiva/sclera: Conjunctivae normal.   Neck:      Vascular: No JVD. Cardiovascular:      Rate and Rhythm: Normal rate and regular rhythm. Heart sounds: S1 normal and S2 normal.   Pulmonary:      Effort: Pulmonary effort is normal. No respiratory distress. Breath sounds: Normal breath sounds. Abdominal:      General: Bowel sounds are normal. There is no distension. Musculoskeletal:         General: No swelling or deformity. Cervical back: Normal range of motion and neck supple. Skin:     General: Skin is dry. Coloration: Skin is not jaundiced. Neurological:      Mental Status: He is alert and oriented to person, place, and time. Mental status is at baseline. Psychiatric:         Mood and Affect: Mood normal.         Behavior: Behavior normal.        =======================================================================================     DATA:  Diagnostic tests reviewed by me for today's visit:   (AS NEEDED FOR MY EVALUATION AND MANAGEMENT).        Recent Labs     10/24/22  0807 10/25/22  0540 10/26/22  0600   WBC 14.1* 13.2* 10.7   HCT 34.9* 32.6* 31.9*    225 239     Iron Saturation:  No components found for: PERCENTFE  FERRITIN:    Lab Results   Component Value Date/Time    FERRITIN 1,025.0 10/07/2022 11:56 AM     IRON:    Lab Results   Component Value Date/Time    IRON 344 10/07/2022 11:56 AM     TIBC:    Lab Results   Component Value Date/Time    TIBC 399 10/07/2022 11:56 AM       Recent Labs     10/25/22  0540      K 4.0      CO2 29   BUN 21*   CREATININE 0.8*     Recent Labs     10/24/22  0520 10/25/22  0540 10/26/22  0600   CALCIUM  --  8.6  --    MG 2.20 2.10 2.30   PHOS 4.0 3.9 3.6     No results for input(s): PH, PCO2, PO2 in the last 72 hours.     Invalid input(s): Neftali Promise    ABG:  No results found for: PH, PCO2, PO2, HCO3, BE, THGB, TCO2, O2SAT  VBG:    Lab Results   Component Value Date/Time    PHVEN 7.405 10/26/2022 06:00 AM       LDH:    Lab Results   Component Value Date/Time     10/26/2022 06:00 AM     Uric Acid:  No results found for: LABURIC, URICACID    PT/INR:    Lab Results   Component Value Date/Time    PROTIME 13.7 10/26/2022 06:00 AM    INR 1.06 10/26/2022 06:00 AM     Warfarin PT/INR:  No components found for: PTPATWAR, PTINRWAR  PTT:    Lab Results   Component Value Date/Time    APTT 25.3 10/26/2022 06:00 AM   [APTT}  Last 3 Troponin:  No results found for: TROPONINI    U/A:    Lab Results   Component Value Date/Time    NITRITE neg 10/08/2020 03:20 PM    COLORU Yellow 10/07/2022 11:55 AM    PROTEINU Negative 10/07/2022 11:55 AM    PHUR 7.0 10/07/2022 11:56 AM    WBCUA 0 10/07/2022 11:55 AM    RBCUA 12 10/07/2022 11:55 AM    BACTERIA None Seen 10/07/2022 11:55 AM    CLARITYU Clear 10/07/2022 11:55 AM    SPECGRAV 1.010 10/07/2022 11:55 AM    LEUKOCYTESUR Negative 10/07/2022 11:55 AM    UROBILINOGEN 2.0 10/07/2022 11:55 AM    BILIRUBINUR Negative 10/07/2022 11:55 AM    BILIRUBINUR neg 10/08/2020 03:20 PM    BLOODU MODERATE 10/07/2022 11:55 AM    GLUCOSEU Negative 10/07/2022 11:55 AM     Microalbumen/Creatinine ratio:  No components found for: RUCREAT  24 Hour Urine for Protein:  No components found for: RAWUPRO, UHRS3, TQXN92YR, UTV3  24 Hour Urine for Creatinine Clearance:  No components found for: CREAT4, UHRS10, UTV10  Urine Toxicology:  No components found for: [place], IBENZO, ICOCAINE, IMARTHC, IOPIATES, IPHENCYC    HgBA1c:    Lab Results   Component Value Date/Time    LABA1C 5.6 08/06/2022 08:15 AM     RPR:  No results found for: RPR  HIV:  No results found for: HIV  DEMI:    Lab Results   Component Value Date/Time    DEMI Negative 10/10/2022 02:35 PM     RF:    Lab Results   Component Value Date/Time    RF <10.0 10/10/2022 02:35 PM     DSDNA:  No components found for: DNA  AMYLASE:  No results found for: AMYLASE  LIPASE:  No results found for: LIPASE  Fibrinogen Level:  No components found for: FIB       BELOW MENTIONED RADIOLOGY STUDY RESULTS BY ME (AS NEEDED FOR MY EVALUATION AND MANAGEMENT). No results found.

## 2022-10-26 NOTE — PROGRESS NOTES
Oncology and Hematology Care   Progress Note      10/26/2022 9:16 AM        Name: Dru Galaviz . Admitted: 10/7/2022    SUBJECTIVE:  He is feeling good today, no bleeding. Still with some bruising. No chest pain or shortness of breath.      Reviewed interval ancillary notes    Current Medications  sennosides-docusate sodium (SENOKOT-S) 8.6-50 MG tablet 2 tablet, Daily PRN  calcium gluconate 4,000 mg in dextrose 5 % 100 mL IVPB, PRN  citrate dextrose (ACD Formula A) 0.73-2.45-2.2 GM/100ML solution, Continuous PRN  diphenhydrAMINE (BENADRYL) tablet 25 mg, PRN  predniSONE (DELTASONE) tablet 80 mg, Daily  caplacizumab-yhdp (CABLIVI) injection 11 mg  (Patient Supplied), Q24H  clobetasol (TEMOVATE) 0.05 % cream, BID  diazePAM (VALIUM) tablet 5 mg, L4N PRN  folic acid (FOLVITE) tablet 1 mg, Daily  Vibegron TABS 75 mg (Patient Supplied), Daily  diatrizoate meglumine-sodium (GASTROGRAFIN) 66-10 % solution 20 mL, ONCE PRN  diphenhydrAMINE (BENADRYL) tablet 12.5 mg, Nightly PRN  sodium chloride flush 0.9 % injection 10 mL, PRN  heparin (porcine) injection 1,000 Units, PRN  melatonin tablet 6 mg, Nightly PRN  sertraline (ZOLOFT) tablet 100 mg, Daily  sodium chloride flush 0.9 % injection 5-40 mL, 2 times per day  sodium chloride flush 0.9 % injection 5-40 mL, PRN  0.9 % sodium chloride infusion, PRN  ondansetron (ZOFRAN-ODT) disintegrating tablet 4 mg, Q8H PRN   Or  ondansetron (ZOFRAN) injection 4 mg, Q6H PRN  polyethylene glycol (GLYCOLAX) packet 17 g, Daily PRN  acetaminophen (TYLENOL) tablet 650 mg, Q6H PRN   Or  acetaminophen (TYLENOL) suppository 650 mg, Q6H PRN  pantoprazole (PROTONIX) tablet 40 mg, QAM AC  vitamin B-12 (CYANOCOBALAMIN) tablet 500 mcg, Daily  verapamil (CALAN SR) extended release tablet 240 mg, Daily        Objective:  /88   Pulse 66   Temp 98.1 °F (36.7 °C)   Resp 18   Ht 5' 10.5\" (1.791 m)   Wt 279 lb 15.8 oz (127 kg)   SpO2 98%   BMI 39.61 kg/m²   No intake or output data Cablivi. -Received Rituxan on 10/17/2022. Second dose of Rituxan given 10/24/22. Plasmapheresis 10/24/22.  -No plasmapheresis on 10/25/2022.  -Plasmapheresis today (10/26/22), then discontinue.   -Philippe Hodge started on 10/21/22 and will continue after discharge.   -Platelet count is 544O today.  -Had bone marrow biopsy 10/19/22, results pending. -LDH today is 177 today. -Repeat ADAMTS 13 level is 12 on 10/17/2022    Okay to discharge patient today after plasmapheresis is done. He will follow-up as outpatient on Friday (10/28/22) for labs and on Monday (10/31/22) for Rituxan. LORENZA [facility]  Oncology Hematology Care, [facility]  [phone]     Patient was seen and examined. He is in great spirits. Platelet count is 903. Will do plasmapheresis today. After that he will be discharged home. Discharge medications from hematology should include    Prednisone 80 mg p.o. daily  Folic acid 1 mg p.o. daily  Cablivi 11 mg subcu daily-patient has supplies. Follow-up for labs on 10/28/2022 and [facility] office  Follow-up for Rituxan treatment on 10/31/2022.     Discussed with Dr. Rodney Riggs MD

## 2022-10-26 NOTE — FLOWSHEET NOTE
10/26/22 1137 10/26/22 1353   Vital Signs   /77 124/78   Temp 97 °F (36.1 °C) 98.4 °F (36.9 °C)   Heart Rate 78 78   Resp 20 20   TPE ( Therapeutic Plasma Exchange)  1  Volume of TPV exchange, with 100 % replacement. Used FFP: 3720 ml. Number of Treatment : 15 of 15 total ( Started on 10-8-22). Being discharged today. Pt tolerated TPE without difficulty. VSS. Fluid balance: + 251 ml ( including ACD-A, Ca gluconate, saline rinse blood back to pt)    Initial setting:  AC 2.6;  inlet 39.1;  plasma removal 19.7 , Replacement 0, ratio 15:1      Final values:   ml;  inlet 7564 ml ;   plasma removal 3929 ml , Replacement  3683 ml    Duration 136 minutes  Started time: 1137  End time: 1353    Medication:  ACD-A ( per protocol via machine) to pt: 92  ml total during whole TPE  Ca: gluconate : 4 gm IVPB with whole TPE, started @ 4639     This is the final TPE tx.  RIJ HD NT cath dc'd per Dr. Paz Maxwell order. Pressure held to site x10 minutes. Site without hematoma/drainage/bleeding. Occlusive pressure drsg to site. Instructed to leave drsg in place x 24 hours, to call 911 if bleeding should start, stated understood to do so. Placed TPE flow sheets and blood transfusion downtime papers placed in the chart for EMR scan.     Report given to Manolo Patel RN

## 2022-10-26 NOTE — DISCHARGE SUMMARY
Hospital Medicine Discharge Summary    Patient ID: Nan Allen      Patient's PCP: Sheree Ashraf MD    Admit Date: 10/7/2022     Discharge Date: 10/26/2022    Admitting Physician: Augustine Olivarez MD     Discharge Physician: nOesimo Vale MD     Discharge Diagnoses and Hospital Course: Active Hospital Problems    Diagnosis Date Noted    TTP (thrombotic thrombocytopenic purpura) ([facility] [M31.19] 10/11/2022     Priority: Medium    Morbid obesity with BMI of 40.0-44.9, adult ([address] [E66.01, Z68.41] 10/11/2022     Priority: Medium    Thrombocytopenia ([address] [D69.6] 10/07/2022     Priority: Medium    Thrombocytopenic ([facility] [address] [D69.6] 10/07/2022     Priority: Medium    Essential hypertension [I10] 02/22/2017       TTP with anemia and thrombocytopenia; clinically improved  Smear showed schistocytes  Undetectable haptoglobin and elevated LDH  Nicole negative  Homocystine level is normal  RA factor normal  DEMI negative  Colin TS 13 level very low at 16 normally greater than 65 in the beat antibodies high at 83 with 45% intubation  S/p plasmapheresis  Given Rituxan on 10/17 and 10/24  On prednisone 80mg daily; continue on d/c  On daily cablivi  Continue folic acid  Bone marrow biopsy done on 10/19; results are pending  Last plasmapheresis 10/26  Close outpt follow up with hematology     PMH of YOHANNES, obesity, htn        Exam:     The patient was seen and examined on day of discharge and this discharge summary is in conjunction with any daily progress note from day of discharge. Consults:     IP CONSULT TO ONCOLOGY  IP CONSULT TO NEPHROLOGY  IP CONSULT TO CRITICAL CARE    Disposition:  home     Condition:  Stable    Discharge Instructions/Follow-up:   Pt to follow up with PCP within 1 week  Consultants as scheduled    Code Status:  Full Code    Activity: activity as tolerated    Diet: Resume home diet    Labs:  For convenience and continuity at follow-up the following most recent labs are provided:      CBC:    Lab Results   Component Value Date/Time    WBC 10.7 10/26/2022 06:00 AM    HGB 10.8 10/26/2022 06:00 AM    HCT 31.9 10/26/2022 06:00 AM     10/26/2022 06:00 AM       Renal:    Lab Results   Component Value Date/Time     10/25/2022 05:40 AM    K 4.0 10/25/2022 05:40 AM    K 4.1 10/07/2022 11:56 AM     10/25/2022 05:40 AM    CO2 29 10/25/2022 05:40 AM    BUN 21 10/25/2022 05:40 AM    CREATININE 0.8 10/25/2022 05:40 AM    CALCIUM 8.6 10/25/2022 05:40 AM    PHOS 3.6 10/26/2022 06:00 AM       Discharge Medications:     Current Discharge Medication List             Details   predniSONE (DELTASONE) 20 MG tablet Take 4 tablets by mouth daily for 7 days  Qty: 28 tablet, Refills: 0      folic acid (FOLVITE) 1 MG tablet Take 1 tablet by mouth daily  Qty: 30 tablet, Refills: 3      vitamin B-12 500 MCG tablet Take 1 tablet by mouth daily  Qty: 30 tablet, Refills: 3                Details   omeprazole (PRILOSEC) 20 MG delayed release capsule 1 daily  Qty: 90 capsule, Refills: 3    Comments: Requesting 1 year supply  Associated Diagnoses: Gastroesophageal reflux disease without esophagitis      sildenafil (VIAGRA) 50 MG tablet Take 1 tablet by mouth as needed for Erectile Dysfunction  Qty: 27 tablet, Refills: 3    Associated Diagnoses: Erectile dysfunction, unspecified erectile dysfunction type      lisinopril (PRINIVIL;ZESTRIL) 40 MG tablet Take 1 tablet by mouth daily  Qty: 90 tablet, Refills: 3    Comments: Requesting 1 year supply  Associated Diagnoses: Essential hypertension      verapamil (CALAN SR) 240 MG extended release tablet Take 1 tablet by mouth nightly  Qty: 90 tablet, Refills: 3    Comments: Requesting 1 year supply  Associated Diagnoses: Essential hypertension      hydroCHLOROthiazide (HYDRODIURIL) 25 MG tablet Take 1 tablet by mouth daily  Qty: 90 tablet, Refills: 3    Comments: Requesting 1 year supply  Associated Diagnoses: Essential hypertension      sertraline (ZOLOFT) 100 MG tablet 1 TABLET BY MOUTH DAILY  Qty: 90 tablet, Refills: 3    Comments: Previous rx sent to wrong mail order-was cancelled. Short term supply also sent to local pharmacy. Associated Diagnoses: Premature ejaculation; Depression, unspecified depression type      Vibegron (GEMTESA) 75 MG TABS Take 75 mg by mouth daily OVERACTIVE BLADDER      calcium carbonate (TUMS) 500 MG chewable tablet Take 6 tablets by mouth daily as needed for Heartburn             Time Spent on discharge is more than 45 minutes in the examination, evaluation, counseling and review of medications and discharge plan. Signed:    Bam Maria MD   10/26/2022    Thank you Jordan Mendes MD for the opportunity to be involved in this patient's care.

## 2022-10-26 NOTE — PROGRESS NOTES
CLINICAL PHARMACY NOTE: MEDS TO BEDS    Total # of Prescriptions Filled: 2   The following medications were delivered to the patient:  Prednisone 16WB  Folic acid 1mg    Additional Documentation:     Did not get Vitamin B12    Medications were picked up in the Outpatient Pharmacy by patient    Sampson Orr

## 2022-10-27 ENCOUNTER — CARE COORDINATION (OUTPATIENT)
Dept: CASE MANAGEMENT | Age: 42
End: 2022-10-27

## 2022-10-27 DIAGNOSIS — M31.19 TTP (THROMBOTIC THROMBOCYTOPENIC PURPURA) (HCC): Primary | ICD-10-CM

## 2022-10-27 NOTE — CARE COORDINATION
[facility] Care Transitions Initial Follow Up Call    Call within 2 business days of discharge: Yes    Care Transition Nurse contacted the patient by telephone to perform post hospital discharge assessment. Verified name and  with family as identifiers. Provided introduction to self, and explanation of the Care Transition Nurse role. Patient: Tawanna Ramirez Patient : 1980   MRN: [id number]  Reason for Admission:   Discharge Date: 10/26/22 RARS: Readmission Risk Score: 10      Last Discharge [address]       Date Complaint Diagnosis Description Type Department Provider    10/7/22  [facility]) Admission (Discharged) Shankar Dillon MD            Was this an external facility discharge? No Discharge Facility:     Challenges to be reviewed by the provider   Additional needs identified to be addressed with provider: No  none               Method of communication with provider: none. Pt's wife,HIPAA verified, states pt is doing well, no issues or concerns. Nurse has just arrived to give him an injection. Agreed to more CTC f/u calls. Care Transition Nurse reviewed discharge instructions with family who verbalized understanding. The family was given an opportunity to ask questions and does not have any further questions or concerns at this time. Were discharge instructions available to patient? Yes. Reviewed appropriate site of care based on symptoms and resources available to patient including: When to call 911. The family agrees to contact the PCP office for questions related to their healthcare. Advance Care Planning:   Does patient have an Advance Directive: reviewed and current. Medication reconciliation was performed with family, who verbalizes understanding of administration of home medications.  Medications reviewed, 1111F entered: yes    Was patient discharged with a pulse oximeter? no    Non-face-to-face services provided:  Obtained and reviewed discharge summary and/or continuity of care documents    Offered patient enrollment in the Remote Patient Monitoring (RPM) program for in-home monitoring: Patient is not eligible for RPM program.    Care Transitions 24 Hour Call    Do you have a copy of your discharge instructions?: Yes  Do you have all of your prescriptions and are they filled?: Yes  Have you been contacted by a [facility] Pharmacist?: No  Have you scheduled your follow up appointment?: Yes  How are you going to get to your appointment?: Car - family or friend to transport  Do you have support at home?: Partner/Spouse/SO  Do you feel like you have everything you need to keep you well at home?: Yes  Are you an active caregiver in your home?: No  Care Transitions Interventions  No Identified Needs         Follow Up  Future Appointments   Date Time Provider Omer Canas   12/7/2022  4:20 PM Ammon Hoover MD [address] [place] [address] Transition Nurse provided contact information. Plan for follow-up call in 5-7 days based on severity of symptoms and risk factors.   Plan for next call: self management-  TTP with anemia and thrombocytopenia; f/u appts    Ernestina Luna RN

## 2022-11-01 ENCOUNTER — TELEPHONE (OUTPATIENT)
Dept: INTERNAL MEDICINE CLINIC | Age: 42
End: 2022-11-01

## 2022-11-03 ENCOUNTER — OFFICE VISIT (OUTPATIENT)
Dept: INTERNAL MEDICINE CLINIC | Age: 42
End: 2022-11-03

## 2022-11-03 ENCOUNTER — CARE COORDINATION (OUTPATIENT)
Dept: CASE MANAGEMENT | Age: 42
End: 2022-11-03

## 2022-11-03 VITALS
BODY MASS INDEX: 38.06 KG/M2 | OXYGEN SATURATION: 96 % | HEART RATE: 100 BPM | SYSTOLIC BLOOD PRESSURE: 120 MMHG | DIASTOLIC BLOOD PRESSURE: 80 MMHG | WEIGHT: 281 LBS | HEIGHT: 72 IN

## 2022-11-03 DIAGNOSIS — T07.XXXA MULTIPLE BRUISES: ICD-10-CM

## 2022-11-03 DIAGNOSIS — Z09 HOSPITAL DISCHARGE FOLLOW-UP: Primary | ICD-10-CM

## 2022-11-03 DIAGNOSIS — D69.6 SEVERE THROMBOCYTOPENIA (HCC): ICD-10-CM

## 2022-11-03 DIAGNOSIS — I10 ESSENTIAL HYPERTENSION: ICD-10-CM

## 2022-11-03 LAB
ANION GAP SERPL CALCULATED.3IONS-SCNC: 16 MMOL/L (ref 3–16)
BASOPHILS ABSOLUTE: 0 K/UL (ref 0–0.2)
BASOPHILS RELATIVE PERCENT: 0.5 %
BUN BLDV-MCNC: 22 MG/DL (ref 7–20)
CALCIUM SERPL-MCNC: 9.6 MG/DL (ref 8.3–10.6)
CHLORIDE BLD-SCNC: 98 MMOL/L (ref 99–110)
CO2: 22 MMOL/L (ref 21–32)
CREAT SERPL-MCNC: 0.9 MG/DL (ref 0.9–1.3)
EOSINOPHILS ABSOLUTE: 0 K/UL (ref 0–0.6)
EOSINOPHILS RELATIVE PERCENT: 0.1 %
GFR SERPL CREATININE-BSD FRML MDRD: >60 ML/MIN/{1.73_M2}
GLUCOSE BLD-MCNC: 104 MG/DL (ref 70–99)
HCT VFR BLD CALC: 40.2 % (ref 40.5–52.5)
HEMOGLOBIN: 14.1 G/DL (ref 13.5–17.5)
INR BLD: 0.99 (ref 0.87–1.14)
LYMPHOCYTES ABSOLUTE: 0.3 K/UL (ref 1–5.1)
LYMPHOCYTES RELATIVE PERCENT: 3.2 %
MCH RBC QN AUTO: 35.3 PG (ref 26–34)
MCHC RBC AUTO-ENTMCNC: 35 G/DL (ref 31–36)
MCV RBC AUTO: 100.9 FL (ref 80–100)
MONOCYTES ABSOLUTE: 0.3 K/UL (ref 0–1.3)
MONOCYTES RELATIVE PERCENT: 3.2 %
NEUTROPHILS ABSOLUTE: 8.8 K/UL (ref 1.7–7.7)
NEUTROPHILS RELATIVE PERCENT: 93 %
PDW BLD-RTO: 15.7 % (ref 12.4–15.4)
PLATELET # BLD: 412 K/UL (ref 135–450)
PMV BLD AUTO: 6.9 FL (ref 5–10.5)
POTASSIUM SERPL-SCNC: 4.3 MMOL/L (ref 3.5–5.1)
PROTHROMBIN TIME: 13 SEC (ref 11.7–14.5)
RBC # BLD: 3.98 M/UL (ref 4.2–5.9)
SODIUM BLD-SCNC: 136 MMOL/L (ref 136–145)
WBC # BLD: 9.4 K/UL (ref 4–11)

## 2022-11-03 RX ORDER — CAPLACIZUMAB 11 MG
11 KIT INJECTION DAILY
COMMUNITY
Start: 2022-10-17

## 2022-11-03 NOTE — PROGRESS NOTES
Post-Discharge Transitional Care  Follow Up      Rosanne Trujillo   YOB: 1980    Date of Office Visit:  11/3/2022  Date of Hospital Admission: 10/7/22  Date of Hospital Discharge: 10/26/22  Risk of hospital readmission (high >=14%. Medium >=10%) :Readmission Risk Score: 10      Care management risk score Rising risk (score 2-5) and Complex Care (Scores >=6): No Risk Score On File     Non face to face  following discharge, date last encounter closed (first attempt may have been earlier): 10/27/2022    Call initiated 2 business days of discharge: Yes    ASSESSMENT/PLAN:   Hospital discharge follow-up  -     OR DISCHARGE MEDS RECONCILED W/ CURRENT OUTPATIENT MED LIST  -     OR DISCHARGE MEDS RECONCILED W/ CURRENT OUTPATIENT MED LIST  Multiple bruises  Severe thrombocytopenia (HCC)  -     CBC with Auto Differential; Future  -     Protime-INR; Future  Essential hypertension  -     Basic Metabolic Panel; Future      Medical Decision Making: moderate complexity           Subjective:   HPI:  Follow up of Hospital problems/diagnosis(es):   Multiple ecchymosis, bruise secondary to severe thrombocytopenia due to thrombotic thrombocytopenic purpura. Since hospital discharge he is taking tapering dose of prednisone. He is still getting  ritituxan. Patient is also getting daily Cablivi injection. He has follow-up appointment with hematologist next week    Inpatient course: Discharge summary reviewed- see chart.     Interval history/Current status:  stable    Patient Active Problem List   Diagnosis    Depression    Dyslipidemia    Essential hypertension    Gastroesophageal reflux disease without esophagitis    Bursitis of right shoulder    Erectile dysfunction    YOHANNES (obstructive sleep apnea)    Class 2 severe obesity due to excess calories with serious comorbidity in adult [facility])    Synovial cyst of right popliteal space    Family history of prostate cancer    Thrombocytopenia ([facility] [address]    Thrombocytopenic ([facility] [address] TTP (thrombotic thrombocytopenic purpura) ([facility])    Morbid obesity with BMI of 40.0-44.9, adult ([facility]       Medications listed as ordered at the time of discharge from hospital     Medication List            Accurate as of November 3, 2022 12:56 PM. If you have any questions, ask your nurse or doctor.                 CONTINUE taking these medications      Cablivi 11 MG Kit  Generic drug: caplacizumab-yhdp     calcium carbonate 500 MG chewable tablet  Commonly known as: TUMS     cyanocobalamin 500 MCG tablet  Take 1 tablet by mouth daily     folic acid 1 MG tablet  Commonly known as: FOLVITE  Take 1 tablet by mouth daily     Gemtesa 75 MG Tabs  Generic drug: Vibegron     hydroCHLOROthiazide 25 MG tablet  Commonly known as: HYDRODIURIL  Take 1 tablet by mouth daily     lisinopril 40 MG tablet  Commonly known as: PRINIVIL;ZESTRIL  Take 1 tablet by mouth daily     omeprazole 20 MG delayed release capsule  Commonly known as: PRILOSEC  1 daily     predniSONE 20 MG tablet  Commonly known as: DELTASONE  Take 4 tablets by mouth daily for 7 days     sertraline 100 MG tablet  Commonly known as: ZOLOFT  1 TABLET BY MOUTH DAILY     sildenafil 50 MG tablet  Commonly known as: VIAGRA  Take 1 tablet by mouth as needed for Erectile Dysfunction     verapamil 240 MG extended release tablet  Commonly known as: CALAN SR  Take 1 tablet by mouth nightly                Medications marked \"taking\" at this time  Outpatient Medications Marked as Taking for the 11/3/22 encounter (Office Visit) with Ammon Hoover MD   Medication Sig Dispense Refill    caplacizumab-yhdp (CABLIVI) 11 MG KIT Inject 11 mg into the skin daily      predniSONE (DELTASONE) 20 MG tablet Take 4 tablets by mouth daily for 7 days 28 tablet 0    folic acid (FOLVITE) 1 MG tablet Take 1 tablet by mouth daily 30 tablet 3    vitamin B-12 500 MCG tablet Take 1 tablet by mouth daily 30 tablet 3    omeprazole (PRILOSEC) 20 MG delayed release capsule 1 daily 90 capsule 3 sildenafil (VIAGRA) 50 MG tablet Take 1 tablet by mouth as needed for Erectile Dysfunction 27 tablet 3    lisinopril (PRINIVIL;ZESTRIL) 40 MG tablet Take 1 tablet by mouth daily 90 tablet 3    verapamil (CALAN SR) 240 MG extended release tablet Take 1 tablet by mouth nightly 90 tablet 3    hydroCHLOROthiazide (HYDRODIURIL) 25 MG tablet Take 1 tablet by mouth daily 90 tablet 3    sertraline (ZOLOFT) 100 MG tablet 1 TABLET BY MOUTH DAILY 90 tablet 3    Vibegron (GEMTESA) 75 MG TABS Take 75 mg by mouth daily OVERACTIVE BLADDER      calcium carbonate (TUMS) 500 MG chewable tablet Take 6 tablets by mouth daily as needed for Heartburn          Medications patient taking as of now reconciled against medications ordered at time of hospital discharge: Yes    A comprehensive review of systems was negative except for what was noted in the HPI. Patient denies headache, nausea vomiting abdominal pain. Denies urinary symptoms. Denies any new bruises. Objective:    /80   Pulse 100   Ht 6' (1.829 m)   Wt 281 lb (127.5 kg)   SpO2 96%   BMI 38.11 kg/m²   Skin: warm and dry,, some fading ecchymosis at the sacral area as well as lower abdomen. Head: normocephalic and atraumatic  Eyes: pupils equal, round, and reactive to light, extraocular eye movements intact, conjunctivae normal  Neck: neck supple and non tender without mass, no thyromegaly or thyroid nodules, no cervical lymphadenopathy   Abdomen: soft, non-tender, non-distended, normal bowel sounds, no masses or organomegaly  Musculoskeletal: normal range of motion, no joint swelling, deformity or tenderness  Neurologic: gait and coordination normal and speech normal        An electronic signature was used to authenticate this note.   --Artemio Nassar MD

## 2022-11-03 NOTE — CARE COORDINATION
[facility] Transitions Follow Up Call    11/3/2022    Patient: Renetta Longoria  Patient : 1980   MRN: [id number]  Reason for Admission: TTP (thrombotic thrombocytopenic purpura)  Discharge Date: 10/26/22 RARS: Readmission Risk Score: 10         Spoke with: Dalia(wife)  Patients wife reports patient is doing fine. He has been trying to regain strength by walking on the tread mill. Appetite and fluid intake is good. She denies sob, chills, fever, cp, abdominal pain, nvd or weakness. He has been getting his treatments. No urinary problems. He is taking Miralax for constipation. No questions, needs or concerns at this time. Will continue to follow. Care Transitions Follow Up Call    Needs to be reviewed by the provider   Additional needs identified to be addressed with provider: No  none             Method of communication with provider : none      Care Transition Nurse (CTN) contacted the family by telephone to follow up after admission on 10/7/2022. Verified name and  with family as identifiers. Addressed changes since last contact: none  Discussed follow-up appointments. If no appointment was previously scheduled, appointment scheduling offered: No.   Is follow up appointment scheduled within 7 days of discharge? No.    Advance Care Planning:   Does patient have an Advance Directive: not on file. CTN reviewed discharge instructions, medical action plan and red flags with family and discussed any barriers to care and/or understanding of plan of care after discharge. Discussed appropriate site of care based on symptoms and resources available to patient including: PCP  Specialist  Urgent care clinics. The family agrees to contact the PCP office for questions related to their healthcare.      Patients top risk factors for readmission: ineffective coping  medical condition-TTP  Interventions to address risk factors: Assessment and support for treatment adherence and medication management- Non-[facility] follow up appointment(s):     CTN provided contact information for future needs. Plan for follow-up call in 5-7 days based on severity of symptoms and risk factors. Plan for next call: self management-   follow up appointment-results of visit          Care Transitions Subsequent and Final Call    Subsequent and Final Calls  Do you have any ongoing symptoms?: No  Have your medications changed?: No  Do you have any questions related to your medications?: No  Do you currently have any active services?: No  Do you have any needs or concerns that I can assist you with?: No  Identified Barriers: None  Care Transitions Interventions  Other Interventions:              Follow Up  Future Appointments   Date Time Provider Omer Canas   11/3/2022 11:00 AM ABEBA Oliveira MD [facility] [place] [facility] [place]   12/7/2022  4:20 PM ABEBA Oliveira MD [id number]       Santhosh Lange LPN

## 2022-11-04 LAB
Lab: NORMAL
REPORT: NORMAL
THIS TEST SENT TO: NORMAL

## 2022-11-04 NOTE — RESULT ENCOUNTER NOTE
Platelet count normal.  Minor other lab abnormalities. Keep appointment with hematologist as a schedule.   Encourage hydration

## 2022-11-10 ENCOUNTER — CARE COORDINATION (OUTPATIENT)
Dept: CASE MANAGEMENT | Age: 42
End: 2022-11-10

## 2022-11-10 RX ORDER — SENNA PLUS 8.6 MG/1
1 TABLET ORAL 2 TIMES DAILY
Qty: 60 TABLET | Refills: 0 | Status: SHIPPED | OUTPATIENT
Start: 2022-11-10 | End: 2023-11-10

## 2022-11-10 NOTE — CARE COORDINATION
[facility] Care Transitions Follow Up Call    Care Transition Nurse contacted the patient by telephone to follow up after admission. Verified name and  with patient as identifiers. Patient: Renetta Longoria  Patient : 1980   MRN: [id number]  Reason for Admission:   Discharge Date: 10/26/22 RARS: Readmission Risk Score: 10      Needs to be reviewed by the provider   Additional needs identified to be addressed with provider: No  none             Method of communication with provider: none. Pt states doing well, no issues or concerns. Agreed to more CTC f/u calls. Addressed changes since last contact:  none  Discussed follow-up appointments. If no appointment was previously scheduled, appointment scheduling offered: No.   Is follow up appointment scheduled within 7 days of discharge? Yes. Follow Up  Future Appointments   Date Time Provider Omer Canas   2022  4:00 PM ABEBA Dorman MD [facility] [place] [facility]   2022  4:20 PM Wali Soriano MD [facility] [place] [facility]     Non-[facility] follow up appointment(s):     Care Transition Nurse reviewed medical action plan with patient and discussed any barriers to care and/or understanding of plan of care after discharge. Discussed appropriate site of care based on symptoms and resources available to patient including: When to call 911. The patient agrees to contact the PCP office for questions related to their healthcare. Advance Care Planning:   reviewed and current.      Patients top risk factors for readmission: medical condition-  TTP with anemia and thrombocytopenia  Interventions to address risk factors: Assessment and support for treatment adherence and medication management-  TTP with anemia and thrombocytopenia    Offered patient enrollment in the Remote Patient Monitoring (RPM) program for in-home monitoring: Patient is not eligible for RPM program.     Care Transitions Subsequent and Final Call    Subsequent and Final Calls  Do you have any ongoing symptoms?: No  Have your medications changed?: No  Do you have any questions related to your medications?: No  Do you currently have any active services?: No  Do you have any needs or concerns that I can assist you with?: No  Identified Barriers: None  Care Transitions Interventions  No Identified Needs  Other Interventions:             Care Transition Nurse provided contact information for future needs. Plan for follow-up call in 5-7 days based on severity of symptoms and risk factors.   Plan for next call: self management-  TTP with anemia and thrombocytopenia    Kely Ravi RN

## 2022-11-17 ENCOUNTER — CARE COORDINATION (OUTPATIENT)
Dept: CASE MANAGEMENT | Age: 42
End: 2022-11-17

## 2022-11-17 NOTE — CARE COORDINATION
[facility] Transitions Follow Up Call    2022    Patient: Isaac Ortiz  Patient : 1980   MRN: [id number]  Reason for Admission: TTP with anemia and thrombocytopenia Discharge Date: 10/26/22 RARS: Readmission Risk Score: 10    Spoke with: Isaac Ortiz who reported that he was doing really good. Patient reported that his platelets are going up and he is schedule to return to work on Monday. Patient denied cp, sob, cough, dizziness, headache, n/v, diarrhea, abdominal pains, fever, or chills. Patient report that appetite and fluid intake is good and denied any problems with bowel or bladder. Patient reported that he is taking all medications as ordered. Patient denied any other needs at this time. Patient instructed to continue to monitor s/s, reporting any that may present to MD immediately for early intervention. Patient very appreciative of our outreach but decline any further follow up. Episode closed    Care Transitions Subsequent and Final Call    Subsequent and Final Calls  Care Transitions Interventions  Other Interventions:              Follow Up  Future Appointments   Date Time Provider Omer Canas   2022  4:00 PM MD [facility]  [place] [facility] [place]   2022  4:20 PM MD [facility]  [place] [facility] [place]       Kaern Henry LPN

## 2022-11-22 DIAGNOSIS — R04.0 BLEEDING FROM THE NOSE: ICD-10-CM

## 2022-11-22 DIAGNOSIS — R04.0 BLEEDING FROM THE NOSE: Primary | ICD-10-CM

## 2022-11-22 LAB
BASOPHILS ABSOLUTE: 0 K/UL (ref 0–0.2)
BASOPHILS RELATIVE PERCENT: 0.3 %
EOSINOPHILS ABSOLUTE: 0 K/UL (ref 0–0.6)
EOSINOPHILS RELATIVE PERCENT: 0.1 %
HCT VFR BLD CALC: 41.2 % (ref 40.5–52.5)
HEMOGLOBIN: 14.3 G/DL (ref 13.5–17.5)
LYMPHOCYTES ABSOLUTE: 1.7 K/UL (ref 1–5.1)
LYMPHOCYTES RELATIVE PERCENT: 19.9 %
MCH RBC QN AUTO: 34.6 PG (ref 26–34)
MCHC RBC AUTO-ENTMCNC: 34.6 G/DL (ref 31–36)
MCV RBC AUTO: 100 FL (ref 80–100)
MONOCYTES ABSOLUTE: 0.9 K/UL (ref 0–1.3)
MONOCYTES RELATIVE PERCENT: 11.1 %
NEUTROPHILS ABSOLUTE: 5.8 K/UL (ref 1.7–7.7)
NEUTROPHILS RELATIVE PERCENT: 68.6 %
PDW BLD-RTO: 14.1 % (ref 12.4–15.4)
PLATELET # BLD: 341 K/UL (ref 135–450)
PMV BLD AUTO: 7.1 FL (ref 5–10.5)
RBC # BLD: 4.12 M/UL (ref 4.2–5.9)
WBC # BLD: 8.5 K/UL (ref 4–11)

## 2022-11-27 NOTE — RESULT ENCOUNTER NOTE
Platelet count and hemoglobin is normal.  If you continue to have nosebleed let me know, he will need office visit.

## 2023-01-24 ENCOUNTER — OFFICE VISIT (OUTPATIENT)
Dept: INTERNAL MEDICINE CLINIC | Age: 43
End: 2023-01-24
Payer: COMMERCIAL

## 2023-01-24 VITALS
HEART RATE: 77 BPM | WEIGHT: 297.4 LBS | BODY MASS INDEX: 40.33 KG/M2 | DIASTOLIC BLOOD PRESSURE: 80 MMHG | SYSTOLIC BLOOD PRESSURE: 120 MMHG | OXYGEN SATURATION: 98 %

## 2023-01-24 DIAGNOSIS — I10 ESSENTIAL HYPERTENSION: ICD-10-CM

## 2023-01-24 DIAGNOSIS — M31.19 ACQUIRED TTP (HCC): ICD-10-CM

## 2023-01-24 DIAGNOSIS — F32.A DEPRESSION, UNSPECIFIED DEPRESSION TYPE: ICD-10-CM

## 2023-01-24 DIAGNOSIS — T14.8XXA BRUISE: ICD-10-CM

## 2023-01-24 DIAGNOSIS — T14.8XXA BRUISE: Primary | ICD-10-CM

## 2023-01-24 DIAGNOSIS — N52.9 ERECTILE DYSFUNCTION, UNSPECIFIED ERECTILE DYSFUNCTION TYPE: ICD-10-CM

## 2023-01-24 LAB
ANION GAP SERPL CALCULATED.3IONS-SCNC: 15 MMOL/L (ref 3–16)
BASOPHILS ABSOLUTE: 0 K/UL (ref 0–0.2)
BASOPHILS RELATIVE PERCENT: 0.9 %
BILIRUBIN URINE: NEGATIVE
BLOOD, URINE: NEGATIVE
BUN BLDV-MCNC: 13 MG/DL (ref 7–20)
CALCIUM SERPL-MCNC: 9.7 MG/DL (ref 8.3–10.6)
CHLORIDE BLD-SCNC: 103 MMOL/L (ref 99–110)
CLARITY: CLEAR
CO2: 21 MMOL/L (ref 21–32)
COLOR: YELLOW
CREAT SERPL-MCNC: 0.8 MG/DL (ref 0.9–1.3)
EOSINOPHILS ABSOLUTE: 0.2 K/UL (ref 0–0.6)
EOSINOPHILS RELATIVE PERCENT: 3.7 %
FIBRINOGEN: 341 MG/DL (ref 207–509)
GFR SERPL CREATININE-BSD FRML MDRD: >60 ML/MIN/{1.73_M2}
GLUCOSE BLD-MCNC: 80 MG/DL (ref 70–99)
GLUCOSE URINE: NEGATIVE MG/DL
HCT VFR BLD CALC: 43.2 % (ref 40.5–52.5)
HEMOGLOBIN: 14.5 G/DL (ref 13.5–17.5)
INR BLD: 0.95 (ref 0.87–1.14)
KETONES, URINE: NEGATIVE MG/DL
LACTATE DEHYDROGENASE: 208 U/L (ref 100–190)
LEUKOCYTE ESTERASE, URINE: NEGATIVE
LYMPHOCYTES ABSOLUTE: 1.4 K/UL (ref 1–5.1)
LYMPHOCYTES RELATIVE PERCENT: 31 %
MCH RBC QN AUTO: 31.2 PG (ref 26–34)
MCHC RBC AUTO-ENTMCNC: 33.6 G/DL (ref 31–36)
MCV RBC AUTO: 93 FL (ref 80–100)
MICROSCOPIC EXAMINATION: NORMAL
MONOCYTES ABSOLUTE: 0.9 K/UL (ref 0–1.3)
MONOCYTES RELATIVE PERCENT: 20.2 %
NEUTROPHILS ABSOLUTE: 1.9 K/UL (ref 1.7–7.7)
NEUTROPHILS RELATIVE PERCENT: 44.2 %
NITRITE, URINE: NEGATIVE
PDW BLD-RTO: 12.9 % (ref 12.4–15.4)
PH UA: 6.5 (ref 5–8)
PLATELET # BLD: 290 K/UL (ref 135–450)
PMV BLD AUTO: 8.3 FL (ref 5–10.5)
POTASSIUM SERPL-SCNC: 4.1 MMOL/L (ref 3.5–5.1)
PROTEIN UA: NEGATIVE MG/DL
PROTHROMBIN TIME: 12.6 SEC (ref 11.7–14.5)
RBC # BLD: 4.65 M/UL (ref 4.2–5.9)
SODIUM BLD-SCNC: 139 MMOL/L (ref 136–145)
SPECIFIC GRAVITY UA: 1.01 (ref 1–1.03)
URINE TYPE: NORMAL
UROBILINOGEN, URINE: 0.2 E.U./DL
WBC # BLD: 4.4 K/UL (ref 4–11)

## 2023-01-24 PROCEDURE — 3079F DIAST BP 80-89 MM HG: CPT | Performed by: INTERNAL MEDICINE

## 2023-01-24 PROCEDURE — 3074F SYST BP LT 130 MM HG: CPT | Performed by: INTERNAL MEDICINE

## 2023-01-24 PROCEDURE — 99214 OFFICE O/P EST MOD 30 MIN: CPT | Performed by: INTERNAL MEDICINE

## 2023-01-24 RX ORDER — SILDENAFIL 100 MG/1
100 TABLET, FILM COATED ORAL PRN
Qty: 27 TABLET | Refills: 2 | Status: SHIPPED | OUTPATIENT
Start: 2023-01-24 | End: 2023-02-23

## 2023-01-24 ASSESSMENT — PATIENT HEALTH QUESTIONNAIRE - PHQ9
1. LITTLE INTEREST OR PLEASURE IN DOING THINGS: 0
2. FEELING DOWN, DEPRESSED OR HOPELESS: 0
5. POOR APPETITE OR OVEREATING: 0
8. MOVING OR SPEAKING SO SLOWLY THAT OTHER PEOPLE COULD HAVE NOTICED. OR THE OPPOSITE, BEING SO FIGETY OR RESTLESS THAT YOU HAVE BEEN MOVING AROUND A LOT MORE THAN USUAL: 0
SUM OF ALL RESPONSES TO PHQ QUESTIONS 1-9: 0
SUM OF ALL RESPONSES TO PHQ9 QUESTIONS 1 & 2: 0
SUM OF ALL RESPONSES TO PHQ QUESTIONS 1-9: 0
7. TROUBLE CONCENTRATING ON THINGS, SUCH AS READING THE NEWSPAPER OR WATCHING TELEVISION: 0
SUM OF ALL RESPONSES TO PHQ QUESTIONS 1-9: 0
6. FEELING BAD ABOUT YOURSELF - OR THAT YOU ARE A FAILURE OR HAVE LET YOURSELF OR YOUR FAMILY DOWN: 0
3. TROUBLE FALLING OR STAYING ASLEEP: 0
10. IF YOU CHECKED OFF ANY PROBLEMS, HOW DIFFICULT HAVE THESE PROBLEMS MADE IT FOR YOU TO DO YOUR WORK, TAKE CARE OF THINGS AT HOME, OR GET ALONG WITH OTHER PEOPLE: 0
9. THOUGHTS THAT YOU WOULD BE BETTER OFF DEAD, OR OF HURTING YOURSELF: 0
SUM OF ALL RESPONSES TO PHQ QUESTIONS 1-9: 0
4. FEELING TIRED OR HAVING LITTLE ENERGY: 0

## 2023-01-24 ASSESSMENT — ENCOUNTER SYMPTOMS
VOICE CHANGE: 0
NAUSEA: 0
TROUBLE SWALLOWING: 0
SHORTNESS OF BREATH: 0
WHEEZING: 0
ABDOMINAL PAIN: 0
VOMITING: 0

## 2023-01-24 NOTE — PROGRESS NOTES
Efrain Chahal  1980  male  36 y.o. SUBJECTIVE:       Chief Complaint   Patient presents with    Hypertension    Follow-up       HPI:  Follow-up visit for chronic problems. Patient was recently diagnosed with TTP. Last visit with hematologist on January 9 with normal platelet count. Patient report spontaneous bruise at the mid right side of the back about a week ago. He denies any nosebleeds, change of color of his stool or urine, gum bleed. History of erectile dysfunction currently 50 mg of Viagra not working as he used to. Patient feels his depression has been stable. Denies suicidal homicidal ideation denies manic symptoms. Hypertension:    Efrain Chahal returns for follow up of hypertension. Tolerating medications well and taking them as directed. No symptoms (denies chest pain,dyspnea,edema or TIA's or blurred vision) concerning for end organ damage are present.     Past Medical History:   Diagnosis Date    Hypertension     OAB (overactive bladder)     YOHANNES (obstructive sleep apnea) 08/26/2020    Thrombocytopenia ([address]     2022     Past Surgical History:   Procedure Laterality Date    APPENDECTOMY      CHOLECYSTECTOMY      CT BONE MARROW BIOPSY  10/19/2022    CT BONE MARROW BIOPSY 10/19/2022 [facility] CT SCAN    MOUTH SURGERY      TONSILLECTOMY      VASECTOMY      WISDOM TOOTH EXTRACTION       Social History     Socioeconomic History    Marital status:      Spouse name: None    Number of children: 2    Years of education: None    Highest education level: None   Occupational History    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   Tobacco Use    Smoking status: Never    Smokeless tobacco: Former     Types: Chew     Quit date: 1/1/2014   Vaping Use    Vaping Use: Never used   Substance and Sexual Activity    Alcohol use: Yes     Comment: rare    Drug use: No    Sexual activity: Yes     Partners: Female     Birth control/protection: Surgical     Social Determinants of Health     Financial Resource Strain: Low Risk     Difficulty of Paying Living Expenses: Not hard at all   Food Insecurity: No Food Insecurity    Worried About Running Out of Food in the Last Year: Never true    Ran Out of Food in the Last Year: Never true     Family History   Problem Relation Age of Onset    Brain Cancer Mother 58    High Blood Pressure Father     Diabetes Father     Prostate Cancer Father         48 years    Heart Disease Father         poss MI       Review of Systems   Constitutional:  Negative for unexpected weight change. HENT:  Negative for trouble swallowing and voice change. Respiratory:  Negative for shortness of breath and wheezing. Cardiovascular:  Negative for chest pain and palpitations. Gastrointestinal:  Negative for abdominal pain, nausea and vomiting. Neurological:  Negative for dizziness and light-headedness. OBJECTIVE:  Pulse Readings from Last 4 Encounters:   01/24/23 77   11/03/22 100   10/26/22 78   10/06/22 91     Wt Readings from Last 4 Encounters:   01/24/23 297 lb 6.4 oz (134.9 kg)   11/03/22 281 lb (127.5 kg)   10/26/22 284 lb 6.3 oz (129 kg)   10/06/22 282 lb (127.9 kg)     BP Readings from Last 4 Encounters:   01/24/23 120/80   11/03/22 120/80   10/26/22 124/78   10/06/22 134/86     Physical Exam  Vitals and nursing note reviewed. Constitutional:       Appearance: Normal appearance. Eyes:      Conjunctiva/sclera: Conjunctivae normal.   Cardiovascular:      Rate and Rhythm: Normal rate. Pulses: Normal pulses. Heart sounds: Normal heart sounds. Pulmonary:      Effort: Pulmonary effort is normal.      Breath sounds: Normal breath sounds. Abdominal:      General: Bowel sounds are normal.   Musculoskeletal:      Right lower leg: No edema. Left lower leg: No edema. Skin:     Comments: Localized area of ecchymosis at the right mid back area   Neurological:      General: No focal deficit present.       Mental Status: He is alert and oriented to person, place, and time.        CBC:   Lab Results   Component Value Date/Time    WBC 8.5 11/22/2022 03:46 PM    HGB 14.3 11/22/2022 03:46 PM    HCT 41.2 11/22/2022 03:46 PM     11/22/2022 03:46 PM     CMP:  Lab Results   Component Value Date/Time     11/03/2022 11:35 AM    K 4.3 11/03/2022 11:35 AM    K 4.1 10/07/2022 11:56 AM    CL 98 11/03/2022 11:35 AM    CO2 22 11/03/2022 11:35 AM    ANIONGAP 16 11/03/2022 11:35 AM    GLUCOSE 104 11/03/2022 11:35 AM    BUN 22 11/03/2022 11:35 AM    CREATININE 0.9 11/03/2022 11:35 AM    GFRAA >60 10/17/2022 04:45 AM    GFRAA >60 11/01/2012 08:49 AM    CALCIUM 9.6 11/03/2022 11:35 AM    PROT 5.3 10/22/2022 06:30 AM    LABALBU 3.2 10/22/2022 06:30 AM    AGRATIO 1.5 10/22/2022 06:30 AM    BILITOT 0.3 10/22/2022 06:30 AM    ALKPHOS 51 10/22/2022 06:30 AM    ALT 21 10/22/2022 06:30 AM    AST 17 10/22/2022 06:30 AM    GLOB 2.7 03/01/2021 05:15 PM     URINALYSIS:  Lab Results   Component Value Date/Time    GLUCOSEU Negative 10/07/2022 11:55 AM    KETUA Negative 10/07/2022 11:55 AM    SPECGRAV 1.010 10/07/2022 11:55 AM    BLOODU MODERATE 10/07/2022 11:55 AM    PHUR 7.0 10/07/2022 11:56 AM    PROTEINU Negative 10/07/2022 11:55 AM    NITRU Negative 10/07/2022 11:55 AM    LEUKOCYTESUR Negative 10/07/2022 11:55 AM    LABMICR YES 10/07/2022 11:55 AM    URINETYPE NotGiven 10/07/2022 11:55 AM     HBA1C:   Lab Results   Component Value Date/Time    LABA1C 5.6 08/06/2022 08:15 AM    .0 08/06/2022 08:15 AM     MICRO/ALB:   Lab Results   Component Value Date/Time    LABMICR YES 10/07/2022 11:55 AM     LIPID:  Lab Results   Component Value Date/Time    CHOL 158 08/06/2022 08:15 AM    TRIG 84 08/06/2022 08:15 AM    HDL 42 08/06/2022 08:15 AM    LDLCALC 99 08/06/2022 08:15 AM    LABVLDL 17 08/06/2022 08:15 AM     TSH:   Lab Results   Component Value Date/Time    TSHREFLEX 1.97 10/07/2022 11:56 AM     PSA:   Lab Results   Component Value Date/Time    PSA 0.39 08/06/2022 08:15 AM        ASSESSMENT/PLAN:    Assessment/Plan:  Corrina Nuñez was seen today for hypertension and follow-up. Diagnoses and all orders for this visit:    Bruise  -     CBC with Auto Differential; Future  -     Protime-INR; Future  -     Urinalysis; Future    Acquired TTP ([address]  Known history with new onset of ecchymosis. Further lab work. -     CBC with Auto Differential; Future  -     Protime-INR; Future  -     Urinalysis; Future  -     Fibrinogen; Future  -     Lactate Dehydrogenase; Future  -     Basic Metabolic Panel; Future    Depression, unspecified depression type  Continue current Zoloft. -     Urinalysis; Future    Essential hypertension  Continue current lisinopril hydrochlorothiazide. -     Urinalysis; Future  -     Basic Metabolic Panel; Future    Erectile dysfunction, unspecified erectile dysfunction type  We will increase-     sildenafil (VIAGRA) 100 MG tablet; Take 1 tablet by mouth as needed for Erectile Dysfunction            Orders Placed This Encounter   Procedures    CBC with Auto Differential     Standing Status:   Future     Number of Occurrences:   1     Standing Expiration Date:   1/24/2024    Protime-INR     Standing Status:   Future     Number of Occurrences:   1     Standing Expiration Date:   1/24/2024     Order Specific Question:   Daily Coumadin Dose?      Answer:   none    Urinalysis     Standing Status:   Future     Number of Occurrences:   1     Standing Expiration Date:   1/24/2024    Fibrinogen     Standing Status:   Future     Number of Occurrences:   1     Standing Expiration Date:   1/24/2024    Lactate Dehydrogenase     Standing Status:   Future     Number of Occurrences:   1     Standing Expiration Date:   7/65/7616    Basic Metabolic Panel     Standing Status:   Future     Number of Occurrences:   1     Standing Expiration Date:   1/24/2024     Current Outpatient Medications   Medication Sig Dispense Refill    sildenafil (VIAGRA) 100 MG tablet Take 1 tablet by mouth as needed for Erectile Dysfunction 27 tablet 2    caplacizumab-yhdp (CABLIVI) 11 MG KIT Inject 11 mg into the skin daily      folic acid (FOLVITE) 1 MG tablet Take 1 tablet by mouth daily 30 tablet 3    vitamin B-12 500 MCG tablet Take 1 tablet by mouth daily 30 tablet 3    omeprazole (PRILOSEC) 20 MG delayed release capsule 1 daily 90 capsule 3    lisinopril (PRINIVIL;ZESTRIL) 40 MG tablet Take 1 tablet by mouth daily 90 tablet 3    verapamil (CALAN SR) 240 MG extended release tablet Take 1 tablet by mouth nightly 90 tablet 3    hydroCHLOROthiazide (HYDRODIURIL) 25 MG tablet Take 1 tablet by mouth daily 90 tablet 3    sertraline (ZOLOFT) 100 MG tablet 1 TABLET BY MOUTH DAILY 90 tablet 3    Vibegron (GEMTESA) 75 MG TABS Take 75 mg by mouth daily OVERACTIVE BLADDER      calcium carbonate (TUMS) 500 MG chewable tablet Take 6 tablets by mouth daily as needed for Heartburn       No current facility-administered medications for this visit. Return in about 3 months (around 4/24/2023). An After Visit Summary was printed and given to the patient. Documentation was done using voice recognition dragon software. Every effort was made to ensure accuracy; however, inadvertent  Unintentional computerized transcription errors may be present.

## 2023-01-25 NOTE — RESULT ENCOUNTER NOTE
Barely elevated LDH. However blood count , PT/INR fibrinogen all are normal.  Overall good. No further action needed at this time.

## 2023-05-08 DIAGNOSIS — N52.9 ERECTILE DYSFUNCTION, UNSPECIFIED ERECTILE DYSFUNCTION TYPE: ICD-10-CM

## 2023-05-08 RX ORDER — SILDENAFIL 50 MG/1
TABLET, FILM COATED ORAL
Qty: 27 TABLET | Refills: 4 | OUTPATIENT
Start: 2023-05-08

## 2023-05-13 NOTE — PROGRESS NOTES
Asked doctor if zinc paste is appropriate for red rash or if pt need micotin powder. Communication through Cardio control. DISCHARGE

## 2023-09-04 DIAGNOSIS — I10 ESSENTIAL HYPERTENSION: ICD-10-CM

## 2023-09-05 RX ORDER — LISINOPRIL 40 MG/1
TABLET ORAL
Qty: 90 TABLET | Refills: 0 | Status: SHIPPED | OUTPATIENT
Start: 2023-09-05

## 2023-09-05 RX ORDER — VERAPAMIL HYDROCHLORIDE 240 MG/1
TABLET, FILM COATED, EXTENDED RELEASE ORAL
Qty: 90 TABLET | Refills: 0 | Status: SHIPPED | OUTPATIENT
Start: 2023-09-05

## 2023-09-11 SDOH — ECONOMIC STABILITY: HOUSING INSECURITY
IN THE LAST 12 MONTHS, WAS THERE A TIME WHEN YOU DID NOT HAVE A STEADY PLACE TO SLEEP OR SLEPT IN A SHELTER (INCLUDING NOW)?: NO

## 2023-09-11 SDOH — ECONOMIC STABILITY: INCOME INSECURITY: HOW HARD IS IT FOR YOU TO PAY FOR THE VERY BASICS LIKE FOOD, HOUSING, MEDICAL CARE, AND HEATING?: NOT HARD AT ALL

## 2023-09-11 SDOH — ECONOMIC STABILITY: FOOD INSECURITY: WITHIN THE PAST 12 MONTHS, THE FOOD YOU BOUGHT JUST DIDN'T LAST AND YOU DIDN'T HAVE MONEY TO GET MORE.: NEVER TRUE

## 2023-09-11 SDOH — ECONOMIC STABILITY: FOOD INSECURITY: WITHIN THE PAST 12 MONTHS, YOU WORRIED THAT YOUR FOOD WOULD RUN OUT BEFORE YOU GOT MONEY TO BUY MORE.: NEVER TRUE

## 2023-09-14 ENCOUNTER — OFFICE VISIT (OUTPATIENT)
Dept: INTERNAL MEDICINE CLINIC | Age: 43
End: 2023-09-14
Payer: COMMERCIAL

## 2023-09-14 VITALS
DIASTOLIC BLOOD PRESSURE: 78 MMHG | HEIGHT: 72 IN | SYSTOLIC BLOOD PRESSURE: 118 MMHG | BODY MASS INDEX: 39.01 KG/M2 | WEIGHT: 288 LBS | OXYGEN SATURATION: 97 % | HEART RATE: 79 BPM

## 2023-09-14 DIAGNOSIS — M54.50 ACUTE MIDLINE LOW BACK PAIN WITHOUT SCIATICA: ICD-10-CM

## 2023-09-14 DIAGNOSIS — M54.50 ACUTE MIDLINE LOW BACK PAIN WITHOUT SCIATICA: Primary | ICD-10-CM

## 2023-09-14 DIAGNOSIS — I10 ESSENTIAL HYPERTENSION: ICD-10-CM

## 2023-09-14 DIAGNOSIS — N28.9 KIDNEY LESION, NATIVE, RIGHT: ICD-10-CM

## 2023-09-14 DIAGNOSIS — Z12.5 SCREENING FOR PROSTATE CANCER: ICD-10-CM

## 2023-09-14 DIAGNOSIS — F52.4 PREMATURE EJACULATION: ICD-10-CM

## 2023-09-14 DIAGNOSIS — F32.A DEPRESSION, UNSPECIFIED DEPRESSION TYPE: ICD-10-CM

## 2023-09-14 DIAGNOSIS — K21.9 GASTROESOPHAGEAL REFLUX DISEASE WITHOUT ESOPHAGITIS: ICD-10-CM

## 2023-09-14 LAB
BILIRUB UR QL STRIP.AUTO: NEGATIVE
CLARITY UR: CLEAR
COLOR UR: YELLOW
DEPRECATED RDW RBC AUTO: 13.4 % (ref 12.4–15.4)
GLUCOSE UR STRIP.AUTO-MCNC: NEGATIVE MG/DL
HCT VFR BLD AUTO: 41.5 % (ref 40.5–52.5)
HGB BLD-MCNC: 14.5 G/DL (ref 13.5–17.5)
HGB UR QL STRIP.AUTO: NEGATIVE
KETONES UR STRIP.AUTO-MCNC: NEGATIVE MG/DL
LEUKOCYTE ESTERASE UR QL STRIP.AUTO: NEGATIVE
MCH RBC QN AUTO: 32 PG (ref 26–34)
MCHC RBC AUTO-ENTMCNC: 34.8 G/DL (ref 31–36)
MCV RBC AUTO: 91.7 FL (ref 80–100)
NITRITE UR QL STRIP.AUTO: NEGATIVE
PH UR STRIP.AUTO: 6 [PH] (ref 5–8)
PLATELET # BLD AUTO: 270 K/UL (ref 135–450)
PMV BLD AUTO: 8.1 FL (ref 5–10.5)
PROT UR STRIP.AUTO-MCNC: NEGATIVE MG/DL
RBC # BLD AUTO: 4.52 M/UL (ref 4.2–5.9)
SP GR UR STRIP.AUTO: 1.01 (ref 1–1.03)
UA DIPSTICK W REFLEX MICRO PNL UR: NORMAL
URN SPEC COLLECT METH UR: NORMAL
UROBILINOGEN UR STRIP-ACNC: 1 E.U./DL
WBC # BLD AUTO: 6.6 K/UL (ref 4–11)

## 2023-09-14 PROCEDURE — 3074F SYST BP LT 130 MM HG: CPT | Performed by: INTERNAL MEDICINE

## 2023-09-14 PROCEDURE — 99214 OFFICE O/P EST MOD 30 MIN: CPT | Performed by: INTERNAL MEDICINE

## 2023-09-14 PROCEDURE — 3078F DIAST BP <80 MM HG: CPT | Performed by: INTERNAL MEDICINE

## 2023-09-14 RX ORDER — SERTRALINE HYDROCHLORIDE 100 MG/1
TABLET, FILM COATED ORAL
Qty: 90 TABLET | Refills: 3 | Status: SHIPPED | OUTPATIENT
Start: 2023-09-14

## 2023-09-14 RX ORDER — HYDROCHLOROTHIAZIDE 25 MG/1
25 TABLET ORAL DAILY
Qty: 90 TABLET | Refills: 3 | Status: SHIPPED | OUTPATIENT
Start: 2023-09-14

## 2023-09-14 RX ORDER — OMEPRAZOLE 20 MG/1
CAPSULE, DELAYED RELEASE ORAL
Qty: 90 CAPSULE | Refills: 3 | Status: SHIPPED | OUTPATIENT
Start: 2023-09-14

## 2023-09-14 ASSESSMENT — ENCOUNTER SYMPTOMS
TROUBLE SWALLOWING: 0
ABDOMINAL PAIN: 0
BACK PAIN: 1
VOICE CHANGE: 0
WHEEZING: 0
SHORTNESS OF BREATH: 0
VOMITING: 0
NAUSEA: 0

## 2023-09-14 NOTE — PROGRESS NOTES
AM    NITRU Negative 01/24/2023 08:51 AM    LEUKOCYTESUR Negative 01/24/2023 08:51 AM    URINETYPE Cleancatch 01/24/2023 08:51 AM     HBA1C:   Lab Results   Component Value Date/Time    LABA1C 5.6 08/06/2022 08:15 AM    .0 08/06/2022 08:15 AM       LIPID:  Lab Results   Component Value Date/Time    CHOL 158 08/06/2022 08:15 AM    TRIG 84 08/06/2022 08:15 AM    HDL 42 08/06/2022 08:15 AM    LDLCALC 99 08/06/2022 08:15 AM    LABVLDL 17 08/06/2022 08:15 AM     TSH:   Lab Results   Component Value Date/Time    TSHREFLEX 1.97 10/07/2022 11:56 AM     PSA:   Lab Results   Component Value Date/Time    PSA 0.39 08/06/2022 08:15 AM        ASSESSMENT/PLAN:  Assessment/Plan:  Robyn Duque was seen today for 6 month follow-up, not permitted influenza vaccine and weight loss. Diagnoses and all orders for this visit:    Acute midline low back pain without sciatica  Likely musculoskeletal pain. There is 80% improvement of pain without any medication. Patient does not have any red flag symptoms. He will let me know if symptoms persist more than 3 weeks or get worse  -     Urinalysis; Future    Essential hypertension  Excellent blood pressure control. Continue lisinopril and hydrochlorothiazide. Continue therapeutic lifestyle changes. Intentional weight loss  There is no concerning discrepancy of blood pressure between right and left  -     hydroCHLOROthiazide (HYDRODIURIL) 25 MG tablet; Take 1 tablet by mouth daily  -     Basic Metabolic Panel; Future  -     CBC; Future    Premature ejaculation  Current medicine Zoloft has been effective. -     sertraline (ZOLOFT) 100 MG tablet; 1 TABLET BY MOUTH DAILY    Depression, unspecified depression type  -     sertraline (ZOLOFT) 100 MG tablet; 1 TABLET BY MOUTH DAILY  Patient denies any manic symptoms. Denies any suicidal or homicidal ideation. Gastroesophageal reflux disease without esophagitis  Patient denies any dysphagia, unintentional weight loss, epigastric pain.   Advised

## 2023-09-14 NOTE — PATIENT INSTRUCTIONS
[facility], [phone], option 2, option 1

## 2023-09-15 LAB
ANION GAP SERPL CALCULATED.3IONS-SCNC: 12 MMOL/L (ref 3–16)
BUN SERPL-MCNC: 17 MG/DL (ref 7–20)
CALCIUM SERPL-MCNC: 9.3 MG/DL (ref 8.3–10.6)
CHLORIDE SERPL-SCNC: 101 MMOL/L (ref 99–110)
CO2 SERPL-SCNC: 23 MMOL/L (ref 21–32)
CREAT SERPL-MCNC: 0.9 MG/DL (ref 0.9–1.3)
GFR SERPLBLD CREATININE-BSD FMLA CKD-EPI: >60 ML/MIN/{1.73_M2}
GLUCOSE SERPL-MCNC: 90 MG/DL (ref 70–99)
POTASSIUM SERPL-SCNC: 3.9 MMOL/L (ref 3.5–5.1)
PSA SERPL DL<=0.01 NG/ML-MCNC: 0.3 NG/ML (ref 0–4)
SODIUM SERPL-SCNC: 136 MMOL/L (ref 136–145)

## 2023-09-27 ENCOUNTER — HOSPITAL ENCOUNTER (OUTPATIENT)
Dept: MRI IMAGING | Age: 43
Discharge: HOME | End: 2023-09-27
Attending: INTERNAL MEDICINE
Payer: COMMERCIAL

## 2023-09-27 DIAGNOSIS — N28.9 KIDNEY LESION, NATIVE, RIGHT: ICD-10-CM

## 2023-09-27 PROCEDURE — 6360000004 HC RX CONTRAST MEDICATION: Performed by: INTERNAL MEDICINE

## 2023-09-27 PROCEDURE — 2580000003 HC RX 258: Performed by: INTERNAL MEDICINE

## 2023-09-27 PROCEDURE — A9577 INJ MULTIHANCE: HCPCS | Performed by: INTERNAL MEDICINE

## 2023-09-27 PROCEDURE — 74183 MRI ABD W/O CNTR FLWD CNTR: CPT

## 2023-09-27 RX ORDER — SODIUM CHLORIDE 9 MG/ML
INJECTION, SOLUTION INTRAVENOUS ONCE
Status: COMPLETED | OUTPATIENT
Start: 2023-09-27 | End: 2023-09-27

## 2023-09-27 RX ADMIN — GADOBENATE DIMEGLUMINE 19 ML: 529 INJECTION, SOLUTION INTRAVENOUS at 18:26

## 2023-09-27 RX ADMIN — SODIUM CHLORIDE: 9 INJECTION, SOLUTION INTRAVENOUS at 18:26

## 2023-09-28 NOTE — RESULT ENCOUNTER NOTE
Bilateral cyst in the kidneys. Based on the finding it appears benign. Radiologist not recommending any follow-up imaging  There Is also cyst in the liver as well- Benign.   No further testing recommended

## 2023-09-29 ENCOUNTER — TELEPHONE (OUTPATIENT)
Dept: INTERNAL MEDICINE CLINIC | Age: 43
End: 2023-09-29

## 2023-09-29 NOTE — TELEPHONE ENCOUNTER
Patient called in regards to his MRI results from 9/27.  Requested if one of 's nurse can give him a call back at [phone]

## 2023-11-28 DIAGNOSIS — F52.4 PREMATURE EJACULATION: ICD-10-CM

## 2023-11-28 DIAGNOSIS — I10 ESSENTIAL HYPERTENSION: ICD-10-CM

## 2023-11-28 DIAGNOSIS — F32.A DEPRESSION, UNSPECIFIED DEPRESSION TYPE: ICD-10-CM

## 2023-11-28 DIAGNOSIS — K21.9 GASTROESOPHAGEAL REFLUX DISEASE WITHOUT ESOPHAGITIS: ICD-10-CM

## 2023-11-28 DIAGNOSIS — N52.9 ERECTILE DYSFUNCTION, UNSPECIFIED ERECTILE DYSFUNCTION TYPE: ICD-10-CM

## 2023-11-29 RX ORDER — HYDROCHLOROTHIAZIDE 25 MG/1
25 TABLET ORAL DAILY
Qty: 90 TABLET | Refills: 1 | Status: SHIPPED | OUTPATIENT
Start: 2023-11-29

## 2023-11-29 RX ORDER — SERTRALINE HYDROCHLORIDE 100 MG/1
TABLET, FILM COATED ORAL
Qty: 90 TABLET | Refills: 1 | Status: SHIPPED | OUTPATIENT
Start: 2023-11-29

## 2023-11-29 RX ORDER — LISINOPRIL 40 MG/1
40 TABLET ORAL DAILY
Qty: 90 TABLET | Refills: 1 | Status: SHIPPED | OUTPATIENT
Start: 2023-11-29

## 2023-11-29 RX ORDER — VERAPAMIL HYDROCHLORIDE 240 MG/1
240 TABLET, FILM COATED, EXTENDED RELEASE ORAL NIGHTLY
Qty: 90 TABLET | Refills: 1 | Status: SHIPPED | OUTPATIENT
Start: 2023-11-29

## 2023-11-29 RX ORDER — OMEPRAZOLE 20 MG/1
CAPSULE, DELAYED RELEASE ORAL
Qty: 90 CAPSULE | Refills: 1 | Status: SHIPPED | OUTPATIENT
Start: 2023-11-29

## 2023-11-29 RX ORDER — SILDENAFIL 100 MG/1
100 TABLET, FILM COATED ORAL PRN
Qty: 27 TABLET | Refills: 2 | Status: SHIPPED | OUTPATIENT
Start: 2023-11-29 | End: 2023-12-29

## 2024-03-14 ENCOUNTER — OFFICE VISIT (OUTPATIENT)
Dept: INTERNAL MEDICINE CLINIC | Age: 44
End: 2024-03-14
Payer: COMMERCIAL

## 2024-03-14 VITALS
WEIGHT: 293 LBS | OXYGEN SATURATION: 97 % | BODY MASS INDEX: 39.74 KG/M2 | SYSTOLIC BLOOD PRESSURE: 120 MMHG | DIASTOLIC BLOOD PRESSURE: 76 MMHG | HEART RATE: 87 BPM

## 2024-03-14 DIAGNOSIS — K21.9 GASTROESOPHAGEAL REFLUX DISEASE WITHOUT ESOPHAGITIS: ICD-10-CM

## 2024-03-14 DIAGNOSIS — Z79.899 LONG-TERM CURRENT USE OF PROTON PUMP INHIBITOR THERAPY: Primary | ICD-10-CM

## 2024-03-14 DIAGNOSIS — Z13.1 SCREENING FOR DIABETES MELLITUS: ICD-10-CM

## 2024-03-14 DIAGNOSIS — E66.01 CLASS 2 SEVERE OBESITY WITH SERIOUS COMORBIDITY AND BODY MASS INDEX (BMI) OF 39.0 TO 39.9 IN ADULT, UNSPECIFIED OBESITY TYPE (HCC): ICD-10-CM

## 2024-03-14 DIAGNOSIS — L30.9 DERMATITIS: ICD-10-CM

## 2024-03-14 DIAGNOSIS — Z13.220 LIPID SCREENING: ICD-10-CM

## 2024-03-14 DIAGNOSIS — E78.5 DYSLIPIDEMIA: ICD-10-CM

## 2024-03-14 DIAGNOSIS — F52.4 PREMATURE EJACULATION: ICD-10-CM

## 2024-03-14 DIAGNOSIS — I10 ESSENTIAL HYPERTENSION: ICD-10-CM

## 2024-03-14 PROCEDURE — 3078F DIAST BP <80 MM HG: CPT | Performed by: INTERNAL MEDICINE

## 2024-03-14 PROCEDURE — 99214 OFFICE O/P EST MOD 30 MIN: CPT | Performed by: INTERNAL MEDICINE

## 2024-03-14 PROCEDURE — 3074F SYST BP LT 130 MM HG: CPT | Performed by: INTERNAL MEDICINE

## 2024-03-14 RX ORDER — CLOBETASOL PROPIONATE 0.5 MG/G
OINTMENT TOPICAL
Qty: 45 G | Refills: 0 | Status: SHIPPED | OUTPATIENT
Start: 2024-03-14

## 2024-03-14 ASSESSMENT — PATIENT HEALTH QUESTIONNAIRE - PHQ9
2. FEELING DOWN, DEPRESSED OR HOPELESS: 0
9. THOUGHTS THAT YOU WOULD BE BETTER OFF DEAD, OR OF HURTING YOURSELF: 0
SUM OF ALL RESPONSES TO PHQ QUESTIONS 1-9: 0
10. IF YOU CHECKED OFF ANY PROBLEMS, HOW DIFFICULT HAVE THESE PROBLEMS MADE IT FOR YOU TO DO YOUR WORK, TAKE CARE OF THINGS AT HOME, OR GET ALONG WITH OTHER PEOPLE: 0
6. FEELING BAD ABOUT YOURSELF - OR THAT YOU ARE A FAILURE OR HAVE LET YOURSELF OR YOUR FAMILY DOWN: 0
SUM OF ALL RESPONSES TO PHQ QUESTIONS 1-9: 0
SUM OF ALL RESPONSES TO PHQ QUESTIONS 1-9: 0
8. MOVING OR SPEAKING SO SLOWLY THAT OTHER PEOPLE COULD HAVE NOTICED. OR THE OPPOSITE, BEING SO FIGETY OR RESTLESS THAT YOU HAVE BEEN MOVING AROUND A LOT MORE THAN USUAL: 0
1. LITTLE INTEREST OR PLEASURE IN DOING THINGS: 0
SUM OF ALL RESPONSES TO PHQ QUESTIONS 1-9: 0
7. TROUBLE CONCENTRATING ON THINGS, SUCH AS READING THE NEWSPAPER OR WATCHING TELEVISION: 0
3. TROUBLE FALLING OR STAYING ASLEEP: 0
4. FEELING TIRED OR HAVING LITTLE ENERGY: 0
5. POOR APPETITE OR OVEREATING: 0
SUM OF ALL RESPONSES TO PHQ9 QUESTIONS 1 & 2: 0

## 2024-03-14 NOTE — PROGRESS NOTES
mouth daily 90 tablet 1    hydroCHLOROthiazide (HYDRODIURIL) 25 MG tablet Take 1 tablet by mouth daily 90 tablet 1    sertraline (ZOLOFT) 100 MG tablet 1 TABLET BY MOUTH DAILY 90 tablet 1    omeprazole (PRILOSEC) 20 MG delayed release capsule 1 daily 90 capsule 1    folic acid (FOLVITE) 1 MG tablet Take 1 tablet by mouth daily 30 tablet 3    vitamin B-12 500 MCG tablet Take 1 tablet by mouth daily 30 tablet 3    Vibegron (GEMTESA) 75 MG TABS Take 1 tablet by mouth daily OVERACTIVE BLADDER      calcium carbonate (TUMS) 500 MG chewable tablet Take 6 tablets by mouth daily as needed for Heartburn      sildenafil (VIAGRA) 100 MG tablet Take 1 tablet by mouth as needed for Erectile Dysfunction 27 tablet 2     No current facility-administered medications for this visit.       Return in about 6 months (around 9/14/2024).  An After Visit Summary was printed and given to the patient.  Documentation was done using voice recognition dragon software.  Every effort was made to ensure accuracy; however, inadvertent  Unintentional computerized transcription errors may be present.

## 2024-05-13 DIAGNOSIS — N52.9 ERECTILE DYSFUNCTION, UNSPECIFIED ERECTILE DYSFUNCTION TYPE: ICD-10-CM

## 2024-05-13 RX ORDER — SILDENAFIL 100 MG/1
TABLET, FILM COATED ORAL
Qty: 27 TABLET | Refills: 4 | Status: SHIPPED | OUTPATIENT
Start: 2024-05-13

## 2024-05-13 NOTE — TELEPHONE ENCOUNTER
Medication:   Requested Prescriptions     Pending Prescriptions Disp Refills    sildenafil (VIAGRA) 100 MG tablet [Pharmacy Med Name: SILDENAFIL TABS 100MG] 27 tablet 4     Sig: TAKE 1 TABLET AS NEEDED FOR ERECTILE DYSFUNCTION        Last Filled:  11/29/23    Patient Phone Number: [phone] (home)     Last appt: 3/14/2024   Next appt: 9/16/2024    Last OARRS:        No data to display

## 2024-05-27 DIAGNOSIS — K21.9 GASTROESOPHAGEAL REFLUX DISEASE WITHOUT ESOPHAGITIS: ICD-10-CM

## 2024-05-27 DIAGNOSIS — I10 ESSENTIAL HYPERTENSION: ICD-10-CM

## 2024-05-28 RX ORDER — LISINOPRIL 40 MG/1
40 TABLET ORAL DAILY
Qty: 90 TABLET | Refills: 1 | Status: SHIPPED | OUTPATIENT
Start: 2024-05-28

## 2024-05-28 RX ORDER — VERAPAMIL HYDROCHLORIDE 240 MG/1
240 TABLET, FILM COATED, EXTENDED RELEASE ORAL NIGHTLY
Qty: 90 TABLET | Refills: 1 | Status: SHIPPED | OUTPATIENT
Start: 2024-05-28

## 2024-05-28 RX ORDER — OMEPRAZOLE 20 MG/1
CAPSULE, DELAYED RELEASE ORAL
Qty: 90 CAPSULE | Refills: 1 | Status: SHIPPED | OUTPATIENT
Start: 2024-05-28

## 2024-06-17 DIAGNOSIS — I10 ESSENTIAL HYPERTENSION: ICD-10-CM

## 2024-06-17 RX ORDER — HYDROCHLOROTHIAZIDE 25 MG/1
25 TABLET ORAL DAILY
Qty: 90 TABLET | Refills: 3 | Status: SHIPPED | OUTPATIENT
Start: 2024-06-17

## 2024-06-17 NOTE — TELEPHONE ENCOUNTER
Medication:   Requested Prescriptions     Pending Prescriptions Disp Refills    hydroCHLOROthiazide (HYDRODIURIL) 25 MG tablet [Pharmacy Med Name: HYDROCHLOROTHIAZIDE TABS 25MG] 90 tablet 3     Sig: TAKE 1 TABLET DAILY        Last Filled:  11/29/23    Patient Phone Number: [phone] (home)     Last appt: 3/14/2024   Next appt: 9/16/2024    Last OARRS:        No data to display

## 2024-07-03 DIAGNOSIS — F32.A DEPRESSION, UNSPECIFIED DEPRESSION TYPE: ICD-10-CM

## 2024-07-03 DIAGNOSIS — F52.4 PREMATURE EJACULATION: ICD-10-CM

## 2024-07-03 RX ORDER — SERTRALINE HYDROCHLORIDE 100 MG/1
TABLET, FILM COATED ORAL
Qty: 90 TABLET | Refills: 1 | Status: SHIPPED | OUTPATIENT
Start: 2024-07-03

## 2024-07-05 ENCOUNTER — HOSPITAL ENCOUNTER (OUTPATIENT)
Age: 44
Discharge: HOME | End: 2024-07-05
Payer: COMMERCIAL

## 2024-07-05 DIAGNOSIS — I10 ESSENTIAL HYPERTENSION: ICD-10-CM

## 2024-07-05 DIAGNOSIS — Z79.899 LONG-TERM CURRENT USE OF PROTON PUMP INHIBITOR THERAPY: ICD-10-CM

## 2024-07-05 DIAGNOSIS — Z13.1 SCREENING FOR DIABETES MELLITUS: ICD-10-CM

## 2024-07-05 DIAGNOSIS — E78.5 DYSLIPIDEMIA: ICD-10-CM

## 2024-07-05 DIAGNOSIS — Z13.220 LIPID SCREENING: ICD-10-CM

## 2024-07-05 LAB
25(OH)D3 SERPL-MCNC: 31.1 NG/ML
CHOLEST SERPL-MCNC: 193 MG/DL (ref 0–199)
HDLC SERPL-MCNC: 46 MG/DL (ref 40–60)
LDL CHOLESTEROL: 114 MG/DL
MAGNESIUM SERPL-MCNC: 2.4 MG/DL (ref 1.8–2.4)
TRIGL SERPL-MCNC: 167 MG/DL (ref 0–150)
TSH SERPL DL<=0.005 MIU/L-ACNC: 1.08 UIU/ML (ref 0.27–4.2)
VLDLC SERPL CALC-MCNC: 33 MG/DL

## 2024-07-05 PROCEDURE — 83036 HEMOGLOBIN GLYCOSYLATED A1C: CPT

## 2024-07-05 PROCEDURE — 80061 LIPID PANEL: CPT

## 2024-07-05 PROCEDURE — 36415 COLL VENOUS BLD VENIPUNCTURE: CPT

## 2024-07-05 PROCEDURE — 84443 ASSAY THYROID STIM HORMONE: CPT

## 2024-07-05 PROCEDURE — 82306 VITAMIN D 25 HYDROXY: CPT

## 2024-07-05 PROCEDURE — 83735 ASSAY OF MAGNESIUM: CPT

## 2024-07-06 LAB
EST. AVERAGE GLUCOSE BLD GHB EST-MCNC: 111.2 MG/DL
HBA1C MFR BLD: 5.5 %

## 2024-10-03 ENCOUNTER — OFFICE VISIT (OUTPATIENT)
Dept: INTERNAL MEDICINE CLINIC | Age: 44
End: 2024-10-03
Payer: COMMERCIAL

## 2024-10-03 VITALS
DIASTOLIC BLOOD PRESSURE: 74 MMHG | BODY MASS INDEX: 39.68 KG/M2 | WEIGHT: 293 LBS | OXYGEN SATURATION: 98 % | HEIGHT: 72 IN | SYSTOLIC BLOOD PRESSURE: 122 MMHG | HEART RATE: 68 BPM

## 2024-10-03 DIAGNOSIS — R35.0 URINARY FREQUENCY: ICD-10-CM

## 2024-10-03 DIAGNOSIS — Z12.5 SCREENING FOR PROSTATE CANCER: ICD-10-CM

## 2024-10-03 DIAGNOSIS — E66.812 CLASS 2 SEVERE OBESITY WITH SERIOUS COMORBIDITY AND BODY MASS INDEX (BMI) OF 39.0 TO 39.9 IN ADULT, UNSPECIFIED OBESITY TYPE: ICD-10-CM

## 2024-10-03 DIAGNOSIS — E66.01 CLASS 2 SEVERE OBESITY WITH SERIOUS COMORBIDITY AND BODY MASS INDEX (BMI) OF 39.0 TO 39.9 IN ADULT, UNSPECIFIED OBESITY TYPE: ICD-10-CM

## 2024-10-03 DIAGNOSIS — I10 ESSENTIAL HYPERTENSION: ICD-10-CM

## 2024-10-03 DIAGNOSIS — Z12.11 SCREEN FOR COLON CANCER: ICD-10-CM

## 2024-10-03 DIAGNOSIS — I10 ESSENTIAL HYPERTENSION: Primary | ICD-10-CM

## 2024-10-03 DIAGNOSIS — N32.81 OVERACTIVE BLADDER: ICD-10-CM

## 2024-10-03 DIAGNOSIS — F32.A DEPRESSION, UNSPECIFIED DEPRESSION TYPE: ICD-10-CM

## 2024-10-03 PROCEDURE — 3074F SYST BP LT 130 MM HG: CPT | Performed by: INTERNAL MEDICINE

## 2024-10-03 PROCEDURE — G2211 COMPLEX E/M VISIT ADD ON: HCPCS | Performed by: INTERNAL MEDICINE

## 2024-10-03 PROCEDURE — 3078F DIAST BP <80 MM HG: CPT | Performed by: INTERNAL MEDICINE

## 2024-10-03 PROCEDURE — 99214 OFFICE O/P EST MOD 30 MIN: CPT | Performed by: INTERNAL MEDICINE

## 2024-10-03 RX ORDER — MIRABEGRON 50 MG/1
50 TABLET, EXTENDED RELEASE ORAL DAILY
COMMUNITY

## 2024-10-03 SDOH — ECONOMIC STABILITY: FOOD INSECURITY: WITHIN THE PAST 12 MONTHS, YOU WORRIED THAT YOUR FOOD WOULD RUN OUT BEFORE YOU GOT MONEY TO BUY MORE.: NEVER TRUE

## 2024-10-03 SDOH — ECONOMIC STABILITY: FOOD INSECURITY: WITHIN THE PAST 12 MONTHS, THE FOOD YOU BOUGHT JUST DIDN'T LAST AND YOU DIDN'T HAVE MONEY TO GET MORE.: NEVER TRUE

## 2024-10-03 SDOH — ECONOMIC STABILITY: INCOME INSECURITY: HOW HARD IS IT FOR YOU TO PAY FOR THE VERY BASICS LIKE FOOD, HOUSING, MEDICAL CARE, AND HEATING?: NOT HARD AT ALL

## 2024-10-03 ASSESSMENT — ENCOUNTER SYMPTOMS
ABDOMINAL PAIN: 0
WHEEZING: 0
SHORTNESS OF BREATH: 0
TROUBLE SWALLOWING: 0
VOICE CHANGE: 0
VOMITING: 0
NAUSEA: 0

## 2024-10-03 NOTE — PROGRESS NOTES
Renan Seymour  1980  male  44 y.o.    SUBJECTIVE:    Chief Complaint   Patient presents with    6 Month Follow-Up       HPI:  Follow-up visit for chronic problems.  Patient continue to follow-up in [facility] for history of TTP.  As per patient recent lab work has been unremarkable.  He denies any excessive bruises.  Patient report he was told not to take any preventive vaccination anymore by one of his hematologist    History of overactive bladder as well as frequent urination.  Current medication Myrbetriq's works most of the time.  He did not pursue for additional procedure urine testing including UroLift and cystoscopy.  Hypertension:    Renan Seymour returns for follow up of hypertension.  Tolerating medications well and taking them as directed. Does not check BP at home. No symptoms (denies chest pain,dyspnea,edema or TIA's or blurred vision) concerning for end organ damage are present.      Past Medical History:   Diagnosis Date    Hypertension     OAB (overactive bladder)     YOHNANES (obstructive sleep apnea) 08/26/2020    Thrombocytopenia ([facility])     2022     Past Surgical History:   Procedure Laterality Date    APPENDECTOMY      CHOLECYSTECTOMY      CT BONE MARROW BIOPSY  10/19/2022    CT BONE MARROW BIOPSY 10/19/2022 [facility] CT SCAN    MOUTH SURGERY      TONSILLECTOMY      VASECTOMY      WISDOM TOOTH EXTRACTION       Social History     Socioeconomic History    Marital status:      Spouse name: None    Number of children: 2    Years of education: None    Highest education level: None   Occupational History    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   Tobacco Use    Smoking status: Never     Passive exposure: Past    Smokeless tobacco: Former     Types: Chew     Quit date: 1/1/2014   Vaping Use    Vaping status: Never Used   Substance and Sexual Activity    Alcohol use: Yes     Comment: rare    Drug use: No    Sexual activity: Yes     Partners: Female     Birth

## 2024-10-04 LAB
ANION GAP SERPL CALCULATED.3IONS-SCNC: 12 MMOL/L (ref 3–16)
BUN SERPL-MCNC: 14 MG/DL (ref 7–20)
CALCIUM SERPL-MCNC: 9.2 MG/DL (ref 8.3–10.6)
CHLORIDE SERPL-SCNC: 102 MMOL/L (ref 99–110)
CO2 SERPL-SCNC: 22 MMOL/L (ref 21–32)
CREAT SERPL-MCNC: 0.9 MG/DL (ref 0.9–1.3)
GFR SERPLBLD CREATININE-BSD FMLA CKD-EPI: >90 ML/MIN/{1.73_M2}
GLUCOSE SERPL-MCNC: 88 MG/DL (ref 70–99)
POTASSIUM SERPL-SCNC: 4.3 MMOL/L (ref 3.5–5.1)
PSA SERPL DL<=0.01 NG/ML-MCNC: 0.29 NG/ML (ref 0–4)
SODIUM SERPL-SCNC: 136 MMOL/L (ref 136–145)

## 2024-10-13 DIAGNOSIS — K21.9 GASTROESOPHAGEAL REFLUX DISEASE WITHOUT ESOPHAGITIS: ICD-10-CM

## 2024-10-14 NOTE — TELEPHONE ENCOUNTER
Medication:   Requested Prescriptions     Pending Prescriptions Disp Refills    omeprazole (PRILOSEC) 20 MG delayed release capsule 90 capsule 1     Sig: TAKE 1 CAPSULE DAILY        Last Filled:  24 (Pt states [facility] that the script is )    Patient Phone Number: There are no phone numbers on file.    Last appt: 10/3/2024   Next appt: 4/3/2025    Last OARRS:        No data to display

## 2024-11-25 DIAGNOSIS — I10 ESSENTIAL HYPERTENSION: ICD-10-CM

## 2024-11-25 RX ORDER — LISINOPRIL 40 MG/1
40 TABLET ORAL DAILY
Qty: 90 TABLET | Refills: 3 | Status: SHIPPED | OUTPATIENT
Start: 2024-11-25

## 2024-11-25 RX ORDER — VERAPAMIL HYDROCHLORIDE 240 MG/1
240 TABLET, FILM COATED, EXTENDED RELEASE ORAL NIGHTLY
Qty: 90 TABLET | Refills: 3 | Status: SHIPPED | OUTPATIENT
Start: 2024-11-25

## 2024-12-30 DIAGNOSIS — F32.A DEPRESSION, UNSPECIFIED DEPRESSION TYPE: ICD-10-CM

## 2024-12-30 DIAGNOSIS — F52.4 PREMATURE EJACULATION: ICD-10-CM

## 2024-12-30 RX ORDER — SERTRALINE HYDROCHLORIDE 100 MG/1
TABLET, FILM COATED ORAL
Qty: 90 TABLET | Refills: 3 | Status: SHIPPED | OUTPATIENT
Start: 2024-12-30

## 2024-12-30 NOTE — TELEPHONE ENCOUNTER
Medication:   Requested Prescriptions     Pending Prescriptions Disp Refills    sertraline (ZOLOFT) 100 MG tablet [Pharmacy Med Name: SERTRALINE HCL TABS 100MG] 90 tablet 3     Sig: TAKE 1 TABLET DAILY        Last Filled:  7/3/24    Patient Phone Number: There are no phone numbers on file.    Last appt: 10/3/2024   Next appt: 4/3/2025    Last OARRS:        No data to display

## 2025-04-03 ENCOUNTER — OFFICE VISIT (OUTPATIENT)
Dept: INTERNAL MEDICINE CLINIC | Age: 45
End: 2025-04-03
Payer: COMMERCIAL

## 2025-04-03 VITALS
BODY MASS INDEX: 40.14 KG/M2 | OXYGEN SATURATION: 97 % | DIASTOLIC BLOOD PRESSURE: 76 MMHG | HEART RATE: 84 BPM | WEIGHT: 296 LBS | SYSTOLIC BLOOD PRESSURE: 130 MMHG

## 2025-04-03 DIAGNOSIS — K21.9 GASTROESOPHAGEAL REFLUX DISEASE WITHOUT ESOPHAGITIS: ICD-10-CM

## 2025-04-03 DIAGNOSIS — F32.A DEPRESSION, UNSPECIFIED DEPRESSION TYPE: ICD-10-CM

## 2025-04-03 DIAGNOSIS — F52.4 PREMATURE EJACULATION: ICD-10-CM

## 2025-04-03 DIAGNOSIS — I10 ESSENTIAL HYPERTENSION: ICD-10-CM

## 2025-04-03 PROCEDURE — G2211 COMPLEX E/M VISIT ADD ON: HCPCS | Performed by: INTERNAL MEDICINE

## 2025-04-03 PROCEDURE — 99214 OFFICE O/P EST MOD 30 MIN: CPT | Performed by: INTERNAL MEDICINE

## 2025-04-03 PROCEDURE — 3078F DIAST BP <80 MM HG: CPT | Performed by: INTERNAL MEDICINE

## 2025-04-03 PROCEDURE — 3075F SYST BP GE 130 - 139MM HG: CPT | Performed by: INTERNAL MEDICINE

## 2025-04-03 SDOH — ECONOMIC STABILITY: FOOD INSECURITY: WITHIN THE PAST 12 MONTHS, YOU WORRIED THAT YOUR FOOD WOULD RUN OUT BEFORE YOU GOT MONEY TO BUY MORE.: NEVER TRUE

## 2025-04-03 SDOH — ECONOMIC STABILITY: FOOD INSECURITY: WITHIN THE PAST 12 MONTHS, THE FOOD YOU BOUGHT JUST DIDN'T LAST AND YOU DIDN'T HAVE MONEY TO GET MORE.: NEVER TRUE

## 2025-04-03 ASSESSMENT — PATIENT HEALTH QUESTIONNAIRE - PHQ9
6. FEELING BAD ABOUT YOURSELF - OR THAT YOU ARE A FAILURE OR HAVE LET YOURSELF OR YOUR FAMILY DOWN: NOT AT ALL
8. MOVING OR SPEAKING SO SLOWLY THAT OTHER PEOPLE COULD HAVE NOTICED. OR THE OPPOSITE, BEING SO FIGETY OR RESTLESS THAT YOU HAVE BEEN MOVING AROUND A LOT MORE THAN USUAL: NOT AT ALL
SUM OF ALL RESPONSES TO PHQ QUESTIONS 1-9: 0
10. IF YOU CHECKED OFF ANY PROBLEMS, HOW DIFFICULT HAVE THESE PROBLEMS MADE IT FOR YOU TO DO YOUR WORK, TAKE CARE OF THINGS AT HOME, OR GET ALONG WITH OTHER PEOPLE: NOT DIFFICULT AT ALL
SUM OF ALL RESPONSES TO PHQ QUESTIONS 1-9: 0
7. TROUBLE CONCENTRATING ON THINGS, SUCH AS READING THE NEWSPAPER OR WATCHING TELEVISION: NOT AT ALL
1. LITTLE INTEREST OR PLEASURE IN DOING THINGS: NOT AT ALL
2. FEELING DOWN, DEPRESSED OR HOPELESS: NOT AT ALL
9. THOUGHTS THAT YOU WOULD BE BETTER OFF DEAD, OR OF HURTING YOURSELF: NOT AT ALL
SUM OF ALL RESPONSES TO PHQ QUESTIONS 1-9: 0
SUM OF ALL RESPONSES TO PHQ QUESTIONS 1-9: 0
3. TROUBLE FALLING OR STAYING ASLEEP: NOT AT ALL
5. POOR APPETITE OR OVEREATING: NOT AT ALL
4. FEELING TIRED OR HAVING LITTLE ENERGY: NOT AT ALL

## 2025-04-03 ASSESSMENT — ENCOUNTER SYMPTOMS
SHORTNESS OF BREATH: 0
NAUSEA: 0
WHEEZING: 0
PHOTOPHOBIA: 0
BACK PAIN: 0
VOMITING: 0
ABDOMINAL PAIN: 0

## 2025-04-03 NOTE — PROGRESS NOTES
Renan Seymour  1980  male  44 y.o.    SUBJECTIVE:    Chief Complaint   Patient presents with    6 Month Follow-Up       History of Present Illness  The patient presents for hypertension, rapid heartbeat, depression, TTP, heartburn,.    No new health concerns since the last visit. Blood pressure is well-regulated at 130/76. Home monitoring is not performed. Medications: lisinopril, hydrochlorothiazide.    Verapamil is taken for history of rapid heartbeat/and hypertension,   Patient denies any exertional chest pain, dyspnea, palpitations, syncope, orthopnea, edema or paroxysmal nocturnal dyspnea.    Sertraline is taken for depression, with no side effects such as suicidal ideation, delusions, or hallucinations.    Follow-up with  [facility] for TTP in 02/2025, indicated stable condition. Physical activity is good, weight consistent.    Omeprazole is taken daily for heartburn.  Denies any red flag symptoms    Vitamin B12 supplement is taken for TTP, recommended by hematologist as per patient.         Past Medical History:   Diagnosis Date    Hypertension     OAB (overactive bladder)     YOHANNES (obstructive sleep apnea) 08/26/2020    Thrombocytopenia     2022     Past Surgical History:   Procedure Laterality Date    APPENDECTOMY      CHOLECYSTECTOMY      CT BIOPSY BONE MARROW  10/19/2022    CT BONE MARROW BIOPSY 10/19/2022 [facility] CT SCAN    MOUTH SURGERY      OTHER SURGICAL HISTORY  10/07/2024    [place] by Dr Hammond    TONSILLECTOMY      VASECTOMY      WISDOM TOOTH EXTRACTION       Social History     Socioeconomic History    Marital status:      Spouse name: None    Number of children: 2    Years of education: None    Highest education level: None   Occupational History    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   Tobacco Use    Smoking status: Never     Passive exposure: Past    Smokeless tobacco: Former     Types: Chew     Quit date: 1/1/2014   Vaping Use    Vaping status: Never

## 2025-04-28 DIAGNOSIS — K21.9 GASTROESOPHAGEAL REFLUX DISEASE WITHOUT ESOPHAGITIS: ICD-10-CM

## 2025-04-28 RX ORDER — OMEPRAZOLE 20 MG/1
20 CAPSULE, DELAYED RELEASE ORAL DAILY
Qty: 90 CAPSULE | Refills: 3 | Status: SHIPPED | OUTPATIENT
Start: 2025-04-28

## 2025-05-28 DIAGNOSIS — S91.209A AVULSION OF TOENAIL, INITIAL ENCOUNTER: Primary | ICD-10-CM

## 2025-06-12 DIAGNOSIS — I10 ESSENTIAL HYPERTENSION: ICD-10-CM

## 2025-06-12 RX ORDER — HYDROCHLOROTHIAZIDE 25 MG/1
25 TABLET ORAL DAILY
Qty: 90 TABLET | Refills: 1 | Status: SHIPPED | OUTPATIENT
Start: 2025-06-12

## 2025-06-12 NOTE — TELEPHONE ENCOUNTER
Last OV: 4/3/2025  Next OV: 10/6/2025    Next appointment due:around 10/3/2025     Last fill:6/17/24  Refills:3 # 90

## 2025-07-14 DIAGNOSIS — N52.9 ERECTILE DYSFUNCTION, UNSPECIFIED ERECTILE DYSFUNCTION TYPE: ICD-10-CM

## 2025-07-14 RX ORDER — SILDENAFIL 100 MG/1
TABLET, FILM COATED ORAL
Qty: 24 TABLET | Refills: 0 | Status: SHIPPED | OUTPATIENT
Start: 2025-07-14

## 2025-07-14 NOTE — TELEPHONE ENCOUNTER
Last OV: 4/3/2025  Next OV: 10/6/2025    Next appointment due:around 10/3/2025     Last fill:5/13/24  Refills:4 #27

## 2025-08-01 ENCOUNTER — OFFICE VISIT (OUTPATIENT)
Dept: INTERNAL MEDICINE CLINIC | Age: 45
End: 2025-08-01
Payer: COMMERCIAL

## 2025-08-01 VITALS
HEIGHT: 72 IN | DIASTOLIC BLOOD PRESSURE: 82 MMHG | WEIGHT: 269.8 LBS | BODY MASS INDEX: 36.54 KG/M2 | HEART RATE: 71 BPM | SYSTOLIC BLOOD PRESSURE: 126 MMHG | TEMPERATURE: 97.8 F | OXYGEN SATURATION: 99 %

## 2025-08-01 DIAGNOSIS — L03.90 CELLULITIS, UNSPECIFIED CELLULITIS SITE: Primary | ICD-10-CM

## 2025-08-01 PROCEDURE — 3079F DIAST BP 80-89 MM HG: CPT

## 2025-08-01 PROCEDURE — 99213 OFFICE O/P EST LOW 20 MIN: CPT

## 2025-08-01 PROCEDURE — 3074F SYST BP LT 130 MM HG: CPT

## 2025-08-01 RX ORDER — SULFAMETHOXAZOLE AND TRIMETHOPRIM 800; 160 MG/1; MG/1
1 TABLET ORAL 2 TIMES DAILY
Qty: 20 TABLET | Refills: 0 | Status: SHIPPED | OUTPATIENT
Start: 2025-08-01 | End: 2025-08-11

## 2025-08-02 ENCOUNTER — PATIENT MESSAGE (OUTPATIENT)
Dept: INTERNAL MEDICINE CLINIC | Age: 45
End: 2025-08-02

## 2025-08-04 ENCOUNTER — PATIENT MESSAGE (OUTPATIENT)
Dept: INTERNAL MEDICINE CLINIC | Age: 45
End: 2025-08-04

## 2025-09-03 DIAGNOSIS — N52.9 ERECTILE DYSFUNCTION, UNSPECIFIED ERECTILE DYSFUNCTION TYPE: ICD-10-CM

## 2025-09-04 RX ORDER — SILDENAFIL 100 MG/1
TABLET, FILM COATED ORAL
Qty: 30 TABLET | Refills: 5 | Status: SHIPPED | OUTPATIENT
Start: 2025-09-04